# Patient Record
Sex: FEMALE | Race: WHITE
[De-identification: names, ages, dates, MRNs, and addresses within clinical notes are randomized per-mention and may not be internally consistent; named-entity substitution may affect disease eponyms.]

---

## 2019-06-25 NOTE — XMS REPORT
MU2 Ambulatory Summary

                             Created on: 2019



Elsi Casillas

External Reference #: [id number]

: 1940

Sex: Female



Demographics







                          Address                   [address] [place]  [address]

 

                          Home Phone                [phone]

 

                          Preferred Language        English

 

                          Marital Status            

 

                          Anabaptism Affiliation     Unknown

 

                          Race                      White

 

                          Ethnic Group              Not  or 





Author







                          Author                    DEON LEON

 

                          [facility]

 

                          Address                   [address]

[place], [place]  [address]



 

                          Phone                     [phone]







Care Team Providers







                    Care Team Member Name    Role                Phone

 

                    DEON LEON    PCP                 [phone]

 

                    DEON LEON    PreferredProvider    [phone]







Allergies and Adverse Reactions







                    Name                Reaction            Notes

 

                    SULFA (SULFONAMIDES)    nausea               







Plan of Treatment







             Planned Activity    Comments     Planned Date    Planned Time    Plan/Goal

 

             Lipid Profile                 2016    12:00 AM      

 

             Chest PA and Lateral - Main                 8/10/2017    12:00 AM      

 

             Lipid Profile                 2018    12:00 AM      







Medications







                                        Active 

 

             Name         Start Date    Estimated Completion Date    SIG          Comments

 

                Calcium 600 + D(3) 600 mg(1,500mg) -200 unit oral tablet                                    take 2 tablets by

 oral route daily                        

 

                pravastatin 20 mg oral tablet                                    take 1 tablet (20 mg) by oral route once daily

                                         

 

                Toprol XL 25 mg oral tablet extended release 24 hr                                    take 1 tablet (25 mg) 

by oral route once daily                 

 

                    albuterol sulfate 1.25 mg/3 mL inhalation solution for nebulization    2017           

                                        inhale 3 milliliters (1.25 mg) via nebulizer by inhalation route 4 times per day

  DX J44.9                               

 

                                        ipratropium-albuterol 0.5 mg-3 mg(2.5 mg base)/3 mL inhalation solution for nebulization

                    2018                                inhale 3 milliliters by nebulization route 4 times per day for COPD

                                         

 

                metoprolol succinate 25 mg oral tablet extended release 24 hr    2018                       TAKE

 1 TABLET DAILY                          

 

                Lasix 40 mg oral tablet    2018                       take 1 tablet (40 mg) by oral route once 

daily                                    

 

                fluticasone 50 mcg/actuation nasal spray,suspension    10/2/2018                       inhale 1 spray

 (50 mcg) in each nostril by intranasal route 2 times per day     

 

             hydrochlorothiazide 25 mg oral tablet    2018                 TAKE 1 TABLET DAILY     

 

                lisinopril 10 mg oral tablet    2018      3/11/2019       take 1 tablet (10 mg) by oral

 route once daily for 30 days            

 

                Sudogest 30 mg oral tablet    2018                      take 1-2 tablets by oral route every 

6 hours as needed                        

 

                amoxicillin 500 mg oral capsule    2019       take 1 capsule (500 mg) by

 oral route 3 times per day for 10 days     









                                         

 

             Name         Start Date    Expiration Date    SIG          Comments

 

                Singulair 10 mg oral tablet    9/3/2010        2011        take 1 tablet (10 mg) by oral route

 daily  for 60 days                      

 

                Zithromax Z-Christopher 250 mg oral tablet    2011       take 2 tablets (500 mg)

 by oral route once daily for 1 day then 1 tablet (250 mg) by oral route once 
daily for 4 days                         

 

                Medrol (Christopher) 4 mg oral tablets,dose pack    2011        take as directed

 for 6 days                              

 

                Keflex 500 mg oral capsule    3/1/2012        3/11/2012       take 1 capsule by oral route 3 

times a day for 10 days                  

 

                Cipro 500 mg oral tablet    2012        take 1 tablet (500 mg) by oral route

 every 12 hours for 15 days              

 

                benzonatate 100 mg oral capsule    2013       take 1 capsule (100 mg) by

 oral route 3 times per day for cough     

 

             Medrol (Christopher) 4 mg oral tablets,dose pack    2013     take as directed    

 

 

                Zyrtec 10 mg oral tablet    2013       take 1 tablet (10 mg) by oral route

 once daily for 30 days                  

 

                Flonase 50 mcg/actuation nasal spray,suspension    2013       inhale 1 spray

 in each nostril by intranasal route 2 times per day for 30 days     

 

                cephalexin 500 mg oral capsule    2013        take 1 capsule (500 mg) by oral

 route every 8 hours                     

 

                promethazine-codeine 6.25-10 mg/5 mL oral syrup    2013                       take 5 milliliters

 by oral route every 4 hours as needed, not to exceed 30 mL in 24 hours     

 

                Zithromax Z-Christopher 250 mg oral tablet    10/2/2013       10/7/2013       take 2 tablets (500 mg)

 by oral route once daily for 1 day then 1 tablet (250 mg) by oral route once 
daily for 4 days                         

 

                amoxicillin 500 mg oral capsule    2014       take 1 capsule by oral route

 3 times a day for 15 days               

 

                lovastatin 20 mg oral tablet    2014        take 1 tablet (20 mg) by oral 

route daily  for 30 days                 

 

                Zithromax Z-Christopher 250 mg oral tablet    2014       take 2 tablets (500 mg)

 by oral route once daily for 1 day then 1 tablet (250 mg) by oral route once 
daily for 4 days                         

 

             Medrol (Christopher) 4 mg oral tablets,dose pack    2014                 take as directed     

 

                amoxicillin 500 mg oral capsule    2014                       take 1 capsule (500 mg) by oral route

 3 times per day                         

 

                Levaquin 500 mg oral tablet    10/16/2014      10/26/2014      take 1 tablet (500 mg) by oral

 route once daily for 10 days            

 

                prednisone 20 mg oral tablet    10/16/2014      10/24/2014      4x2 days 3x2 days 2x2 days

 1x2 days                                

 

                Zithromax Z-Christopher 250 mg oral tablet    2014       take 2 tablets (500 mg)

 by oral route once daily for 1 day then 1 tablet (250 mg) by oral route once 
daily for 4 days                         

 

                Bactrim -160 mg oral tablet    1/15/2015       2015       take 1 tablet by oral route

 every 12 hours for 10 days              

 

                Zithromax Z-Christopher 250 mg oral tablet    2015      take 2 tablets (500 mg)

 by oral route once daily for 1 day then 1 tablet (250 mg) by oral route once 
daily for 4 days                         

 

                Keflex 500 mg oral capsule    2016       take 1 capsule by oral route 3

 times a day for 7 days                  

 

                amoxicillin 500 mg oral capsule    10/4/2016       10/14/2016      take 1 capsule by oral route

 3 times a day for 10 days               

 

                Tessalon Perles 100 mg oral capsule    10/4/2016                       take 1 capsule by oral route 

every 4 hours                            

 

                Zithromax Z-Christopher 250 mg oral tablet    11/10/2016      11/15/2016      take 2 tablets (500 

mg) by oral route once daily for 1 day then 1 tablet (250 mg) by oral route once
daily for 4 days                         

 

                amoxicillin 500 mg oral tablet    2016                       take 1 tablet (500 mg) by oral route

 3 times per day                         

 

                amoxicillin 500 mg oral capsule    2017       take 1 capsule (500 mg) by

 oral route 3 times per day for 10 days     

 

                Bactrim -160 mg oral tablet    2017       take 1 tablet by oral route

 2 times a day for 10 days               

 

                Levaquin 500 mg oral tablet    2017        take 1 tablet (500 mg) by oral

 route once daily for 10 days            

 

                amoxicillin 500 mg oral capsule    2017                       take 1 capsule (500 mg) by oral route

 every 12 hours for 7 days               

 

                Zithromax Z-Christopher 250 mg oral tablet    2017                      take 2 tablets (500 mg) by oral

 route once daily for 1 day then 1 tablet (250 mg) by oral route once daily for 
4 days                                   

 

                amoxicillin 500 mg oral tablet    2018                       take 1 tablet (500 mg) by oral route

 every 12 hours for 10 days              

 

                Cipro 500 mg oral tablet    2018        2/15/2018       take 1 tablet (500 mg) by oral route

 2 times per day for 10 days             

 

                Zithromax Z-Christopher 250 mg oral tablet    2018       take 2 tablets (500 mg)

 by oral route once daily for 1 day then 1 tablet (250 mg) by oral route once 
daily for 4 days                         

 

                Keflex 500 mg oral capsule    2018       take 1 capsule (500 mg) by oral

 route every 12 hours for 7 days         

 

                prednisone 20 mg oral tablet    2018       2X4 days 1X4 days 1/2X4 days 

                                         

 

                Levaquin 500 mg oral tablet    2018       take 1 tablet (500 mg) by oral

 route once daily for 7 days             









                                        Discontinued 

 

             Name         Start Date    Discontinued Date    SIG          Comments

 

                amoxicillin 500 mg oral capsule    11/10/2011      10/3/2012       take 1 capsule (500 mg) 

by oral route 3 times per day            

 

                Anusol-HC 25 mg rectal suppository    2011      10/3/2012       insert 1 suppository 

(25 mg) by rectal route 2 times per day     

 

                Proctofoam 1 % topical foam    2011       apply by external route daily

                                         

 

             Aerochamber    2014    Use with inhaler as directed     

 

                hydrochlorothiazide 25 mg oral tablet    1/15/2015       2015      TAKE 1 TABLET DAILY

                                         

 

                promethazine-codeine 6.25-10 mg/5 mL oral syrup    11/10/2016      10/25/2017      take 5 

milliliters by oral route every 6 hours as needed, not to exceed 30 mL in 24 
hours                                    

 

                prednisone 20 mg oral tablet    2017       10/25/2017      4 x 2 days, 3 x 2 days, 2 x

 2 days 1 x 2 days                       

 

                Augmentin 875-125 mg oral tablet    2017       take 1 tablet by oral route

 every 12 hours for 7 days               

 

                Keflex 500 mg oral capsule    2017       take 1 capsule (500 mg) by oral

 route every 12 hours                    

 

                Keflex 500 mg oral capsule    10/10/2017      2018       take 1 capsule (500 mg) by oral

 route every 12 hours for 10 days        

 

                    Symbicort 160-4.5 mcg/actuation inhalation HFA aerosol inhaler    10/25/2017          10/6/2018

                          inhale 2 puffs by inhalation route 2 times per day in the morning and evening    

 

 

                Levaquin 500 mg oral tablet    2018       take 1 tablet (500 mg) by oral

 route once daily for 10 days            

 

                Bactrim -160 mg oral tablet    2018       take 1 tablet by oral route

 every 12 hours for 10 days             nausea

 

                Tessalon Perles 100 mg oral capsule    2018        10/6/2018       take 1 capsule (100 mg)

 by oral route 3 times per day as needed for cough     

 

                Sudogest 30 mg oral tablet    2018        10/6/2018       take 1 tablets (60 mg) by oral 

route every 6 hours as needed            







Problem List







                    Description         Status              Onset

 

                    Chronic Obstructive Pulmonary Disease    Active               

 

                    Hyperlipidemia      Active               

 

                    Anxiety disorder    Active              10/29/2013

 

                    Pain in joint; Hip    Active              06/10/2014

 

                    Pulmonary nodule seen on imaging study    Active              10/29/2014

 

                    Exercise hypoxemia    Active              10/29/2014

 

                    Anxiety about health    Active              2016

 

                    Primary osteoarthritis involving multiple joints    Active              2017

 

                    Medication management    Active              2017

 

                    Cellulitis of left lower extremity    Active              2017

 

                    Dog bite of calf, left, sequela    Active              2017

 

                    Peripheral edema    Active              2018







Vital Signs







      Date    Time    BP-Sys(mm[Hg]    BP-Noni(mm[Hg])    HR(bpm)    RR(rpm)    Temp    WT    HT    HC    BMI

                    BSA                 BMI Percentile      O2 Sat(%)

 

        2019    3:30:00 PM    108 mmHg    72 mmHg    82 bpm    18 rpm    98.1 F    142 lbs    62 in 

                          25.9719 kg/m    1.6786 m                 89 %

 

        2018    9:30:00 AM    154 mmHg    100 mmHg    108 bpm    20 rpm    98.1 F    145 lbs    62 

in                        26.52 kg/m2    1.70 m2                   91 %

 

        10/4/2018    9:00:00 AM    114 mmHg    74 mmHg    76 bpm    18 rpm    98.4 F    145 lbs    61 in

                          27.3972 kg/m    1.6825 m                 97 %

 

        2018    10:07:00 AM    124 mmHg    82 mmHg    86 bpm    18 rpm    98.2 F    149 lbs    61 in

                          28.15 kg/m2    1.71 m2                   92 %

 

        2018    10:48:00 AM    118 mmHg    80 mmHg    82 bpm    18 rpm    98.3 F    144 lbs    61 in

                          27.2083 kg/m    1.6767 m                 93 %

 

        2018    2:35:00 PM    120 mmHg    82 mmHg    106 bpm    20 rpm    98.2 F    142 lbs    62 in

                          25.97 kg/m2    1.68 m2                   92 %

 

       2018    3:28:00 PM    122 mmHg    68 mmHg    114 bpm    18 rpm    98.2 F    142 lbs            

                                                                90 %

 

        2018    1:35:00 PM    112 mmHg    74 mmHg    114 bpm    18 rpm    99.6 F    139 lbs    63 in

                          24.6225 kg/m    1.6741 m                 98 %

 

       2018    3:57:00 PM    128 mmHg    80 mmHg    78 bpm    18 rpm    98.4 F    148 lbs             

                                                                90 %

 

        10/24/2017    1:51:00 PM    132 mmHg    80 mmHg    82 bpm    16 rpm    98.2 F    145 lbs    62 in

                          26.52 kg/m2    1.70 m2                   95 %

 

       2017    3:29:00 PM    128 mmHg    80 mmHg    68 bpm    16 rpm    98 F    144 lbs    63 in    

                25.5082 kg/m    1.7039 m                      93 %

 

        2017    11:37:00 AM    118 mmHg    72 mmHg    96 bpm    16 rpm    97.4 F    141 lbs    63 in

                          24.98 kg/m2    1.69 m2                   91 %

 

        2017    8:55:00 AM    105 mmHg    60 mmHg    96 bpm    18 rpm    95.8 F    142 lbs    63 in 

                          25.1539 kg/m    1.6921 m                 95 %

 

       2017    12:10:00 PM    122 mmHg    68 mmHg    76 bpm    18 rpm    98 F    143 lbs    63 in    

                25.33 kg/m2     1.70 m2                         98 %

 

        2017    4:03:00 PM    118 mmHg    64 mmHg    106 bpm    18 rpm    97.4 F    137 lbs    63 in

                          24.2682 kg/m    1.662 m                 98 %

 

        2016    12:11:00 PM    120 mmHg    84 mmHg    110 bpm    16 rpm    98.1 F    130 lbs    63

 in                       23.03 kg/m2    1.62 m2                   90 %

 

        11/10/2016    3:57:00 PM    148 mmHg    72 mmHg    88 bpm    16 rpm    98.2 F    132 lbs    63 in

                          23.3825 kg/m    1.6314 m                 98 %

 

        3/10/2016    2:12:00 PM    122 mmHg    60 mmHg    106 bpm    18 rpm    97 F    137 lbs    63 in 

                          24.27 kg/m2    1.66 m2                   93 %

 

        12/15/2015    2:33:00 PM    120 mmHg    75 mmHg    102 bpm    16 rpm    98.2 F    137 lbs    63 

in                        24.2682 kg/m    1.662 m                 94 %

 

        10/26/2015    2:46:00 PM    130 mmHg    80 mmHg    96 bpm    16 rpm    97.9 F    141 lbs    66 in

                          22.76 kg/m2    1.73 m2                   98 %

 

        10/6/2015    9:37:00 AM    140 mmHg    78 mmHg    110 bpm    16 rpm    97.9 F    141 lbs    63 in

                          24.9768 kg/m    1.6861 m                 95 %

 

        10/28/2014    2:25:00 PM    132 mmHg    66 mmHg    92 bpm    24 rpm    97.5 F    147 lbs    63 in

                          26.04 kg/m2    1.72 m2                   92 %

 

        10/16/2014    9:45:00 AM    132 mmHg    64 mmHg    74 bpm    24 rpm    97.7 F    146 lbs    63 in

                          25.8625 kg/m    1.7157 m                 92 %

 

        2014    2:31:00 PM    136 mmHg    68 mmHg    90 bpm    22 rpm    98.2 F    148 lbs    63 in 

                          26.22 kg/m2    1.73 m2                   95 %

 

        2014    3:19:00 PM    124 mmHg    70 mmHg    64 bpm    20 rpm    97.8 F    147 lbs    63 in

                          26.0396 kg/m    1.7216 m                 95 %

 

        10/28/2013    10:31:00 AM    140 mmHg    70 mmHg    92 bpm    24 rpm    97.8 F    143 lbs    63 

in                        25.33 kg/m2    1.70 m2                   95 %

 

      2013    2:51:00 PM    130 mmHg    78 mmHg    90 bpm          98.2 F    168 lbs    65 in          

27.9564 kg/m      1.8694 m                               

 

       2013    2:43:00 PM    110 mmHg    76 mmHg    103 bpm           96.6 F    137 lbs    63 in      

                24.27 kg/m2     1.66 m2                          

 

        2013    9:50:00 AM    150 mmHg    66 mmHg    108 bpm    20 rpm    97.8 F    138 lbs    63 in

                          24.4454 kg/m    1.6681 m                 94 %

 

        10/25/2012    9:08:00 AM    110 mmHg    60 mmHg    88 bpm    18 rpm    97.5 F    142 lbs    63 in

                          25.15 kg/m2    1.69 m2                   94 %

 

      10/3/2012    9:33:00 AM    130 mmHg    60 mmHg    98 bpm    18 rpm          146 lbs    63 in          

25.8625 kg/m      1.7157 m                              95 %

 

      2012    2:31:00 PM    116 mmHg    76 mmHg    88 bpm                140 lbs    63 in          24.80 

kg/m2               1.68 m2                                 96 %

 

     2011    10:02:00 AM    122 mmHg    80 mmHg    110 bpm              144 lbs                        

                                        94 %

 

      2011    2:58:00 PM    124 mmHg    84 mmHg    106 bpm                155 lbs    63 in          27.4567

 kg/m             1.7678 m                              96 %

 

     2011    9:55:00 AM    114 mmHg    78 mmHg    92 bpm              146 lbs                             95

 %

 

     6/3/2011    8:36:00 AM    116 mmHg    74 mmHg    90 bpm              146 lbs                             96

 %

 

     2010    3:01:00 PM    132 mmHg    84 mmHg    102 bpm              149 lbs                          

                                        94 %

 

     2010    11:46:00 AM    124 mmHg    78 mmHg    104 bpm              151 lbs                         

                                        94 %

 

     2010    8:47:00 AM    116 mmHg    78 mmHg    103 bpm              151 lbs                          

                                        95 %







Social History







                    Name                Description         Comments

 

                    Alcohol             Never                

 

                    Uses seatbelts                           

 

                    Tobacco             Never smoker         







History of Procedures







                    Date Ordered        Description         Order Status

 

                    2011 12:00 AM    THER/PROPH/DIAG INJ SC/IM    Reviewed

 

                    2011 12:00 AM    Decadron Inj.1mg-([facility]) N[facility] #[id number]    Reviewed

 

                    2011 12:00 AM    Depo-Medrol 80 Mg Im/[facility] 0009-128722    Reviewed

 

                    2011 12:00 AM    Rocephin, Per 250MG - 1 Gram Vial NDC 0979-330244-[facility]    Reviewed



 

                    3/16/2012 12:00 AM    ROUTINE VENIPUNCTURE    Reviewed

 

                    3/16/2012 12:00 AM    COMPLETE CBC W/AUTO DIFF WBC    Reviewed

 

                    3/16/2012 12:00 AM    COMPREHEN METABOLIC PANEL    Reviewed

 

                    3/16/2012 12:00 AM    ASSAY THYROID STIM HORMONE    Reviewed

 

                    11/10/2016 12:00 AM    Decadron, Per 1 Mg N[facility]# [address]-64    Reviewed

 

                    11/10/2016 12:00 AM    Depo-Medrol, Per 80 Mg NDC#1614-7108-25    Reviewed

 

                    11/10/2016 12:00 AM    Rocephin 1 gram NDC#3820-8517-17    Reviewed

 

                    2016 12:00 AM    THER/PROPH/DIAG INJ SC/IM    Reviewed

 

                    2016 12:00 AM    Decadron 8mg Injection, [facility]    Reviewed

 

                    2016 12:00 AM    Depo-Medrol 80mg Injection, [facility]    Reviewed

 

                    2016 12:00 AM    Rocephin 1 gram Injection, [facility]    Reviewed

 

                    2017 12:00 AM    COMPLETE CBC W/AUTO DIFF WBC    Reviewed

 

                    2017 12:00 AM    COMPREHEN METABOLIC PANEL    Reviewed

 

                    2017 12:00 AM    LIPID PANEL         Reviewed

 

                    2017 12:00 AM    THERAPEUTIC PROPHYLACTIC/DX INJECTION SUBQ/IM    Reviewed

 

                    2017 12:00 AM    Decadron 8mg Injection, [facility]    Reviewed

 

                    2017 12:00 AM    Depo-Medrol 80mg Injection, [facility]    Reviewed

 

                    2017 12:00 AM    LIPID PANEL         Returned

 

                    2017 12:00 AM    THERAPEUTIC PROPHYLACTIC/DX INJECTION SUBQ/IM    Reviewed

 

                    2017 12:00 AM    Decadron 8mg Injection, [facility]    Reviewed

 

                    2017 12:00 AM    Depo-Medrol 80mg Injection, [facility]    Reviewed

 

                    10/3/2012 12:00 AM    THER/PROPH/DIAG INJ SC/IM    Reviewed

 

                    10/3/2012 12:00 AM    Decadron, Per 1 Mg NDC# [address]-17    Reviewed

 

                    10/3/2012 12:00 AM    Depo-Medrol, Per 80 Mg NDC#6732-9430-36    Reviewed

 

                    10/3/2012 12:00 AM    Toradol 60 Mg NDC#4621-0774-48    Reviewed

 

                    10/24/2017 12:00 AM    THERAPEUTIC PROPHYLACTIC/DX INJECTION SUBQ/IM    Reviewed

 

                    10/24/2017 12:00 AM    Decadron 8mg Injection, [facility] Medicare    Reviewed

 

                    10/24/2017 12:00 AM    Depo-Medrol 80mg Injection, [facility] Medicare    Reviewed

 

                    2018 12:00 AM    THERAPEUTIC PROPHYLACTIC/DX INJECTION SUBQ/IM    Reviewed

 

                    2018 12:00 AM    Rocephin 1 gram Injection, [facility] Medicare    Reviewed

 

                    2018 12:00 AM    THERAPEUTIC PROPHYLACTIC/DX INJECTION SUBQ/IM    Reviewed

 

                    2018 12:00 AM    Decadron 8mg Injection, [facility] Medicare    Reviewed

 

                    2018 12:00 AM    Depo-Medrol 80mg Injection, [facility] Medicare    Reviewed

 

                    2018 12:00 AM    Rocephin 1 gram Injection, [facility]    Reviewed

 

                    2018 12:00 AM    COMPLETE CBC W/AUTO DIFF WBC    Returned

 

                    2018 12:00 AM    COMPREHEN METABOLIC PANEL    Returned

 

                    2018 12:00 AM    ELECTROCARDIOGRAM TRACING    Reviewed

 

                    2013 12:00 AM    THER/PROPH/DIAG INJ SC/IM    Reviewed

 

                    2013 12:00 AM    Decadron, Per 1 Mg NDC# [address]-11    Reviewed

 

                    2013 12:00 AM    Depo-Medrol, Per 80 Mg NDC#6850-2524-94    Reviewed

 

                    2013 12:00 AM    Toradol 60 Mg NDC#8113-8579-72    Reviewed

 

                    2019 12:00 AM    THERAPEUTIC PROPHYLACTIC/DX INJECTION SUBQ/IM    Reviewed

 

                    2019 12:00 AM    Decadron 8mg Injection, [facility] Medicare    Reviewed

 

                    2019 12:00 AM    Depo-Medrol 80mg Injection, [facility] Medicare    Reviewed

 

                    2010 12:00 AM    ROUTINE VENIPUNCTURE    Reviewed

 

                    2010 12:00 AM    COMPLETE CBC W/AUTO DIFF WBC    Reviewed

 

                    2010 12:00 AM    COMPREHEN METABOLIC PANEL    Reviewed

 

                    2010 12:00 AM    LIPID PANEL         Reviewed

 

                    10/28/2014 12:00 AM    CHEST X-RAY 4/> VIEWS    Reviewed

 

                    6/3/2011 12:00 AM    ROUTINE VENIPUNCTURE    Reviewed

 

                    6/3/2011 12:00 AM    COMPLETE CBC AUTOMATED    Reviewed

 

                    6/3/2011 12:00 AM    COMPREHEN METABOLIC PANEL    Reviewed

 

                    6/3/2011 12:00 AM    LIPID PANEL         Reviewed

 

                    2011 12:00 AM    THER/PROPH/DIAG INJ SC/IM    Reviewed

 

                    2011 12:00 AM    Decadron Inj.8mg-([place]) Ndc #[id number]    Reviewed

 

                    2011 12:00 AM    Depo-Medrol 80 Mg Im/[place] NDC 0009-116564    Reviewed







Results Summary







                          Date and Description      Results

 

                          6/3/2011 1:53 PM          TRIGLYCERIDES 155.0 mg/dLCHOLESTEROL 248.0 mg/dLHDL 51.0 mg/dLTOT

 CHOL/HDL 4.9 .0 mg/dLGLUCOSE 97.0 mg/dLSODIUM 139.0 mmol/LPOTASSIUM 3.70
 mmol/LCHLORIDE 101.0 mmol/LCO2 27.0 mmol/LBUN 19.0 mg/dLCREATININE 0.90 
mg/dLSGOT/AST 19.0 IU/LSGPT/ALT 11.0 IU/LALK PHOS 111.0 IU/LTOTAL PROTEIN 7.40 
g/dLALBUMIN 4.20 g/dLTOTAL BILI 0.50 mg/dLCALCIUM 9.50 mg/dLAGE 70 GFR NonAA 62 
GFR AA 75 eGFR >60 mL/min/1.73 m2eGFR AA* >60 







History Of Immunizations

Not available.



History of Past Illness







                    Name                Date of Onset       Comments

 

                    Chronic Obstructive Pulmonary Disease                         

 

                    Hyperlipidemia                           

 

                    Cough               2010  8:49AM     

 

                    General Medical Exam, Adult    2010  8:49AM     

 

                    Seasonal Allergies    2010  8:49AM     

 

                    Sinusitis, Chronic    2010  8:49AM     

 

                    Ganglion cyst of synovium, tendon, and bursa; other    2010 11:48AM     

 

                    Anxiety disorder    10/29/2013           

 

                    Pain in joint; Hip    06/10/2014           

 

                    Pulmonary nodule seen on imaging study    10/29/2014           

 

                    Exercise hypoxemia    10/29/2014           

 

                    Chronic Obstructive Pulmonary Disease    2010  3:02PM     

 

                    Edema               2010  3:02PM     

 

                    Sinusitis, Acute    2010  3:02PM     

 

                    Anxiety about health    2016           

 

                    Chronic Obstructive Pulmonary Disease    Trey  3 2011  8:38AM     

 

                    Palpitations        Trey  3 2011  8:38AM     

 

                    Cough               2011  9:54AM     

 

                    Sinusitis, Acute    2011  9:54AM     

 

                    Seasonal Allergies    2011  9:54AM     

 

                    Post-nasal drainage    2011  9:54AM     

 

                    Primary osteoarthritis involving multiple joints    2017           

 

                    Medication management    2017           

 

                    Cellulitis of left lower extremity    2017           

 

                    Dog bite of calf, left, sequela    2017           

 

                    Cough               Sep 22 2011  2:55PM     

 

                    Seasonal Allergies    Sep 22 2011  2:55PM     

 

                    Pharyngitis, Acute    Sep 22 2011  2:55PM     

 

                    Post-nasal drainage    Sep 22 2011  2:55PM     

 

                    Peripheral edema    2018           

 

                    Blood In Stool, Occult    2011  9:58AM     

 

                    Hemorrhoids         2011  9:58AM     

 

                    Chronic Obstructive Pulmonary Disease    2012  2:33PM     

 

                    Cardiac Dysrhythmia    2012  2:33PM     

 

                    Sinoatrial Node Dysfunction    Mar 16 2012  9:12AM     

 

                    Palpitations        Mar 16 2012  9:12AM     

 

                    Osteoarthrosis, generalized, multiple sites    Oct  3 2012  9:34AM     

 

                    Pain in joint; Hip    Oct  3 2012  9:34AM     

 

                    Osteoarthrosis      Oct 25 2012  9:09AM     

 

                    Bronchitis, Acute    Oct 25 2012  9:09AM     

 

                    Cough               Oct 25 2012  9:09AM     

 

                    Pain in joint; Left Hip    Oct 25 2012  9:09AM     

 

                    Pain in joint; Hip    2013  9:50AM     

 

                    Osteoarthrosis, generalized, multiple sites    2013  2:45PM     

 

                    Pain in joint; Hip bilateral    2013  2:45PM     

 

                    Anxiety Disorder    Oct 28 2013 10:32AM     

 

                    Chronic Obstructive Pulmonary Disease    Oct 28 2013 10:32AM     

 

                    Hyperlipidemia      Oct 28 2013 10:32AM     

 

                    Pain in joint; Left Hip    Oct 28 2013 10:32AM     

 

                    Sinusitis, Acute    Oct 28 2013 10:32AM     

 

                    Essential Hypertension    2014  3:19PM     

 

                    Osteoarthrosis, generalized, multiple sites    2014  3:19PM     

 

                    Chronic Obstructive Pulmonary Disease    2014  3:19PM     

 

                    Pain in joint; Hip    2014  3:19PM     

 

                    Chronic Obstructive Pulmonary Disease    2014  2:31PM     

 

                    Pain in joint; Hip    2014  2:31PM     

 

                    pre-operative examination, unspecified    2014  2:31PM     

 

                    Lung nodule seen on imaging study    Oct 28 2014 10:24AM     

 

                    Bronchitis, Acute    Oct 16 2014  9:45AM     

 

                    Respiratory System And Chest Symptoms    Oct 16 2014  9:45AM     

 

                    COPD (chronic obstructive pulmonary disease)    Oct 16 2014  9:45AM     

 

                    Chronic Obstructive Pulmonary Disease    Oct 28 2014  2:26PM     

 

                    Pulmonary nodule seen on imaging study    Oct 28 2014  2:26PM     

 

                    Shortness of breath    Oct 28 2014  2:26PM     

 

                    Exercise hypoxemia    Oct 28 2014  2:26PM     

 

                    Nail avulsion       Oct  6 2015  9:38AM     

 

                          Contusion of left index finger with damage to nail, initial encounter    Oct 26 2015

  2:53PM                                 

 

                    Forehead contusion, subsequent encounter    Dec 15 2015  2:39PM     

 

                    Generalized anxiety disorder    Dec 15 2015  2:39PM     

 

                    Chronic Obstructive Pulmonary Disease    Dec 15 2015  2:39PM     

 

                    Osteoarthrosis, generalized, multiple sites    Mar 10 2016  2:12PM     

 

                    Pain of right thigh    Mar 10 2016  2:12PM     

 

                    Mild Acute Hip pain, acute, right Improving    Mar 10 2016  2:12PM     

 

                    Anxiety about health    Mar 10 2016  2:12PM     

 

                    Hyperlipidemia      Aug 22 2016 10:58AM     

 

                    Sinoatrial Node Dysfunction    Aug 22 2016 10:58AM     

 

                    Palpitations        Aug 22 2016 10:58AM     

 

                    Chronic Obstructive Pulmonary Disease    Aug 22 2016 10:58AM     

 

                    Exercise hypoxemia    Aug 22 2016 10:58AM     

 

                    Pain in joint; Hip    Aug 22 2016 10:58AM     

 

                    Pulmonary nodule seen on imaging study    Aug 22 2016 10:58AM     

 

                    Eustachian tube dysfunction, bilateral    Nov 10 2016  3:57PM     

 

                    Moderate Acute Cough    Nov 10 2016  3:57PM     

 

                    Pharyngitis, Acute    Nov 10 2016  3:57PM     

 

                    Upper Respiratory Infection    Nov 10 2016  3:57PM     

 

                          COPD (chronic obstructive pulmonary disease) with acute bronchitis    Nov 10 2016 

 3:57PM                                  

 

                    Mild Chronic Cough Worsening    Dec 12 2016 12:12PM     

 

                          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

 12 2016 12:12PM                         

 

                    Moderate Shortness of breath on exertion    Dec 12 2016 12:12PM     

 

                    Hyperlipidemia      May  4 2017 10:27AM     

 

                    Sinoatrial Node Dysfunction    May  4 2017 10:27AM     

 

                    Palpitations        May  4 2017 10:27AM     

 

                    Chronic Obstructive Pulmonary Disease    May  4 2017 10:27AM     

 

                    Exercise hypoxemia    May  4 2017 10:27AM     

 

                    Pain in joint; Hip    May  4 2017 10:27AM     

 

                    Pulmonary nodule seen on imaging study    May  4 2017 10:27AM     

 

                    Chest congestion    May 16 2017  4:04PM     

 

                          COPD (chronic obstructive pulmonary disease) with acute bronchitis    May 16 2017 

 4:04PM                                  

 

                    Productive cough    May 16 2017  4:04PM     

 

                    Anxiety about health    May 16 2017  4:04PM     

 

                          Chronic obstructive pulmonary disease, unspecified COPD type    May 16 2017  4:04PM

                                         

 

                    Exercise hypoxemia    May 16 2017  4:04PM     

 

                    Mixed hyperlipidemia    May 16 2017  4:04PM     

 

                    Medication management    May 16 2017  4:04PM     

 

                    Primary osteoarthritis involving multiple joints    May 16 2017  4:04PM     

 

                    Cellulitis of left lower extremity    2017 12:10PM     

 

                    Bitten by dog, initial encounter    2017 12:10PM     

 

                    Cellulitis of left lower extremity    2017  8:56AM     

 

                    Open bite, left lower leg, sequela    2017  8:56AM     

 

                    Bitten by dog, sequela    2017  8:56AM     

 

                    Medication management    2017  8:56AM     

 

                    Cellulitis of left lower extremity    2017 11:38AM     

 

                    Open bite, left lower leg, subsequent encounter    2017 11:38AM     

 

                    Bitten by dog, subsequent encounter    2017 11:38AM     

 

                    Medication management    2017 11:38AM     

 

                    Cough               Aug 10 2017  4:09PM     

 

                    Abnormal chest xray    Aug 10 2017  4:09PM     

 

                    Hyperlipidemia      Aug 29 2017  9:02AM     

 

                    Sinoatrial Node Dysfunction    Aug 29 2017  9:02AM     

 

                    Palpitations        Aug 29 2017  9:02AM     

 

                    Chronic Obstructive Pulmonary Disease    Aug 29 2017  9:02AM     

 

                    Exercise hypoxemia    Aug 29 2017  9:02AM     

 

                    Pain in joint; Hip    Aug 29 2017  9:02AM     

 

                    Pulmonary nodule seen on imaging study    Aug 29 2017  9:02AM     

 

                    Eustachian tube dysfunction, bilateral    Aug 28 2017  3:30PM     

 

                    Purulent postnasal drainage    Aug 28 2017  3:30PM     

 

                    Chest congestion    Aug 28 2017  3:30PM     

 

                    Upper respiratory tract infection, unspecified type    Aug 28 2017  3:30PM     

 

                    Moderate Acute Sinus pressure    Aug 28 2017  3:30PM     

 

                          COPD (chronic obstructive pulmonary disease) with acute bronchitis    Aug 28 2017 

 3:30PM                                  

 

                    Moderate Chronic Purulent postnasal drainage    Oct 24 2017  1:52PM     

 

                    Mild Chronic Sinus pressure    Oct 24 2017  1:52PM     

 

                          Moderate Chronic Acute seasonal allergic rhinitis, unspecified trigger Stable    Oct

 24 2017  1:52PM                         

 

                    Mild Acute Labyrinthitis    Oct 24 2017  1:52PM     

 

                    Moderate Acute Vertigo    Oct 24 2017  1:52PM     

 

                    Purulent postnasal drainage    2018  3:58PM     

 

                    Upper respiratory tract infection, unspecified type    2018  3:58PM     

 

                    Mild Acute Left Enlarged lymph node in neck    2018  3:58PM     

 

                    Cough               2018  1:36PM     

 

                    Moderate Acute Recurrent Chest congestion    Feb   2018  1:36PM     

 

                    COPD exacerbation    2018  1:36PM     

 

                          Chronic obstructive pulmonary disease with acute lower respiratory infection    2018  1:36PM                         

 

                    Acute bronchitis, unspecified    2018  1:36PM     

 

                    Cough               2018  3:29PM     

 

                    Acute pharyngitis, unspecified etiology    2018  3:29PM     

 

                    Chest congestion    2018  3:29PM     

 

                    COPD (chronic obstructive pulmonary disease)    2018  3:29PM     

 

                    Cellulitis of left lower extremity    May 22 2018  2:35PM     

 

                    Cellulitis of left lower extremity    2018 10:48AM     

 

                    Peripheral edema    2018 10:48AM     

 

                    Hyperlipidemia      Aug 22 2018 10:03AM     

 

                    Sinoatrial Node Dysfunction    Aug 22 2018 10:03AM     

 

                    Palpitations        Aug 22 2018 10:03AM     

 

                    Chronic Obstructive Pulmonary Disease    Aug 22 2018 10:03AM     

 

                    Exercise hypoxemia    Aug 22 2018 10:03AM     

 

                    Pain in joint; Hip    Aug 22 2018 10:03AM     

 

                    Pulmonary nodule seen on imaging study    Aug 22 2018 10:03AM     

 

                    Exercise Counseling    Aug 21 2018 10:08AM     

 

                    Moderate Acute Bilateral Peripheral edema    Aug 21 2018 10:08AM     

 

                          Chronic obstructive pulmonary disease, unspecified COPD type    Aug 21 2018 10:08AM

                                         

 

                    Medication management    Aug 21 2018 10:08AM     

 

                    Acute nasopharyngitis (common cold)    Oct  4 2018  9:03AM     

 

                    Purulent postnasal drainage    Oct  4 2018  9:03AM     

 

                    Ear pain, right     Oct  4 2018  9:03AM     

 

                    Essential hypertension    2018  9:31AM     

 

                    Ear pain, bilateral    2018  9:31AM     

 

                    Purulent postnasal drainage    2019  3:35PM     

 

                    Chest congestion    2019  3:35PM     

 

                    Upper respiratory tract infection, unspecified type    2019  3:35PM     

 

                    Sinus pressure      2019  3:35PM     

 

                          Chronic obstructive pulmonary disease with acute lower respiratory infection    2019  3:35PM                         

 

                    Acute bronchitis, unspecified    2019  3:35PM     







Payers







           Insurance Name    Company Name    Plan Name    Plan Number    Policy Number    Policy Group

 Number                                 Start Date

 

                    Medicare [employer]    Medicare [employer]              [id number]              N/A

 

                          [place] [employer]    [place] [employer]                 [id number]

                                                    N/A

 

                    [employer] Medicare A    Test Medicare A              [id number]              N/A

 

                                [facility] [place] [facility]      

                    [id number]                             N/A

 

                    Medicare Part A    Medicare - Lab/Xray              [id number]              N/A

 

                          [place] [employer]    [facility] - C                 [id number]

                                                    N/A

 

                    Medicare Part A    Medicare Part A              [id number]              N/A

 

                    Medicare Part B    [employer] [place]              [id number]              N/A







History of Encounters







                    Visit Date          Visit Type          Provider

 

                    2019            Office visit        DEON LEON PA

 

                    2018           Office visit        DEON LEON PA

 

                    10/2/2018           Office visit        DEON LEON PA

 

                    2018           Office visit        DEON LEON PA

 

                    2018           Office visit        DEON LEON PA

 

                    2018           Office visit        DEON LEON PA

 

                    2018           Office visit        DEON LEON PA

 

                    2018            Office visit        DEON LEON PA

 

                    2018           Office visit        DEON LEON PA

 

                    10/24/2017          Office visit        DEON LEON PA

 

                    2017           Voided              DEON LEON PA

 

                    2017           Office visit        DEON LEON PA

 

                    2017           Office visit        DEON LEON PA

 

                    2017            Office visit        DEON LEON PA

 

                    2017            Office visit        DEON LEON PA

 

                    2017           Office visit        DEON LEON PA

 

                    2016          Office visit        DEON LEON PA

 

                    11/10/2016          Office visit        DEON LEON PA

 

                    3/10/2016           Office visit        DEON LEON PA

 

                    12/15/2015          Office visit        DEON LEON PA

 

                    10/26/2015          Office visit        DEON LEON PA

 

                    10/6/2015           Office visit        DEON LEON PA

 

                    10/28/2014          Office visit        DEON LEON PA

 

                    10/16/2014          Office visit        DEON LEON PA

 

                    2014            Office visit        DEON LEON PA

 

                    2014           [facility]            DEWEY Garcia MD

 

                    2014           Office visit        DEON LEON PA

 

                    10/28/2013          Office visit        DEON LEON PA

 

                    10/14/2013          [facility]            DEWEY Garcia MD

 

                    2013           Office visit        EDON LEON PA

 

                    2013            Office visit        DEON LEON PA

 

                    10/25/2012          Office visit        DEON LEON PA

 

                    10/3/2012           Office visit        DEON LEON PA

 

                    3/16/2012           Office visit        DEON ESCALANTE

 

                    2012            Office visit        DEON ESCALANTE

 

                    2012            [facility]            DEWEY Garcia MD

 

                    2011          Office visit        DEON ESCALANTE

 

                    2011           Office visit        Deon ESCALANTE-C

 

                    2011            Office visit        Deon ESCALANTE-C

 

                    6/3/2011            Office visit        Deon ESCALANTE-C

 

                    2010           Office visit        Deon CASTROC

 

                    2010           Office visit        Deon ESCALANTE-C

 

                    2010           Office visit        Deon CASTROC

## 2019-06-25 NOTE — XMS REPORT
MU2 Ambulatory Summary

                             Created on: 2018



Elsi Casillas

External Reference #: [id number]

: 1940

Sex: Female



Demographics







                          Address                   [address] [place]  [address]

 

                          Home Phone                [phone]

 

                          Preferred Language        English

 

                          Marital Status            

 

                          [employer] Affiliation     Unknown

 

                          Race                      White

 

                          Ethnic Group              Not  or 





Author







                          Author                    DEON LEON

 

                          [facility]

 

                          Address                   [address]

[place], [place]  [id number]



 

                          Phone                     [phone]







Care Team Providers







                    Care Team Member Name    Role                Phone

 

                    DEON LEON    PCP                 [phone]

 

                    DEON LEON    PreferredProvider    [phone]







Allergies and Adverse Reactions







                    Name                Reaction            Notes

 

                    NO KNOWN DRUG ALLERGIES                         







Plan of Treatment







             Planned Activity    Comments     Planned Date    Planned Time    Plan/Goal

 

             Lipid Profile                 2016    12:00 AM      

 

             Chest PA and Lateral - Main                 8/10/2017    12:00 AM      

 

             Injection,Subcutaneous/Intramuscul, [facility]                 2018    12:00 AM      







Medications







                                        Active 

 

             Name         Start Date    Estimated Completion Date    SIG          Comments

 

                Calcium 600 + D(3) 600 mg(1,500mg) -200 unit oral tablet                                    take 2 tablets by

 oral route daily                        

 

                pravastatin 20 mg oral tablet                                    take 1 tablet (20 mg) by oral route once daily

                                         

 

                Toprol XL 25 mg oral tablet extended release 24 hr                                    take 1 tablet (25 mg) 

by oral route once daily                 

 

                                        ipratropium-albuterol 0.5 mg-3 mg(2.5 mg base)/3 mL inhalation solution for nebulization

                    2016                              inhale 3 milliliters by nebulization route 4 times per day for COPD

                                         

 

                    albuterol sulfate 1.25 mg/3 mL inhalation solution for nebulization    2017           

                                        inhale 3 milliliters (1.25 mg) via nebulizer by inhalation route 4 times per day

  DX J44.9                               

 

                Symbicort 160-4.5 mcg/actuation inhalation HFA aerosol inhaler    10/25/2017                      inhale

 2 puffs by inhalation route 2 times per day in the morning and evening     

 

             hydrochlorothiazide 25 mg oral tablet    2017                 TAKE 1 TABLET DAILY     

 

                metoprolol succinate 25 mg oral tablet extended release 24 hr    2017                       TAKE

 1 TABLET DAILY                          

 

                Sudogest 30 mg oral tablet    2017                      take 1 tablets (60 mg) by oral route 

every 6 hours as needed                  

 

                amoxicillin 500 mg oral tablet    2018                       take 1 tablet (500 mg) by oral route

 every 12 hours for 10 days              









                                         

 

             Name         Start Date    Expiration Date    SIG          Comments

 

                Singulair 10 mg oral tablet    9/3/2010        2011        take 1 tablet (10 mg) by oral route

 daily  for 60 days                      

 

                Zithromax Z-Christopher 250 mg oral tablet    2011       take 2 tablets (500 mg)

 by oral route once daily for 1 day then 1 tablet (250 mg) by oral route once 
daily for 4 days                         

 

                Medrol (Christopher) 4 mg oral tablets,dose pack    2011        take as directed

 for 6 days                              

 

                Keflex 500 mg oral capsule    3/1/2012        3/11/2012       take 1 capsule by oral route 3 

times a day for 10 days                  

 

                Cipro 500 mg oral tablet    2012        take 1 tablet (500 mg) by oral route

 every 12 hours for 15 days              

 

                benzonatate 100 mg oral capsule    2013       take 1 capsule (100 mg) by

 oral route 3 times per day for cough     

 

             Medrol (Christopher) 4 mg oral tablets,dose pack    2013     take as directed    

 

 

                Zyrtec 10 mg oral tablet    2013       take 1 tablet (10 mg) by oral route

 once daily for 30 days                  

 

                Flonase 50 mcg/actuation nasal spray,suspension    2013       inhale 1 spray

 in each nostril by intranasal route 2 times per day for 30 days     

 

                cephalexin 500 mg oral capsule    2013        take 1 capsule (500 mg) by oral

 route every 8 hours                     

 

                promethazine-codeine 6.25-10 mg/5 mL oral syrup    2013                       take 5 milliliters

 by oral route every 4 hours as needed, not to exceed 30 mL in 24 hours     

 

                Zithromax Z-Christopher 250 mg oral tablet    10/2/2013       10/7/2013       take 2 tablets (500 mg)

 by oral route once daily for 1 day then 1 tablet (250 mg) by oral route once 
daily for 4 days                         

 

                amoxicillin 500 mg oral capsule    2014       take 1 capsule by oral route

 3 times a day for 15 days               

 

                lovastatin 20 mg oral tablet    2014        take 1 tablet (20 mg) by oral 

route daily  for 30 days                 

 

                Zithromax Z-Christopher 250 mg oral tablet    2014       take 2 tablets (500 mg)

 by oral route once daily for 1 day then 1 tablet (250 mg) by oral route once 
daily for 4 days                         

 

             Medrol (Christopher) 4 mg oral tablets,dose pack    2014                 take as directed     

 

                amoxicillin 500 mg oral capsule    2014                       take 1 capsule (500 mg) by oral route

 3 times per day                         

 

                Levaquin 500 mg oral tablet    10/16/2014      10/26/2014      take 1 tablet (500 mg) by oral

 route once daily for 10 days            

 

                prednisone 20 mg oral tablet    10/16/2014      10/24/2014      4x2 days 3x2 days 2x2 days

 1x2 days                                

 

                Zithromax Z-Christopher 250 mg oral tablet    2014       take 2 tablets (500 mg)

 by oral route once daily for 1 day then 1 tablet (250 mg) by oral route once 
daily for 4 days                         

 

                Bactrim -160 mg oral tablet    1/15/2015       2015       take 1 tablet by oral route

 every 12 hours for 10 days              

 

                Zithromax Z-Christopher 250 mg oral tablet    2015      take 2 tablets (500 mg)

 by oral route once daily for 1 day then 1 tablet (250 mg) by oral route once 
daily for 4 days                         

 

                Keflex 500 mg oral capsule    2016       take 1 capsule by oral route 3

 times a day for 7 days                  

 

                amoxicillin 500 mg oral capsule    10/4/2016       10/14/2016      take 1 capsule by oral route

 3 times a day for 10 days               

 

                Tessalon Perles 100 mg oral capsule    10/4/2016                       take 1 capsule by oral route 

every 4 hours                            

 

                Zithromax Z-Christopher 250 mg oral tablet    11/10/2016      11/15/2016      take 2 tablets (500 

mg) by oral route once daily for 1 day then 1 tablet (250 mg) by oral route once
daily for 4 days                         

 

                amoxicillin 500 mg oral tablet    2016                       take 1 tablet (500 mg) by oral route

 3 times per day                         

 

                amoxicillin 500 mg oral capsule    2017       take 1 capsule (500 mg) by

 oral route 3 times per day for 10 days     

 

                Bactrim -160 mg oral tablet    2017       take 1 tablet by oral route

 2 times a day for 10 days               

 

                Levaquin 500 mg oral tablet    2017        take 1 tablet (500 mg) by oral

 route once daily for 10 days            

 

                amoxicillin 500 mg oral capsule    2017                       take 1 capsule (500 mg) by oral route

 every 12 hours for 7 days               

 

                Zithromax Z-Christopher 250 mg oral tablet    2017                      take 2 tablets (500 mg) by oral

 route once daily for 1 day then 1 tablet (250 mg) by oral route once daily for 
4 days                                   









                                        Discontinued 

 

             Name         Start Date    Discontinued Date    SIG          Comments

 

                amoxicillin 500 mg oral capsule    11/10/2011      10/3/2012       take 1 capsule (500 mg) 

by oral route 3 times per day            

 

                Anusol-HC 25 mg rectal suppository    2011      10/3/2012       insert 1 suppository 

(25 mg) by rectal route 2 times per day     

 

                Proctofoam 1 % topical foam    2011       apply by external route daily

                                         

 

             Aerochamber    2014    Use with inhaler as directed     

 

                hydrochlorothiazide 25 mg oral tablet    1/15/2015       2015      TAKE 1 TABLET DAILY

                                         

 

                promethazine-codeine 6.25-10 mg/5 mL oral syrup    11/10/2016      10/25/2017      take 5 

milliliters by oral route every 6 hours as needed, not to exceed 30 mL in 24 
hours                                    

 

                prednisone 20 mg oral tablet    2017       10/25/2017      4 x 2 days, 3 x 2 days, 2 x

 2 days 1 x 2 days                       

 

                Augmentin 875-125 mg oral tablet    2017       take 1 tablet by oral route

 every 12 hours for 7 days               

 

                Keflex 500 mg oral capsule    2017       take 1 capsule (500 mg) by oral

 route every 12 hours                    

 

                Keflex 500 mg oral capsule    10/10/2017      2018       take 1 capsule (500 mg) by oral

 route every 12 hours for 10 days        

 

                Levaquin 500 mg oral tablet    2018       take 1 tablet (500 mg) by oral

 route once daily for 10 days            







Problem List







                    Description         Status              Onset

 

                    Chronic Obstructive Pulmonary Disease    Active               

 

                    Hyperlipidemia      Active               

 

                    Anxiety disorder    Active              10/29/2013

 

                    Pain in joint; Hip    Active              06/10/2014

 

                    Pulmonary nodule seen on imaging study    Active              10/29/2014

 

                    Exercise hypoxemia    Active              10/29/2014

 

                    Anxiety about health    Active              2016

 

                    Primary osteoarthritis involving multiple joints    Active              2017

 

                    Medication management    Active              2017

 

                    Cellulitis of left lower extremity    Active              2017

 

                    Dog bite of calf, left, sequela    Active              2017







Vital Signs







      Date    Time    BP-Sys(mm[Hg]    BP-Noni(mm[Hg])    HR(bpm)    RR(rpm)    Temp    WT    HT    HC    BMI

                    BSA                 BMI Percentile      O2 Sat(%)

 

       2018    3:57:00 PM    128 mmHg    80 mmHg    78 bpm    18 rpm    98.4 F    148 lbs             

                                                                90 %

 

        10/24/2017    1:51:00 PM    132 mmHg    80 mmHg    82 bpm    16 rpm    98.2 F    145 lbs    62 in

                          26.5206 kg/m    1.6962 m                 95 %

 

       2017    3:29:00 PM    128 mmHg    80 mmHg    68 bpm    16 rpm    98 F    144 lbs    63 in    

                25.51 kg/m2     1.70 m2                         93 %

 

        2017    11:37:00 AM    118 mmHg    72 mmHg    96 bpm    16 rpm    97.4 F    141 lbs    63 in

                          24.9768 kg/m    1.6861 m                 91 %

 

        2017    8:55:00 AM    105 mmHg    60 mmHg    96 bpm    18 rpm    95.8 F    142 lbs    63 in 

                          25.15 kg/m2    1.69 m2                   95 %

 

       2017    12:10:00 PM    122 mmHg    68 mmHg    76 bpm    18 rpm    98 F    143 lbs    63 in    

                25.3311 kg/m    1.698 m                       98 %

 

        2017    4:03:00 PM    118 mmHg    64 mmHg    106 bpm    18 rpm    97.4 F    137 lbs    63 in

                          24.27 kg/m2    1.66 m2                   98 %

 

        2016    12:11:00 PM    120 mmHg    84 mmHg    110 bpm    16 rpm    98.1 F    130 lbs    63

 in                       23.0282 kg/m    1.619 m                 90 %

 

        11/10/2016    3:57:00 PM    148 mmHg    72 mmHg    88 bpm    16 rpm    98.2 F    132 lbs    63 in

                          23.38 kg/m2    1.63 m2                   98 %

 

        3/10/2016    2:12:00 PM    122 mmHg    60 mmHg    106 bpm    18 rpm    97 F    137 lbs    63 in 

                          24.2682 kg/m    1.662 m                 93 %

 

        12/15/2015    2:33:00 PM    120 mmHg    75 mmHg    102 bpm    16 rpm    98.2 F    137 lbs    63 

in                        24.27 kg/m2    1.66 m2                   94 %

 

        10/26/2015    2:46:00 PM    130 mmHg    80 mmHg    96 bpm    16 rpm    97.9 F    141 lbs    66 in

                          22.7578 kg/m    1.7258 m                 98 %

 

        10/6/2015    9:37:00 AM    140 mmHg    78 mmHg    110 bpm    16 rpm    97.9 F    141 lbs    63 in

                          24.98 kg/m2    1.69 m2                   95 %

 

        10/28/2014    2:25:00 PM    132 mmHg    66 mmHg    92 bpm    24 rpm    97.5 F    147 lbs    63 in

                          26.0396 kg/m    1.7216 m                 92 %

 

        10/16/2014    9:45:00 AM    132 mmHg    64 mmHg    74 bpm    24 rpm    97.7 F    146 lbs    63 in

                          25.86 kg/m2    1.72 m2                   92 %

 

        2014    2:31:00 PM    136 mmHg    68 mmHg    90 bpm    22 rpm    98.2 F    148 lbs    63 in 

                          26.2168 kg/m    1.7274 m                 95 %

 

        2014    3:19:00 PM    124 mmHg    70 mmHg    64 bpm    20 rpm    97.8 F    147 lbs    63 in

                          26.04 kg/m2    1.72 m2                   95 %

 

        10/28/2013    10:31:00 AM    140 mmHg    70 mmHg    92 bpm    24 rpm    97.8 F    143 lbs    63 

in                        25.3311 kg/m    1.698 m                 95 %

 

      2013    2:51:00 PM    130 mmHg    78 mmHg    90 bpm          98.2 F    168 lbs    65 in          

27.96 kg/m2         1.87 m2                                  

 

       2013    2:43:00 PM    110 mmHg    76 mmHg    103 bpm           96.6 F    137 lbs    63 in      

                24.2682 kg/m    1.662 m                        

 

        2013    9:50:00 AM    150 mmHg    66 mmHg    108 bpm    20 rpm    97.8 F    138 lbs    63 in

                          24.45 kg/m2    1.67 m2                   94 %

 

        10/25/2012    9:08:00 AM    110 mmHg    60 mmHg    88 bpm    18 rpm    97.5 F    142 lbs    63 in

                          25.1539 kg/m    1.6921 m                 94 %

 

      10/3/2012    9:33:00 AM    130 mmHg    60 mmHg    98 bpm    18 rpm          146 lbs    63 in          

25.86 kg/m2         1.72 m2                                 95 %

 

      2012    2:31:00 PM    116 mmHg    76 mmHg    88 bpm                140 lbs    63 in          24.7996

 kg/m             1.6801 m                              96 %

 

     2011    10:02:00 AM    122 mmHg    80 mmHg    110 bpm              144 lbs                        

                                        94 %

 

      2011    2:58:00 PM    124 mmHg    84 mmHg    106 bpm                155 lbs    63 in          27.4567

 kg/m             1.7678 m                              96 %

 

     2011    9:55:00 AM    114 mmHg    78 mmHg    92 bpm              146 lbs                             95

 %

 

     6/3/2011    8:36:00 AM    116 mmHg    74 mmHg    90 bpm              146 lbs                             96

 %

 

     2010    3:01:00 PM    132 mmHg    84 mmHg    102 bpm              149 lbs                          

                                        94 %

 

     2010    11:46:00 AM    124 mmHg    78 mmHg    104 bpm              151 lbs                         

                                        94 %

 

     2010    8:47:00 AM    116 mmHg    78 mmHg    103 bpm              151 lbs                          

                                        95 %







Social History







                    Name                Description         Comments

 

                    Tobacco             Never smoker         

 

                    denies alcohol use                         







History of Procedures







                    Date Ordered        Description         Order Status

 

                    2011 12:00 AM    THER/PROPH/DIAG INJ SC/IM    Reviewed

 

                    2011 12:00 AM    Decadron Inj.1mg-([facility]) Ndc #[id number]    Reviewed

 

                    2011 12:00 AM    Depo-Medrol 80 Mg Im/[facility]DC 0009-439167    Reviewed

 

                    2011 12:00 AM    Rocephin, Per 250MG - 1 Gram Vial NDC 9958-141826-[facility]    Reviewed



 

                    3/16/2012 12:00 AM    ROUTINE VENIPUNCTURE    Reviewed

 

                    3/16/2012 12:00 AM    COMPLETE CBC W/AUTO DIFF WBC    Reviewed

 

                    3/16/2012 12:00 AM    COMPREHEN METABOLIC PANEL    Reviewed

 

                    3/16/2012 12:00 AM    ASSAY THYROID STIM HORMONE    Reviewed

 

                    11/10/2016 12:00 AM    Decadron, Per 1 Mg NDC# [address]-10    Reviewed

 

                    11/10/2016 12:00 AM    Depo-Medrol, Per 80 Mg NDC#2605-0668-77    Reviewed

 

                    11/10/2016 12:00 AM    Rocephin 1 gram NDC#8708-0981-51    Reviewed

 

                    2016 12:00 AM    THER/PROPH/DIAG INJ SC/IM    Reviewed

 

                    2016 12:00 AM    Decadron 8mg Injection, [facility]    Reviewed

 

                    2016 12:00 AM    Depo-Medrol 80mg Injection, [facility]    Reviewed

 

                    2016 12:00 AM    Rocephin 1 gram Injection, [facility]    Reviewed

 

                    2017 12:00 AM    COMPLETE CBC W/AUTO DIFF WBC    Reviewed

 

                    2017 12:00 AM    COMPREHEN METABOLIC PANEL    Reviewed

 

                    2017 12:00 AM    LIPID PANEL         Reviewed

 

                    2017 12:00 AM    THERAPEUTIC PROPHYLACTIC/DX INJECTION SUBQ/IM    Reviewed

 

                    2017 12:00 AM    Decadron 8mg Injection, [facility]    Reviewed

 

                    2017 12:00 AM    Depo-Medrol 80mg Injection, [facility]    Reviewed

 

                    2017 12:00 AM    LIPID PANEL         Returned

 

                    2017 12:00 AM    THERAPEUTIC PROPHYLACTIC/DX INJECTION SUBQ/IM    Reviewed

 

                    2017 12:00 AM    Decadron 8mg Injection, [facility]    Reviewed

 

                    2017 12:00 AM    Depo-Medrol 80mg Injection, [facility]    Reviewed

 

                    10/3/2012 12:00 AM    THER/PROPH/DIAG INJ SC/IM    Reviewed

 

                    10/3/2012 12:00 AM    Decadron, Per 1 Mg NDC# [address]-96    Reviewed

 

                    10/3/2012 12:00 AM    Depo-Medrol, Per 80 Mg NDC#1492-5233-63    Reviewed

 

                    10/3/2012 12:00 AM    Toradol 60 Mg NDC#9849-3733-51    Reviewed

 

                    10/24/2017 12:00 AM    THERAPEUTIC PROPHYLACTIC/DX INJECTION SUBQ/IM    Reviewed

 

                    10/24/2017 12:00 AM    Decadron 8mg Injection, [facility]    Reviewed

 

                    10/24/2017 12:00 AM    Depo-Medrol 80mg Injection, [facility]    Reviewed

 

                    2013 12:00 AM    THER/PROPH/DIAG INJ SC/IM    Reviewed

 

                    2013 12:00 AM    Decadron, Per 1 Mg NDC# [address]-77    Reviewed

 

                    2013 12:00 AM    Depo-Medrol, Per 80 Mg NDC#4258-2649-03    Reviewed

 

                    2013 12:00 AM    Toradol 60 Mg NDC#0379-1698-81    Reviewed

 

                    2010 12:00 AM    ROUTINE VENIPUNCTURE    Reviewed

 

                    2010 12:00 AM    COMPLETE CBC W/AUTO DIFF WBC    Reviewed

 

                    2010 12:00 AM    COMPREHEN METABOLIC PANEL    Reviewed

 

                    2010 12:00 AM    LIPID PANEL         Reviewed

 

                    10/28/2014 12:00 AM    CHEST X-RAY 4/> VIEWS    Reviewed

 

                    6/3/2011 12:00 AM    ROUTINE VENIPUNCTURE    Reviewed

 

                    6/3/2011 12:00 AM    COMPLETE CBC AUTOMATED    Reviewed

 

                    6/3/2011 12:00 AM    COMPREHEN METABOLIC PANEL    Reviewed

 

                    6/3/2011 12:00 AM    LIPID PANEL         Reviewed

 

                    2011 12:00 AM    THER/PROPH/DIAG INJ SC/IM    Reviewed

 

                    2011 12:00 AM    Decadron Inj.8mg-([facility]) Ndc #[id number]    Reviewed

 

                    2011 12:00 AM    Depo-Medrol 80 Mg Im/[facility] NDC 0009-703102    Reviewed







Results Summary







                          Date and Description      Results

 

                          6/3/2011 1:53 PM          TRIGLYCERIDES 155.0 mg/dLCHOLESTEROL 248.0 mg/dLHDL 51.0 mg/dLTOT

 CHOL/HDL 4.9 .0 mg/dLGLUCOSE 97.0 mg/dLSODIUM 139.0 mmol/LPOTASSIUM 3.70
 mmol/LCHLORIDE 101.0 mmol/LCO2 27.0 mmol/LBUN 19.0 mg/dLCREATININE 0.90 
mg/dLSGOT/AST 19.0 IU/LSGPT/ALT 11.0 IU/LALK PHOS 111.0 IU/LTOTAL PROTEIN 7.40 
g/dLALBUMIN 4.20 g/dLTOTAL BILI 0.50 mg/dLCALCIUM 9.50 mg/dLAGE 70 GFR NonAA 62 
GFR AA 75 eGFR >60 mL/min/1.73 m2eGFR AA* >60 







History Of Immunizations

Not available.



History of Past Illness







                    Name                Date of Onset       Comments

 

                    Chronic Obstructive Pulmonary Disease                         

 

                    Hyperlipidemia                           

 

                    Cough               2010  8:49AM     

 

                    General Medical Exam, Adult    2010  8:49AM     

 

                    Seasonal Allergies    2010  8:49AM     

 

                    Sinusitis, Chronic    2010  8:49AM     

 

                    Ganglion cyst of synovium, tendon, and bursa; other    2010 11:48AM     

 

                    Anxiety disorder    10/29/2013           

 

                    Pain in joint; Hip    06/10/2014           

 

                    Pulmonary nodule seen on imaging study    10/29/2014           

 

                    Exercise hypoxemia    10/29/2014           

 

                    Chronic Obstructive Pulmonary Disease    2010  3:02PM     

 

                    Edema               2010  3:02PM     

 

                    Sinusitis, Acute    2010  3:02PM     

 

                    Anxiety about health    2016           

 

                    Chronic Obstructive Pulmonary Disease    Trey  3 2011  8:38AM     

 

                    Palpitations        Trey  3 2011  8:38AM     

 

                    Cough               2011  9:54AM     

 

                    Sinusitis, Acute    2011  9:54AM     

 

                    Seasonal Allergies    2011  9:54AM     

 

                    Post-nasal drainage    2011  9:54AM     

 

                    Primary osteoarthritis involving multiple joints    2017           

 

                    Medication management    2017           

 

                    Cellulitis of left lower extremity    2017           

 

                    Dog bite of calf, left, sequela    2017           

 

                    Cough               Sep 22 2011  2:55PM     

 

                    Seasonal Allergies    Sep 22 2011  2:55PM     

 

                    Pharyngitis, Acute    Sep 22 2011  2:55PM     

 

                    Post-nasal drainage    Sep 22 2011  2:55PM     

 

                    Blood In Stool, Occult    2011  9:58AM     

 

                    Hemorrhoids         2011  9:58AM     

 

                    Chronic Obstructive Pulmonary Disease    2012  2:33PM     

 

                    Cardiac Dysrhythmia    2012  2:33PM     

 

                    Sinoatrial Node Dysfunction    Mar 16 2012  9:12AM     

 

                    Palpitations        Mar 16 2012  9:12AM     

 

                    Osteoarthrosis, generalized, multiple sites    Oct  3 2012  9:34AM     

 

                    Pain in joint; Hip    Oct  3 2012  9:34AM     

 

                    Osteoarthrosis      Oct 25 2012  9:09AM     

 

                    Bronchitis, Acute    Oct 25 2012  9:09AM     

 

                    Cough               Oct 25 2012  9:09AM     

 

                    Pain in joint; Left Hip    Oct 25 2012  9:09AM     

 

                    Pain in joint; Hip    2013  9:50AM     

 

                    Osteoarthrosis, generalized, multiple sites    2013  2:45PM     

 

                    Pain in joint; Hip bilateral    2013  2:45PM     

 

                    Anxiety Disorder    Oct 28 2013 10:32AM     

 

                    Chronic Obstructive Pulmonary Disease    Oct 28 2013 10:32AM     

 

                    Hyperlipidemia      Oct 28 2013 10:32AM     

 

                    Pain in joint; Left Hip    Oct 28 2013 10:32AM     

 

                    Sinusitis, Acute    Oct 28 2013 10:32AM     

 

                    Essential Hypertension    2014  3:19PM     

 

                    Osteoarthrosis, generalized, multiple sites    2014  3:19PM     

 

                    Chronic Obstructive Pulmonary Disease    2014  3:19PM     

 

                    Pain in joint; Hip    2014  3:19PM     

 

                    Chronic Obstructive Pulmonary Disease    2014  2:31PM     

 

                    Pain in joint; Hip    2014  2:31PM     

 

                    pre-operative examination, unspecified    2014  2:31PM     

 

                    Lung nodule seen on imaging study    Oct 28 2014 10:24AM     

 

                    Bronchitis, Acute    Oct 16 2014  9:45AM     

 

                    Respiratory System And Chest Symptoms    Oct 16 2014  9:45AM     

 

                    COPD (chronic obstructive pulmonary disease)    Oct 16 2014  9:45AM     

 

                    Chronic Obstructive Pulmonary Disease    Oct 28 2014  2:26PM     

 

                    Pulmonary nodule seen on imaging study    Oct 28 2014  2:26PM     

 

                    Shortness of breath    Oct 28 2014  2:26PM     

 

                    Exercise hypoxemia    Oct 28 2014  2:26PM     

 

                    Nail avulsion       Oct  6 2015  9:38AM     

 

                          Contusion of left index finger with damage to nail, initial encounter    Oct 26 2015

  2:53PM                                 

 

                    Forehead contusion, subsequent encounter    Dec 15 2015  2:39PM     

 

                    Generalized anxiety disorder    Dec 15 2015  2:39PM     

 

                    Chronic Obstructive Pulmonary Disease    Dec 15 2015  2:39PM     

 

                    Osteoarthrosis, generalized, multiple sites    Mar 10 2016  2:12PM     

 

                    Pain of right thigh    Mar 10 2016  2:12PM     

 

                    Mild Acute Hip pain, acute, right Improving    Mar 10 2016  2:12PM     

 

                    Anxiety about health    Mar 10 2016  2:12PM     

 

                    Hyperlipidemia      Aug 22 2016 10:58AM     

 

                    Sinoatrial Node Dysfunction    Aug 22 2016 10:58AM     

 

                    Palpitations        Aug 22 2016 10:58AM     

 

                    Chronic Obstructive Pulmonary Disease    Aug 22 2016 10:58AM     

 

                    Exercise hypoxemia    Aug 22 2016 10:58AM     

 

                    Pain in joint; Hip    Aug 22 2016 10:58AM     

 

                    Pulmonary nodule seen on imaging study    Aug 22 2016 10:58AM     

 

                    Eustachian tube dysfunction, bilateral    Nov 10 2016  3:57PM     

 

                    Moderate Acute Cough    Nov 10 2016  3:57PM     

 

                    Pharyngitis, Acute    Nov 10 2016  3:57PM     

 

                    Upper Respiratory Infection    Nov 10 2016  3:57PM     

 

                          COPD (chronic obstructive pulmonary disease) with acute bronchitis    Nov 10 2016 

 3:57PM                                  

 

                    Mild Chronic Cough Worsening    Dec 12 2016 12:12PM     

 

                          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

 12 2016 12:12PM                         

 

                    Moderate Shortness of breath on exertion    Dec 12 2016 12:12PM     

 

                    Hyperlipidemia      May  4 2017 10:27AM     

 

                    Sinoatrial Node Dysfunction    May  4 2017 10:27AM     

 

                    Palpitations        May  4 2017 10:27AM     

 

                    Chronic Obstructive Pulmonary Disease    May  4 2017 10:27AM     

 

                    Exercise hypoxemia    May  4 2017 10:27AM     

 

                    Pain in joint; Hip    May  4 2017 10:27AM     

 

                    Pulmonary nodule seen on imaging study    May  4 2017 10:27AM     

 

                    Chest congestion    May 16 2017  4:04PM     

 

                          COPD (chronic obstructive pulmonary disease) with acute bronchitis    May 16 2017 

 4:04PM                                  

 

                    Productive cough    May 16 2017  4:04PM     

 

                    Anxiety about health    May 16 2017  4:04PM     

 

                          Chronic obstructive pulmonary disease, unspecified COPD type    May 16 2017  4:04PM

                                         

 

                    Exercise hypoxemia    May 16 2017  4:04PM     

 

                    Mixed hyperlipidemia    May 16 2017  4:04PM     

 

                    Medication management    May 16 2017  4:04PM     

 

                    Primary osteoarthritis involving multiple joints    May 16 2017  4:04PM     

 

                    Cellulitis of left lower extremity    2017 12:10PM     

 

                    Bitten by dog, initial encounter    2017 12:10PM     

 

                    Cellulitis of left lower extremity    2017  8:56AM     

 

                    Open bite, left lower leg, sequela    2017  8:56AM     

 

                    Bitten by dog, sequela    2017  8:56AM     

 

                    Medication management    2017  8:56AM     

 

                    Cellulitis of left lower extremity    2017 11:38AM     

 

                    Open bite, left lower leg, subsequent encounter    2017 11:38AM     

 

                    Bitten by dog, subsequent encounter    2017 11:38AM     

 

                    Medication management    2017 11:38AM     

 

                    Cough               Aug 10 2017  4:09PM     

 

                    Abnormal chest xray    Aug 10 2017  4:09PM     

 

                    Hyperlipidemia      Aug 29 2017  9:02AM     

 

                    Sinoatrial Node Dysfunction    Aug 29 2017  9:02AM     

 

                    Palpitations        Aug 29 2017  9:02AM     

 

                    Chronic Obstructive Pulmonary Disease    Aug 29 2017  9:02AM     

 

                    Exercise hypoxemia    Aug 29 2017  9:02AM     

 

                    Pain in joint; Hip    Aug 29 2017  9:02AM     

 

                    Pulmonary nodule seen on imaging study    Aug 29 2017  9:02AM     

 

                    Eustachian tube dysfunction, bilateral    Aug 28 2017  3:30PM     

 

                    Purulent postnasal drainage    Aug 28 2017  3:30PM     

 

                    Chest congestion    Aug 28 2017  3:30PM     

 

                    Upper respiratory tract infection, unspecified type    Aug 28 2017  3:30PM     

 

                    Moderate Acute Sinus pressure    Aug 28 2017  3:30PM     

 

                          COPD (chronic obstructive pulmonary disease) with acute bronchitis    Aug 28 2017 

 3:30PM                                  

 

                    Moderate Chronic Purulent postnasal drainage    Oct 24 2017  1:52PM     

 

                    Mild Chronic Sinus pressure    Oct 24 2017  1:52PM     

 

                          Moderate Chronic Acute seasonal allergic rhinitis, unspecified trigger Stable    Oct

 24 2017  1:52PM                         

 

                    Mild Acute Labyrinthitis    Oct 24 2017  1:52PM     

 

                    Moderate Acute Vertigo    Oct 24 2017  1:52PM     

 

                    Purulent postnasal drainage    2018  3:58PM     

 

                    Upper respiratory tract infection, unspecified type    2018  3:58PM     

 

                    Mild Acute Left Enlarged lymph node in neck    2018  3:58PM     







Payers







           Insurance Name    Company Name    Plan Name    Plan Number    Policy Number    Policy Group

 Number                                 Start Date

 

                    Medicare [employer]    Medicare [employer]              [id number]              N/A

 

                          [place] [employer]    [place] [employer]                 [id number]

                                                    N/A

 

                    [employer] Medicare A    Test Medicare A              [id number]              N/A

 

                                [facility] - [facility] - [facility] Comm      

                    [id number]                             N/A

 

                    Medicare Part A    Medicare - Lab/Xray              [id number]              N/A

 

                          [employer]    [employer]                 [id number]

                                                    N/A

 

                    Medicare Part A    Medicare Part A              [id number]              N/A

 

                    Medicare Part B    [employer] [place]              [id number]              N/A







History of Encounters







                    Visit Date          Visit Type          Provider

 

                    2018           Office visit        DEON ESCALANTE

 

                    10/24/2017          Office visit        DEON ESCALANTE

 

                    2017           Voided              DEON ESCALANTE

 

                    2017           Office visit        DEON ESCALANTE

 

                    2017           Office visit        DEON ESCALANTE

 

                    2017            Office visit        DEON ESCALANTE

 

                    2017            Office visit        DEON ESCALANTE

 

                    2017           Office visit        DEON ESCALANTE

 

                    2016          Office visit        DEON ESCALANTE

 

                    11/10/2016          Office visit        DEON ESCALANTE

 

                    3/10/2016           Office visit        DEON ESCALANTE

 

                    12/15/2015          Office visit        DEON ESCALANTE

 

                    10/26/2015          Office visit        DEON ESCALANTE

 

                    10/6/2015           Office visit        DEON ESCALANTE

 

                    10/28/2014          Office visit        DEON ESCALANTE

 

                    10/16/2014          Office visit        DEON ESCALANTE

 

                    2014            Office visit        DEON ESCALANTE

 

                    2014           Hospital            DEWEY Garcia MD

 

                    2014           Office visit        DEON ESCALANTE

 

                    10/28/2013          Office visit        DEON ESCALANTE

 

                    10/14/2013          [facility]            DEWEY Garcia MD

 

                    2013           Office visit        DEON ESCALANTE

 

                    2013            Office visit        DEON LEON PA

 

                    10/25/2012          Office visit        DEON LEON PA

 

                    10/3/2012           Office visit        DEON LEON PA

 

                    3/16/2012           Office visit        DEON LEON PA

 

                    2012            Office visit        DEON LEON PA

 

                    2012            [facility]            DEWEY Garcia MD

 

                    2011          Office visit        DEON LEON PA

 

                    2011           Office visit        Deon Leon PA-C

 

                    2011            Office visit        Deon Leon PA-C

 

                    6/3/2011            Office visit        Deon Leon PA-C

 

                    2010           Office visit        Deon Leon PA-C

 

                    2010           Office visit        Deon Leon PA-C

 

                    2010           Office visit        Deon CASTROC

## 2019-06-25 NOTE — XMS REPORT
MU2 Ambulatory Summary

                             Created on: 2016



Elsi Casillas

External Reference #: [id number]

: 1940

Sex: Female



Demographics







                          Address                   [address] [place]  [address]

 

                          Home Phone                [phone]

 

                          Preferred Language        English

 

                          Marital Status            

 

                          Oriental orthodox Affiliation     Unknown

 

                          Race                      White

 

                          Ethnic Group              Not  or 





Author







                          Author                    DEON LEON

 

                          [facility]

 

                          Address                   [address]

[place], [place]  [id number]



 

                          Phone                     [phone]







Care Team Providers







                    Care Team Member Name    Role                Phone

 

                    DEON LEON    PCP                 Unavailable







Allergies and Adverse Reactions







                    Name                Reaction            Notes

 

                    NO KNOWN DRUG ALLERGIES                         







Plan of Treatment







             Planned Activity    Comments     Planned Date    Planned Time    Plan/Goal

 

             LIPID PANEL                 2016    12:00 AM      







Medications







                                        Active 

 

             Name         Start Date    Estimated Completion Date    SIG          Comments

 

             Proctofoam 1 % topical foam    2011                 apply by external route daily     

 

                Calcium 600 + D(3) 600 mg(1,500mg) -200 unit oral tablet                                    take 2 tablets by

 oral route daily                        

 

             Aerochamber    2014                 Use with inhaler as directed     

 

                Symbicort 160-4.5 mcg/actuation inhalation HFA aerosol inhaler    2014                       inhale

 2 puffs by inhalation route 2 times per day in the morning and evening     

 

                pravastatin 20 mg oral tablet                                    take 1 tablet (20 mg) by oral route once daily

                                         

 

                Toprol XL 25 mg oral tablet extended release 24 hr                                    take 1 tablet (25 mg) 

by oral route once daily                 

 

                                        ipratropium-albuterol 0.5 mg-3 mg(2.5 mg base)/3 mL inhalation solution for nebulization

                    10/29/2014                              inhale 3 milliliters by nebulization route 4 times per day for COPD

                                         

 

                hydrochlorothiazide 25 mg oral tablet    1/15/2015                       take 1 tablet (25 mg) by oral

 route once daily for 30 days            

 

                metoprolol succinate 25 mg oral tablet extended release 24 hr    2015                      take

 1 tablet (25 mg) by oral route once daily     

 

                metoprolol succinate 25 mg oral tablet extended release 24 hr    2016                       TAKE

 1 TABLET DAILY                          

 

             hydrochlorothiazide 25 mg oral tablet    2016                 TAKE 1 TABLET DAILY     









                                         

 

             Name         Start Date    Expiration Date    SIG          Comments

 

                Singulair 10 mg oral tablet    9/3/2010        2011        take 1 tablet (10 mg) by oral route

 daily  for 60 days                      

 

                Zithromax Z-Christopher 250 mg oral tablet    2011       take 2 tablets (500 mg)

 by oral route once daily for 1 day then 1 tablet (250 mg) by oral route once 
daily for 4 days                         

 

                Medrol (Christopher) 4 mg oral tablets,dose pack    2011        take as directed

 for 6 days                              

 

                Keflex 500 mg oral capsule    3/1/2012        3/11/2012       take 1 capsule by oral route 3 

times a day for 10 days                  

 

                Cipro 500 mg oral tablet    2012        take 1 tablet (500 mg) by oral route

 every 12 hours for 15 days              

 

                benzonatate 100 mg oral capsule    2013       take 1 capsule (100 mg) by

 oral route 3 times per day for cough     

 

             Medrol (Christopher) 4 mg oral tablets,dose pack    2013     take as directed    

 

 

                Zyrtec 10 mg oral tablet    2013       take 1 tablet (10 mg) by oral route

 once daily for 30 days                  

 

                Flonase 50 mcg/actuation nasal spray,suspension    2013       inhale 1 spray

 in each nostril by intranasal route 2 times per day for 30 days     

 

                cephalexin 500 mg oral capsule    2013        take 1 capsule (500 mg) by oral

 route every 8 hours                     

 

                promethazine-codeine 6.25-10 mg/5 mL oral syrup    2013                       take 5 milliliters

 by oral route every 4 hours as needed, not to exceed 30 mL in 24 hours     

 

                Zithromax Z-Christopher 250 mg oral tablet    10/2/2013       10/7/2013       take 2 tablets (500 mg)

 by oral route once daily for 1 day then 1 tablet (250 mg) by oral route once 
daily for 4 days                         

 

                amoxicillin 500 mg oral capsule    2014       take 1 capsule by oral route

 3 times a day for 15 days               

 

                lovastatin 20 mg oral tablet    2014        take 1 tablet (20 mg) by oral 

route daily  for 30 days                 

 

                Zithromax Z-Christopher 250 mg oral tablet    2014       take 2 tablets (500 mg)

 by oral route once daily for 1 day then 1 tablet (250 mg) by oral route once 
daily for 4 days                         

 

             Medrol (Christopher) 4 mg oral tablets,dose pack    2014                 take as directed     

 

                amoxicillin 500 mg oral capsule    2014                       take 1 capsule (500 mg) by oral route

 3 times per day                         

 

                Levaquin 500 mg oral tablet    10/16/2014      10/26/2014      take 1 tablet (500 mg) by oral

 route once daily for 10 days            

 

                prednisone 20 mg oral tablet    10/16/2014      10/24/2014      4x2 days 3x2 days 2x2 days

 1x2 days                                

 

                Zithromax Z-Christopher 250 mg oral tablet    2014       take 2 tablets (500 mg)

 by oral route once daily for 1 day then 1 tablet (250 mg) by oral route once 
daily for 4 days                         

 

                Bactrim -160 mg oral tablet    1/15/2015       2015       take 1 tablet by oral route

 every 12 hours for 10 days              

 

                Zithromax Z-Christopher 250 mg oral tablet    2015      take 2 tablets (500 mg)

 by oral route once daily for 1 day then 1 tablet (250 mg) by oral route once 
daily for 4 days                         

 

                Zithromax Z-Christopher 250 mg oral tablet    2016       take 2 tablets (500 mg)

 by oral route once daily for 1 day then 1 tablet (250 mg) by oral route once 
daily for 4 days                         









                                        Discontinued 

 

             Name         Start Date    Discontinued Date    SIG          Comments

 

                amoxicillin 500 mg oral capsule    11/10/2011      10/3/2012       take 1 capsule (500 mg) 

by oral route 3 times per day            

 

                Anusol-HC 25 mg rectal suppository    2011      10/3/2012       insert 1 suppository 

(25 mg) by rectal route 2 times per day     

 

                hydrochlorothiazide 25 mg oral tablet    1/15/2015       2015      TAKE 1 TABLET DAILY

                                         







Problem List







                    Description         Status              Onset

 

                    Chronic Obstructive Pulmonary Disease    Active               

 

                    Hyperlipidemia      Active               

 

                    Anxiety Disorder    Active              10/29/2013

 

                    Pain in joint; Hip    Active              06/10/2014

 

                    Pulmonary nodule seen on imaging study    Active              10/29/2014

 

                    Exercise hypoxemia    Active              10/29/2014

 

                    Anxiety about health    Active              2016







Vital Signs







      Date    Time    BP-Sys(mm[Hg]    BP-Noni(mm[Hg])    HR(bpm)    RR(rpm)    Temp    WT    HT    HC    BMI

                    BSA                 BMI Percentile      O2 Sat(%)

 

        3/10/2016    2:12:00 PM    122 mmHg    60 mmHg    106 bpm    18 rpm    97 F    137 lbs    63 in 

                          24.27 kg/m2    1.66 m2                   93 %

 

        12/15/2015    2:33:00 PM    120 mmHg    75 mmHg    102 bpm    16 rpm    98.2 F    137 lbs    63 

in                        24.2682 kg/m    1.662 m                 94 %

 

        10/26/2015    2:46:00 PM    130 mmHg    80 mmHg    96 bpm    16 rpm    97.9 F    141 lbs    66 in

                          22.76 kg/m2    1.73 m2                   98 %

 

        10/6/2015    9:37:00 AM    140 mmHg    78 mmHg    110 bpm    16 rpm    97.9 F    141 lbs    63 in

                          24.9768 kg/m    1.6861 m                 95 %

 

        10/28/2014    2:25:00 PM    132 mmHg    66 mmHg    92 bpm    24 rpm    97.5 F    147 lbs    63 in

                          26.04 kg/m2    1.72 m2                   92 %

 

        10/16/2014    9:45:00 AM    132 mmHg    64 mmHg    74 bpm    24 rpm    97.7 F    146 lbs    63 in

                          25.8625 kg/m    1.7157 m                 92 %

 

        2014    2:31:00 PM    136 mmHg    68 mmHg    90 bpm    22 rpm    98.2 F    148 lbs    63 in 

                          26.22 kg/m2    1.73 m2                   95 %

 

        2014    3:19:00 PM    124 mmHg    70 mmHg    64 bpm    20 rpm    97.8 F    147 lbs    63 in

                          26.0396 kg/m    1.7216 m                 95 %

 

        10/28/2013    10:31:00 AM    140 mmHg    70 mmHg    92 bpm    24 rpm    97.8 F    143 lbs    63 

in                        25.33 kg/m2    1.70 m2                   95 %

 

      2013    2:51:00 PM    130 mmHg    78 mmHg    90 bpm          98.2 F    168 lbs    65 in          

27.9564 kg/m      1.8694 m                               

 

       2013    2:43:00 PM    110 mmHg    76 mmHg    103 bpm           96.6 F    137 lbs    63 in      

                24.27 kg/m2     1.66 m2                          

 

        2013    9:50:00 AM    150 mmHg    66 mmHg    108 bpm    20 rpm    97.8 F    138 lbs    63 in

                          24.4454 kg/m    1.6681 m                 94 %

 

        10/25/2012    9:08:00 AM    110 mmHg    60 mmHg    88 bpm    18 rpm    97.5 F    142 lbs    63 in

                          25.15 kg/m2    1.69 m2                   94 %

 

      10/3/2012    9:33:00 AM    130 mmHg    60 mmHg    98 bpm    18 rpm          146 lbs    63 in          

25.8625 kg/m      1.7157 m                              95 %

 

      2012    2:31:00 PM    116 mmHg    76 mmHg    88 bpm                140 lbs    63 in          24.80 

kg/m2               1.68 m2                                 96 %

 

     2011    10:02:00 AM    122 mmHg    80 mmHg    110 bpm              144 lbs                        

                                        94 %

 

      2011    2:58:00 PM    124 mmHg    84 mmHg    106 bpm                155 lbs    63 in          27.46

 kg/m2              1.77 m2                                 96 %

 

     2011    9:55:00 AM    114 mmHg    78 mmHg    92 bpm              146 lbs                             95

 %

 

     6/3/2011    8:36:00 AM    116 mmHg    74 mmHg    90 bpm              146 lbs                             96

 %

 

     2010    3:01:00 PM    132 mmHg    84 mmHg    102 bpm              149 lbs                          

                                        94 %

 

     2010    11:46:00 AM    124 mmHg    78 mmHg    104 bpm              151 lbs                         

                                        94 %

 

     2010    8:47:00 AM    116 mmHg    78 mmHg    103 bpm              151 lbs                          

                                        95 %







Social History







                    Name                Description         Comments

 

                    Tobacco             Never smoker         

 

                    denies alcohol use                         







History of Procedures







                    Date Ordered        Description         Order Status

 

                    2011 12:00 AM    THER/PROPH/DIAG INJ SC/IM    Reviewed

 

                    2011 12:00 AM    Decadron Inj.1mg-([facility]) Ndc #[id number]    Reviewed

 

                    2011 12:00 AM    Depo-Medrol 80 Mg Im/[facility] NDC 0009-562588    Reviewed

 

                    2011 12:00 AM    Rocephin, Per 250MG - 1 Gram Vial NDC 6773-356174-[facility]    Reviewed



 

                    3/16/2012 12:00 AM    ROUTINE VENIPUNCTURE    Reviewed

 

                    3/16/2012 12:00 AM    COMPLETE CBC W/AUTO DIFF WBC    Reviewed

 

                    3/16/2012 12:00 AM    COMPREHEN METABOLIC PANEL    Reviewed

 

                    3/16/2012 12:00 AM    ASSAY THYROID STIM HORMONE    Reviewed

 

                    10/3/2012 12:00 AM    THER/PROPH/DIAG INJ SC/IM    Reviewed

 

                    10/3/2012 12:00 AM    Decadron, Per 1 Mg NDC# [address]-57    Reviewed

 

                    10/3/2012 12:00 AM    Depo-Medrol, Per 80 Mg NDC#9051-0652-46    Reviewed

 

                    10/3/2012 12:00 AM    Toradol 60 Mg NDC#8044-2952-79    Reviewed

 

                    2013 12:00 AM    THER/PROPH/DIAG INJ SC/IM    Reviewed

 

                    2013 12:00 AM    Decadron, Per 1 Mg NDC# [address]-82    Reviewed

 

                    2013 12:00 AM    Depo-Medrol, Per 80 Mg NDC#6657-3661-67    Reviewed

 

                    2013 12:00 AM    Toradol 60 Mg NDC#9849-7297-54    Reviewed

 

                    2010 12:00 AM    ROUTINE VENIPUNCTURE    Reviewed

 

                    2010 12:00 AM    COMPLETE CBC W/AUTO DIFF WBC    Reviewed

 

                    2010 12:00 AM    COMPREHEN METABOLIC PANEL    Reviewed

 

                    2010 12:00 AM    LIPID PANEL         Reviewed

 

                    10/28/2014 12:00 AM    CHEST X-RAY 4/> VIEWS    Returned

 

                    6/3/2011 12:00 AM    ROUTINE VENIPUNCTURE    Reviewed

 

                    6/3/2011 12:00 AM    COMPLETE CBC AUTOMATED    Reviewed

 

                    6/3/2011 12:00 AM    COMPREHEN METABOLIC PANEL    Reviewed

 

                    6/3/2011 12:00 AM    LIPID PANEL         Reviewed

 

                    2011 12:00 AM    THER/PROPH/DIAG INJ SC/IM    Reviewed

 

                    2011 12:00 AM    Decadron Inj.8mg-([facility]) Ndc #[id number]    Reviewed

 

                    2011 12:00 AM    Depo-Medrol 80 Mg Im/[facility] NDC 0009-764573    Reviewed







Results Summary







                          Data and Description      Results

 

                          6/3/2011 1:52 PM          WBC 8.6 RBC 4.66 HGB 13.20 g/dLHCT 42.10 %MCV 90.0 fLMCH 28.30

 pgMCHC 31.40 g/dLRDW CV 13.60 %MPV 10.90 fLPLT 383 %NEUT 68.0 %%LYMP 20.30 
%%MONO 9.30 %%EOS 2.20 %%BASO 0.20 %#NEUT 5.81 #LYMP 1.74 #MONO 0.80 #EOS 0.19 
#BASO 0.02 

 

                          6/3/2011 1:53 PM          TRIGLYCERIDES 155.0 mg/dLCHOLESTEROL 248.0 mg/dLHDL 51.0 mg/dLLDL

 168.0 mg/dLGLUCOSE 97.0 mg/dLSODIUM 139.0 mmol/LPOTASSIUM 3.70 mmol/LCHLORIDE 
101.0 mmol/LCO2 27.0 mmol/LBUN 19.0 mg/dLCREATININE 0.90 mg/dLSGOT/AST 19.0 
IU/LSGPT/ALT 11.0 IU/LALK PHOS 111.0 IU/LTOTAL PROTEIN 7.40 g/dLALBUMIN 4.20 
g/dLTOTAL BILI 0.50 mg/dLCALCIUM 9.50 mg/dLeGFR >60 mL/min/1.73 m2







History Of Immunizations

Not available.



History of Past Illness







                    Name                Date of Onset       Comments

 

                    Chronic Obstructive Pulmonary Disease                         

 

                    Hyperlipidemia                           

 

                    Cough               2010  8:49AM     

 

                    General Medical Exam, Adult    2010  8:49AM     

 

                    Seasonal Allergies    2010  8:49AM     

 

                    Sinusitis, Chronic    2010  8:49AM     

 

                    Ganglion cyst of synovium, tendon, and bursa; other    2010 11:48AM     

 

                    Anxiety Disorder    10/29/2013           

 

                    Pain in joint; Hip    06/10/2014           

 

                    Pulmonary nodule seen on imaging study    10/29/2014           

 

                    Exercise hypoxemia    10/29/2014           

 

                    Chronic Obstructive Pulmonary Disease    2010  3:02PM     

 

                    Edema               2010  3:02PM     

 

                    Sinusitis, Acute    2010  3:02PM     

 

                    Anxiety about health    2016           

 

                    Chronic Obstructive Pulmonary Disease    Trey  3 2011  8:38AM     

 

                    Palpitations        Trey  3 2011  8:38AM     

 

                    Cough               2011  9:54AM     

 

                    Sinusitis, Acute    2011  9:54AM     

 

                    Seasonal Allergies    2011  9:54AM     

 

                    Post-nasal drainage    2011  9:54AM     

 

                    Cough               Sep 22 2011  2:55PM     

 

                    Seasonal Allergies    Sep 22 2011  2:55PM     

 

                    Pharyngitis, Acute    Sep 22 2011  2:55PM     

 

                    Post-nasal drainage    Sep 22 2011  2:55PM     

 

                    Blood In Stool, Occult    2011  9:58AM     

 

                    Hemorrhoids         2011  9:58AM     

 

                    Chronic Obstructive Pulmonary Disease    2012  2:33PM     

 

                    Cardiac Dysrhythmia    2012  2:33PM     

 

                    Sinoatrial Node Dysfunction    Mar 16 2012  9:12AM     

 

                    Palpitations        Mar 16 2012  9:12AM     

 

                    Osteoarthrosis, generalized, multiple sites    Oct  3 2012  9:34AM     

 

                    Pain in joint; Hip    Oct  3 2012  9:34AM     

 

                    Osteoarthrosis      Oct 25 2012  9:09AM     

 

                    Bronchitis, Acute    Oct 25 2012  9:09AM     

 

                    Cough               Oct 25 2012  9:09AM     

 

                    Pain in joint; Left Hip    Oct 25 2012  9:09AM     

 

                    Pain in joint; Hip    2013  9:50AM     

 

                    Osteoarthrosis, generalized, multiple sites    2013  2:45PM     

 

                    Pain in joint; Hip bilateral    2013  2:45PM     

 

                    Anxiety Disorder    Oct 28 2013 10:32AM     

 

                    Chronic Obstructive Pulmonary Disease    Oct 28 2013 10:32AM     

 

                    Hyperlipidemia      Oct 28 2013 10:32AM     

 

                    Pain in joint; Left Hip    Oct 28 2013 10:32AM     

 

                    Sinusitis, Acute    Oct 28 2013 10:32AM     

 

                    Essential Hypertension    2014  3:19PM     

 

                    Osteoarthrosis, generalized, multiple sites    2014  3:19PM     

 

                    Chronic Obstructive Pulmonary Disease    2014  3:19PM     

 

                    Pain in joint; Hip    2014  3:19PM     

 

                    Chronic Obstructive Pulmonary Disease    2014  2:31PM     

 

                    Pain in joint; Hip    2014  2:31PM     

 

                    pre-operative examination, unspecified    2014  2:31PM     

 

                    Lung nodule seen on imaging study    Oct 28 2014 10:24AM     

 

                    Bronchitis, Acute    Oct 16 2014  9:45AM     

 

                    Respiratory System And Chest Symptoms    Oct 16 2014  9:45AM     

 

                    COPD (chronic obstructive pulmonary disease)    Oct 16 2014  9:45AM     

 

                    Chronic Obstructive Pulmonary Disease    Oct 28 2014  2:26PM     

 

                    Pulmonary nodule seen on imaging study    Oct 28 2014  2:26PM     

 

                    Shortness of breath    Oct 28 2014  2:26PM     

 

                    Exercise hypoxemia    Oct 28 2014  2:26PM     

 

                    Nail avulsion       Oct  6 2015  9:38AM     

 

                          Contusion of left index finger with damage to nail, initial encounter    Oct 26 2015

  2:53PM                                 

 

                    Forehead contusion, subsequent encounter    Dec 15 2015  2:39PM     

 

                    Generalized anxiety disorder    Dec 15 2015  2:39PM     

 

                    Chronic Obstructive Pulmonary Disease    Dec 15 2015  2:39PM     

 

                    Osteoarthrosis, generalized, multiple sites    Mar 10 2016  2:12PM     

 

                    Pain of right thigh    Mar 10 2016  2:12PM     

 

                    Mild Acute Hip pain, acute, right Improving    Mar 10 2016  2:12PM     

 

                    Anxiety about health    Mar 10 2016  2:12PM     

 

                    Hyperlipidemia      Aug 22 2016 10:58AM     

 

                    Sinoatrial Node Dysfunction    Aug 22 2016 10:58AM     

 

                    Palpitations        Aug 22 2016 10:58AM     

 

                    Chronic Obstructive Pulmonary Disease    Aug 22 2016 10:58AM     

 

                    Exercise hypoxemia    Aug 22 2016 10:58AM     

 

                    Pain in joint; Hip    Aug 22 2016 10:58AM     

 

                    Pulmonary nodule seen on imaging study    Aug 22 2016 10:58AM     







Payers







           Insurance Name    Company Name    Plan Name    Plan Number    Policy Number    Policy Group

 Number                                 Start Date

 

                    Medicare Part A    Medicare Part A              [id number]              N/A

 

                          [place] [employer] [facility]    [place] [employer]                 [id number]

                                                    N/A

 

                    zzzTest Medicare A    Test Medicare A              [id number]              N/A

 

                                [facility]    [facility]      

                    [id number]                             N/A

 

                    Medicare Part A    Medicare - Lab/Xray              [id number]              N/A

 

                          [employer] [facility]    [employer] [facility]                 [id number]

                                                    N/A

 

                    Medicare Part B    [employer] [place]              [id number]              N/A







History of Encounters







                    Visit Date          Visit Type          Provider

 

                    3/10/2016           Office visit        DEON ESCALANTE

 

                    12/15/2015          Office visit        DEON ESCALANTE

 

                    10/26/2015          Office visit        DEON ESCALANTE

 

                    10/6/2015           Office visit        DEON ESCALANTE

 

                    10/28/2014          Office visit        DEON ESCALANTE

 

                    10/16/2014          Office visit        DEON ESCALANTE

 

                    2014            Office visit        DEON ESCALANTE

 

                    2014           [facility]            DEWEY Garcia MD

 

                    2014           Office visit        DEON ESCALANTE

 

                    10/28/2013          Office visit        DEON ESCALANTE

 

                    10/14/2013          [facility]            DEWEY Garcia MD

 

                    2013           Office visit        DEON ESCALANTE

 

                    2013            Office visit        DEON ESCALANTE

 

                    10/25/2012          Office visit        DEON ESCALANTE

 

                    10/3/2012           Office visit        DEON ESCALANTE

 

                    3/16/2012           Office visit        DEON ESCALANTE

 

                    2012            Office visit        DEON ESCALANTE

 

                    2012            [facility]            DEWEY Garcia MD

 

                    2011          Office visit        DEON ESCALANTE

 

                    2011           Office visit        Deon Leon PA-C

 

                    2011            Office visit        Deon Leon PA-C

 

                    6/3/2011            Office visit        Deon Leon PA-C

 

                    2010           Office visit        Deon Leon PA-C

 

                    2010           Office visit        Deon Leon PA-C

 

                    2010           Office visit        Deon ESCALANTE-C

## 2019-06-25 NOTE — XMS REPORT
MU2 Ambulatory Summary

                             Created on: 10/06/2018



Elsi Casillas

External Reference #: [id number]

: 1940

Sex: Female



Demographics







                          Address                   [address] [place]  [address]

 

                          Home Phone                [phone]

 

                          Preferred Language        English

 

                          Marital Status            

 

                          Nondenominational Affiliation     Unknown

 

                          Race                      White

 

                          Ethnic Group              Not  or 





Author







                          Author                    DEON LEON

 

                          [facility]

 

                          Address                   [address]

[place], [place]  [id number]



 

                          Phone                     [phone]







Care Team Providers







                    Care Team Member Name    Role                Phone

 

                    DEON LEON    PCP                 [phone]

 

                    DEON LEON    PreferredProvider    [phone]







Allergies and Adverse Reactions







                    Name                Reaction            Notes

 

                    SULFA (SULFONAMIDES)    nausea               







Plan of Treatment







             Planned Activity    Comments     Planned Date    Planned Time    Plan/Goal

 

             Lipid Profile                 2016    12:00 AM      

 

             Chest PA and Lateral - Main                 8/10/2017    12:00 AM      

 

             Lipid Profile                 2018    12:00 AM      







Medications







                                        Active 

 

             Name         Start Date    Estimated Completion Date    SIG          Comments

 

                Calcium 600 + D(3) 600 mg(1,500mg) -200 unit oral tablet                                    take 2 tablets by

 oral route daily                        

 

                pravastatin 20 mg oral tablet                                    take 1 tablet (20 mg) by oral route once daily

                                         

 

                Toprol XL 25 mg oral tablet extended release 24 hr                                    take 1 tablet (25 mg) 

by oral route once daily                 

 

                    albuterol sulfate 1.25 mg/3 mL inhalation solution for nebulization    2017           

                                        inhale 3 milliliters (1.25 mg) via nebulizer by inhalation route 4 times per day

  DX J44.9                               

 

                                        ipratropium-albuterol 0.5 mg-3 mg(2.5 mg base)/3 mL inhalation solution for nebulization

                    2018                                inhale 3 milliliters by nebulization route 4 times per day for COPD

                                         

 

                metoprolol succinate 25 mg oral tablet extended release 24 hr    2018                       TAKE

 1 TABLET DAILY                          

 

                Lasix 40 mg oral tablet    2018                       take 1 tablet (40 mg) by oral route once 

daily                                    

 

             hydrochlorothiazide 25 mg oral tablet    2018                 TAKE 1 TABLET DAILY     

 

                amoxicillin 500 mg oral capsule    10/2/2018       10/12/2018      take 1 capsule (500 mg) 

by oral route 3 times per day for 10 days     

 

                fluticasone 50 mcg/actuation nasal spray,suspension    10/2/2018                       inhale 1 spray

 (50 mcg) in each nostril by intranasal route 2 times per day     









                                         

 

             Name         Start Date    Expiration Date    SIG          Comments

 

                Singulair 10 mg oral tablet    9/3/2010        2011        take 1 tablet (10 mg) by oral route

 daily  for 60 days                      

 

                Zithromax Z-Christopher 250 mg oral tablet    2011       take 2 tablets (500 mg)

 by oral route once daily for 1 day then 1 tablet (250 mg) by oral route once 
daily for 4 days                         

 

                Medrol (Christopher) 4 mg oral tablets,dose pack    2011        take as directed

 for 6 days                              

 

                Keflex 500 mg oral capsule    3/1/2012        3/11/2012       take 1 capsule by oral route 3 

times a day for 10 days                  

 

                Cipro 500 mg oral tablet    2012        take 1 tablet (500 mg) by oral route

 every 12 hours for 15 days              

 

                benzonatate 100 mg oral capsule    2013       take 1 capsule (100 mg) by

 oral route 3 times per day for cough     

 

             Medrol (Christopher) 4 mg oral tablets,dose pack    2013     take as directed    

 

 

                Zyrtec 10 mg oral tablet    2013       take 1 tablet (10 mg) by oral route

 once daily for 30 days                  

 

                Flonase 50 mcg/actuation nasal spray,suspension    2013       inhale 1 spray

 in each nostril by intranasal route 2 times per day for 30 days     

 

                cephalexin 500 mg oral capsule    2013        take 1 capsule (500 mg) by oral

 route every 8 hours                     

 

                promethazine-codeine 6.25-10 mg/5 mL oral syrup    2013                       take 5 milliliters

 by oral route every 4 hours as needed, not to exceed 30 mL in 24 hours     

 

                Zithromax Z-Christopher 250 mg oral tablet    10/2/2013       10/7/2013       take 2 tablets (500 mg)

 by oral route once daily for 1 day then 1 tablet (250 mg) by oral route once 
daily for 4 days                         

 

                amoxicillin 500 mg oral capsule    2014       take 1 capsule by oral route

 3 times a day for 15 days               

 

                lovastatin 20 mg oral tablet    2014        take 1 tablet (20 mg) by oral 

route daily  for 30 days                 

 

                Zithromax Z-Christopher 250 mg oral tablet    2014       take 2 tablets (500 mg)

 by oral route once daily for 1 day then 1 tablet (250 mg) by oral route once 
daily for 4 days                         

 

             Medrol (Christopher) 4 mg oral tablets,dose pack    2014                 take as directed     

 

                amoxicillin 500 mg oral capsule    2014                       take 1 capsule (500 mg) by oral route

 3 times per day                         

 

                Levaquin 500 mg oral tablet    10/16/2014      10/26/2014      take 1 tablet (500 mg) by oral

 route once daily for 10 days            

 

                prednisone 20 mg oral tablet    10/16/2014      10/24/2014      4x2 days 3x2 days 2x2 days

 1x2 days                                

 

                Zithromax Z-Christopher 250 mg oral tablet    2014       take 2 tablets (500 mg)

 by oral route once daily for 1 day then 1 tablet (250 mg) by oral route once 
daily for 4 days                         

 

                Bactrim -160 mg oral tablet    1/15/2015       2015       take 1 tablet by oral route

 every 12 hours for 10 days              

 

                Zithromax Z-Christopher 250 mg oral tablet    2015      take 2 tablets (500 mg)

 by oral route once daily for 1 day then 1 tablet (250 mg) by oral route once 
daily for 4 days                         

 

                Keflex 500 mg oral capsule    2016       take 1 capsule by oral route 3

 times a day for 7 days                  

 

                amoxicillin 500 mg oral capsule    10/4/2016       10/14/2016      take 1 capsule by oral route

 3 times a day for 10 days               

 

                Tessalon Perles 100 mg oral capsule    10/4/2016                       take 1 capsule by oral route 

every 4 hours                            

 

                Zithromax Z-Christopher 250 mg oral tablet    11/10/2016      11/15/2016      take 2 tablets (500 

mg) by oral route once daily for 1 day then 1 tablet (250 mg) by oral route once
daily for 4 days                         

 

                amoxicillin 500 mg oral tablet    2016                       take 1 tablet (500 mg) by oral route

 3 times per day                         

 

                amoxicillin 500 mg oral capsule    2017       take 1 capsule (500 mg) by

 oral route 3 times per day for 10 days     

 

                Bactrim -160 mg oral tablet    2017       take 1 tablet by oral route

 2 times a day for 10 days               

 

                Levaquin 500 mg oral tablet    2017        take 1 tablet (500 mg) by oral

 route once daily for 10 days            

 

                amoxicillin 500 mg oral capsule    2017                       take 1 capsule (500 mg) by oral route

 every 12 hours for 7 days               

 

                Zithromax Z-Christopher 250 mg oral tablet    2017                      take 2 tablets (500 mg) by oral

 route once daily for 1 day then 1 tablet (250 mg) by oral route once daily for 
4 days                                   

 

                amoxicillin 500 mg oral tablet    2018                       take 1 tablet (500 mg) by oral route

 every 12 hours for 10 days              

 

                Cipro 500 mg oral tablet    2018        2/15/2018       take 1 tablet (500 mg) by oral route

 2 times per day for 10 days             

 

                Zithromax Z-Christopher 250 mg oral tablet    2018       take 2 tablets (500 mg)

 by oral route once daily for 1 day then 1 tablet (250 mg) by oral route once 
daily for 4 days                         

 

                Keflex 500 mg oral capsule    2018       take 1 capsule (500 mg) by oral

 route every 12 hours for 7 days         

 

                prednisone 20 mg oral tablet    2018       2X4 days 1X4 days 1/2X4 days 

                                         









                                        Discontinued 

 

             Name         Start Date    Discontinued Date    SIG          Comments

 

                amoxicillin 500 mg oral capsule    11/10/2011      10/3/2012       take 1 capsule (500 mg) 

by oral route 3 times per day            

 

                Anusol-HC 25 mg rectal suppository    2011      10/3/2012       insert 1 suppository 

(25 mg) by rectal route 2 times per day     

 

                Proctofoam 1 % topical foam    2011       apply by external route daily

                                         

 

             Aerochamber    2014    Use with inhaler as directed     

 

                hydrochlorothiazide 25 mg oral tablet    1/15/2015       2015      TAKE 1 TABLET DAILY

                                         

 

                promethazine-codeine 6.25-10 mg/5 mL oral syrup    11/10/2016      10/25/2017      take 5 

milliliters by oral route every 6 hours as needed, not to exceed 30 mL in 24 
hours                                    

 

                prednisone 20 mg oral tablet    2017       10/25/2017      4 x 2 days, 3 x 2 days, 2 x

 2 days 1 x 2 days                       

 

                Augmentin 875-125 mg oral tablet    2017       take 1 tablet by oral route

 every 12 hours for 7 days               

 

                Keflex 500 mg oral capsule    2017       take 1 capsule (500 mg) by oral

 route every 12 hours                    

 

                Keflex 500 mg oral capsule    10/10/2017      2018       take 1 capsule (500 mg) by oral

 route every 12 hours for 10 days        

 

                    Symbicort 160-4.5 mcg/actuation inhalation HFA aerosol inhaler    10/25/2017          10/6/2018

                          inhale 2 puffs by inhalation route 2 times per day in the morning and evening    

 

 

                Levaquin 500 mg oral tablet    2018       take 1 tablet (500 mg) by oral

 route once daily for 10 days            

 

                Bactrim -160 mg oral tablet    2018       take 1 tablet by oral route

 every 12 hours for 10 days             nausea

 

                Tessalon Perles 100 mg oral capsule    2018        10/6/2018       take 1 capsule (100 mg)

 by oral route 3 times per day as needed for cough     

 

                Sudogest 30 mg oral tablet    2018        10/6/2018       take 1 tablets (60 mg) by oral 

route every 6 hours as needed            







Problem List







                    Description         Status              Onset

 

                    Chronic Obstructive Pulmonary Disease    Active               

 

                    Hyperlipidemia      Active               

 

                    Anxiety Disorder    Active              10/29/2013

 

                    Pain in joint; Hip    Active              06/10/2014

 

                    Pulmonary nodule seen on imaging study    Active              10/29/2014

 

                    Exercise hypoxemia    Active              10/29/2014

 

                    Anxiety about health    Active              2016

 

                    Primary osteoarthritis involving multiple joints    Active              2017

 

                    Medication management    Active              2017

 

                    Cellulitis of left lower extremity    Active              2017

 

                    Dog bite of calf, left, sequela    Active              2017

 

                    Peripheral edema    Active              2018







Vital Signs







      Date    Time    BP-Sys(mm[Hg]    BP-Noni(mm[Hg])    HR(bpm)    RR(rpm)    Temp    WT    HT    HC    BMI

                    BSA                 BMI Percentile      O2 Sat(%)

 

        10/4/2018    9:00:00 AM    114 mmHg    74 mmHg    76 bpm    18 rpm    98.4 F    145 lbs    61 in

                          27.3972 kg/m    1.6825 m                 97 %

 

        2018    10:07:00 AM    124 mmHg    82 mmHg    86 bpm    18 rpm    98.2 F    149 lbs    61 in

                          28.15 kg/m2    1.71 m2                   92 %

 

        2018    10:48:00 AM    118 mmHg    80 mmHg    82 bpm    18 rpm    98.3 F    144 lbs    61 in

                          27.2083 kg/m    1.6767 m                 93 %

 

        2018    2:35:00 PM    120 mmHg    82 mmHg    106 bpm    20 rpm    98.2 F    142 lbs    62 in

                          25.97 kg/m2    1.68 m2                   92 %

 

       2018    3:28:00 PM    122 mmHg    68 mmHg    114 bpm    18 rpm    98.2 F    142 lbs            

                                                                90 %

 

        2018    1:35:00 PM    112 mmHg    74 mmHg    114 bpm    18 rpm    99.6 F    139 lbs    63 in

                          24.6225 kg/m    1.6741 m                 98 %

 

       2018    3:57:00 PM    128 mmHg    80 mmHg    78 bpm    18 rpm    98.4 F    148 lbs             

                                                                90 %

 

        10/24/2017    1:51:00 PM    132 mmHg    80 mmHg    82 bpm    16 rpm    98.2 F    145 lbs    62 in

                          26.52 kg/m2    1.70 m2                   95 %

 

       2017    3:29:00 PM    128 mmHg    80 mmHg    68 bpm    16 rpm    98 F    144 lbs    63 in    

                25.5082 kg/m    1.7039 m                      93 %

 

        2017    11:37:00 AM    118 mmHg    72 mmHg    96 bpm    16 rpm    97.4 F    141 lbs    63 in

                          24.98 kg/m2    1.69 m2                   91 %

 

        2017    8:55:00 AM    105 mmHg    60 mmHg    96 bpm    18 rpm    95.8 F    142 lbs    63 in 

                          25.1539 kg/m    1.6921 m                 95 %

 

       2017    12:10:00 PM    122 mmHg    68 mmHg    76 bpm    18 rpm    98 F    143 lbs    63 in    

                25.33 kg/m2     1.70 m2                         98 %

 

        2017    4:03:00 PM    118 mmHg    64 mmHg    106 bpm    18 rpm    97.4 F    137 lbs    63 in

                          24.2682 kg/m    1.662 m                 98 %

 

        2016    12:11:00 PM    120 mmHg    84 mmHg    110 bpm    16 rpm    98.1 F    130 lbs    63

 in                       23.03 kg/m2    1.62 m2                   90 %

 

        11/10/2016    3:57:00 PM    148 mmHg    72 mmHg    88 bpm    16 rpm    98.2 F    132 lbs    63 in

                          23.3825 kg/m    1.6314 m                 98 %

 

        3/10/2016    2:12:00 PM    122 mmHg    60 mmHg    106 bpm    18 rpm    97 F    137 lbs    63 in 

                          24.27 kg/m2    1.66 m2                   93 %

 

        12/15/2015    2:33:00 PM    120 mmHg    75 mmHg    102 bpm    16 rpm    98.2 F    137 lbs    63 

in                        24.2682 kg/m    1.662 m                 94 %

 

        10/26/2015    2:46:00 PM    130 mmHg    80 mmHg    96 bpm    16 rpm    97.9 F    141 lbs    66 in

                          22.76 kg/m2    1.73 m2                   98 %

 

        10/6/2015    9:37:00 AM    140 mmHg    78 mmHg    110 bpm    16 rpm    97.9 F    141 lbs    63 in

                          24.9768 kg/m    1.6861 m                 95 %

 

        10/28/2014    2:25:00 PM    132 mmHg    66 mmHg    92 bpm    24 rpm    97.5 F    147 lbs    63 in

                          26.04 kg/m2    1.72 m2                   92 %

 

        10/16/2014    9:45:00 AM    132 mmHg    64 mmHg    74 bpm    24 rpm    97.7 F    146 lbs    63 in

                          25.8625 kg/m    1.7157 m                 92 %

 

        2014    2:31:00 PM    136 mmHg    68 mmHg    90 bpm    22 rpm    98.2 F    148 lbs    63 in 

                          26.22 kg/m2    1.73 m2                   95 %

 

        2014    3:19:00 PM    124 mmHg    70 mmHg    64 bpm    20 rpm    97.8 F    147 lbs    63 in

                          26.0396 kg/m    1.7216 m                 95 %

 

        10/28/2013    10:31:00 AM    140 mmHg    70 mmHg    92 bpm    24 rpm    97.8 F    143 lbs    63 

in                        25.33 kg/m2    1.70 m2                   95 %

 

      2013    2:51:00 PM    130 mmHg    78 mmHg    90 bpm          98.2 F    168 lbs    65 in          

27.9564 kg/m      1.8694 m                               

 

       2013    2:43:00 PM    110 mmHg    76 mmHg    103 bpm           96.6 F    137 lbs    63 in      

                24.27 kg/m2     1.66 m2                          

 

        2013    9:50:00 AM    150 mmHg    66 mmHg    108 bpm    20 rpm    97.8 F    138 lbs    63 in

                          24.4454 kg/m    1.6681 m                 94 %

 

        10/25/2012    9:08:00 AM    110 mmHg    60 mmHg    88 bpm    18 rpm    97.5 F    142 lbs    63 in

                          25.15 kg/m2    1.69 m2                   94 %

 

      10/3/2012    9:33:00 AM    130 mmHg    60 mmHg    98 bpm    18 rpm          146 lbs    63 in          

25.8625 kg/m      1.7157 m                              95 %

 

      2012    2:31:00 PM    116 mmHg    76 mmHg    88 bpm                140 lbs    63 in          24.80 

kg/m2               1.68 m2                                 96 %

 

     2011    10:02:00 AM    122 mmHg    80 mmHg    110 bpm              144 lbs                        

                                        94 %

 

      2011    2:58:00 PM    124 mmHg    84 mmHg    106 bpm                155 lbs    63 in          27.4567

 kg/m             1.7678 m                              96 %

 

     2011    9:55:00 AM    114 mmHg    78 mmHg    92 bpm              146 lbs                             95

 %

 

     6/3/2011    8:36:00 AM    116 mmHg    74 mmHg    90 bpm              146 lbs                             96

 %

 

     2010    3:01:00 PM    132 mmHg    84 mmHg    102 bpm              149 lbs                          

                                        94 %

 

     2010    11:46:00 AM    124 mmHg    78 mmHg    104 bpm              151 lbs                         

                                        94 %

 

     2010    8:47:00 AM    116 mmHg    78 mmHg    103 bpm              151 lbs                          

                                        95 %







Social History







                    Name                Description         Comments

 

                    Alcohol             Never                

 

                    Uses seatbelts                           

 

                    Tobacco             Never smoker         







History of Procedures







                    Date Ordered        Description         Order Status

 

                    2011 12:00 AM    THER/PROPH/DIAG INJ SC/IM    Reviewed

 

                    2011 12:00 AM    Decadron Inj.1mg-([facility]) Ndc #0812485061    Reviewed

 

                    2011 12:00 AM    Depo-Medrol 80 Mg Im/[facility]DC 0009-040822    Reviewed

 

                    2011 12:00 AM    Rocephin, Per 250MG - 1 Gram Vial NDC 9847-712517-[facility]    Reviewed



 

                    3/16/2012 12:00 AM    ROUTINE VENIPUNCTURE    Reviewed

 

                    3/16/2012 12:00 AM    COMPLETE CBC W/AUTO DIFF WBC    Reviewed

 

                    3/16/2012 12:00 AM    COMPREHEN METABOLIC PANEL    Reviewed

 

                    3/16/2012 12:00 AM    ASSAY THYROID STIM HORMONE    Reviewed

 

                    11/10/2016 12:00 AM    Decadron, Per 1 Mg NDC# [address]-51    Reviewed

 

                    11/10/2016 12:00 AM    Depo-Medrol, Per 80 Mg NDC#9015-5236-59    Reviewed

 

                    11/10/2016 12:00 AM    Rocephin 1 gram NDC#5687-1374-24    Reviewed

 

                    2016 12:00 AM    THER/PROPH/DIAG INJ SC/IM    Reviewed

 

                    2016 12:00 AM    Decadron 8mg Injection, [facility]    Reviewed

 

                    2016 12:00 AM    Depo-Medrol 80mg Injection, [facility]    Reviewed

 

                    2016 12:00 AM    Rocephin 1 gram Injection, [facility]    Reviewed

 

                    2017 12:00 AM    COMPLETE CBC W/AUTO DIFF WBC    Reviewed

 

                    2017 12:00 AM    COMPREHEN METABOLIC PANEL    Reviewed

 

                    2017 12:00 AM    LIPID PANEL         Reviewed

 

                    2017 12:00 AM    THERAPEUTIC PROPHYLACTIC/DX INJECTION SUBQ/IM    Reviewed

 

                    2017 12:00 AM    Decadron 8mg Injection, [facility]    Reviewed

 

                    2017 12:00 AM    Depo-Medrol 80mg Injection, [facility]    Reviewed

 

                    2017 12:00 AM    LIPID PANEL         Returned

 

                    2017 12:00 AM    THERAPEUTIC PROPHYLACTIC/DX INJECTION SUBQ/IM    Reviewed

 

                    2017 12:00 AM    Decadron 8mg Injection, [facility]    Reviewed

 

                    2017 12:00 AM    Depo-Medrol 80mg Injection, [facility]    Reviewed

 

                    10/3/2012 12:00 AM    THER/PROPH/DIAG INJ SC/IM    Reviewed

 

                    10/3/2012 12:00 AM    Decadron, Per 1 Mg NDC# [address]-84    Reviewed

 

                    10/3/2012 12:00 AM    Depo-Medrol, Per 80 Mg NDC#9161-3911-56    Reviewed

 

                    10/3/2012 12:00 AM    Toradol 60 Mg NDC#0733-2026-93    Reviewed

 

                    10/24/2017 12:00 AM    THERAPEUTIC PROPHYLACTIC/DX INJECTION SUBQ/IM    Reviewed

 

                    10/24/2017 12:00 AM    Decadron 8mg Injection, [facility]    Reviewed

 

                    10/24/2017 12:00 AM    Depo-Medrol 80mg Injection, [facility]    Reviewed

 

                    2018 12:00 AM    THERAPEUTIC PROPHYLACTIC/DX INJECTION SUBQ/IM    Reviewed

 

                    2018 12:00 AM    Rocephin 1 gram Injection, [facility]    Reviewed

 

                    2018 12:00 AM    THERAPEUTIC PROPHYLACTIC/DX INJECTION SUBQ/IM    Reviewed

 

                    2018 12:00 AM    Decadron 8mg Injection, [facility]    Reviewed

 

                    2018 12:00 AM    Depo-Medrol 80mg Injection, [facility]    Reviewed

 

                    2018 12:00 AM    Rocephin 1 gram Injection, [facility]    Reviewed

 

                    2018 12:00 AM    COMPLETE CBC W/AUTO DIFF WBC    Returned

 

                    2018 12:00 AM    COMPREHEN METABOLIC PANEL    Returned

 

                    2018 12:00 AM    ELECTROCARDIOGRAM TRACING    Reviewed

 

                    2013 12:00 AM    THER/PROPH/DIAG INJ SC/IM    Reviewed

 

                    2013 12:00 AM    Decadron, Per 1 Mg NDC# [address]-73    Reviewed

 

                    2013 12:00 AM    Depo-Medrol, Per 80 Mg NDC#1934-1183-90    Reviewed

 

                    2013 12:00 AM    Toradol 60 Mg NDC#0951-6816-64    Reviewed

 

                    2010 12:00 AM    ROUTINE VENIPUNCTURE    Reviewed

 

                    2010 12:00 AM    COMPLETE CBC W/AUTO DIFF WBC    Reviewed

 

                    2010 12:00 AM    COMPREHEN METABOLIC PANEL    Reviewed

 

                    2010 12:00 AM    LIPID PANEL         Reviewed

 

                    10/28/2014 12:00 AM    CHEST X-RAY 4/> VIEWS    Reviewed

 

                    6/3/2011 12:00 AM    ROUTINE VENIPUNCTURE    Reviewed

 

                    6/3/2011 12:00 AM    COMPLETE CBC AUTOMATED    Reviewed

 

                    6/3/2011 12:00 AM    COMPREHEN METABOLIC PANEL    Reviewed

 

                    6/3/2011 12:00 AM    LIPID PANEL         Reviewed

 

                    2011 12:00 AM    THER/PROPH/DIAG INJ SC/IM    Reviewed

 

                    2011 12:00 AM    Decadron Inj.8mg-([place]) Ndc #[id number]    Reviewed

 

                    2011 12:00 AM    Depo-Medrol 80 Mg Im/[place] NDC [id number]    Reviewed







Results Summary







                          Date and Description      Results

 

                          6/3/2011 1:53 PM          TRIGLYCERIDES 155.0 mg/dLCHOLESTEROL 248.0 mg/dLHDL 51.0 mg/dLTOT

 CHOL/HDL 4.9 .0 mg/dLGLUCOSE 97.0 mg/dLSODIUM 139.0 mmol/LPOTASSIUM 3.70
 mmol/LCHLORIDE 101.0 mmol/LCO2 27.0 mmol/LBUN 19.0 mg/dLCREATININE 0.90 
mg/dLSGOT/AST 19.0 IU/LSGPT/ALT 11.0 IU/LALK PHOS 111.0 IU/LTOTAL PROTEIN 7.40 
g/dLALBUMIN 4.20 g/dLTOTAL BILI 0.50 mg/dLCALCIUM 9.50 mg/dLAGE 70 GFR NonAA 62 
GFR AA 75 eGFR >60 mL/min/1.73 m2eGFR AA* >60 







History Of Immunizations

Not available.



History of Past Illness







                    Name                Date of Onset       Comments

 

                    Chronic Obstructive Pulmonary Disease                         

 

                    Hyperlipidemia                           

 

                    Cough               2010  8:49AM     

 

                    General Medical Exam, Adult    2010  8:49AM     

 

                    Seasonal Allergies    2010  8:49AM     

 

                    Sinusitis, Chronic    2010  8:49AM     

 

                    Ganglion cyst of synovium, tendon, and bursa; other    2010 11:48AM     

 

                    Anxiety Disorder    10/29/2013           

 

                    Pain in joint; Hip    06/10/2014           

 

                    Pulmonary nodule seen on imaging study    10/29/2014           

 

                    Exercise hypoxemia    10/29/2014           

 

                    Chronic Obstructive Pulmonary Disease    2010  3:02PM     

 

                    Edema               2010  3:02PM     

 

                    Sinusitis, Acute    2010  3:02PM     

 

                    Anxiety about health    2016           

 

                    Chronic Obstructive Pulmonary Disease    Trey  3 2011  8:38AM     

 

                    Palpitations        Trey  3 2011  8:38AM     

 

                    Cough               2011  9:54AM     

 

                    Sinusitis, Acute    2011  9:54AM     

 

                    Seasonal Allergies    2011  9:54AM     

 

                    Post-nasal drainage    2011  9:54AM     

 

                    Primary osteoarthritis involving multiple joints    2017           

 

                    Medication management    2017           

 

                    Cellulitis of left lower extremity    2017           

 

                    Dog bite of calf, left, sequela    2017           

 

                    Cough               Sep 22 2011  2:55PM     

 

                    Seasonal Allergies    Sep 22 2011  2:55PM     

 

                    Pharyngitis, Acute    Sep 22 2011  2:55PM     

 

                    Post-nasal drainage    Sep 22 2011  2:55PM     

 

                    Peripheral edema    2018           

 

                    Blood In Stool, Occult    2011  9:58AM     

 

                    Hemorrhoids         2011  9:58AM     

 

                    Chronic Obstructive Pulmonary Disease    2012  2:33PM     

 

                    Cardiac Dysrhythmia    2012  2:33PM     

 

                    Sinoatrial Node Dysfunction    Mar 16 2012  9:12AM     

 

                    Palpitations        Mar 16 2012  9:12AM     

 

                    Osteoarthrosis, generalized, multiple sites    Oct  3 2012  9:34AM     

 

                    Pain in joint; Hip    Oct  3 2012  9:34AM     

 

                    Osteoarthrosis      Oct 25 2012  9:09AM     

 

                    Bronchitis, Acute    Oct 25 2012  9:09AM     

 

                    Cough               Oct 25 2012  9:09AM     

 

                    Pain in joint; Left Hip    Oct 25 2012  9:09AM     

 

                    Pain in joint; Hip    2013  9:50AM     

 

                    Osteoarthrosis, generalized, multiple sites    2013  2:45PM     

 

                    Pain in joint; Hip bilateral    2013  2:45PM     

 

                    Anxiety Disorder    Oct 28 2013 10:32AM     

 

                    Chronic Obstructive Pulmonary Disease    Oct 28 2013 10:32AM     

 

                    Hyperlipidemia      Oct 28 2013 10:32AM     

 

                    Pain in joint; Left Hip    Oct 28 2013 10:32AM     

 

                    Sinusitis, Acute    Oct 28 2013 10:32AM     

 

                    Essential Hypertension    2014  3:19PM     

 

                    Osteoarthrosis, generalized, multiple sites    2014  3:19PM     

 

                    Chronic Obstructive Pulmonary Disease    2014  3:19PM     

 

                    Pain in joint; Hip    2014  3:19PM     

 

                    Chronic Obstructive Pulmonary Disease    2014  2:31PM     

 

                    Pain in joint; Hip    2014  2:31PM     

 

                    pre-operative examination, unspecified    2014  2:31PM     

 

                    Lung nodule seen on imaging study    Oct 28 2014 10:24AM     

 

                    Bronchitis, Acute    Oct 16 2014  9:45AM     

 

                    Respiratory System And Chest Symptoms    Oct 16 2014  9:45AM     

 

                    COPD (chronic obstructive pulmonary disease)    Oct 16 2014  9:45AM     

 

                    Chronic Obstructive Pulmonary Disease    Oct 28 2014  2:26PM     

 

                    Pulmonary nodule seen on imaging study    Oct 28 2014  2:26PM     

 

                    Shortness of breath    Oct 28 2014  2:26PM     

 

                    Exercise hypoxemia    Oct 28 2014  2:26PM     

 

                    Nail avulsion       Oct  6 2015  9:38AM     

 

                          Contusion of left index finger with damage to nail, initial encounter    Oct 26 2015

  2:53PM                                 

 

                    Forehead contusion, subsequent encounter    Dec 15 2015  2:39PM     

 

                    Generalized anxiety disorder    Dec 15 2015  2:39PM     

 

                    Chronic Obstructive Pulmonary Disease    Dec 15 2015  2:39PM     

 

                    Osteoarthrosis, generalized, multiple sites    Mar 10 2016  2:12PM     

 

                    Pain of right thigh    Mar 10 2016  2:12PM     

 

                    Mild Acute Hip pain, acute, right Improving    Mar 10 2016  2:12PM     

 

                    Anxiety about health    Mar 10 2016  2:12PM     

 

                    Hyperlipidemia      Aug 22 2016 10:58AM     

 

                    Sinoatrial Node Dysfunction    Aug 22 2016 10:58AM     

 

                    Palpitations        Aug 22 2016 10:58AM     

 

                    Chronic Obstructive Pulmonary Disease    Aug 22 2016 10:58AM     

 

                    Exercise hypoxemia    Aug 22 2016 10:58AM     

 

                    Pain in joint; Hip    Aug 22 2016 10:58AM     

 

                    Pulmonary nodule seen on imaging study    Aug 22 2016 10:58AM     

 

                    Eustachian tube dysfunction, bilateral    Nov 10 2016  3:57PM     

 

                    Moderate Acute Cough    Nov 10 2016  3:57PM     

 

                    Pharyngitis, Acute    Nov 10 2016  3:57PM     

 

                    Upper Respiratory Infection    Nov 10 2016  3:57PM     

 

                          COPD (chronic obstructive pulmonary disease) with acute bronchitis    Nov 10 2016 

 3:57PM                                  

 

                    Mild Chronic Cough Worsening    Dec 12 2016 12:12PM     

 

                          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

 12 2016 12:12PM                         

 

                    Moderate Shortness of breath on exertion    Dec 12 2016 12:12PM     

 

                    Hyperlipidemia      May  4 2017 10:27AM     

 

                    Sinoatrial Node Dysfunction    May  4 2017 10:27AM     

 

                    Palpitations        May  4 2017 10:27AM     

 

                    Chronic Obstructive Pulmonary Disease    May  4 2017 10:27AM     

 

                    Exercise hypoxemia    May  4 2017 10:27AM     

 

                    Pain in joint; Hip    May  4 2017 10:27AM     

 

                    Pulmonary nodule seen on imaging study    May  4 2017 10:27AM     

 

                    Chest congestion    May 16 2017  4:04PM     

 

                          COPD (chronic obstructive pulmonary disease) with acute bronchitis    May 16 2017 

 4:04PM                                  

 

                    Productive cough    May 16 2017  4:04PM     

 

                    Anxiety about health    May 16 2017  4:04PM     

 

                          Chronic obstructive pulmonary disease, unspecified COPD type    May 16 2017  4:04PM

                                         

 

                    Exercise hypoxemia    May 16 2017  4:04PM     

 

                    Mixed hyperlipidemia    May 16 2017  4:04PM     

 

                    Medication management    May 16 2017  4:04PM     

 

                    Primary osteoarthritis involving multiple joints    May 16 2017  4:04PM     

 

                    Cellulitis of left lower extremity    2017 12:10PM     

 

                    Bitten by dog, initial encounter    2017 12:10PM     

 

                    Cellulitis of left lower extremity    2017  8:56AM     

 

                    Open bite, left lower leg, sequela    2017  8:56AM     

 

                    Bitten by dog, sequela    2017  8:56AM     

 

                    Medication management    2017  8:56AM     

 

                    Cellulitis of left lower extremity    2017 11:38AM     

 

                    Open bite, left lower leg, subsequent encounter    2017 11:38AM     

 

                    Bitten by dog, subsequent encounter    2017 11:38AM     

 

                    Medication management    2017 11:38AM     

 

                    Cough               Aug 10 2017  4:09PM     

 

                    Abnormal chest xray    Aug 10 2017  4:09PM     

 

                    Hyperlipidemia      Aug 29 2017  9:02AM     

 

                    Sinoatrial Node Dysfunction    Aug 29 2017  9:02AM     

 

                    Palpitations        Aug 29 2017  9:02AM     

 

                    Chronic Obstructive Pulmonary Disease    Aug 29 2017  9:02AM     

 

                    Exercise hypoxemia    Aug 29 2017  9:02AM     

 

                    Pain in joint; Hip    Aug 29 2017  9:02AM     

 

                    Pulmonary nodule seen on imaging study    Aug 29 2017  9:02AM     

 

                    Eustachian tube dysfunction, bilateral    Aug 28 2017  3:30PM     

 

                    Purulent postnasal drainage    Aug 28 2017  3:30PM     

 

                    Chest congestion    Aug 28 2017  3:30PM     

 

                    Upper respiratory tract infection, unspecified type    Aug 28 2017  3:30PM     

 

                    Moderate Acute Sinus pressure    Aug 28 2017  3:30PM     

 

                          COPD (chronic obstructive pulmonary disease) with acute bronchitis    Aug 28 2017 

 3:30PM                                  

 

                    Moderate Chronic Purulent postnasal drainage    Oct 24 2017  1:52PM     

 

                    Mild Chronic Sinus pressure    Oct 24 2017  1:52PM     

 

                          Moderate Chronic Acute seasonal allergic rhinitis, unspecified trigger Stable    Oct

 24 2017  1:52PM                         

 

                    Mild Acute Labyrinthitis    Oct 24 2017  1:52PM     

 

                    Moderate Acute Vertigo    Oct 24 2017  1:52PM     

 

                    Purulent postnasal drainage    2018  3:58PM     

 

                    Upper respiratory tract infection, unspecified type    2018  3:58PM     

 

                    Mild Acute Left Enlarged lymph node in neck    2018  3:58PM     

 

                    Cough               2018  1:36PM     

 

                    Moderate Acute Recurrent Chest congestion    2018  1:36PM     

 

                    COPD exacerbation    2018  1:36PM     

 

                          Chronic obstructive pulmonary disease with acute lower respiratory infection    2018  1:36PM                         

 

                    Acute bronchitis, unspecified    b  2018  1:36PM     

 

                    Cough               2018  3:29PM     

 

                    Acute pharyngitis, unspecified etiology    2018  3:29PM     

 

                    Chest congestion    2018  3:29PM     

 

                    COPD (chronic obstructive pulmonary disease)    2018  3:29PM     

 

                    Cellulitis of left lower extremity    May 22 2018  2:35PM     

 

                    Cellulitis of left lower extremity    2018 10:48AM     

 

                    Peripheral edema    2018 10:48AM     

 

                    Hyperlipidemia      Aug 22 2018 10:03AM     

 

                    Sinoatrial Node Dysfunction    Aug 22 2018 10:03AM     

 

                    Palpitations        Aug 22 2018 10:03AM     

 

                    Chronic Obstructive Pulmonary Disease    Aug 22 2018 10:03AM     

 

                    Exercise hypoxemia    Aug 22 2018 10:03AM     

 

                    Pain in joint; Hip    Aug 22 2018 10:03AM     

 

                    Pulmonary nodule seen on imaging study    Aug 22 2018 10:03AM     

 

                    Exercise Counseling    Aug 21 2018 10:08AM     

 

                    Moderate Acute Bilateral Peripheral edema    Aug 21 2018 10:08AM     

 

                          Chronic obstructive pulmonary disease, unspecified COPD type    Aug 21 2018 10:08AM

                                         

 

                    Medication management    Aug 21 2018 10:08AM     

 

                    Acute nasopharyngitis (common cold)    Oct  4 2018  9:03AM     

 

                    Purulent postnasal drainage    Oct  4 2018  9:03AM     

 

                    Ear pain, right     Oct  4 2018  9:03AM     







Payers







           Insurance Name    Company Name    Plan Name    Plan Number    Policy Number    Policy Group

 Number                                 Start Date

 

                    Medicare [employer]    Medicare [employer]              [id number]              N/A

 

                          [place] [employer]    [place] [employer]                 [id number]

                                                    N/A

 

                    zzzTest Medicare A    Test Medicare A              [id number]              N/A

 

                                [facility] - RHC - [facility] RHC Comm      

                    [id number]                             N/A

 

                    Medicare Part A    Medicare - Lab/Xray              [id number]              N/A

 

                          [employer]    [employer]                 [id number]

                                                    N/A

 

                    Medicare Part A    Medicare Part A              [id number]              N/A

 

                    Medicare Part B    [employer] [place]              [id number]              N/A







History of Encounters







                    Visit Date          Visit Type          Provider

 

                    10/2/2018           Office visit        DEON ESCALANTE

 

                    2018           Office visit        DEON ESCALANTE

 

                    2018           Office visit        DEON ESCALANTE

 

                    2018           Office visit        DEON ESCALANTE

 

                    2018           Office visit        DEON ESCALANTE

 

                    2018            Office visit        DEON ESCALANTE

 

                    2018           Office visit        DEON ESCALANTE

 

                    10/24/2017          Office visit        DEON ESCALANTE

 

                    2017           Voided              DEON ESCALANTE

 

                    2017           Office visit        DEON ESCALANTE

 

                    2017           Office visit        DEON ESCALANTE

 

                    2017            Office visit        DEON ESCALANTE

 

                    2017            Office visit        DEON ESCALANTE

 

                    2017           Office visit        DEON ESCALANTE

 

                    2016          Office visit        DEON ESCALANTE

 

                    11/10/2016          Office visit        DEON ESCALANTE

 

                    3/10/2016           Office visit        DEON ESCALANTE

 

                    12/15/2015          Office visit        DEON ESCALANTE

 

                    10/26/2015          Office visit        DEON ESCALANTE

 

                    10/6/2015           Office visit        DEON ESCALANTE

 

                    10/28/2014          Office visit        DEON LEON PA

 

                    10/16/2014          Office visit        DEON LEON PA

 

                    2014            Office visit        DEON LEON PA

 

                    2014           [facility]            DEWEY Garcia MD

 

                    2014           Office visit        DEON LEON PA

 

                    10/28/2013          Office visit        DEON LEON PA

 

                    10/14/2013          [facility]            DEWEY Garcia MD

 

                    2013           Office visit        DEON LEON PA

 

                    2013            Office visit        DEON LEON PA

 

                    10/25/2012          Office visit        DEON LEON PA

 

                    10/3/2012           Office visit        DEON LEON PA

 

                    3/16/2012           Office visit        DEON LEON PA

 

                    2012            Office visit        DEON LEON PA

 

                    2012            [facility]            DEWEY Garcia MD

 

                    2011          Office visit        DEON LEON PA

 

                    2011           Office visit        Deon Leon PA-C

 

                    2011            Office visit        Deon Leon PA-C

 

                    6/3/2011            Office visit        Deon Leon PA-C

 

                    2010           Office visit        Deon Leon PA-C

 

                    2010           Office visit        Deon Leon PA-C

 

                    2010           Office visit        Deon Leon PA-C

## 2019-06-25 NOTE — XMS REPORT
MU2 Ambulatory Summary

                             Created on: 2016



Elsi Casillas

External Reference #: [id number]

: 1940

Sex: Female



Demographics







                          Address                   [address] [place]  [address]

 

                          Home Phone                [phone]

 

                          Preferred Language        English

 

                          Marital Status            

 

                          Ad[employer]ist Affiliation     Unknown

 

                          Race                      White

 

                          Ethnic Group              Not  or 





Author







                          Author                    DEON LEON

 

                          [facility]

 

                          Address                   [address]

[place], [place]  [id number]



 

                          Phone                     [phone]







Care Team Providers







                    Care Team Member Name    Role                Phone

 

                    DEON LEON    PCP                 Unavailable







Allergies and Adverse Reactions







                    Name                Reaction            Notes

 

                    NO KNOWN DRUG ALLERGIES                         







Plan of Treatment

Not available.



Medications







                                        Active 

 

             Name         Start Date    Estimated Completion Date    SIG          Comments

 

             Proctofoam 1 % topical foam    2011                 apply by external route daily     

 

                Calcium 600 + D(3) 600 mg(1,500mg) -200 unit oral tablet                                    take 2 tablets by

 oral route daily                        

 

             Aerochamber    2014                 Use with inhaler as directed     

 

                Symbicort 160-4.5 mcg/actuation inhalation HFA aerosol inhaler    2014                       inhale

 2 puffs by inhalation route 2 times per day in the morning and evening     

 

                pravastatin 20 mg oral tablet                                    take 1 tablet (20 mg) by oral route once daily

                                         

 

                Toprol XL 25 mg oral tablet extended release 24 hr                                    take 1 tablet (25 mg) 

by oral route once daily                 

 

                                        ipratropium-albuterol 0.5 mg-3 mg(2.5 mg base)/3 mL inhalation solution for nebulization

                    10/29/2014                              inhale 3 milliliters by nebulization route 4 times per day for COPD

                                         

 

                hydrochlorothiazide 25 mg oral tablet    1/15/2015                       take 1 tablet (25 mg) by oral

 route once daily for 30 days            

 

                metoprolol succinate 25 mg oral tablet extended release 24 hr    2015                      take

 1 tablet (25 mg) by oral route once daily     









                                         

 

             Name         Start Date    Expiration Date    SIG          Comments

 

                Singulair 10 mg oral tablet    9/3/2010        2011        take 1 tablet (10 mg) by oral route

 daily  for 60 days                      

 

                Zithromax Z-Christopher 250 mg oral tablet    2011       take 2 tablets (500 mg)

 by oral route once daily for 1 day then 1 tablet (250 mg) by oral route once 
daily for 4 days                         

 

                Medrol (Christopher) 4 mg oral tablets,dose pack    2011        take as directed

 for 6 days                              

 

                Keflex 500 mg oral capsule    3/1/2012        3/11/2012       take 1 capsule by oral route 3 

times a day for 10 days                  

 

                Cipro 500 mg oral tablet    2012        take 1 tablet (500 mg) by oral route

 every 12 hours for 15 days              

 

                benzonatate 100 mg oral capsule    2013       take 1 capsule (100 mg) by

 oral route 3 times per day for cough     

 

             Medrol (Christopher) 4 mg oral tablets,dose pack    2013     take as directed    

 

 

                Zyrtec 10 mg oral tablet    2013       take 1 tablet (10 mg) by oral route

 once daily for 30 days                  

 

                Flonase 50 mcg/actuation nasal spray,suspension    2013       inhale 1 spray

 in each nostril by intranasal route 2 times per day for 30 days     

 

                cephalexin 500 mg oral capsule    2013        take 1 capsule (500 mg) by oral

 route every 8 hours                     

 

                promethazine-codeine 6.25-10 mg/5 mL oral syrup    2013                       take 5 milliliters

 by oral route every 4 hours as needed, not to exceed 30 mL in 24 hours     

 

                Zithromax Z-Christopher 250 mg oral tablet    10/2/2013       10/7/2013       take 2 tablets (500 mg)

 by oral route once daily for 1 day then 1 tablet (250 mg) by oral route once 
daily for 4 days                         

 

                amoxicillin 500 mg oral capsule    2014       take 1 capsule by oral route

 3 times a day for 15 days               

 

                lovastatin 20 mg oral tablet    2014        take 1 tablet (20 mg) by oral 

route daily  for 30 days                 

 

                Zithromax Z-Christopher 250 mg oral tablet    2014       take 2 tablets (500 mg)

 by oral route once daily for 1 day then 1 tablet (250 mg) by oral route once 
daily for 4 days                         

 

             Medrol (Christopher) 4 mg oral tablets,dose pack    2014                 take as directed     

 

                amoxicillin 500 mg oral capsule    2014                       take 1 capsule (500 mg) by oral route

 3 times per day                         

 

                Levaquin 500 mg oral tablet    10/16/2014      10/26/2014      take 1 tablet (500 mg) by oral

 route once daily for 10 days            

 

                prednisone 20 mg oral tablet    10/16/2014      10/24/2014      4x2 days 3x2 days 2x2 days

 1x2 days                                

 

                Zithromax Z-Christopher 250 mg oral tablet    2014       take 2 tablets (500 mg)

 by oral route once daily for 1 day then 1 tablet (250 mg) by oral route once 
daily for 4 days                         

 

                Bactrim -160 mg oral tablet    1/15/2015       2015       take 1 tablet by oral route

 every 12 hours for 10 days              

 

                Zithromax Z-Christopher 250 mg oral tablet    2015      take 2 tablets (500 mg)

 by oral route once daily for 1 day then 1 tablet (250 mg) by oral route once 
daily for 4 days                         









                                        Discontinued 

 

             Name         Start Date    Discontinued Date    SIG          Comments

 

                amoxicillin 500 mg oral capsule    11/10/2011      10/3/2012       take 1 capsule (500 mg) 

by oral route 3 times per day            

 

                Anusol-HC 25 mg rectal suppository    2011      10/3/2012       insert 1 suppository 

(25 mg) by rectal route 2 times per day     

 

                hydrochlorothiazide 25 mg oral tablet    1/15/2015       2015      TAKE 1 TABLET DAILY

                                         







Problem List







                    Description         Status              Onset

 

                    Chronic Obstructive Pulmonary Disease    Active               

 

                    Hyperlipidemia      Active               

 

                    Anxiety Disorder    Active              10/29/2013

 

                    Pain in joint; Hip    Active              06/10/2014

 

                    Pulmonary nodule seen on imaging study    Active              10/29/2014

 

                    Exercise hypoxemia    Active              10/29/2014







Vital Signs







      Date    Time    BP-Sys(mm[Hg]    BP-Noni(mm[Hg])    HR(bpm)    RR(rpm)    Temp    WT    HT    HC    BMI

                    BSA                 BMI Percentile      O2 Sat(%)

 

        12/15/2015    2:33:00 PM    120 mmHg    75 mmHg    102 bpm    16 rpm    98.2 F    137 lbs    63 

in                        24.27 kg/m2    1.66 m2                   94 %

 

        10/26/2015    2:46:00 PM    130 mmHg    80 mmHg    96 bpm    16 rpm    97.9 F    141 lbs    66 in

                          22.7578 kg/m    1.7258 m                 98 %

 

        10/6/2015    9:37:00 AM    140 mmHg    78 mmHg    110 bpm    16 rpm    97.9 F    141 lbs    63 in

                          24.98 kg/m2    1.69 m2                   95 %

 

        10/28/2014    2:25:00 PM    132 mmHg    66 mmHg    92 bpm    24 rpm    97.5 F    147 lbs    63 in

                          26.0396 kg/m    1.7216 m                 92 %

 

        10/16/2014    9:45:00 AM    132 mmHg    64 mmHg    74 bpm    24 rpm    97.7 F    146 lbs    63 in

                          25.86 kg/m2    1.72 m2                   92 %

 

        2014    2:31:00 PM    136 mmHg    68 mmHg    90 bpm    22 rpm    98.2 F    148 lbs    63 in 

                          26.2168 kg/m    1.7274 m                 95 %

 

        2014    3:19:00 PM    124 mmHg    70 mmHg    64 bpm    20 rpm    97.8 F    147 lbs    63 in

                          26.04 kg/m2    1.72 m2                   95 %

 

        10/28/2013    10:31:00 AM    140 mmHg    70 mmHg    92 bpm    24 rpm    97.8 F    143 lbs    63 

in                        25.3311 kg/m    1.698 m                 95 %

 

      2013    2:51:00 PM    130 mmHg    78 mmHg    90 bpm          98.2 F    168 lbs    65 in          

27.96 kg/m2         1.87 m2                                  

 

       2013    2:43:00 PM    110 mmHg    76 mmHg    103 bpm           96.6 F    137 lbs    63 in      

                24.2682 kg/m    1.662 m                        

 

        2013    9:50:00 AM    150 mmHg    66 mmHg    108 bpm    20 rpm    97.8 F    138 lbs    63 in

                          24.45 kg/m2    1.67 m2                   94 %

 

        10/25/2012    9:08:00 AM    110 mmHg    60 mmHg    88 bpm    18 rpm    97.5 F    142 lbs    63 in

                          25.1539 kg/m    1.6921 m                 94 %

 

      10/3/2012    9:33:00 AM    130 mmHg    60 mmHg    98 bpm    18 rpm          146 lbs    63 in          

25.86 kg/m2         1.72 m2                                 95 %

 

      2012    2:31:00 PM    116 mmHg    76 mmHg    88 bpm                140 lbs    63 in          24.7996

 kg/m             1.6801 m                              96 %

 

     2011    10:02:00 AM    122 mmHg    80 mmHg    110 bpm              144 lbs                        

                                        94 %

 

      2011    2:58:00 PM    124 mmHg    84 mmHg    106 bpm                155 lbs    63 in          27.4567

 kg/m             1.7678 m                              96 %

 

     2011    9:55:00 AM    114 mmHg    78 mmHg    92 bpm              146 lbs                             95

 %

 

     6/3/2011    8:36:00 AM    116 mmHg    74 mmHg    90 bpm              146 lbs                             96

 %

 

     2010    3:01:00 PM    132 mmHg    84 mmHg    102 bpm              149 lbs                          

                                        94 %

 

     2010    11:46:00 AM    124 mmHg    78 mmHg    104 bpm              151 lbs                         

                                        94 %

 

     2010    8:47:00 AM    116 mmHg    78 mmHg    103 bpm              151 lbs                          

                                        95 %







Social History







                    Name                Description         Comments

 

                    Tobacco             Never smoker         

 

                    denies alcohol use                         







History of Procedures







                    Date Ordered        Description         Order Status

 

                    2011 12:00 AM    THER/PROPH/DIAG INJ SC/IM    Reviewed

 

                    2011 12:00 AM    Decadron Inj.1mg-([facility]) Ndc #[id number]    Reviewed

 

                    2011 12:00 AM    Depo-Medrol 80 Mg Im/[facility] NDC 0009-352532    Reviewed

 

                    2011 12:00 AM    Rocephin, Per 250MG - 1 Gram Vial NDC 3149-673632-[facility] [place]    Reviewed



 

                    3/16/2012 12:00 AM    ROUTINE VENIPUNCTURE    Reviewed

 

                    3/16/2012 12:00 AM    COMPLETE CBC W/AUTO DIFF WBC    Reviewed

 

                    3/16/2012 12:00 AM    COMPREHEN METABOLIC PANEL    Reviewed

 

                    3/16/2012 12:00 AM    ASSAY THYROID STIM HORMONE    Reviewed

 

                    10/3/2012 12:00 AM    THER/PROPH/DIAG INJ SC/IM    Reviewed

 

                    10/3/2012 12:00 AM    Decadron, Per 1 Mg NDC# [address]-11    Reviewed

 

                    10/3/2012 12:00 AM    Depo-Medrol, Per 80 Mg NDC#2240-0243-92    Reviewed

 

                    10/3/2012 12:00 AM    Toradol 60 Mg NDC#2334-1358-97    Reviewed

 

                    2013 12:00 AM    THER/PROPH/DIAG INJ SC/IM    Reviewed

 

                    2013 12:00 AM    Decadron, Per 1 Mg NDC# [address]-59    Reviewed

 

                    2013 12:00 AM    Depo-Medrol, Per 80 Mg NDC#2906-8997-63    Reviewed

 

                    2013 12:00 AM    Toradol 60 Mg NDC#2557-0103-92    Reviewed

 

                    2010 12:00 AM    ROUTINE VENIPUNCTURE    Reviewed

 

                    2010 12:00 AM    COMPLETE CBC W/AUTO DIFF WBC    Reviewed

 

                    2010 12:00 AM    COMPREHEN METABOLIC PANEL    Reviewed

 

                    2010 12:00 AM    LIPID PANEL         Reviewed

 

                    10/28/2014 12:00 AM    CHEST X-RAY 4/> VIEWS    Returned

 

                    6/3/2011 12:00 AM    ROUTINE VENIPUNCTURE    Reviewed

 

                    6/3/2011 12:00 AM    COMPLETE CBC AUTOMATED    Reviewed

 

                    6/3/2011 12:00 AM    COMPREHEN METABOLIC PANEL    Reviewed

 

                    6/3/2011 12:00 AM    LIPID PANEL         Reviewed

 

                    2011 12:00 AM    THER/PROPH/DIAG INJ SC/IM    Reviewed

 

                    2011 12:00 AM    Decadron Inj.8mg-([facility]) Ndc #[id number]    Reviewed

 

                    2011 12:00 AM    Depo-Medrol 80 Mg Im/[facility] NDC 0009-453396    Reviewed







Results Summary







                          Data and Description      Results

 

                          6/3/2011 1:52 PM          WBC 8.6 RBC 4.66 HGB 13.20 g/dLHCT 42.10 %MCV 90.0 fLMCH 28.30

 pgMCHC 31.40 g/dLRDW CV 13.60 %MPV 10.90 fLPLT 383 %NEUT 68.0 %%LYMP 20.30 
%%MONO 9.30 %%EOS 2.20 %%BASO 0.20 %#NEUT 5.81 #LYMP 1.74 #MONO 0.80 #EOS 0.19 
#BASO 0.02 

 

                          6/3/2011 1:53 PM          TRIGLYCERIDES 155.0 mg/dLCHOLESTEROL 248.0 mg/dLHDL 51.0 mg/dLLDL

 168.0 mg/dLGLUCOSE 97.0 mg/dLSODIUM 139.0 mmol/LPOTASSIUM 3.70 mmol/LCHLORIDE 
101.0 mmol/LCO2 27.0 mmol/LBUN 19.0 mg/dLCREATININE 0.90 mg/dLSGOT/AST 19.0 
IU/LSGPT/ALT 11.0 IU/LALK PHOS 111.0 IU/LTOTAL PROTEIN 7.40 g/dLALBUMIN 4.20 
g/dLTOTAL BILI 0.50 mg/dLCALCIUM 9.50 mg/dLeGFR >60 mL/min/1.73 m2







History Of Immunizations

Not available.



History of Past Illness







                    Name                Date of Onset       Comments

 

                    Chronic Obstructive Pulmonary Disease                         

 

                    Hyperlipidemia                           

 

                    Cough               2010  8:49AM     

 

                    General Medical Exam, Adult    2010  8:49AM     

 

                    Seasonal Allergies    2010  8:49AM     

 

                    Sinusitis, Chronic    2010  8:49AM     

 

                    Ganglion cyst of synovium, tendon, and bursa; other    2010 11:48AM     

 

                    Anxiety Disorder    10/29/2013           

 

                    Pain in joint; Hip    06/10/2014           

 

                    Pulmonary nodule seen on imaging study    10/29/2014           

 

                    Exercise hypoxemia    10/29/2014           

 

                    Chronic Obstructive Pulmonary Disease    2010  3:02PM     

 

                    Edema               2010  3:02PM     

 

                    Sinusitis, Acute    2010  3:02PM     

 

                    Chronic Obstructive Pulmonary Disease    Trey  3 2011  8:38AM     

 

                    Palpitations        Trey  3 2011  8:38AM     

 

                    Cough               2011  9:54AM     

 

                    Sinusitis, Acute    2011  9:54AM     

 

                    Seasonal Allergies    2011  9:54AM     

 

                    Post-nasal drainage    2011  9:54AM     

 

                    Cough               Sep 22 2011  2:55PM     

 

                    Seasonal Allergies    Sep 22 2011  2:55PM     

 

                    Pharyngitis, Acute    Sep 22 2011  2:55PM     

 

                    Post-nasal drainage    Sep 22 2011  2:55PM     

 

                    Blood In Stool, Occult    2011  9:58AM     

 

                    Hemorrhoids         2011  9:58AM     

 

                    Chronic Obstructive Pulmonary Disease    2012  2:33PM     

 

                    Cardiac Dysrhythmia    2012  2:33PM     

 

                    Sinoatrial Node Dysfunction    Mar 16 2012  9:12AM     

 

                    Palpitations        Mar 16 2012  9:12AM     

 

                    Osteoarthrosis, generalized, multiple sites    Oct  3 2012  9:34AM     

 

                    Pain in joint; Hip    Oct  3 2012  9:34AM     

 

                    Osteoarthrosis      Oct 25 2012  9:09AM     

 

                    Bronchitis, Acute    Oct 25 2012  9:09AM     

 

                    Cough               Oct 25 2012  9:09AM     

 

                    Pain in joint; Left Hip    Oct 25 2012  9:09AM     

 

                    Pain in joint; Hip    2013  9:50AM     

 

                    Osteoarthrosis, generalized, multiple sites    2013  2:45PM     

 

                    Pain in joint; Hip bilateral    2013  2:45PM     

 

                    Anxiety Disorder    Oct 28 2013 10:32AM     

 

                    Chronic Obstructive Pulmonary Disease    Oct 28 2013 10:32AM     

 

                    Hyperlipidemia      Oct 28 2013 10:32AM     

 

                    Pain in joint; Left Hip    Oct 28 2013 10:32AM     

 

                    Sinusitis, Acute    Oct 28 2013 10:32AM     

 

                    Essential Hypertension    2014  3:19PM     

 

                    Osteoarthrosis, generalized, multiple sites    2014  3:19PM     

 

                    Chronic Obstructive Pulmonary Disease    2014  3:19PM     

 

                    Pain in joint; Hip    2014  3:19PM     

 

                    Chronic Obstructive Pulmonary Disease    2014  2:31PM     

 

                    Pain in joint; Hip    2014  2:31PM     

 

                    pre-operative examination, unspecified    2014  2:31PM     

 

                    Lung nodule seen on imaging study    Oct 28 2014 10:24AM     

 

                    Bronchitis, Acute    Oct 16 2014  9:45AM     

 

                    Respiratory System And Chest Symptoms    Oct 16 2014  9:45AM     

 

                    COPD (chronic obstructive pulmonary disease)    Oct 16 2014  9:45AM     

 

                    Chronic Obstructive Pulmonary Disease    Oct 28 2014  2:26PM     

 

                    Pulmonary nodule seen on imaging study    Oct 28 2014  2:26PM     

 

                    Shortness of breath    Oct 28 2014  2:26PM     

 

                    Exercise hypoxemia    Oct 28 2014  2:26PM     

 

                    Nail avulsion       Oct  6 2015  9:38AM     

 

                          Contusion of left index finger with damage to nail, initial encounter    Oct 26 2015

  2:53PM                                 

 

                    Forehead contusion, subsequent encounter    Dec 15 2015  2:39PM     

 

                    Generalized anxiety disorder    Dec 15 2015  2:39PM     

 

                    Chronic Obstructive Pulmonary Disease    Dec 15 2015  2:39PM     







Payers







           Insurance Name    Company Name    Plan Name    Plan Number    Policy Number    Policy Group

 Number                                 Start Date

 

                    Medicare Part A    Medicare Part A              [id number]              N/A

 

                                [facility]      

                    [id number]                             N/A

 

                    Medicare Part B    [employer] [place]              [id number]              N/A

 

                          [place] [employer] [facility]                 [id number]

                                                    N/A

 

                    [facility]              [id number]              N/A









History of Encounters







                    Visit Date          Visit Type          Provider

 

                    12/15/2015          Office visit        DEON ESCALANTE

 

                    10/26/2015          Office visit        DEON ESCALANTE

 

                    10/6/2015           Office visit        DEON ESCALANTE

 

                    10/28/2014          Office visit        DEON ESCALANTE

 

                    10/16/2014          Office visit        DEON ESCALANTE

 

                    2014            Office visit        DEON ESCALANTE

 

                    2014           Hospital            DEWEY Garcia MD

 

                    2014           Office visit        DEON ESCALANTE

 

                    10/28/2013          Office visit        DEON ESCALANTE

 

                    10/14/2013          Hospital            DEWEY Garcia MD

 

                    2013           Office visit        DEON ESCALANTE

 

                    2013            Office visit        DEON ESCALANTE

 

                    10/25/2012          Office visit        DEON LEON PA

 

                    10/3/2012           Office visit        DEON LEON PA

 

                    3/16/2012           Office visit        DEON LEON PA

 

                    2012            Office visit        DEON LEON PA

 

                    2012            [facility]            DEWEY Garcia MD

 

                    2011          Office visit        DEON LEON PA

 

                    2011           Office visit        Deon Leon PA-C

 

                    2011            Office visit        Deon Leon PA-C

 

                    6/3/2011            Office visit        Deon Leon PA-C

 

                    2010           Office visit        Deon Leon PA-C

 

                    2010           Office visit        Deon Leon PA-C

 

                    2010           Office visit        Deon Leon PA-C

## 2019-06-25 NOTE — XMS REPORT
Continuity of Care Document

                             Created on: 2019



Elsi Casillas

External Reference #: [id number]

: 1940

Sex: Female



Demographics







                          Address                   [address] [place]  [address]

 

                          Home Phone                [phone] x

 

                          Preferred Language        Unknown

 

                          Marital Status            Unknown

 

                          Yazidism Affiliation     Unknown

 

                          Race                      Unknown

 

                          Ethnic Group              Unknown





Author







                          Organization              Unknown

 

                          Address                   Unknown

 

                          Phone                     [phone]



              



Allergies

      





             Active              Description              Code              Type              Severity

                Reaction              Onset              Reported/Identified              Relationship

 to Patient                             Clinical Status        

 

                Yes              No Known Drug Allergies              [id number]              Drug Allergy

              Unknown              N/A                             2015              

                                                 



                  



Medications

      



There is no data.                  



Problems

      





             Date Dx Coded              Attending              Type              Code              Diagnosis

                                        Diagnosed By        

 

                2015              NEGIN VÁZQUEZ MD              Ot              S01.112A  

                                                             

 

                2015              NEGIN VÁZQUEZ MD D              Ot              S01.21XA  

                                                             

 

                2015              NEGIN VÁZQUEZ MD D              Ot              W01.0XXA  

                                                             

 

                2015              NEGIN VÁZQUEZ MD D              Ot              Y92.481   

                                                             

 

                2015              NEGIN VÁZQUEZ MD D              Ot              Y99.8     

                                                             

 

                2015              GURPREET JAMES, NEGIN D              Ot              Z23       

                                                             

 

                2015              GURPREET JAMES, NEGIN D              Ot              Z79.82    

                                                             

 

                2015              YISEL MCKEON              Ot              S01.112D   

                                                             



                                



Procedures

      



There is no data.                  



Results

      



There is no data.              



Encounters

      





                ACCT No.              Visit Date/Time              Discharge              Status      

                Pt. Type              Provider              Facility              Loc./Unit      

                                        Complaint        

 

                [id number]              2019 12:23:00              2019 23:59:59              

[facility]              Outpatient              JOSE LEON                                

                                                 

 

                [id number]              2019 11:05:39              2019 23:59:59              

[facility]              Outpatient              JOSE LEON                                

                                                 

 

                [id number]              2019 11:15:33              2019 23:59:59              

[facility]              Outpatient              JOSE LE[facility]                                

                                                 

 

                [id number]              2019 15:09:59              2019 23:59:59              

[facility]              Outpatient              JOSE LEON                                

                                                 

 

                [id number]              2019 15:32:24              2019 23:59:59              

[facility]              Outpatient              JOSE LEON                                

                                                 

 

                [id number]              2018 14:11:47              2018 23:59:59              

[facility]              Outpatient              JOSE LEON                                

                                                 

 

                [id number]              2018 10:14:35              2018 23:59:59              

[facility]              Outpatient              CAROLYN, JOSE PALOMO                                

                                                 

 

                [id number]              10/02/2018 11:27:40              10/02/2018 23:59:59              

[facility]              Outpatient              CAROLYN, JOSE PALOMO                                

                                                 

 

                [id number]              2018 14:52:35              2018 23:59:59              

[facility]              Outpatient              CAROLYN, JOSE PALOMO                                

                                                 

 

                [id number]              2018 10:32:37              2018 23:59:59              

[facility]              Outpatient              CAROLYN, JOSE PALOMO                                

                                                 

 

                [id number]              2018 14:56:10              2018 23:59:59              

[facility]              Outpatient              CAROLYN, JOSE PALOMO                                

                                                 

 

                [id number]              2018 10:53:12              2018 23:59:59              

[facility]              Outpatient              CAROLYN, JOSE PALOMO                                

                                                 

 

                [id number]              2018 11:11:44              2018 23:59:59              

[facility]              Outpatient              CAROLYN, JOSE PALOMO                                

                                                 

 

                [id number]              2018 07:14:13              2018 23:59:59              

[facility]              Outpatient              CAROLYN, JOSE PALOMO                                

                                                 

 

                [id number]              10/24/2017 11:36:49              10/24/2017 23:59:59              

[facility]              Outpatient              CAROLYN, JOSE PALOMO                                

                                                 

 

                [id number]              2017 09:16:17              2017 23:59:59              

[facility]              Outpatient              CAROLYN, JOSE PALOMO                                

                                                 

 

                [id number]              2017 15:13:21              2017 23:59:59              

[facility]              Outpatient              CAROLYNJOSE DEWEY                                

                                                 

 

                [id number]              2017 10:02:59              2017 23:59:59              

[facility]              Outpatient              CAROLYN, JOSE DEWEY                                

                                                 

 

                [id number]              2017 09:23:15              2017 23:59:59              

[facility]              Outpatient              CAROLYN, JOSE DEWEY                                

                                                 

 

                [id number]              2017 10:08:47              2017 23:59:59              

[facility]              Outpatient              CAROLYN, JOSE DEWEY                                

                                                 

 

                [id number]              2017 15:19:18              2017 23:59:59              

[facility]              Outpatient              CAROLYN, JOSE DEWEY                                

                                                 

 

                [id number]              2016 11:56:35              2016 23:59:59              

[facility]              Outpatient              CAROLYN, JOSE DEWEY                                

                                                 

 

                [id number]              11/10/2016 17:24:47              11/10/2016 23:59:59              

[facility]              Outpatient              CAROLYN, JOSE DEWEY                                

                                                 

 

                [id number]              03/10/2016 15:06:06              03/10/2016 23:59:59              

[facility]              Outpatient              [facility]                                

                                                 

 

                [id number]              12/15/2015 15:01:17              12/15/2015 23:59:59              

[facility]              Outpatient              [facility]                                

                                                 

 

                [id number]              10/26/2015 15:20:04              10/26/2015 23:59:59              

[facility]              Outpatient              [facility] LE[facility]                                

                                                 

 

                [id number]              10/06/2015 10:16:59              10/06/2015 23:59:59              

[facility]              Outpatient              [facility] LE[facility]                                

                                                 

 

                [id number]              10/28/2014 15:01:19              10/28/2014 23:59:59              

[facility]              Outpatient              [facility]                                

                                                 

 

                [id number]              10/16/2014 09:53:31              10/16/2014 23:59:59              

[facility]              Outpatient              [facility] LE[facility]                                

                                                 

 

                [id number]              2014 15:03:12              2014 23:59:59              

[facility]              Outpatient              [facility]                                

                                                 

 

                [id number]              2014 03:11:56              2014 23:59:59              

[facility]              Outpatient              DEWEY Garcia Abhijit                                 

                                                 

 

                [id number]              2014 15:48:44              2014 23:59:59              

[facility]              Outpatient              [facility]                                

                                                 

 

                    [id number]              2015 16:12:00              2015 16:47:00      

                [facility]              Emergency              YISEL MCKEON              [facility]              ER                                 

 

                    [id number]              2015 13:35:00              2015 15:25:00      

                [facility]              Emergency              NEGIN VÁZQUEZ MD              [facility]              ER

## 2019-06-25 NOTE — XMS REPORT
MU2 Ambulatory Summary

                             Created on: 2015



Elsi Casillas

External Reference #: [id number]

: 1940

Sex: Female



Demographics







                          Address                   [address] [place]  [address]

 

                          Home Phone                [phone]

 

                          Preferred Language        English

 

                          Marital Status            

 

                          Christianity Affiliation     Unknown

 

                          Race                      White

 

                          Ethnic Group              Not  or 





Author







                          Author                    DEON LEON

 

                          [facility]

 

                          Address                   [address]

[place], [place]  [address]



 

                          Phone                     [phone]







Care Team Providers







                    Care Team Member Name    Role                Phone

 

                    DEON LEON    PCP                 Unavailable







Allergies and Adverse Reactions







                    Name                Reaction            Notes

 

                    NO KNOWN DRUG ALLERGIES                         







Plan of Treatment

Not available.



Medications







                                        Active 

 

             Name         Start Date    Estimated Completion Date    SIG          Comments

 

             Proctofoam 1 % topical foam    2011                 apply by external route daily     

 

                Calcium 600 + D(3) 600 mg(1,500mg) -200 unit oral tablet                                    take 2 tablets by

 oral route daily                        

 

             Aerochamber    2014                 Use with inhaler as directed     

 

                Symbicort 160-4.5 mcg/actuation inhalation HFA aerosol inhaler    2014                       inhale

 2 puffs by inhalation route 2 times per day in the morning and evening     

 

                pravastatin 20 mg oral tablet                                    take 1 tablet (20 mg) by oral route once daily

                                         

 

                Toprol XL 25 mg oral tablet extended release 24 hr                                    take 1 tablet (25 mg) 

by oral route once daily                 

 

                                        ipratropium-albuterol 0.5 mg-3 mg(2.5 mg base)/3 mL inhalation solution for nebulization

                    10/29/2014                              inhale 3 milliliters by nebulization route 4 times per day for COPD

                                         

 

                hydrochlorothiazide 25 mg oral tablet    1/15/2015                       take 1 tablet (25 mg) by oral

 route once daily for 30 days            

 

                metoprolol succinate 25 mg oral tablet extended release 24 hr    2015                      take

 1 tablet (25 mg) by oral route once daily     









                                         

 

             Name         Start Date    Expiration Date    SIG          Comments

 

                Singulair 10 mg oral tablet    9/3/2010        2011        take 1 tablet (10 mg) by oral route

 daily  for 60 days                      

 

                Zithromax Z-Christopher 250 mg oral tablet    2011       take 2 tablets (500 mg)

 by oral route once daily for 1 day then 1 tablet (250 mg) by oral route once 
daily for 4 days                         

 

                Medrol (Christopher) 4 mg oral tablets,dose pack    2011        take as directed

 for 6 days                              

 

                Keflex 500 mg oral capsule    3/1/2012        3/11/2012       take 1 capsule by oral route 3 

times a day for 10 days                  

 

                Cipro 500 mg oral tablet    2012        take 1 tablet (500 mg) by oral route

 every 12 hours for 15 days              

 

                benzonatate 100 mg oral capsule    2013       take 1 capsule (100 mg) by

 oral route 3 times per day for cough     

 

             Medrol (Christopher) 4 mg oral tablets,dose pack    2013     take as directed    

 

 

                Zyrtec 10 mg oral tablet    2013       take 1 tablet (10 mg) by oral route

 once daily for 30 days                  

 

                Flonase 50 mcg/actuation nasal spray,suspension    2013       inhale 1 spray

 in each nostril by intranasal route 2 times per day for 30 days     

 

                cephalexin 500 mg oral capsule    2013        take 1 capsule (500 mg) by oral

 route every 8 hours                     

 

                promethazine-codeine 6.25-10 mg/5 mL oral syrup    2013                       take 5 milliliters

 by oral route every 4 hours as needed, not to exceed 30 mL in 24 hours     

 

                Zithromax Z-Christopher 250 mg oral tablet    10/2/2013       10/7/2013       take 2 tablets (500 mg)

 by oral route once daily for 1 day then 1 tablet (250 mg) by oral route once 
daily for 4 days                         

 

                amoxicillin 500 mg oral capsule    2014       take 1 capsule by oral route

 3 times a day for 15 days               

 

                lovastatin 20 mg oral tablet    2014        take 1 tablet (20 mg) by oral 

route daily  for 30 days                 

 

                Zithromax Z-Christopher 250 mg oral tablet    2014       take 2 tablets (500 mg)

 by oral route once daily for 1 day then 1 tablet (250 mg) by oral route once 
daily for 4 days                         

 

             Medrol (Christopher) 4 mg oral tablets,dose pack    2014                 take as directed     

 

                amoxicillin 500 mg oral capsule    2014                       take 1 capsule (500 mg) by oral route

 3 times per day                         

 

                Levaquin 500 mg oral tablet    10/16/2014      10/26/2014      take 1 tablet (500 mg) by oral

 route once daily for 10 days            

 

                prednisone 20 mg oral tablet    10/16/2014      10/24/2014      4x2 days 3x2 days 2x2 days

 1x2 days                                

 

                Zithromax Z-Christopher 250 mg oral tablet    2014       take 2 tablets (500 mg)

 by oral route once daily for 1 day then 1 tablet (250 mg) by oral route once 
daily for 4 days                         

 

                Bactrim -160 mg oral tablet    1/15/2015       2015       take 1 tablet by oral route

 every 12 hours for 10 days              

 

                Zithromax Z-Christopher 250 mg oral tablet    2015      take 2 tablets (500 mg)

 by oral route once daily for 1 day then 1 tablet (250 mg) by oral route once 
daily for 4 days                         









                                        Discontinued 

 

             Name         Start Date    Discontinued Date    SIG          Comments

 

                amoxicillin 500 mg oral capsule    11/10/2011      10/3/2012       take 1 capsule (500 mg) 

by oral route 3 times per day            

 

                Anusol-HC 25 mg rectal suppository    2011      10/3/2012       insert 1 suppository 

(25 mg) by rectal route 2 times per day     

 

                hydrochlorothiazide 25 mg oral tablet    1/15/2015       2015      TAKE 1 TABLET DAILY

                                         







Problem List







                    Description         Status              Onset

 

                    Chronic Obstructive Pulmonary Disease    Active               

 

                    Hyperlipidemia      Active               

 

                    Anxiety Disorder    Active              10/29/2013

 

                    Pain in joint; Hip    Active              06/10/2014

 

                    Pulmonary nodule seen on imaging study    Active              10/29/2014

 

                    Exercise hypoxemia    Active              10/29/2014







Vital Signs







      Date    Time    BP-Sys(mm[Hg]    BP-Noni(mm[Hg])    HR(bpm)    RR(rpm)    Temp    WT    HT    HC    BMI

                    BSA                 BMI Percentile      O2 Sat(%)

 

        12/15/2015    2:33:00 PM    120 mmHg    75 mmHg    102 bpm    16 rpm    98.2 F    137 lbs    63 

in                        24.27 kg/m2    1.66 m2                   94 %

 

        10/26/2015    2:46:00 PM    130 mmHg    80 mmHg    96 bpm    16 rpm    97.9 F    141 lbs    66 in

                          22.7578 kg/m    1.7258 m                 98 %

 

        10/6/2015    9:37:00 AM    140 mmHg    78 mmHg    110 bpm    16 rpm    97.9 F    141 lbs    63 in

                          24.98 kg/m2    1.69 m2                   95 %

 

        10/28/2014    2:25:00 PM    132 mmHg    66 mmHg    92 bpm    24 rpm    97.5 F    147 lbs    63 in

                          26.0396 kg/m    1.7216 m                 92 %

 

        10/16/2014    9:45:00 AM    132 mmHg    64 mmHg    74 bpm    24 rpm    97.7 F    146 lbs    63 in

                          25.86 kg/m2    1.72 m2                   92 %

 

        2014    2:31:00 PM    136 mmHg    68 mmHg    90 bpm    22 rpm    98.2 F    148 lbs    63 in 

                          26.2168 kg/m    1.7274 m                 95 %

 

        2014    3:19:00 PM    124 mmHg    70 mmHg    64 bpm    20 rpm    97.8 F    147 lbs    63 in

                          26.04 kg/m2    1.72 m2                   95 %

 

        10/28/2013    10:31:00 AM    140 mmHg    70 mmHg    92 bpm    24 rpm    97.8 F    143 lbs    63 

in                        25.3311 kg/m    1.698 m                 95 %

 

      2013    2:51:00 PM    130 mmHg    78 mmHg    90 bpm          98.2 F    168 lbs    65 in          

27.96 kg/m2         1.87 m2                                  

 

       2013    2:43:00 PM    110 mmHg    76 mmHg    103 bpm           96.6 F    137 lbs    63 in      

                24.2682 kg/m    1.662 m                        

 

        2013    9:50:00 AM    150 mmHg    66 mmHg    108 bpm    20 rpm    97.8 F    138 lbs    63 in

                          24.45 kg/m2    1.67 m2                   94 %

 

        10/25/2012    9:08:00 AM    110 mmHg    60 mmHg    88 bpm    18 rpm    97.5 F    142 lbs    63 in

                          25.1539 kg/m    1.6921 m                 94 %

 

      10/3/2012    9:33:00 AM    130 mmHg    60 mmHg    98 bpm    18 rpm          146 lbs    63 in          

25.86 kg/m2         1.72 m2                                 95 %

 

      2012    2:31:00 PM    116 mmHg    76 mmHg    88 bpm                140 lbs    63 in          24.7996

 kg/m             1.6801 m                              96 %

 

     2011    10:02:00 AM    122 mmHg    80 mmHg    110 bpm              144 lbs                        

                                        94 %

 

      2011    2:58:00 PM    124 mmHg    84 mmHg    106 bpm                155 lbs    63 in          27.4567

 kg/m             1.7678 m                              96 %

 

     2011    9:55:00 AM    114 mmHg    78 mmHg    92 bpm              146 lbs                             95

 %

 

     6/3/2011    8:36:00 AM    116 mmHg    74 mmHg    90 bpm              146 lbs                             96

 %

 

     2010    3:01:00 PM    132 mmHg    84 mmHg    102 bpm              149 lbs                          

                                        94 %

 

     2010    11:46:00 AM    124 mmHg    78 mmHg    104 bpm              151 lbs                         

                                        94 %

 

     2010    8:47:00 AM    116 mmHg    78 mmHg    103 bpm              151 lbs                          

                                        95 %







Social History







                    Name                Description         Comments

 

                    Tobacco             Never smoker         

 

                    denies alcohol use                         







History of Procedures







                    Date Ordered        Description         Order Status

 

                    2011 12:00 AM    THER/PROPH/DIAG INJ SC/IM    Reviewed

 

                    2011 12:00 AM    Decadron Inj.1mg-([facility]) Ndc #[id number]    Reviewed

 

                    2011 12:00 AM    Depo-Medrol 80 Mg Im/[facility] NDC 0009-297780    Reviewed

 

                    2011 12:00 AM    Rocephin, Per 250MG - 1 Gram Vial NDC 6709-829784-[facility]    Reviewed



 

                    3/16/2012 12:00 AM    ROUTINE VENIPUNCTURE    Reviewed

 

                    3/16/2012 12:00 AM    COMPLETE CBC W/AUTO DIFF WBC    Reviewed

 

                    3/16/2012 12:00 AM    COMPREHEN METABOLIC PANEL    Reviewed

 

                    3/16/2012 12:00 AM    ASSAY THYROID STIM HORMONE    Reviewed

 

                    10/3/2012 12:00 AM    THER/PROPH/DIAG INJ SC/IM    Reviewed

 

                    10/3/2012 12:00 AM    Decadron, Per 1 Mg NDC# [address]-34    Reviewed

 

                    10/3/2012 12:00 AM    Depo-Medrol, Per 80 Mg NDC#1810-1164-60    Reviewed

 

                    10/3/2012 12:00 AM    Toradol 60 Mg NDC#8439-4852-57    Reviewed

 

                    2013 12:00 AM    THER/PROPH/DIAG INJ SC/IM    Reviewed

 

                    2013 12:00 AM    Decadron, Per 1 Mg NDC# [address]-43    Reviewed

 

                    2013 12:00 AM    Depo-Medrol, Per 80 Mg NDC#4238-0708-71    Reviewed

 

                    2013 12:00 AM    Toradol 60 Mg NDC#3249-0201-46    Reviewed

 

                    2010 12:00 AM    ROUTINE VENIPUNCTURE    Reviewed

 

                    2010 12:00 AM    COMPLETE CBC W/AUTO DIFF WBC    Reviewed

 

                    2010 12:00 AM    COMPREHEN METABOLIC PANEL    Reviewed

 

                    2010 12:00 AM    LIPID PANEL         Reviewed

 

                    10/28/2014 12:00 AM    CHEST X-RAY 4/> VIEWS    Returned

 

                    6/3/2011 12:00 AM    ROUTINE VENIPUNCTURE    Reviewed

 

                    6/3/2011 12:00 AM    COMPLETE CBC AUTOMATED    Reviewed

 

                    6/3/2011 12:00 AM    COMPREHEN METABOLIC PANEL    Reviewed

 

                    6/3/2011 12:00 AM    LIPID PANEL         Reviewed

 

                    2011 12:00 AM    THER/PROPH/DIAG INJ SC/IM    Reviewed

 

                    2011 12:00 AM    Decadron Inj.8mg-([facility]) Ndc #[id number]    Reviewed

 

                    2011 12:00 AM    Depo-Medrol 80 Mg Im/[facility] NDC [id number]    Reviewed







Results Summary







                          Data and Description      Results

 

                          6/3/2011 1:52 PM          WBC 8.6 RBC 4.66 HGB 13.20 g/dLHCT 42.10 %MCV 90.0 fLMCH 28.30

 pgMCHC 31.40 g/dLRDW CV 13.60 %MPV 10.90 fLPLT 383 %NEUT 68.0 %%LYMP 20.30 
%%MONO 9.30 %%EOS 2.20 %%BASO 0.20 %#NEUT 5.81 #LYMP 1.74 #MONO 0.80 #EOS 0.19 
#BASO 0.02 

 

                          6/3/2011 1:53 PM          TRIGLYCERIDES 155.0 mg/dLCHOLESTEROL 248.0 mg/dLHDL 51.0 mg/dLLDL

 168.0 mg/dLGLUCOSE 97.0 mg/dLSODIUM 139.0 mmol/LPOTASSIUM 3.70 mmol/LCHLORIDE 
101.0 mmol/LCO2 27.0 mmol/LBUN 19.0 mg/dLCREATININE 0.90 mg/dLSGOT/AST 19.0 
IU/LSGPT/ALT 11.0 IU/LALK PHOS 111.0 IU/LTOTAL PROTEIN 7.40 g/dLALBUMIN 4.20 
g/dLTOTAL BILI 0.50 mg/dLCALCIUM 9.50 mg/dLeGFR >60 mL/min/1.73 m2







History Of Immunizations

Not available.



History of Past Illness







                    Name                Date of Onset       Comments

 

                    Chronic Obstructive Pulmonary Disease                         

 

                    Hyperlipidemia                           

 

                    Cough               2010  8:49AM     

 

                    General Medical Exam, Adult    2010  8:49AM     

 

                    Seasonal Allergies    2010  8:49AM     

 

                    Sinusitis, Chronic    2010  8:49AM     

 

                    Ganglion cyst of synovium, tendon, and bursa; other    2010 11:48AM     

 

                    Anxiety Disorder    10/29/2013           

 

                    Pain in joint; Hip    06/10/2014           

 

                    Pulmonary nodule seen on imaging study    10/29/2014           

 

                    Exercise hypoxemia    10/29/2014           

 

                    Chronic Obstructive Pulmonary Disease    2010  3:02PM     

 

                    Edema               2010  3:02PM     

 

                    Sinusitis, Acute    2010  3:02PM     

 

                    Chronic Obstructive Pulmonary Disease    Trey  3 2011  8:38AM     

 

                    Palpitations        Trey  3 2011  8:38AM     

 

                    Cough               2011  9:54AM     

 

                    Sinusitis, Acute    2011  9:54AM     

 

                    Seasonal Allergies    2011  9:54AM     

 

                    Post-nasal drainage    2011  9:54AM     

 

                    Cough               Sep 22 2011  2:55PM     

 

                    Seasonal Allergies    Sep 22 2011  2:55PM     

 

                    Pharyngitis, Acute    Sep 22 2011  2:55PM     

 

                    Post-nasal drainage    Sep 22 2011  2:55PM     

 

                    Blood In Stool, Occult    2011  9:58AM     

 

                    Hemorrhoids         2011  9:58AM     

 

                    Chronic Obstructive Pulmonary Disease    2012  2:33PM     

 

                    Cardiac Dysrhythmia    2012  2:33PM     

 

                    Sinoatrial Node Dysfunction    Mar 16 2012  9:12AM     

 

                    Palpitations        Mar 16 2012  9:12AM     

 

                    Osteoarthrosis, generalized, multiple sites    Oct  3 2012  9:34AM     

 

                    Pain in joint; Hip    Oct  3 2012  9:34AM     

 

                    Osteoarthrosis      Oct 25 2012  9:09AM     

 

                    Bronchitis, Acute    Oct 25 2012  9:09AM     

 

                    Cough               Oct 25 2012  9:09AM     

 

                    Pain in joint; Left Hip    Oct 25 2012  9:09AM     

 

                    Pain in joint; Hip    2013  9:50AM     

 

                    Osteoarthrosis, generalized, multiple sites    2013  2:45PM     

 

                    Pain in joint; Hip bilateral    2013  2:45PM     

 

                    Anxiety Disorder    Oct 28 2013 10:32AM     

 

                    Chronic Obstructive Pulmonary Disease    Oct 28 2013 10:32AM     

 

                    Hyperlipidemia      Oct 28 2013 10:32AM     

 

                    Pain in joint; Left Hip    Oct 28 2013 10:32AM     

 

                    Sinusitis, Acute    Oct 28 2013 10:32AM     

 

                    Essential Hypertension    2014  3:19PM     

 

                    Osteoarthrosis, generalized, multiple sites    2014  3:19PM     

 

                    Chronic Obstructive Pulmonary Disease    2014  3:19PM     

 

                    Pain in joint; Hip    2014  3:19PM     

 

                    Chronic Obstructive Pulmonary Disease    2014  2:31PM     

 

                    Pain in joint; Hip    2014  2:31PM     

 

                    pre-operative examination, unspecified    2014  2:31PM     

 

                    Lung nodule seen on imaging study    Oct 28 2014 10:24AM     

 

                    Bronchitis, Acute    Oct 16 2014  9:45AM     

 

                    Respiratory System And Chest Symptoms    Oct 16 2014  9:45AM     

 

                    COPD (chronic obstructive pulmonary disease)    Oct 16 2014  9:45AM     

 

                    Chronic Obstructive Pulmonary Disease    Oct 28 2014  2:26PM     

 

                    Pulmonary nodule seen on imaging study    Oct 28 2014  2:26PM     

 

                    Shortness of breath    Oct 28 2014  2:26PM     

 

                    Exercise hypoxemia    Oct 28 2014  2:26PM     

 

                    Nail avulsion       Oct  6 2015  9:38AM     

 

                          Contusion of left index finger with damage to nail, initial encounter    Oct 26 2015

  2:53PM                                 

 

                    Forehead contusion, subsequent encounter    Dec 15 2015  2:39PM     

 

                    Generalized anxiety disorder    Dec 15 2015  2:39PM     

 

                    Chronic Obstructive Pulmonary Disease    Dec 15 2015  2:39PM     







Payers







           Insurance Name    Company Name    Plan Name    Plan Number    Policy Number    Policy Group

 Number                                 Start Date

 

                    Medicare Part A    Medicare Part A              [id number]              N/A

 

                                [place] [facility]      

                    [id number]                             N/A

 

                    Medicare Part B    [employer] [place]              [id number]              N/A

 

                          [place] [facility]                 [id number]

                                                    N/A

 

                    [facility]              [id number]              N/A









History of Encounters







                    Visit Date          Visit Type          Provider

 

                    12/15/2015          Office visit        DEON ESCALANTE

 

                    10/26/2015          Office visit        DEON ESCALANTE

 

                    10/6/2015           Office visit        DEON ESCALANTE

 

                    10/28/2014          Office visit        DEON ESCALANTE

 

                    10/16/2014          Office visit        DEON ESCALANTE

 

                    2014            Office visit        DEON ESCALANTE

 

                    2014           Hospital            DEWEY Garcia MD

 

                    2014           Office visit        DEON ESCALANTE

 

                    10/28/2013          Office visit        DEON ESCALANTE

 

                    10/14/2013          Hospital            DEWEY Garcia MD

 

                    2013           Office visit        DEON ESCALANTE

 

                    2013            Office visit        DEON ESCALANTE

 

                    10/25/2012          Office visit        DEON LEON PA

 

                    10/3/2012           Office visit        DEON LEON PA

 

                    3/16/2012           Office visit        DEON LEON PA

 

                    2012            Office visit        DEON LEON PA

 

                    2012            [facility]            DEWEY Garcia MD

 

                    2011          Office visit        DEON LEON PA

 

                    2011           Office visit        Deon Leon PA-C

 

                    2011            Office visit        Deon Leon PA-C

 

                    6/3/2011            Office visit        eDon Leon PA-C

 

                    2010           Office visit        Deon Leon PA-C

 

                    2010           Office visit        Deon Leon PA-C

 

                    2010           Office visit        Deon Leon PA-C

## 2019-06-25 NOTE — XMS REPORT
MU2 Ambulatory Summary

                             Created on: 2018



Elsi Casillas

External Reference #: [id number]

: 1940

Sex: Female



Demographics







                          Address                   [address] [place]  [address]

 

                          Home Phone                [phone]

 

                          Preferred Language        English

 

                          Marital Status            

 

                          Worship Affiliation     Unknown

 

                          Race                      White

 

                          Ethnic Group              Not  or 





Author







                          Author                    DEON LEON

 

                          [facility]

 

                          Address                   [address]

[place], [place]  [id number]



 

                          Phone                     [phone]







Care Team Providers







                    Care Team Member Name    Role                Phone

 

                    DEON LEON    PCP                 [phone]

 

                    DEON LEON    PreferredProvider    [phone]







Allergies and Adverse Reactions







                    Name                Reaction            Notes

 

                    SULFA (SULFONAMIDES)    nausea               







Plan of Treatment







             Planned Activity    Comments     Planned Date    Planned Time    Plan/Goal

 

             Lipid Profile                 2016    12:00 AM      

 

             Chest PA and Lateral - Main                 8/10/2017    12:00 AM      

 

             CBC with Auto                 2018    12:00 AM      

 

             Comprehensive metabolic panel                 2018    12:00 AM      

 

             Lipid Profile                 2018    12:00 AM      

 

             EKG.                      2018    12:00 AM      







Medications







                                        Active 

 

             Name         Start Date    Estimated Completion Date    SIG          Comments

 

                Calcium 600 + D(3) 600 mg(1,500mg) -200 unit oral tablet                                    take 2 tablets by

 oral route daily                        

 

                pravastatin 20 mg oral tablet                                    take 1 tablet (20 mg) by oral route once daily

                                         

 

                Toprol XL 25 mg oral tablet extended release 24 hr                                    take 1 tablet (25 mg) 

by oral route once daily                 

 

                    albuterol sulfate 1.25 mg/3 mL inhalation solution for nebulization    2017           

                                        inhale 3 milliliters (1.25 mg) via nebulizer by inhalation route 4 times per day

  DX J44.9                               

 

                Symbicort 160-4.5 mcg/actuation inhalation HFA aerosol inhaler    10/25/2017                      inhale

 2 puffs by inhalation route 2 times per day in the morning and evening     

 

                                        ipratropium-albuterol 0.5 mg-3 mg(2.5 mg base)/3 mL inhalation solution for nebulization

                    2018                                inhale 3 milliliters by nebulization route 4 times per day for COPD

                                         

 

                metoprolol succinate 25 mg oral tablet extended release 24 hr    2018                       TAKE

 1 TABLET DAILY                          

 

                Tessalon Perles 100 mg oral capsule    2018                        take 1 capsule (100 mg) by oral

 route 3 times per day as needed for cough     

 

                Sudogest 30 mg oral tablet    2018                        take 1 tablets (60 mg) by oral route every

 6 hours as needed                       

 

                amoxicillin 500 mg oral capsule    7/10/2018                       take 1 capsule (500 mg) by oral route

 3 times per day                         

 

             hydrochlorothiazide 25 mg oral tablet    2018                 TAKE 1 TABLET DAILY     









                                         

 

             Name         Start Date    Expiration Date    SIG          Comments

 

                Singulair 10 mg oral tablet    9/3/2010        2011        take 1 tablet (10 mg) by oral route

 daily  for 60 days                      

 

                Zithromax Z-Christopher 250 mg oral tablet    2011       take 2 tablets (500 mg)

 by oral route once daily for 1 day then 1 tablet (250 mg) by oral route once 
daily for 4 days                         

 

                Medrol (Christopher) 4 mg oral tablets,dose pack    2011        take as directed

 for 6 days                              

 

                Keflex 500 mg oral capsule    3/1/2012        3/11/2012       take 1 capsule by oral route 3 

times a day for 10 days                  

 

                Cipro 500 mg oral tablet    2012        take 1 tablet (500 mg) by oral route

 every 12 hours for 15 days              

 

                benzonatate 100 mg oral capsule    2013       take 1 capsule (100 mg) by

 oral route 3 times per day for cough     

 

             Medrol (Christopher) 4 mg oral tablets,dose pack    2013     take as directed    

 

 

                Zyrtec 10 mg oral tablet    2013       take 1 tablet (10 mg) by oral route

 once daily for 30 days                  

 

                Flonase 50 mcg/actuation nasal spray,suspension    2013       inhale 1 spray

 in each nostril by intranasal route 2 times per day for 30 days     

 

                cephalexin 500 mg oral capsule    2013        take 1 capsule (500 mg) by oral

 route every 8 hours                     

 

                promethazine-codeine 6.25-10 mg/5 mL oral syrup    2013                       take 5 milliliters

 by oral route every 4 hours as needed, not to exceed 30 mL in 24 hours     

 

                Zithromax Z-Christopher 250 mg oral tablet    10/2/2013       10/7/2013       take 2 tablets (500 mg)

 by oral route once daily for 1 day then 1 tablet (250 mg) by oral route once 
daily for 4 days                         

 

                amoxicillin 500 mg oral capsule    2014       take 1 capsule by oral route

 3 times a day for 15 days               

 

                lovastatin 20 mg oral tablet    2014        take 1 tablet (20 mg) by oral 

route daily  for 30 days                 

 

                Zithromax Z-Christopher 250 mg oral tablet    2014       take 2 tablets (500 mg)

 by oral route once daily for 1 day then 1 tablet (250 mg) by oral route once 
daily for 4 days                         

 

             Medrol (Christopher) 4 mg oral tablets,dose pack    2014                 take as directed     

 

                amoxicillin 500 mg oral capsule    2014                       take 1 capsule (500 mg) by oral route

 3 times per day                         

 

                Levaquin 500 mg oral tablet    10/16/2014      10/26/2014      take 1 tablet (500 mg) by oral

 route once daily for 10 days            

 

                prednisone 20 mg oral tablet    10/16/2014      10/24/2014      4x2 days 3x2 days 2x2 days

 1x2 days                                

 

                Zithromax Z-Christopher 250 mg oral tablet    2014       take 2 tablets (500 mg)

 by oral route once daily for 1 day then 1 tablet (250 mg) by oral route once 
daily for 4 days                         

 

                Bactrim -160 mg oral tablet    1/15/2015       2015       take 1 tablet by oral route

 every 12 hours for 10 days              

 

                Zithromax Z-Christopher 250 mg oral tablet    2015      take 2 tablets (500 mg)

 by oral route once daily for 1 day then 1 tablet (250 mg) by oral route once 
daily for 4 days                         

 

                Keflex 500 mg oral capsule    2016       take 1 capsule by oral route 3

 times a day for 7 days                  

 

                amoxicillin 500 mg oral capsule    10/4/2016       10/14/2016      take 1 capsule by oral route

 3 times a day for 10 days               

 

                Tessalon Perles 100 mg oral capsule    10/4/2016                       take 1 capsule by oral route 

every 4 hours                            

 

                Zithromax Z-Christopher 250 mg oral tablet    11/10/2016      11/15/2016      take 2 tablets (500 

mg) by oral route once daily for 1 day then 1 tablet (250 mg) by oral route once
daily for 4 days                         

 

                amoxicillin 500 mg oral tablet    2016                       take 1 tablet (500 mg) by oral route

 3 times per day                         

 

                amoxicillin 500 mg oral capsule    2017       take 1 capsule (500 mg) by

 oral route 3 times per day for 10 days     

 

                Bactrim -160 mg oral tablet    2017       take 1 tablet by oral route

 2 times a day for 10 days               

 

                Levaquin 500 mg oral tablet    2017        take 1 tablet (500 mg) by oral

 route once daily for 10 days            

 

                amoxicillin 500 mg oral capsule    2017                       take 1 capsule (500 mg) by oral route

 every 12 hours for 7 days               

 

                Zithromax Z-Christopher 250 mg oral tablet    2017                      take 2 tablets (500 mg) by oral

 route once daily for 1 day then 1 tablet (250 mg) by oral route once daily for 
4 days                                   

 

                amoxicillin 500 mg oral tablet    2018                       take 1 tablet (500 mg) by oral route

 every 12 hours for 10 days              

 

                Cipro 500 mg oral tablet    2018        2/15/2018       take 1 tablet (500 mg) by oral route

 2 times per day for 10 days             

 

                Zithromax Z-Christopher 250 mg oral tablet    2018       take 2 tablets (500 mg)

 by oral route once daily for 1 day then 1 tablet (250 mg) by oral route once 
daily for 4 days                         

 

                Keflex 500 mg oral capsule    2018       take 1 capsule (500 mg) by oral

 route every 12 hours for 7 days         

 

                prednisone 20 mg oral tablet    2018       2X4 days 1X4 days 1/2X4 days 

                                         









                                        Discontinued 

 

             Name         Start Date    Discontinued Date    SIG          Comments

 

                amoxicillin 500 mg oral capsule    11/10/2011      10/3/2012       take 1 capsule (500 mg) 

by oral route 3 times per day            

 

                Anusol-HC 25 mg rectal suppository    2011      10/3/2012       insert 1 suppository 

(25 mg) by rectal route 2 times per day     

 

                Proctofoam 1 % topical foam    2011       apply by external route daily

                                         

 

             Aerochamber    2014    Use with inhaler as directed     

 

                hydrochlorothiazide 25 mg oral tablet    1/15/2015       2015      TAKE 1 TABLET DAILY

                                         

 

                promethazine-codeine 6.25-10 mg/5 mL oral syrup    11/10/2016      10/25/2017      take 5 

milliliters by oral route every 6 hours as needed, not to exceed 30 mL in 24 
hours                                    

 

                prednisone 20 mg oral tablet    2017       10/25/2017      4 x 2 days, 3 x 2 days, 2 x

 2 days 1 x 2 days                       

 

                Augmentin 875-125 mg oral tablet    2017       take 1 tablet by oral route

 every 12 hours for 7 days               

 

                Keflex 500 mg oral capsule    2017       take 1 capsule (500 mg) by oral

 route every 12 hours                    

 

                Keflex 500 mg oral capsule    10/10/2017      2018       take 1 capsule (500 mg) by oral

 route every 12 hours for 10 days        

 

                Levaquin 500 mg oral tablet    2018       take 1 tablet (500 mg) by oral

 route once daily for 10 days            

 

                Bactrim -160 mg oral tablet    2018       take 1 tablet by oral route

 every 12 hours for 10 days             nausea







Problem List







                    Description         Status              Onset

 

                    Chronic Obstructive Pulmonary Disease    Active               

 

                    Hyperlipidemia      Active               

 

                    Anxiety disorder    Active              10/29/2013

 

                    Pain in joint; Hip    Active              06/10/2014

 

                    Pulmonary nodule seen on imaging study    Active              10/29/2014

 

                    Exercise hypoxemia    Active              10/29/2014

 

                    Anxiety about health    Active              2016

 

                    Primary osteoarthritis involving multiple joints    Active              2017

 

                    Medication management    Active              2017

 

                    Cellulitis of left lower extremity    Active              2017

 

                    Dog bite of calf, left, sequela    Active              2017







Vital Signs







      Date    Time    BP-Sys(mm[Hg]    BP-Noni(mm[Hg])    HR(bpm)    RR(rpm)    Temp    WT    HT    HC    BMI

                    BSA                 BMI Percentile      O2 Sat(%)

 

        2018    10:07:00 AM    124 mmHg    82 mmHg    86 bpm    18 rpm    98.2 F    149 lbs    61 in

                          28.153 kg/m    1.7055 m                 92 %

 

        2018    10:48:00 AM    118 mmHg    80 mmHg    82 bpm    18 rpm    98.3 F    144 lbs    61 in

                          27.21 kg/m2    1.68 m2                   93 %

 

        2018    2:35:00 PM    120 mmHg    82 mmHg    106 bpm    20 rpm    98.2 F    142 lbs    62 in

                          25.9719 kg/m    1.6786 m                 92 %

 

       2018    3:28:00 PM    122 mmHg    68 mmHg    114 bpm    18 rpm    98.2 F    142 lbs            

                                                                90 %

 

        2018    1:35:00 PM    112 mmHg    74 mmHg    114 bpm    18 rpm    99.6 F    139 lbs    63 in

                          24.6225 kg/m    1.6741 m                 98 %

 

       2018    3:57:00 PM    128 mmHg    80 mmHg    78 bpm    18 rpm    98.4 F    148 lbs             

                                                                90 %

 

        10/24/2017    1:51:00 PM    132 mmHg    80 mmHg    82 bpm    16 rpm    98.2 F    145 lbs    62 in

                          26.52 kg/m2    1.70 m2                   95 %

 

       2017    3:29:00 PM    128 mmHg    80 mmHg    68 bpm    16 rpm    98 F    144 lbs    63 in    

                25.5082 kg/m    1.7039 m                      93 %

 

        2017    11:37:00 AM    118 mmHg    72 mmHg    96 bpm    16 rpm    97.4 F    141 lbs    63 in

                          24.98 kg/m2    1.69 m2                   91 %

 

        2017    8:55:00 AM    105 mmHg    60 mmHg    96 bpm    18 rpm    95.8 F    142 lbs    63 in 

                          25.1539 kg/m    1.6921 m                 95 %

 

       2017    12:10:00 PM    122 mmHg    68 mmHg    76 bpm    18 rpm    98 F    143 lbs    63 in    

                25.33 kg/m2     1.70 m2                         98 %

 

        2017    4:03:00 PM    118 mmHg    64 mmHg    106 bpm    18 rpm    97.4 F    137 lbs    63 in

                          24.2682 kg/m    1.662 m                 98 %

 

        2016    12:11:00 PM    120 mmHg    84 mmHg    110 bpm    16 rpm    98.1 F    130 lbs    63

 in                       23.03 kg/m2    1.62 m2                   90 %

 

        11/10/2016    3:57:00 PM    148 mmHg    72 mmHg    88 bpm    16 rpm    98.2 F    132 lbs    63 in

                          23.3825 kg/m    1.6314 m                 98 %

 

        3/10/2016    2:12:00 PM    122 mmHg    60 mmHg    106 bpm    18 rpm    97 F    137 lbs    63 in 

                          24.27 kg/m2    1.66 m2                   93 %

 

        12/15/2015    2:33:00 PM    120 mmHg    75 mmHg    102 bpm    16 rpm    98.2 F    137 lbs    63 

in                        24.2682 kg/m    1.662 m                 94 %

 

        10/26/2015    2:46:00 PM    130 mmHg    80 mmHg    96 bpm    16 rpm    97.9 F    141 lbs    66 in

                          22.76 kg/m2    1.73 m2                   98 %

 

        10/6/2015    9:37:00 AM    140 mmHg    78 mmHg    110 bpm    16 rpm    97.9 F    141 lbs    63 in

                          24.9768 kg/m    1.6861 m                 95 %

 

        10/28/2014    2:25:00 PM    132 mmHg    66 mmHg    92 bpm    24 rpm    97.5 F    147 lbs    63 in

                          26.04 kg/m2    1.72 m2                   92 %

 

        10/16/2014    9:45:00 AM    132 mmHg    64 mmHg    74 bpm    24 rpm    97.7 F    146 lbs    63 in

                          25.8625 kg/m    1.7157 m                 92 %

 

        2014    2:31:00 PM    136 mmHg    68 mmHg    90 bpm    22 rpm    98.2 F    148 lbs    63 in 

                          26.22 kg/m2    1.73 m2                   95 %

 

        2014    3:19:00 PM    124 mmHg    70 mmHg    64 bpm    20 rpm    97.8 F    147 lbs    63 in

                          26.0396 kg/m    1.7216 m                 95 %

 

        10/28/2013    10:31:00 AM    140 mmHg    70 mmHg    92 bpm    24 rpm    97.8 F    143 lbs    63 

in                        25.33 kg/m2    1.70 m2                   95 %

 

      2013    2:51:00 PM    130 mmHg    78 mmHg    90 bpm          98.2 F    168 lbs    65 in          

27.9564 kg/m      1.8694 m                               

 

       2013    2:43:00 PM    110 mmHg    76 mmHg    103 bpm           96.6 F    137 lbs    63 in      

                24.27 kg/m2     1.66 m2                          

 

        2013    9:50:00 AM    150 mmHg    66 mmHg    108 bpm    20 rpm    97.8 F    138 lbs    63 in

                          24.4454 kg/m    1.6681 m                 94 %

 

        10/25/2012    9:08:00 AM    110 mmHg    60 mmHg    88 bpm    18 rpm    97.5 F    142 lbs    63 in

                          25.15 kg/m2    1.69 m2                   94 %

 

      10/3/2012    9:33:00 AM    130 mmHg    60 mmHg    98 bpm    18 rpm          146 lbs    63 in          

25.8625 kg/m      1.7157 m                              95 %

 

      2012    2:31:00 PM    116 mmHg    76 mmHg    88 bpm                140 lbs    63 in          24.80 

kg/m2               1.68 m2                                 96 %

 

     2011    10:02:00 AM    122 mmHg    80 mmHg    110 bpm              144 lbs                        

                                        94 %

 

      2011    2:58:00 PM    124 mmHg    84 mmHg    106 bpm                155 lbs    63 in          27.4567

 kg/m             1.7678 m                              96 %

 

     2011    9:55:00 AM    114 mmHg    78 mmHg    92 bpm              146 lbs                             95

 %

 

     6/3/2011    8:36:00 AM    116 mmHg    74 mmHg    90 bpm              146 lbs                             96

 %

 

     2010    3:01:00 PM    132 mmHg    84 mmHg    102 bpm              149 lbs                          

                                        94 %

 

     2010    11:46:00 AM    124 mmHg    78 mmHg    104 bpm              151 lbs                         

                                        94 %

 

     2010    8:47:00 AM    116 mmHg    78 mmHg    103 bpm              151 lbs                          

                                        95 %







Social History







                    Name                Description         Comments

 

                    Alcohol             Never                

 

                    Uses seatbelts                           

 

                    Tobacco             Never smoker         







History of Procedures







                    Date Ordered        Description         Order Status

 

                    2011 12:00 AM    THER/PROPH/DIAG INJ SC/IM    Reviewed

 

                    2011 12:00 AM    Decadron Inj.1mg-([facility]) Ndc #[id number]    Reviewed

 

                    2011 12:00 AM    Depo-Medrol 80 Mg Im/[facility] [place] 0009-460441    Reviewed

 

                    2011 12:00 AM    Rocephin, Per 250MG - 1 Gram Vial NDC 4374-328637-[facility] [place]    Reviewed



 

                    3/16/2012 12:00 AM    ROUTINE VENIPUNCTURE    Reviewed

 

                    3/16/2012 12:00 AM    COMPLETE CBC W/AUTO DIFF WBC    Reviewed

 

                    3/16/2012 12:00 AM    COMPREHEN METABOLIC PANEL    Reviewed

 

                    3/16/2012 12:00 AM    ASSAY THYROID STIM HORMONE    Reviewed

 

                    11/10/2016 12:00 AM    Decadron, Per 1 Mg NDC# [address]-89    Reviewed

 

                    11/10/2016 12:00 AM    Depo-Medrol, Per 80 Mg NDC#7995-4763-63    Reviewed

 

                    11/10/2016 12:00 AM    Rocephin 1 gram NDC#1327-2022-48    Reviewed

 

                    2016 12:00 AM    THER/PROPH/DIAG INJ SC/IM    Reviewed

 

                    2016 12:00 AM    Decadron 8mg Injection, [facility]    Reviewed

 

                    2016 12:00 AM    Depo-Medrol 80mg Injection, [facility]    Reviewed

 

                    2016 12:00 AM    Rocephin 1 gram Injection, [facility]    Reviewed

 

                    2017 12:00 AM    COMPLETE CBC W/AUTO DIFF WBC    Reviewed

 

                    2017 12:00 AM    COMPREHEN METABOLIC PANEL    Reviewed

 

                    2017 12:00 AM    LIPID PANEL         Reviewed

 

                    2017 12:00 AM    THERAPEUTIC PROPHYLACTIC/DX INJECTION SUBQ/IM    Reviewed

 

                    2017 12:00 AM    Decadron 8mg Injection, [facility]    Reviewed

 

                    2017 12:00 AM    Depo-Medrol 80mg Injection, [facility]    Reviewed

 

                    2017 12:00 AM    LIPID PANEL         Returned

 

                    2017 12:00 AM    THERAPEUTIC PROPHYLACTIC/DX INJECTION SUBQ/IM    Reviewed

 

                    2017 12:00 AM    Decadron 8mg Injection, [facility]    Reviewed

 

                    2017 12:00 AM    Depo-Medrol 80mg Injection, [facility]    Reviewed

 

                    10/3/2012 12:00 AM    THER/PROPH/DIAG INJ SC/IM    Reviewed

 

                    10/3/2012 12:00 AM    Decadron, Per 1 Mg NDC# [address]-78    Reviewed

 

                    10/3/2012 12:00 AM    Depo-Medrol, Per 80 Mg NDC#5429-7941-91    Reviewed

 

                    10/3/2012 12:00 AM    Toradol 60 Mg NDC#1819-5853-30    Reviewed

 

                    10/24/2017 12:00 AM    THERAPEUTIC PROPHYLACTIC/DX INJECTION SUBQ/IM    Reviewed

 

                    10/24/2017 12:00 AM    Decadron 8mg Injection, [facility]    Reviewed

 

                    10/24/2017 12:00 AM    Depo-Medrol 80mg Injection, [facility]    Reviewed

 

                    2018 12:00 AM    THERAPEUTIC PROPHYLACTIC/DX INJECTION SUBQ/IM    Reviewed

 

                    2018 12:00 AM    Rocephin 1 gram Injection, [facility]    Reviewed

 

                    2018 12:00 AM    THERAPEUTIC PROPHYLACTIC/DX INJECTION SUBQ/IM    Reviewed

 

                    2018 12:00 AM    Decadron 8mg Injection, [facility]    Reviewed

 

                    2018 12:00 AM    Depo-Medrol 80mg Injection, [facility]    Reviewed

 

                    2018 12:00 AM    Rocephin 1 gram Injection, [facility]    Reviewed

 

                    2013 12:00 AM    THER/PROPH/DIAG INJ SC/IM    Reviewed

 

                    2013 12:00 AM    Decadron, Per 1 Mg NDC# [address]-87    Reviewed

 

                    2013 12:00 AM    Depo-Medrol, Per 80 Mg NDC#9233-7574-52    Reviewed

 

                    2013 12:00 AM    Toradol 60 Mg NDC#6551-5055-84    Reviewed

 

                    2010 12:00 AM    ROUTINE VENIPUNCTURE    Reviewed

 

                    2010 12:00 AM    COMPLETE CBC W/AUTO DIFF WBC    Reviewed

 

                    2010 12:00 AM    COMPREHEN METABOLIC PANEL    Reviewed

 

                    2010 12:00 AM    LIPID PANEL         Reviewed

 

                    10/28/2014 12:00 AM    CHEST X-RAY 4/> VIEWS    Reviewed

 

                    6/3/2011 12:00 AM    ROUTINE VENIPUNCTURE    Reviewed

 

                    6/3/2011 12:00 AM    COMPLETE CBC AUTOMATED    Reviewed

 

                    6/3/2011 12:00 AM    COMPREHEN METABOLIC PANEL    Reviewed

 

                    6/3/2011 12:00 AM    LIPID PANEL         Reviewed

 

                    2011 12:00 AM    THER/PROPH/DIAG INJ SC/IM    Reviewed

 

                    2011 12:00 AM    Decadron Inj.8mg-([facility]) Ndc #[id number]    Reviewed

 

                    2011 12:00 AM    Depo-Medrol 80 Mg Im/[facility] [place] [facility]DC [id number]    Reviewed







Results Summary







                          Date and Description      Results

 

                          6/3/2011 1:53 PM          TRIGLYCERIDES 155.0 mg/dLCHOLESTEROL 248.0 mg/dLHDL 51.0 mg/dLTOT

 CHOL/HDL 4.9 .0 mg/dLGLUCOSE 97.0 mg/dLSODIUM 139.0 mmol/LPOTASSIUM 3.70
 mmol/LCHLORIDE 101.0 mmol/LCO2 27.0 mmol/LBUN 19.0 mg/dLCREATININE 0.90 
mg/dLSGOT/AST 19.0 IU/LSGPT/ALT 11.0 IU/LALK PHOS 111.0 IU/LTOTAL PROTEIN 7.40 
g/dLALBUMIN 4.20 g/dLTOTAL BILI 0.50 mg/dLCALCIUM 9.50 mg/dLAGE 70 GFR NonAA 62 
GFR AA 75 eGFR >60 mL/min/1.73 m2eGFR AA* >60 







History Of Immunizations

Not available.



History of Past Illness







                    Name                Date of Onset       Comments

 

                    Chronic Obstructive Pulmonary Disease                         

 

                    Hyperlipidemia                           

 

                    Cough               2010  8:49AM     

 

                    General Medical Exam, Adult    2010  8:49AM     

 

                    Seasonal Allergies    2010  8:49AM     

 

                    Sinusitis, Chronic    2010  8:49AM     

 

                    Ganglion cyst of synovium, tendon, and bursa; other    2010 11:48AM     

 

                    Anxiety disorder    10/29/2013           

 

                    Pain in joint; Hip    06/10/2014           

 

                    Pulmonary nodule seen on imaging study    10/29/2014           

 

                    Exercise hypoxemia    10/29/2014           

 

                    Chronic Obstructive Pulmonary Disease    2010  3:02PM     

 

                    Edema               2010  3:02PM     

 

                    Sinusitis, Acute    2010  3:02PM     

 

                    Anxiety about health    2016           

 

                    Chronic Obstructive Pulmonary Disease    Trey  3 2011  8:38AM     

 

                    Palpitations        Trey  3 2011  8:38AM     

 

                    Cough               2011  9:54AM     

 

                    Sinusitis, Acute    2011  9:54AM     

 

                    Seasonal Allergies    2011  9:54AM     

 

                    Post-nasal drainage    2011  9:54AM     

 

                    Primary osteoarthritis involving multiple joints    2017           

 

                    Medication management    2017           

 

                    Cellulitis of left lower extremity    2017           

 

                    Dog bite of calf, left, sequela    2017           

 

                    Cough               Sep 22 2011  2:55PM     

 

                    Seasonal Allergies    Sep 22 2011  2:55PM     

 

                    Pharyngitis, Acute    Sep 22 2011  2:55PM     

 

                    Post-nasal drainage    Sep 22 2011  2:55PM     

 

                    Blood In Stool, Occult    2011  9:58AM     

 

                    Hemorrhoids         2011  9:58AM     

 

                    Chronic Obstructive Pulmonary Disease    2012  2:33PM     

 

                    Cardiac Dysrhythmia    2012  2:33PM     

 

                    Sinoatrial Node Dysfunction    Mar 16 2012  9:12AM     

 

                    Palpitations        Mar 16 2012  9:12AM     

 

                    Osteoarthrosis, generalized, multiple sites    Oct  3 2012  9:34AM     

 

                    Pain in joint; Hip    Oct  3 2012  9:34AM     

 

                    Osteoarthrosis      Oct 25 2012  9:09AM     

 

                    Bronchitis, Acute    Oct 25 2012  9:09AM     

 

                    Cough               Oct 25 2012  9:09AM     

 

                    Pain in joint; Left Hip    Oct 25 2012  9:09AM     

 

                    Pain in joint; Hip    2013  9:50AM     

 

                    Osteoarthrosis, generalized, multiple sites    2013  2:45PM     

 

                    Pain in joint; Hip bilateral    2013  2:45PM     

 

                    Anxiety Disorder    Oct 28 2013 10:32AM     

 

                    Chronic Obstructive Pulmonary Disease    Oct 28 2013 10:32AM     

 

                    Hyperlipidemia      Oct 28 2013 10:32AM     

 

                    Pain in joint; Left Hip    Oct 28 2013 10:32AM     

 

                    Sinusitis, Acute    Oct 28 2013 10:32AM     

 

                    Essential Hypertension    2014  3:19PM     

 

                    Osteoarthrosis, generalized, multiple sites    2014  3:19PM     

 

                    Chronic Obstructive Pulmonary Disease    2014  3:19PM     

 

                    Pain in joint; Hip    2014  3:19PM     

 

                    Chronic Obstructive Pulmonary Disease    2014  2:31PM     

 

                    Pain in joint; Hip    2014  2:31PM     

 

                    pre-operative examination, unspecified    2014  2:31PM     

 

                    Lung nodule seen on imaging study    Oct 28 2014 10:24AM     

 

                    Bronchitis, Acute    Oct 16 2014  9:45AM     

 

                    Respiratory System And Chest Symptoms    Oct 16 2014  9:45AM     

 

                    COPD (chronic obstructive pulmonary disease)    Oct 16 2014  9:45AM     

 

                    Chronic Obstructive Pulmonary Disease    Oct 28 2014  2:26PM     

 

                    Pulmonary nodule seen on imaging study    Oct 28 2014  2:26PM     

 

                    Shortness of breath    Oct 28 2014  2:26PM     

 

                    Exercise hypoxemia    Oct 28 2014  2:26PM     

 

                    Nail avulsion       Oct  6 2015  9:38AM     

 

                          Contusion of left index finger with damage to nail, initial encounter    Oct 26 2015

  2:53PM                                 

 

                    Forehead contusion, subsequent encounter    Dec 15 2015  2:39PM     

 

                    Generalized anxiety disorder    Dec 15 2015  2:39PM     

 

                    Chronic Obstructive Pulmonary Disease    Dec 15 2015  2:39PM     

 

                    Osteoarthrosis, generalized, multiple sites    Mar 10 2016  2:12PM     

 

                    Pain of right thigh    Mar 10 2016  2:12PM     

 

                    Mild Acute Hip pain, acute, right Improving    Mar 10 2016  2:12PM     

 

                    Anxiety about health    Mar 10 2016  2:12PM     

 

                    Hyperlipidemia      Aug 22 2016 10:58AM     

 

                    Sinoatrial Node Dysfunction    Aug 22 2016 10:58AM     

 

                    Palpitations        Aug 22 2016 10:58AM     

 

                    Chronic Obstructive Pulmonary Disease    Aug 22 2016 10:58AM     

 

                    Exercise hypoxemia    Aug 22 2016 10:58AM     

 

                    Pain in joint; Hip    Aug 22 2016 10:58AM     

 

                    Pulmonary nodule seen on imaging study    Aug 22 2016 10:58AM     

 

                    Eustachian tube dysfunction, bilateral    Nov 10 2016  3:57PM     

 

                    Moderate Acute Cough    Nov 10 2016  3:57PM     

 

                    Pharyngitis, Acute    Nov 10 2016  3:57PM     

 

                    Upper Respiratory Infection    Nov 10 2016  3:57PM     

 

                          COPD (chronic obstructive pulmonary disease) with acute bronchitis    Nov 10 2016 

 3:57PM                                  

 

                    Mild Chronic Cough Worsening    Dec 12 2016 12:12PM     

 

                          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

 12 2016 12:12PM                         

 

                    Moderate Shortness of breath on exertion    Dec 12 2016 12:12PM     

 

                    Hyperlipidemia      May  4 2017 10:27AM     

 

                    Sinoatrial Node Dysfunction    May  4 2017 10:27AM     

 

                    Palpitations        May  4 2017 10:27AM     

 

                    Chronic Obstructive Pulmonary Disease    May  4 2017 10:27AM     

 

                    Exercise hypoxemia    May  4 2017 10:27AM     

 

                    Pain in joint; Hip    May  4 2017 10:27AM     

 

                    Pulmonary nodule seen on imaging study    May  4 2017 10:27AM     

 

                    Chest congestion    May 16 2017  4:04PM     

 

                          COPD (chronic obstructive pulmonary disease) with acute bronchitis    May 16 2017 

 4:04PM                                  

 

                    Productive cough    May 16 2017  4:04PM     

 

                    Anxiety about health    May 16 2017  4:04PM     

 

                          Chronic obstructive pulmonary disease, unspecified COPD type    May 16 2017  4:04PM

                                         

 

                    Exercise hypoxemia    May 16 2017  4:04PM     

 

                    Mixed hyperlipidemia    May 16 2017  4:04PM     

 

                    Medication management    May 16 2017  4:04PM     

 

                    Primary osteoarthritis involving multiple joints    May 16 2017  4:04PM     

 

                    Cellulitis of left lower extremity    2017 12:10PM     

 

                    Bitten by dog, initial encounter    2017 12:10PM     

 

                    Cellulitis of left lower extremity    2017  8:56AM     

 

                    Open bite, left lower leg, sequela    2017  8:56AM     

 

                    Bitten by dog, sequela    2017  8:56AM     

 

                    Medication management    2017  8:56AM     

 

                    Cellulitis of left lower extremity    2017 11:38AM     

 

                    Open bite, left lower leg, subsequent encounter    2017 11:38AM     

 

                    Bitten by dog, subsequent encounter    2017 11:38AM     

 

                    Medication management    2017 11:38AM     

 

                    Cough               Aug 10 2017  4:09PM     

 

                    Abnormal chest xray    Aug 10 2017  4:09PM     

 

                    Hyperlipidemia      Aug 29 2017  9:02AM     

 

                    Sinoatrial Node Dysfunction    Aug 29 2017  9:02AM     

 

                    Palpitations        Aug 29 2017  9:02AM     

 

                    Chronic Obstructive Pulmonary Disease    Aug 29 2017  9:02AM     

 

                    Exercise hypoxemia    Aug 29 2017  9:02AM     

 

                    Pain in joint; Hip    Aug 29 2017  9:02AM     

 

                    Pulmonary nodule seen on imaging study    Aug 29 2017  9:02AM     

 

                    Eustachian tube dysfunction, bilateral    Aug 28 2017  3:30PM     

 

                    Purulent postnasal drainage    Aug 28 2017  3:30PM     

 

                    Chest congestion    Aug 28 2017  3:30PM     

 

                    Upper respiratory tract infection, unspecified type    Aug 28 2017  3:30PM     

 

                    Moderate Acute Sinus pressure    Aug 28 2017  3:30PM     

 

                          COPD (chronic obstructive pulmonary disease) with acute bronchitis    Aug 28 2017 

 3:30PM                                  

 

                    Moderate Chronic Purulent postnasal drainage    Oct 24 2017  1:52PM     

 

                    Mild Chronic Sinus pressure    Oct 24 2017  1:52PM     

 

                          Moderate Chronic Acute seasonal allergic rhinitis, unspecified trigger Stable    Oct

 24 2017  1:52PM                         

 

                    Mild Acute Labyrinthitis    Oct 24 2017  1:52PM     

 

                    Moderate Acute Vertigo    Oct 24 2017  1:52PM     

 

                    Purulent postnasal drainage    2018  3:58PM     

 

                    Upper respiratory tract infection, unspecified type    2018  3:58PM     

 

                    Mild Acute Left Enlarged lymph node in neck    2018  3:58PM     

 

                    Cough               2018  1:36PM     

 

                    Moderate Acute Recurrent Chest congestion    2018  1:36PM     

 

                    COPD exacerbation    2018  1:36PM     

 

                          Chronic obstructive pulmonary disease with acute lower respiratory infection    2018  1:36PM                         

 

                    Acute bronchitis, unspecified    2018  1:36PM     

 

                    Cough               2018  3:29PM     

 

                    Acute pharyngitis, unspecified etiology    2018  3:29PM     

 

                    Chest congestion    2018  3:29PM     

 

                    COPD (chronic obstructive pulmonary disease)    2018  3:29PM     

 

                    Cellulitis of left lower extremity    May 22 2018  2:35PM     

 

                    Cellulitis of left lower extremity    2018 10:48AM     

 

                    Peripheral edema    2018 10:48AM     

 

                    Hyperlipidemia      Aug 22 2018 10:03AM     

 

                    Sinoatrial Node Dysfunction    Aug 22 2018 10:03AM     

 

                    Palpitations        Aug 22 2018 10:03AM     

 

                    Chronic Obstructive Pulmonary Disease    Aug 22 2018 10:03AM     

 

                    Exercise hypoxemia    Aug 22 2018 10:03AM     

 

                    Pain in joint; Hip    Aug 22 2018 10:03AM     

 

                    Pulmonary nodule seen on imaging study    Aug 22 2018 10:03AM     







Payers







           Insurance Name    Company Name    Plan Name    Plan Number    Policy Number    Policy Group

 Number                                 Start Date

 

                    Medicare RHC Medicare RHC              [id number]              N/A

 

                          [place] [employer]    [place] [employer]                 [id number]

                                                    N/A

 

                    zzzTest Medicare A    Test Medicare A              [id number]              N/A

 

                                [facility]      

                    [id number]                             N/A

 

                    Medicare Part A    Medicare - Lab/Xray              [id number]              N/A

 

                          [employer] [employer]    [employer] [facility]                 [id number]

                                                    N/A

 

                    Medicare Part A    Medicare Part A              [id number]              N/A

 

                    Medicare Part B    [employer] [place]              [id number]              N/A







History of Encounters







                    Visit Date          Visit Type          Provider

 

                    2018           Office visit        DEON LEON PA

 

                    2018           Office visit        DEON LEON PA

 

                    2018           Office visit        DEON LEON PA

 

                    2018           Office visit        DEON LEON PA

 

                    2018            Office visit        DEON LEON PA

 

                    2018           Office visit        DEON LEON PA

 

                    10/24/2017          Office visit        DEON LEON PA

 

                    2017           Voided              DEON LEON PA

 

                    2017           Office visit        DEON LEON PA

 

                    2017           Office visit        DEON LEON PA

 

                    2017            Office visit        DEON LEON PA

 

                    2017            Office visit        DEON LEON PA

 

                    2017           Office visit        DEON LEON PA

 

                    2016          Office visit        DEON LEON PA

 

                    11/10/2016          Office visit        DEON LEON PA

 

                    3/10/2016           Office visit        DEON LEON PA

 

                    12/15/2015          Office visit        DEON LEON PA

 

                    10/26/2015          Office visit        DEON LEON PA

 

                    10/6/2015           Office visit        DEON LEON PA

 

                    10/28/2014          Office visit        DEON LEON PA

 

                    10/16/2014          Office visit        DEON LEON PA

 

                    2014            Office visit        DEON ESCALANTE

 

                    2014           [facility]            DEWEY Garcia MD

 

                    2014           Office visit        DEON LEON PA

 

                    10/28/2013          Office visit        DEON LEON PA

 

                    10/14/2013          Nabeel Garcia MD

 

                    2013           Office visit        DEON LEON PA

 

                    2013            Office visit        DEON LEON PA

 

                    10/25/2012          Office visit        DEON LEON PA

 

                    10/3/2012           Office visit        DEON LEON PA

 

                    3/16/2012           Office visit        DEON LEON PA

 

                    2012            Office visit        DEON LEON PA

 

                    2012            [facility]            DEWEY Garcia MD

 

                    2011          Office visit        DEON LEON PA

 

                    2011           Office visit        Deon Leon PA-C

 

                    2011            Office visit        Deon Leon PA-C

 

                    6/3/2011            Office visit        Deon Leon PA-C

 

                    2010           Office visit        Deon Leon PA-C

 

                    2010           Office visit        Deon Leon PA-C

 

                    2010           Office visit        Deon Loen PA-C

## 2019-06-25 NOTE — XMS REPORT
MU2 Ambulatory Summary

                             Created on: 2017



Elsi Casillas

External Reference #: [id number]

: 1940

Sex: Female



Demographics







                          Address                   [address] [place]  [address]

 

                          Home Phone                [phone]

 

                          Preferred Language        English

 

                          Marital Status            

 

                          Sabianism Affiliation     Unknown

 

                          Race                      White

 

                          Ethnic Group              Not  or 





Author







                          DEON Ferguson

 

                          [facility]

 

                          Address                   [address]

[place], [place]  [address]



 

                          Phone                     [phone]







Care Team Providers







                    Care Team Member Name    Role                Phone

 

                    DEON LEON    PCP                 Unavailable

 

                    DEON LEON    PreferredProvider    Unavailable







Allergies and Adverse Reactions







                    Name                Reaction            Notes

 

                    NO KNOWN DRUG ALLERGIES                         







Plan of Treatment







             Planned Activity    Comments     Planned Date    Planned Time    Plan/Goal

 

             Lipid Profile                 2016    12:00 AM      

 

             Chest PA and Lateral - Main                 8/10/2017    12:00 AM      







Medications







                                        Active 

 

             Name         Start Date    Estimated Completion Date    SIG          Comments

 

                Calcium 600 + D(3) 600 mg(1,500mg) -200 unit oral tablet                                    take 2 tablets by

 oral route daily                        

 

                pravastatin 20 mg oral tablet                                    take 1 tablet (20 mg) by oral route once daily

                                         

 

                Toprol XL 25 mg oral tablet extended release 24 hr                                    take 1 tablet (25 mg) 

by oral route once daily                 

 

                hydrochlorothiazide 25 mg oral tablet    1/15/2015                       take 1 tablet (25 mg) by oral

 route once daily for 30 days            

 

                metoprolol succinate 25 mg oral tablet extended release 24 hr    2015                      take

 1 tablet (25 mg) by oral route once daily     

 

                promethazine-codeine 6.25-10 mg/5 mL oral syrup    11/10/2016                      take 5 milliliters

 by oral route every 6 hours as needed, not to exceed 30 mL in 24 hours     

 

                                        ipratropium-albuterol 0.5 mg-3 mg(2.5 mg base)/3 mL inhalation solution for nebulization

                    2016                              inhale 3 milliliters by nebulization route 4 times per day for COPD

                                         

 

                Symbicort 160-4.5 mcg/actuation inhalation HFA aerosol inhaler    11/10/2016                      inhale

 2 puffs by inhalation route 2 times per day in the morning and evening     

 

                prednisone 20 mg oral tablet    2017                       4 x 2 days, 3 x 2 days, 2 x 2 days 1

 x 2 days                                

 

             hydrochlorothiazide 25 mg oral tablet    2017                 TAKE 1 TABLET DAILY     

 

                metoprolol succinate 25 mg oral tablet extended release 24 hr    2017                       TAKE

 1 TABLET DAILY                          

 

                    albuterol sulfate 1.25 mg/3 mL inhalation solution for nebulization    2017           

                                        inhale 3 milliliters (1.25 mg) via nebulizer by inhalation route 4 times per day

  DX J44.9                               

 

                Sudogest 30 mg oral tablet    2017                       take 1 tablets (60 mg) by oral route every

 6 hours as needed                       

 

                Levaquin 500 mg oral tablet    2017        take 1 tablet (500 mg) by oral

 route once daily for 10 days            









                                         

 

             Name         Start Date    Expiration Date    SIG          Comments

 

                Singulair 10 mg oral tablet    9/3/2010        2011        take 1 tablet (10 mg) by oral route

 daily  for 60 days                      

 

                Zithromax Z-Christopher 250 mg oral tablet    2011       take 2 tablets (500 mg)

 by oral route once daily for 1 day then 1 tablet (250 mg) by oral route once 
daily for 4 days                         

 

                Medrol (Christopher) 4 mg oral tablets,dose pack    2011        take as directed

 for 6 days                              

 

                Keflex 500 mg oral capsule    3/1/2012        3/11/2012       take 1 capsule by oral route 3 

times a day for 10 days                  

 

                Cipro 500 mg oral tablet    2012        take 1 tablet (500 mg) by oral route

 every 12 hours for 15 days              

 

                benzonatate 100 mg oral capsule    2013       take 1 capsule (100 mg) by

 oral route 3 times per day for cough     

 

             Medrol (Christopher) 4 mg oral tablets,dose pack    2013     take as directed    

 

 

                Zyrtec 10 mg oral tablet    2013       take 1 tablet (10 mg) by oral route

 once daily for 30 days                  

 

                Flonase 50 mcg/actuation nasal spray,suspension    2013       inhale 1 spray

 in each nostril by intranasal route 2 times per day for 30 days     

 

                cephalexin 500 mg oral capsule    2013        take 1 capsule (500 mg) by oral

 route every 8 hours                     

 

                promethazine-codeine 6.25-10 mg/5 mL oral syrup    2013                       take 5 milliliters

 by oral route every 4 hours as needed, not to exceed 30 mL in 24 hours     

 

                Zithromax Z-Christopher 250 mg oral tablet    10/2/2013       10/7/2013       take 2 tablets (500 mg)

 by oral route once daily for 1 day then 1 tablet (250 mg) by oral route once 
daily for 4 days                         

 

                amoxicillin 500 mg oral capsule    2014       take 1 capsule by oral route

 3 times a day for 15 days               

 

                lovastatin 20 mg oral tablet    2014        take 1 tablet (20 mg) by oral 

route daily  for 30 days                 

 

                Zithromax Z-Christopher 250 mg oral tablet    2014       take 2 tablets (500 mg)

 by oral route once daily for 1 day then 1 tablet (250 mg) by oral route once 
daily for 4 days                         

 

             Medrol (Christopher) 4 mg oral tablets,dose pack    2014                 take as directed     

 

                amoxicillin 500 mg oral capsule    2014                       take 1 capsule (500 mg) by oral route

 3 times per day                         

 

                Levaquin 500 mg oral tablet    10/16/2014      10/26/2014      take 1 tablet (500 mg) by oral

 route once daily for 10 days            

 

                prednisone 20 mg oral tablet    10/16/2014      10/24/2014      4x2 days 3x2 days 2x2 days

 1x2 days                                

 

                Zithromax Z-Christopher 250 mg oral tablet    2014       take 2 tablets (500 mg)

 by oral route once daily for 1 day then 1 tablet (250 mg) by oral route once 
daily for 4 days                         

 

                Bactrim -160 mg oral tablet    1/15/2015       2015       take 1 tablet by oral route

 every 12 hours for 10 days              

 

                Zithromax Z-Christopher 250 mg oral tablet    2015      take 2 tablets (500 mg)

 by oral route once daily for 1 day then 1 tablet (250 mg) by oral route once 
daily for 4 days                         

 

                Keflex 500 mg oral capsule    2016       take 1 capsule by oral route 3

 times a day for 7 days                  

 

                amoxicillin 500 mg oral capsule    10/4/2016       10/14/2016      take 1 capsule by oral route

 3 times a day for 10 days               

 

                Tessalon Perles 100 mg oral capsule    10/4/2016                       take 1 capsule by oral route 

every 4 hours                            

 

                Zithromax Z-Christopher 250 mg oral tablet    11/10/2016      11/15/2016      take 2 tablets (500 

mg) by oral route once daily for 1 day then 1 tablet (250 mg) by oral route once
daily for 4 days                         

 

                amoxicillin 500 mg oral tablet    2016                       take 1 tablet (500 mg) by oral route

 3 times per day                         

 

                amoxicillin 500 mg oral capsule    2017       take 1 capsule (500 mg) by

 oral route 3 times per day for 10 days     

 

                Bactrim -160 mg oral tablet    2017       take 1 tablet by oral route

 2 times a day for 10 days               









                                        Discontinued 

 

             Name         Start Date    Discontinued Date    SIG          Comments

 

                amoxicillin 500 mg oral capsule    11/10/2011      10/3/2012       take 1 capsule (500 mg) 

by oral route 3 times per day            

 

                Anusol-HC 25 mg rectal suppository    2011      10/3/2012       insert 1 suppository 

(25 mg) by rectal route 2 times per day     

 

                Proctofoam 1 % topical foam    2011       apply by external route daily

                                         

 

             Aerochamber    2014    Use with inhaler as directed     

 

                hydrochlorothiazide 25 mg oral tablet    1/15/2015       2015      TAKE 1 TABLET DAILY

                                         

 

                Augmentin 875-125 mg oral tablet    2017       take 1 tablet by oral route

 every 12 hours for 7 days               

 

                Keflex 500 mg oral capsule    2017       take 1 capsule (500 mg) by oral

 route every 12 hours                    







Problem List







                    Description         Status              Onset

 

                    Chronic Obstructive Pulmonary Disease    Active               

 

                    Hyperlipidemia      Active               

 

                    Anxiety Disorder    Active              10/29/2013

 

                    Pain in joint; Hip    Active              06/10/2014

 

                    Pulmonary nodule seen on imaging study    Active              10/29/2014

 

                    Exercise hypoxemia    Active              10/29/2014

 

                    Anxiety about health    Active              2016

 

                    Primary osteoarthritis involving multiple joints    Active              2017

 

                    Medication management    Active              2017

 

                    Cellulitis of left lower extremity    Active              2017

 

                    Dog bite of calf, left, sequela    Active              2017







Vital Signs







      Date    Time    BP-Sys(mm[Hg]    BP-Noni(mm[Hg])    HR(bpm)    RR(rpm)    Temp    WT    HT    HC    BMI

                    BSA                 BMI Percentile      O2 Sat(%)

 

       2017    3:29:00 PM    128 mmHg    80 mmHg    68 bpm    16 rpm    98 F    144 lbs    63 in    

                25.51 kg/m2     1.70 m2                         93 %

 

        2017    11:37:00 AM    118 mmHg    72 mmHg    96 bpm    16 rpm    97.4 F    141 lbs    63 in

                          24.9768 kg/m    1.6861 m                 91 %

 

        2017    8:55:00 AM    105 mmHg    60 mmHg    96 bpm    18 rpm    95.8 F    142 lbs    63 in 

                          25.15 kg/m2    1.69 m2                   95 %

 

       2017    12:10:00 PM    122 mmHg    68 mmHg    76 bpm    18 rpm    98 F    143 lbs    63 in    

                25.3311 kg/m    1.698 m                       98 %

 

        2017    4:03:00 PM    118 mmHg    64 mmHg    106 bpm    18 rpm    97.4 F    137 lbs    63 in

                          24.27 kg/m2    1.66 m2                   98 %

 

        2016    12:11:00 PM    120 mmHg    84 mmHg    110 bpm    16 rpm    98.1 F    130 lbs    63

 in                       23.0282 kg/m    1.619 m                 90 %

 

        11/10/2016    3:57:00 PM    148 mmHg    72 mmHg    88 bpm    16 rpm    98.2 F    132 lbs    63 in

                          23.38 kg/m2    1.63 m2                   98 %

 

        3/10/2016    2:12:00 PM    122 mmHg    60 mmHg    106 bpm    18 rpm    97 F    137 lbs    63 in 

                          24.2682 kg/m    1.662 m                 93 %

 

        12/15/2015    2:33:00 PM    120 mmHg    75 mmHg    102 bpm    16 rpm    98.2 F    137 lbs    63 

in                        24.27 kg/m2    1.66 m2                   94 %

 

        10/26/2015    2:46:00 PM    130 mmHg    80 mmHg    96 bpm    16 rpm    97.9 F    141 lbs    66 in

                          22.7578 kg/m    1.7258 m                 98 %

 

        10/6/2015    9:37:00 AM    140 mmHg    78 mmHg    110 bpm    16 rpm    97.9 F    141 lbs    63 in

                          24.98 kg/m2    1.69 m2                   95 %

 

        10/28/2014    2:25:00 PM    132 mmHg    66 mmHg    92 bpm    24 rpm    97.5 F    147 lbs    63 in

                          26.0396 kg/m    1.7216 m                 92 %

 

        10/16/2014    9:45:00 AM    132 mmHg    64 mmHg    74 bpm    24 rpm    97.7 F    146 lbs    63 in

                          25.86 kg/m2    1.72 m2                   92 %

 

        2014    2:31:00 PM    136 mmHg    68 mmHg    90 bpm    22 rpm    98.2 F    148 lbs    63 in 

                          26.2168 kg/m    1.7274 m                 95 %

 

        2014    3:19:00 PM    124 mmHg    70 mmHg    64 bpm    20 rpm    97.8 F    147 lbs    63 in

                          26.04 kg/m2    1.72 m2                   95 %

 

        10/28/2013    10:31:00 AM    140 mmHg    70 mmHg    92 bpm    24 rpm    97.8 F    143 lbs    63 

in                        25.3311 kg/m    1.698 m                 95 %

 

      2013    2:51:00 PM    130 mmHg    78 mmHg    90 bpm          98.2 F    168 lbs    65 in          

27.96 kg/m2         1.87 m2                                  

 

       2013    2:43:00 PM    110 mmHg    76 mmHg    103 bpm           96.6 F    137 lbs    63 in      

                24.2682 kg/m    1.662 m                        

 

        2013    9:50:00 AM    150 mmHg    66 mmHg    108 bpm    20 rpm    97.8 F    138 lbs    63 in

                          24.45 kg/m2    1.67 m2                   94 %

 

        10/25/2012    9:08:00 AM    110 mmHg    60 mmHg    88 bpm    18 rpm    97.5 F    142 lbs    63 in

                          25.1539 kg/m    1.6921 m                 94 %

 

      10/3/2012    9:33:00 AM    130 mmHg    60 mmHg    98 bpm    18 rpm          146 lbs    63 in          

25.86 kg/m2         1.72 m2                                 95 %

 

      2012    2:31:00 PM    116 mmHg    76 mmHg    88 bpm                140 lbs    63 in          24.7996

 kg/m             1.6801 m                              96 %

 

     2011    10:02:00 AM    122 mmHg    80 mmHg    110 bpm              144 lbs                        

                                        94 %

 

      2011    2:58:00 PM    124 mmHg    84 mmHg    106 bpm                155 lbs    63 in          27.4567

 kg/m             1.7678 m                              96 %

 

     2011    9:55:00 AM    114 mmHg    78 mmHg    92 bpm              146 lbs                             95

 %

 

     6/3/2011    8:36:00 AM    116 mmHg    74 mmHg    90 bpm              146 lbs                             96

 %

 

     2010    3:01:00 PM    132 mmHg    84 mmHg    102 bpm              149 lbs                          

                                        94 %

 

     2010    11:46:00 AM    124 mmHg    78 mmHg    104 bpm              151 lbs                         

                                        94 %

 

     2010    8:47:00 AM    116 mmHg    78 mmHg    103 bpm              151 lbs                          

                                        95 %







Social History







                    Name                Description         Comments

 

                    Tobacco             Never smoker         

 

                    denies alcohol use                         







History of Procedures







                    Date Ordered        Description         Order Status

 

                    2011 12:00 AM    THER/PROPH/DIAG INJ SC/IM    Reviewed

 

                    2011 12:00 AM    Decadron Inj.1mg-([facility]) Ndc #[id number]    Reviewed

 

                    2011 12:00 AM    Depo-Medrol 80 Mg Im/[facility] NDC 0009-540364    Reviewed

 

                    2011 12:00 AM    Rocephin, Per 250MG - 1 Gram Vial NDC 2713-576042-[facility]    Reviewed



 

                    3/16/2012 12:00 AM    ROUTINE VENIPUNCTURE    Reviewed

 

                    3/16/2012 12:00 AM    COMPLETE CBC W/AUTO DIFF WBC    Reviewed

 

                    3/16/2012 12:00 AM    COMPREHEN METABOLIC PANEL    Reviewed

 

                    3/16/2012 12:00 AM    ASSAY THYROID STIM HORMONE    Reviewed

 

                    11/10/2016 12:00 AM    Decadron, Per 1 Mg NDC# [address]-65    Reviewed

 

                    11/10/2016 12:00 AM    Depo-Medrol, Per 80 Mg NDC#4105-5558-83    Reviewed

 

                    11/10/2016 12:00 AM    Rocephin 1 gram NDC#7397-4441-92    Reviewed

 

                    2016 12:00 AM    THER/PROPH/DIAG INJ SC/IM    Reviewed

 

                    2016 12:00 AM    Decadron 8mg Injection, [facility]    Reviewed

 

                    2016 12:00 AM    Depo-Medrol 80mg Injection, [facility]    Reviewed

 

                    2016 12:00 AM    Rocephin 1 gram Injection, [facility]    Reviewed

 

                    2017 12:00 AM    COMPLETE CBC W/AUTO DIFF WBC    Reviewed

 

                    2017 12:00 AM    COMPREHEN METABOLIC PANEL    Reviewed

 

                    2017 12:00 AM    LIPID PANEL         Reviewed

 

                    2017 12:00 AM    THERAPEUTIC PROPHYLACTIC/DX INJECTION SUBQ/IM    Reviewed

 

                    2017 12:00 AM    Decadron 8mg Injection, [facility]    Reviewed

 

                    2017 12:00 AM    Depo-Medrol 80mg Injection, [facility]    Reviewed

 

                    2017 12:00 AM    LIPID PANEL         Returned

 

                    2017 12:00 AM    THERAPEUTIC PROPHYLACTIC/DX INJECTION SUBQ/IM    Reviewed

 

                    2017 12:00 AM    Decadron 8mg Injection, [facility]    Reviewed

 

                    2017 12:00 AM    Depo-Medrol 80mg Injection, [facility]    Reviewed

 

                    10/3/2012 12:00 AM    THER/PROPH/DIAG INJ SC/IM    Reviewed

 

                    10/3/2012 12:00 AM    Decadron, Per 1 Mg NDC# [address]-89    Reviewed

 

                    10/3/2012 12:00 AM    Depo-Medrol, Per 80 Mg NDC#6801-8168-20    Reviewed

 

                    10/3/2012 12:00 AM    Toradol 60 Mg NDC#4413-2921-15    Reviewed

 

                    2013 12:00 AM    THER/PROPH/DIAG INJ SC/IM    Reviewed

 

                    2013 12:00 AM    Decadron, Per 1 Mg NDC# [address]-80    Reviewed

 

                    2013 12:00 AM    Depo-Medrol, Per 80 Mg NDC#0515-5614-76    Reviewed

 

                    2013 12:00 AM    Toradol 60 Mg NDC#3354-3895-19    Reviewed

 

                    2010 12:00 AM    ROUTINE VENIPUNCTURE    Reviewed

 

                    2010 12:00 AM    COMPLETE CBC W/AUTO DIFF WBC    Reviewed

 

                    2010 12:00 AM    COMPREHEN METABOLIC PANEL    Reviewed

 

                    2010 12:00 AM    LIPID PANEL         Reviewed

 

                    10/28/2014 12:00 AM    CHEST X-RAY 4/> VIEWS    Reviewed

 

                    6/3/2011 12:00 AM    ROUTINE VENIPUNCTURE    Reviewed

 

                    6/3/2011 12:00 AM    COMPLETE CBC AUTOMATED    Reviewed

 

                    6/3/2011 12:00 AM    COMPREHEN METABOLIC PANEL    Reviewed

 

                    6/3/2011 12:00 AM    LIPID PANEL         Reviewed

 

                    2011 12:00 AM    THER/PROPH/DIAG INJ SC/IM    Reviewed

 

                    2011 12:00 AM    Decadron Inj.8mg-([facility]) Ndc #[id number]    Reviewed

 

                    2011 12:00 AM    Depo-Medrol 80 Mg Im/[facility] NDC [id number]    Reviewed







Results Summary







                          Date and Description      Results

 

                          6/3/2011 1:53 PM          TRIGLYCERIDES 155.0 mg/dLCHOLESTEROL 248.0 mg/dLHDL 51.0 mg/dLTOT

 CHOL/HDL 4.9 .0 mg/dLGLUCOSE 97.0 mg/dLSODIUM 139.0 mmol/LPOTASSIUM 3.70
 mmol/LCHLORIDE 101.0 mmol/LCO2 27.0 mmol/LBUN 19.0 mg/dLCREATININE 0.90 
mg/dLSGOT/AST 19.0 IU/LSGPT/ALT 11.0 IU/LALK PHOS 111.0 IU/LTOTAL PROTEIN 7.40 
g/dLALBUMIN 4.20 g/dLTOTAL BILI 0.50 mg/dLCALCIUM 9.50 mg/dLAGE 70 GFR NonAA 62 
GFR AA 75 eGFR >60 mL/min/1.73 m2eGFR AA* >60 







History Of Immunizations

Not available.



History of Past Illness







                    Name                Date of Onset       Comments

 

                    Chronic Obstructive Pulmonary Disease                         

 

                    Hyperlipidemia                           

 

                    Cough               2010  8:49AM     

 

                    General Medical Exam, Adult    2010  8:49AM     

 

                    Seasonal Allergies    2010  8:49AM     

 

                    Sinusitis, Chronic    2010  8:49AM     

 

                    Ganglion cyst of synovium, tendon, and bursa; other    2010 11:48AM     

 

                    Anxiety Disorder    10/29/2013           

 

                    Pain in joint; Hip    06/10/2014           

 

                    Pulmonary nodule seen on imaging study    10/29/2014           

 

                    Exercise hypoxemia    10/29/2014           

 

                    Chronic Obstructive Pulmonary Disease    2010  3:02PM     

 

                    Edema               2010  3:02PM     

 

                    Sinusitis, Acute    2010  3:02PM     

 

                    Anxiety about health    2016           

 

                    Chronic Obstructive Pulmonary Disease    Trey  3 2011  8:38AM     

 

                    Palpitations        Trey  3 2011  8:38AM     

 

                    Cough               2011  9:54AM     

 

                    Sinusitis, Acute    2011  9:54AM     

 

                    Seasonal Allergies    2011  9:54AM     

 

                    Post-nasal drainage    2011  9:54AM     

 

                    Primary osteoarthritis involving multiple joints    2017           

 

                    Medication management    2017           

 

                    Cellulitis of left lower extremity    2017           

 

                    Dog bite of calf, left, sequela    2017           

 

                    Cough               Sep 22 2011  2:55PM     

 

                    Seasonal Allergies    Sep 22 2011  2:55PM     

 

                    Pharyngitis, Acute    Sep 22 2011  2:55PM     

 

                    Post-nasal drainage    Sep 22 2011  2:55PM     

 

                    Blood In Stool, Occult    2011  9:58AM     

 

                    Hemorrhoids         2011  9:58AM     

 

                    Chronic Obstructive Pulmonary Disease    2012  2:33PM     

 

                    Cardiac Dysrhythmia    2012  2:33PM     

 

                    Sinoatrial Node Dysfunction    Mar 16 2012  9:12AM     

 

                    Palpitations        Mar 16 2012  9:12AM     

 

                    Osteoarthrosis, generalized, multiple sites    Oct  3 2012  9:34AM     

 

                    Pain in joint; Hip    Oct  3 2012  9:34AM     

 

                    Osteoarthrosis      Oct 25 2012  9:09AM     

 

                    Bronchitis, Acute    Oct 25 2012  9:09AM     

 

                    Cough               Oct 25 2012  9:09AM     

 

                    Pain in joint; Left Hip    Oct 25 2012  9:09AM     

 

                    Pain in joint; Hip    2013  9:50AM     

 

                    Osteoarthrosis, generalized, multiple sites    2013  2:45PM     

 

                    Pain in joint; Hip bilateral    2013  2:45PM     

 

                    Anxiety Disorder    Oct 28 2013 10:32AM     

 

                    Chronic Obstructive Pulmonary Disease    Oct 28 2013 10:32AM     

 

                    Hyperlipidemia      Oct 28 2013 10:32AM     

 

                    Pain in joint; Left Hip    Oct 28 2013 10:32AM     

 

                    Sinusitis, Acute    Oct 28 2013 10:32AM     

 

                    Essential Hypertension    2014  3:19PM     

 

                    Osteoarthrosis, generalized, multiple sites    2014  3:19PM     

 

                    Chronic Obstructive Pulmonary Disease    2014  3:19PM     

 

                    Pain in joint; Hip    2014  3:19PM     

 

                    Chronic Obstructive Pulmonary Disease    2014  2:31PM     

 

                    Pain in joint; Hip    2014  2:31PM     

 

                    pre-operative examination, unspecified    2014  2:31PM     

 

                    Lung nodule seen on imaging study    Oct 28 2014 10:24AM     

 

                    Bronchitis, Acute    Oct 16 2014  9:45AM     

 

                    Respiratory System And Chest Symptoms    Oct 16 2014  9:45AM     

 

                    COPD (chronic obstructive pulmonary disease)    Oct 16 2014  9:45AM     

 

                    Chronic Obstructive Pulmonary Disease    Oct 28 2014  2:26PM     

 

                    Pulmonary nodule seen on imaging study    Oct 28 2014  2:26PM     

 

                    Shortness of breath    Oct 28 2014  2:26PM     

 

                    Exercise hypoxemia    Oct 28 2014  2:26PM     

 

                    Nail avulsion       Oct  6 2015  9:38AM     

 

                          Contusion of left index finger with damage to nail, initial encounter    Oct 26 2015

  2:53PM                                 

 

                    Forehead contusion, subsequent encounter    Dec 15 2015  2:39PM     

 

                    Generalized anxiety disorder    Dec 15 2015  2:39PM     

 

                    Chronic Obstructive Pulmonary Disease    Dec 15 2015  2:39PM     

 

                    Osteoarthrosis, generalized, multiple sites    Mar 10 2016  2:12PM     

 

                    Pain of right thigh    Mar 10 2016  2:12PM     

 

                    Mild Acute Hip pain, acute, right Improving    Mar 10 2016  2:12PM     

 

                    Anxiety about health    Mar 10 2016  2:12PM     

 

                    Hyperlipidemia      Aug 22 2016 10:58AM     

 

                    Sinoatrial Node Dysfunction    Aug 22 2016 10:58AM     

 

                    Palpitations        Aug 22 2016 10:58AM     

 

                    Chronic Obstructive Pulmonary Disease    Aug 22 2016 10:58AM     

 

                    Exercise hypoxemia    Aug 22 2016 10:58AM     

 

                    Pain in joint; Hip    Aug 22 2016 10:58AM     

 

                    Pulmonary nodule seen on imaging study    Aug 22 2016 10:58AM     

 

                    Eustachian tube dysfunction, bilateral    Nov 10 2016  3:57PM     

 

                    Moderate Acute Cough    Nov 10 2016  3:57PM     

 

                    Pharyngitis, Acute    Nov 10 2016  3:57PM     

 

                    Upper Respiratory Infection    Nov 10 2016  3:57PM     

 

                          COPD (chronic obstructive pulmonary disease) with acute bronchitis    Nov 10 2016 

 3:57PM                                  

 

                    Mild Chronic Cough Worsening    Dec 12 2016 12:12PM     

 

                          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

 12 2016 12:12PM                         

 

                    Moderate Shortness of breath on exertion    Dec 12 2016 12:12PM     

 

                    Hyperlipidemia      May  4 2017 10:27AM     

 

                    Sinoatrial Node Dysfunction    May  4 2017 10:27AM     

 

                    Palpitations        May  4 2017 10:27AM     

 

                    Chronic Obstructive Pulmonary Disease    May  4 2017 10:27AM     

 

                    Exercise hypoxemia    May  4 2017 10:27AM     

 

                    Pain in joint; Hip    May  4 2017 10:27AM     

 

                    Pulmonary nodule seen on imaging study    May  4 2017 10:27AM     

 

                    Chest congestion    May 16 2017  4:04PM     

 

                          COPD (chronic obstructive pulmonary disease) with acute bronchitis    May 16 2017 

 4:04PM                                  

 

                    Productive cough    May 16 2017  4:04PM     

 

                    Anxiety about health    May 16 2017  4:04PM     

 

                          Chronic obstructive pulmonary disease, unspecified COPD type    May 16 2017  4:04PM

                                         

 

                    Exercise hypoxemia    May 16 2017  4:04PM     

 

                    Mixed hyperlipidemia    May 16 2017  4:04PM     

 

                    Medication management    May 16 2017  4:04PM     

 

                    Primary osteoarthritis involving multiple joints    May 16 2017  4:04PM     

 

                    Cellulitis of left lower extremity    2017 12:10PM     

 

                    Bitten by dog, initial encounter    2017 12:10PM     

 

                    Cellulitis of left lower extremity    2017  8:56AM     

 

                    Open bite, left lower leg, sequela    2017  8:56AM     

 

                    Bitten by dog, sequela    2017  8:56AM     

 

                    Medication management    2017  8:56AM     

 

                    Cellulitis of left lower extremity    2017 11:38AM     

 

                    Open bite, left lower leg, subsequent encounter    2017 11:38AM     

 

                    Bitten by dog, subsequent encounter    2017 11:38AM     

 

                    Medication management    2017 11:38AM     

 

                    Cough               Aug 10 2017  4:09PM     

 

                    Abnormal chest xray    Aug 10 2017  4:09PM     

 

                    Hyperlipidemia      Aug 29 2017  9:02AM     

 

                    Sinoatrial Node Dysfunction    Aug 29 2017  9:02AM     

 

                    Palpitations        Aug 29 2017  9:02AM     

 

                    Chronic Obstructive Pulmonary Disease    Aug 29 2017  9:02AM     

 

                    Exercise hypoxemia    Aug 29 2017  9:02AM     

 

                    Pain in joint; Hip    Aug 29 2017  9:02AM     

 

                    Pulmonary nodule seen on imaging study    Aug 29 2017  9:02AM     

 

                    Eustachian tube dysfunction, bilateral    Aug 28 2017  3:30PM     

 

                    Purulent postnasal drainage    Aug 28 2017  3:30PM     

 

                    Chest congestion    Aug 28 2017  3:30PM     

 

                    Upper respiratory tract infection, unspecified type    Aug 28 2017  3:30PM     

 

                    Moderate Acute Sinus pressure    Aug 28 2017  3:30PM     

 

                          COPD (chronic obstructive pulmonary disease) with acute bronchitis    Aug 28 2017 

 3:30PM                                  







Payers







           Insurance Name    Company Name    Plan Name    Plan Number    Policy Number    Policy Group

 Number                                 Start Date

 

                    Medicare [employer]    Medicare [employer]              [id number]              N/A

 

                          [place] [employer]    [place] [employer]                 [id number]

                                                    N/A

 

                    [employer] Medicare A    Test Medicare A              [id number]              N/A

 

                                [facility] - [facility] Comm      

                    [id number]                             N/A

 

                    Medicare Part A    Medicare - Lab/Xray              [id number]              N/A

 

                          [employer]    [employer]                 [id number]

                                                    N/A

 

                    Medicare Part A    Medicare Part A              [id number]              N/A

 

                    Medicare Part B    [employer] [place]              [id number]              N/A







History of Encounters







                    Visit Date          Visit Type          Provider

 

                    2017           Laboratory          DEON ESCALANTE

 

                    2017           Office visit        DEON ESCALANTE

 

                    2017           Office visit        DEON ESCALANTE

 

                    2017            Office visit        DEON ESCALANTE

 

                    2017            Office visit        DEON ESCALANTE

 

                    2017           Office visit        DEON ESCALANTE

 

                    2016          Office visit        DEON ESCALANTE

 

                    11/10/2016          Office visit        DEON ESCALANTE

 

                    3/10/2016           Office visit        DEON ESCALANTE

 

                    12/15/2015          Office visit        DEON ESCALANTE

 

                    10/26/2015          Office visit        DEON ESCALANTE

 

                    10/6/2015           Office visit        DEON ESCALANTE

 

                    10/28/2014          Office visit        DEON ESCALANTE

 

                    10/16/2014          Office visit        DEON ESCALANTE

 

                    2014            Office visit        DEON LEON PA

 

                    2014           [facility]            DEWEY Garcia MD

 

                    2014           Office visit        DEON LEON PA

 

                    10/28/2013          Office visit        DEON LEON PA

 

                    10/14/2013          [facility]            DEWEY Garcia MD

 

                    2013           Office visit        DEON LEON PA

 

                    2013            Office visit        DEON LEON PA

 

                    10/25/2012          Office visit        DEON ESCALANTE

 

                    10/3/2012           Office visit        DEON LEON PA

 

                    3/16/2012           Office visit        DEON LEON PA

 

                    2012            Office visit        DEON LEON PA

 

                    2012            [facility]            DEWEY Garcia MD

 

                    2011          Office visit        DEON LEON PA

 

                    2011           Office visit        Deon Leon PA-C

 

                    2011            Office visit        Deon Leon PA-C

 

                    6/3/2011            Office visit        Deon Leon PA-C

 

                    2010           Office visit        Deon Leon PA-C

 

                    2010           Office visit        Deon Leon PA-C

 

                    2010           Office visit        Deon Leon PA-C

## 2019-06-25 NOTE — DIAGNOSTIC IMAGING REPORT
PROCEDURE: CT head wo r/o stroke.



TECHNIQUE: Multiple contiguous axial images were obtained through

the brain without the use of intravenous contrast. Auto Exposure

Controls were utilized during the CT exam to meet ALARA standards

for radiation dose reduction. 



INDICATION: Stroke symptoms, vomiting, headache, hypertension. 



CORRELATION STUDY: 

CT head 12/07/2015



FINDINGS:  

There is streak attenuation and motion artifact as limited

assessment. The ventricles and sulci appearing age-appropriate.

Scattered areas of decreased attenuation favoring likely changes

of small vessel ischemic disease and/or senescent changes.

Definitive evidence for edema is not otherwise suggested. No

intracranial hemorrhage. No hyperdense MCA sign. Basilar cisterns

are maintained.



Bony calvarium intact. Paranasal sinuses and mastoid air cells

are clear.



IMPRESSION:

1. No CT evidence for acute intracranial abnormality. Generalized

atrophic changes with likely findings of small vessel ischemic

disease. If symptoms persist and/or clinically warranted, MRI

would be recommended.



Dictated by: 



  Dictated on workstation # [id number]

## 2019-06-25 NOTE — XMS REPORT
MU2 Ambulatory Summary

                             Created on: 2018



Elsi Casillas

External Reference #: [id number]

: 1940

Sex: Female



Demographics







                          Address                   [address] [place]  [address]

 

                          Home Phone                [phone]

 

                          Preferred Language        English

 

                          Marital Status            

 

                          Catholic Affiliation     Unknown

 

                          Race                      White

 

                          Ethnic Group              Not  or 





Author







                          Author                    DEON LEON

 

                          [facility]

 

                          Address                   [address]

[place], [place]  [address]



 

                          Phone                     [phone]







Care Team Providers







                    Care Team Member Name    Role                Phone

 

                    DEON LEON    PCP                 [phone]

 

                    DEON LEON    PreferredProvider    [phone]







Allergies and Adverse Reactions







                    Name                Reaction            Notes

 

                    NO KNOWN DRUG ALLERGIES                         







Plan of Treatment







             Planned Activity    Comments     Planned Date    Planned Time    Plan/Goal

 

             Lipid Profile                 2016    12:00 AM      

 

             Chest PA and Lateral - Main                 8/10/2017    12:00 AM      







Medications







                                        Active 

 

             Name         Start Date    Estimated Completion Date    SIG          Comments

 

                Calcium 600 + D(3) 600 mg(1,500mg) -200 unit oral tablet                                    take 2 tablets by

 oral route daily                        

 

                pravastatin 20 mg oral tablet                                    take 1 tablet (20 mg) by oral route once daily

                                         

 

                Toprol XL 25 mg oral tablet extended release 24 hr                                    take 1 tablet (25 mg) 

by oral route once daily                 

 

                    albuterol sulfate 1.25 mg/3 mL inhalation solution for nebulization    2017           

                                        inhale 3 milliliters (1.25 mg) via nebulizer by inhalation route 4 times per day

  DX J44.9                               

 

                Symbicort 160-4.5 mcg/actuation inhalation HFA aerosol inhaler    10/25/2017                      inhale

 2 puffs by inhalation route 2 times per day in the morning and evening     

 

                metoprolol succinate 25 mg oral tablet extended release 24 hr    2017                       TAKE

 1 TABLET DAILY                          

 

                                        ipratropium-albuterol 0.5 mg-3 mg(2.5 mg base)/3 mL inhalation solution for nebulization

                    2018                                inhale 3 milliliters by nebulization route 4 times per day for COPD

                                         

 

                Sudogest 30 mg oral tablet    3/9/2018                        take 1 tablets (60 mg) by oral route every

 6 hours as needed                       

 

                Bactrim -160 mg oral tablet    2018       take 1 tablet by oral route

 every 12 hours for 10 days              

 

             hydrochlorothiazide 25 mg oral tablet    2018                 TAKE 1 TABLET DAILY     









                                         

 

             Name         Start Date    Expiration Date    SIG          Comments

 

                Singulair 10 mg oral tablet    9/3/2010        2011        take 1 tablet (10 mg) by oral route

 daily  for 60 days                      

 

                Zithromax Z-Christopher 250 mg oral tablet    2011       take 2 tablets (500 mg)

 by oral route once daily for 1 day then 1 tablet (250 mg) by oral route once 
daily for 4 days                         

 

                Medrol (Christopher) 4 mg oral tablets,dose pack    2011        take as directed

 for 6 days                              

 

                Keflex 500 mg oral capsule    3/1/2012        3/11/2012       take 1 capsule by oral route 3 

times a day for 10 days                  

 

                Cipro 500 mg oral tablet    2012        take 1 tablet (500 mg) by oral route

 every 12 hours for 15 days              

 

                benzonatate 100 mg oral capsule    2013       take 1 capsule (100 mg) by

 oral route 3 times per day for cough     

 

             Medrol (Christopher) 4 mg oral tablets,dose pack    2013     take as directed    

 

 

                Zyrtec 10 mg oral tablet    2013       take 1 tablet (10 mg) by oral route

 once daily for 30 days                  

 

                Flonase 50 mcg/actuation nasal spray,suspension    2013       inhale 1 spray

 in each nostril by intranasal route 2 times per day for 30 days     

 

                cephalexin 500 mg oral capsule    2013        take 1 capsule (500 mg) by oral

 route every 8 hours                     

 

                promethazine-codeine 6.25-10 mg/5 mL oral syrup    2013                       take 5 milliliters

 by oral route every 4 hours as needed, not to exceed 30 mL in 24 hours     

 

                Zithromax Z-Christopher 250 mg oral tablet    10/2/2013       10/7/2013       take 2 tablets (500 mg)

 by oral route once daily for 1 day then 1 tablet (250 mg) by oral route once 
daily for 4 days                         

 

                amoxicillin 500 mg oral capsule    2014       take 1 capsule by oral route

 3 times a day for 15 days               

 

                lovastatin 20 mg oral tablet    2014        take 1 tablet (20 mg) by oral 

route daily  for 30 days                 

 

                Zithromax Z-Christopher 250 mg oral tablet    2014       take 2 tablets (500 mg)

 by oral route once daily for 1 day then 1 tablet (250 mg) by oral route once 
daily for 4 days                         

 

             Medrol (Christopher) 4 mg oral tablets,dose pack    2014                 take as directed     

 

                amoxicillin 500 mg oral capsule    2014                       take 1 capsule (500 mg) by oral route

 3 times per day                         

 

                Levaquin 500 mg oral tablet    10/16/2014      10/26/2014      take 1 tablet (500 mg) by oral

 route once daily for 10 days            

 

                prednisone 20 mg oral tablet    10/16/2014      10/24/2014      4x2 days 3x2 days 2x2 days

 1x2 days                                

 

                Zithromax Z-Christopher 250 mg oral tablet    2014       take 2 tablets (500 mg)

 by oral route once daily for 1 day then 1 tablet (250 mg) by oral route once 
daily for 4 days                         

 

                Bactrim -160 mg oral tablet    1/15/2015       2015       take 1 tablet by oral route

 every 12 hours for 10 days              

 

                Zithromax Z-Christopher 250 mg oral tablet    2015      take 2 tablets (500 mg)

 by oral route once daily for 1 day then 1 tablet (250 mg) by oral route once 
daily for 4 days                         

 

                Keflex 500 mg oral capsule    2016       take 1 capsule by oral route 3

 times a day for 7 days                  

 

                amoxicillin 500 mg oral capsule    10/4/2016       10/14/2016      take 1 capsule by oral route

 3 times a day for 10 days               

 

                Tessalon Perles 100 mg oral capsule    10/4/2016                       take 1 capsule by oral route 

every 4 hours                            

 

                Zithromax Z-Christopher 250 mg oral tablet    11/10/2016      11/15/2016      take 2 tablets (500 

mg) by oral route once daily for 1 day then 1 tablet (250 mg) by oral route once
daily for 4 days                         

 

                amoxicillin 500 mg oral tablet    2016                       take 1 tablet (500 mg) by oral route

 3 times per day                         

 

                amoxicillin 500 mg oral capsule    2017       take 1 capsule (500 mg) by

 oral route 3 times per day for 10 days     

 

                Bactrim -160 mg oral tablet    2017       take 1 tablet by oral route

 2 times a day for 10 days               

 

                Levaquin 500 mg oral tablet    2017        take 1 tablet (500 mg) by oral

 route once daily for 10 days            

 

                amoxicillin 500 mg oral capsule    2017                       take 1 capsule (500 mg) by oral route

 every 12 hours for 7 days               

 

                Zithromax Z-Christopher 250 mg oral tablet    2017                      take 2 tablets (500 mg) by oral

 route once daily for 1 day then 1 tablet (250 mg) by oral route once daily for 
4 days                                   

 

                amoxicillin 500 mg oral tablet    2018                       take 1 tablet (500 mg) by oral route

 every 12 hours for 10 days              

 

                Cipro 500 mg oral tablet    2018        2/15/2018       take 1 tablet (500 mg) by oral route

 2 times per day for 10 days             

 

                prednisone 20 mg oral tablet    2018       2X4 days 1X4 days 1/2X4 days 

                                         

 

                Zithromax Z-Christopher 250 mg oral tablet    2018       take 2 tablets (500 mg)

 by oral route once daily for 1 day then 1 tablet (250 mg) by oral route once 
daily for 4 days                         

 

                Keflex 500 mg oral capsule    2018       take 1 capsule (500 mg) by oral

 route every 12 hours for 7 days         









                                        Discontinued 

 

             Name         Start Date    Discontinued Date    SIG          Comments

 

                amoxicillin 500 mg oral capsule    11/10/2011      10/3/2012       take 1 capsule (500 mg) 

by oral route 3 times per day            

 

                Anusol-HC 25 mg rectal suppository    2011      10/3/2012       insert 1 suppository 

(25 mg) by rectal route 2 times per day     

 

                Proctofoam 1 % topical foam    2011       apply by external route daily

                                         

 

             Aerochamber    2014    Use with inhaler as directed     

 

                hydrochlorothiazide 25 mg oral tablet    1/15/2015       2015      TAKE 1 TABLET DAILY

                                         

 

                promethazine-codeine 6.25-10 mg/5 mL oral syrup    11/10/2016      10/25/2017      take 5 

milliliters by oral route every 6 hours as needed, not to exceed 30 mL in 24 
hours                                    

 

                prednisone 20 mg oral tablet    2017       10/25/2017      4 x 2 days, 3 x 2 days, 2 x

 2 days 1 x 2 days                       

 

                Augmentin 875-125 mg oral tablet    2017       take 1 tablet by oral route

 every 12 hours for 7 days               

 

                Keflex 500 mg oral capsule    2017       take 1 capsule (500 mg) by oral

 route every 12 hours                    

 

                Keflex 500 mg oral capsule    10/10/2017      2018       take 1 capsule (500 mg) by oral

 route every 12 hours for 10 days        

 

                Levaquin 500 mg oral tablet    2018       take 1 tablet (500 mg) by oral

 route once daily for 10 days            







Problem List







                    Description         Status              Onset

 

                    Chronic Obstructive Pulmonary Disease    Active               

 

                    Hyperlipidemia      Active               

 

                    Anxiety disorder    Active              10/29/2013

 

                    Pain in joint; Hip    Active              06/10/2014

 

                    Pulmonary nodule seen on imaging study    Active              10/29/2014

 

                    Exercise hypoxemia    Active              10/29/2014

 

                    Anxiety about health    Active              2016

 

                    Primary osteoarthritis involving multiple joints    Active              2017

 

                    Medication management    Active              2017

 

                    Cellulitis of left lower extremity    Active              2017

 

                    Dog bite of calf, left, sequela    Active              2017







Vital Signs







      Date    Time    BP-Sys(mm[Hg]    BP-Noni(mm[Hg])    HR(bpm)    RR(rpm)    Temp    WT    HT    HC    BMI

                    BSA                 BMI Percentile      O2 Sat(%)

 

        2018    10:48:00 AM    118 mmHg    80 mmHg    82 bpm    18 rpm    98.3 F    144 lbs    61 in

                          27.2083 kg/m    1.6767 m                 93 %

 

        2018    2:35:00 PM    120 mmHg    82 mmHg    106 bpm    20 rpm    98.2 F    142 lbs    62 in

                          25.97 kg/m2    1.68 m2                   92 %

 

       2018    3:28:00 PM    122 mmHg    68 mmHg    114 bpm    18 rpm    98.2 F    142 lbs            

                                                                90 %

 

        2018    1:35:00 PM    112 mmHg    74 mmHg    114 bpm    18 rpm    99.6 F    139 lbs    63 in

                          24.6225 kg/m    1.6741 m                 98 %

 

       2018    3:57:00 PM    128 mmHg    80 mmHg    78 bpm    18 rpm    98.4 F    148 lbs             

                                                                90 %

 

        10/24/2017    1:51:00 PM    132 mmHg    80 mmHg    82 bpm    16 rpm    98.2 F    145 lbs    62 in

                          26.5206 kg/m    1.6962 m                 95 %

 

       2017    3:29:00 PM    128 mmHg    80 mmHg    68 bpm    16 rpm    98 F    144 lbs    63 in    

                25.51 kg/m2     1.70 m2                         93 %

 

        2017    11:37:00 AM    118 mmHg    72 mmHg    96 bpm    16 rpm    97.4 F    141 lbs    63 in

                          24.9768 kg/m    1.6861 m                 91 %

 

        2017    8:55:00 AM    105 mmHg    60 mmHg    96 bpm    18 rpm    95.8 F    142 lbs    63 in 

                          25.15 kg/m2    1.69 m2                   95 %

 

       2017    12:10:00 PM    122 mmHg    68 mmHg    76 bpm    18 rpm    98 F    143 lbs    63 in    

                25.3311 kg/m    1.698 m                       98 %

 

        2017    4:03:00 PM    118 mmHg    64 mmHg    106 bpm    18 rpm    97.4 F    137 lbs    63 in

                          24.27 kg/m2    1.66 m2                   98 %

 

        2016    12:11:00 PM    120 mmHg    84 mmHg    110 bpm    16 rpm    98.1 F    130 lbs    63

 in                       23.0282 kg/m    1.619 m                 90 %

 

        11/10/2016    3:57:00 PM    148 mmHg    72 mmHg    88 bpm    16 rpm    98.2 F    132 lbs    63 in

                          23.38 kg/m2    1.63 m2                   98 %

 

        3/10/2016    2:12:00 PM    122 mmHg    60 mmHg    106 bpm    18 rpm    97 F    137 lbs    63 in 

                          24.2682 kg/m    1.662 m                 93 %

 

        12/15/2015    2:33:00 PM    120 mmHg    75 mmHg    102 bpm    16 rpm    98.2 F    137 lbs    63 

in                        24.27 kg/m2    1.66 m2                   94 %

 

        10/26/2015    2:46:00 PM    130 mmHg    80 mmHg    96 bpm    16 rpm    97.9 F    141 lbs    66 in

                          22.7578 kg/m    1.7258 m                 98 %

 

        10/6/2015    9:37:00 AM    140 mmHg    78 mmHg    110 bpm    16 rpm    97.9 F    141 lbs    63 in

                          24.98 kg/m2    1.69 m2                   95 %

 

        10/28/2014    2:25:00 PM    132 mmHg    66 mmHg    92 bpm    24 rpm    97.5 F    147 lbs    63 in

                          26.0396 kg/m    1.7216 m                 92 %

 

        10/16/2014    9:45:00 AM    132 mmHg    64 mmHg    74 bpm    24 rpm    97.7 F    146 lbs    63 in

                          25.86 kg/m2    1.72 m2                   92 %

 

        2014    2:31:00 PM    136 mmHg    68 mmHg    90 bpm    22 rpm    98.2 F    148 lbs    63 in 

                          26.2168 kg/m    1.7274 m                 95 %

 

        2014    3:19:00 PM    124 mmHg    70 mmHg    64 bpm    20 rpm    97.8 F    147 lbs    63 in

                          26.04 kg/m2    1.72 m2                   95 %

 

        10/28/2013    10:31:00 AM    140 mmHg    70 mmHg    92 bpm    24 rpm    97.8 F    143 lbs    63 

in                        25.3311 kg/m    1.698 m                 95 %

 

      2013    2:51:00 PM    130 mmHg    78 mmHg    90 bpm          98.2 F    168 lbs    65 in          

27.96 kg/m2         1.87 m2                                  

 

       2013    2:43:00 PM    110 mmHg    76 mmHg    103 bpm           96.6 F    137 lbs    63 in      

                24.2682 kg/m    1.662 m                        

 

        2013    9:50:00 AM    150 mmHg    66 mmHg    108 bpm    20 rpm    97.8 F    138 lbs    63 in

                          24.4454 kg/m    1.67 m2                   94 %

 

        10/25/2012    9:08:00 AM    110 mmHg    60 mmHg    88 bpm    18 rpm    97.5 F    142 lbs    63 in

                          25.15 kg/m2    1.6921 m                 94 %

 

      10/3/2012    9:33:00 AM    130 mmHg    60 mmHg    98 bpm    18 rpm          146 lbs    63 in          

25.8625 kg/m      1.72 m2                                 95 %

 

      2012    2:31:00 PM    116 mmHg    76 mmHg    88 bpm                140 lbs    63 in          24.80 

kg/m2               1.6801 m                              96 %

 

     2011    10:02:00 AM    122 mmHg    80 mmHg    110 bpm              144 lbs                        

                                        94 %

 

      2011    2:58:00 PM    124 mmHg    84 mmHg    106 bpm                155 lbs    63 in          27.4567

 kg/m             1.7678 m                              96 %

 

     2011    9:55:00 AM    114 mmHg    78 mmHg    92 bpm              146 lbs                             95

 %

 

     6/3/2011    8:36:00 AM    116 mmHg    74 mmHg    90 bpm              146 lbs                             96

 %

 

     2010    3:01:00 PM    132 mmHg    84 mmHg    102 bpm              149 lbs                          

                                        94 %

 

     2010    11:46:00 AM    124 mmHg    78 mmHg    104 bpm              151 lbs                         

                                        94 %

 

     2010    8:47:00 AM    116 mmHg    78 mmHg    103 bpm              151 lbs                          

                                        95 %







Social History







                    Name                Description         Comments

 

                    Alcohol             Never                

 

                    Uses seatbelts                           

 

                    Tobacco             Never smoker         







History of Procedures







                    Date Ordered        Description         Order Status

 

                    2011 12:00 AM    THER/PROPH/DIAG INJ SC/IM    Reviewed

 

                    2011 12:00 AM    Decadron Inj.1mg-([facility]) Ndc #[id number]    Reviewed

 

                    2011 12:00 AM    Depo-Medrol 80 Mg Im/[facility] NDC 0009-195399    Reviewed

 

                    2011 12:00 AM    Rocephin, Per 250MG - 1 Gram Vial NDC 1733-244540-[facility]    Reviewed



 

                    3/16/2012 12:00 AM    ROUTINE VENIPUNCTURE    Reviewed

 

                    3/16/2012 12:00 AM    COMPLETE CBC W/AUTO DIFF WBC    Reviewed

 

                    3/16/2012 12:00 AM    COMPREHEN METABOLIC PANEL    Reviewed

 

                    3/16/2012 12:00 AM    ASSAY THYROID STIM HORMONE    Reviewed

 

                    11/10/2016 12:00 AM    Decadron, Per 1 Mg NDC# [address]-94    Reviewed

 

                    11/10/2016 12:00 AM    Depo-Medrol, Per 80 Mg NDC#9893-1516-27    Reviewed

 

                    11/10/2016 12:00 AM    Rocephin 1 gram NDC#4808-1085-20    Reviewed

 

                    2016 12:00 AM    THER/PROPH/DIAG INJ SC/IM    Reviewed

 

                    2016 12:00 AM    Decadron 8mg Injection, [facility]    Reviewed

 

                    2016 12:00 AM    Depo-Medrol 80mg Injection, [facility]    Reviewed

 

                    2016 12:00 AM    Rocephin 1 gram Injection, [facility]    Reviewed

 

                    2017 12:00 AM    COMPLETE CBC W/AUTO DIFF WBC    Reviewed

 

                    2017 12:00 AM    COMPREHEN METABOLIC PANEL    Reviewed

 

                    2017 12:00 AM    LIPID PANEL         Reviewed

 

                    2017 12:00 AM    THERAPEUTIC PROPHYLACTIC/DX INJECTION SUBQ/IM    Reviewed

 

                    2017 12:00 AM    Decadron 8mg Injection, [facility]    Reviewed

 

                    2017 12:00 AM    Depo-Medrol 80mg Injection, [facility]    Reviewed

 

                    2017 12:00 AM    LIPID PANEL         Returned

 

                    2017 12:00 AM    THERAPEUTIC PROPHYLACTIC/DX INJECTION SUBQ/IM    Reviewed

 

                    2017 12:00 AM    Decadron 8mg Injection, [facility]    Reviewed

 

                    2017 12:00 AM    Depo-Medrol 80mg Injection, [facility]    Reviewed

 

                    10/3/2012 12:00 AM    THER/PROPH/DIAG INJ SC/IM    Reviewed

 

                    10/3/2012 12:00 AM    Decadron, Per 1 Mg NDC# [address]-48    Reviewed

 

                    10/3/2012 12:00 AM    Depo-Medrol, Per 80 Mg NDC#4014-7856-34    Reviewed

 

                    10/3/2012 12:00 AM    Toradol 60 Mg NDC#7665-0120-49    Reviewed

 

                    10/24/2017 12:00 AM    THERAPEUTIC PROPHYLACTIC/DX INJECTION SUBQ/IM    Reviewed

 

                    10/24/2017 12:00 AM    Decadron 8mg Injection, [facility]    Reviewed

 

                    10/24/2017 12:00 AM    Depo-Medrol 80mg Injection, [facility]    Reviewed

 

                    2018 12:00 AM    THERAPEUTIC PROPHYLACTIC/DX INJECTION SUBQ/IM    Reviewed

 

                    2018 12:00 AM    Rocephin 1 gram Injection, [facility]    Reviewed

 

                    2018 12:00 AM    THERAPEUTIC PROPHYLACTIC/DX INJECTION SUBQ/IM    Reviewed

 

                    2018 12:00 AM    Decadron 8mg Injection, [facility]    Reviewed

 

                    2018 12:00 AM    Depo-Medrol 80mg Injection, [facility]    Reviewed

 

                    2018 12:00 AM    Rocephin 1 gram Injection, [facility]    Reviewed

 

                    2013 12:00 AM    THER/PROPH/DIAG INJ SC/IM    Reviewed

 

                    2013 12:00 AM    Decadron, Per 1 Mg NDC# [address]-47    Reviewed

 

                    2013 12:00 AM    Depo-Medrol, Per 80 Mg NDC#5516-7878-03    Reviewed

 

                    2013 12:00 AM    Toradol 60 Mg NDC#1968-1627-47    Reviewed

 

                    2010 12:00 AM    ROUTINE VENIPUNCTURE    Reviewed

 

                    2010 12:00 AM    COMPLETE CBC W/AUTO DIFF WBC    Reviewed

 

                    2010 12:00 AM    COMPREHEN METABOLIC PANEL    Reviewed

 

                    2010 12:00 AM    LIPID PANEL         Reviewed

 

                    10/28/2014 12:00 AM    CHEST X-RAY 4/> VIEWS    Reviewed

 

                    6/3/2011 12:00 AM    ROUTINE VENIPUNCTURE    Reviewed

 

                    6/3/2011 12:00 AM    COMPLETE CBC AUTOMATED    Reviewed

 

                    6/3/2011 12:00 AM    COMPREHEN METABOLIC PANEL    Reviewed

 

                    6/3/2011 12:00 AM    LIPID PANEL         Reviewed

 

                    2011 12:00 AM    THER/PROPH/DIAG INJ SC/IM    Reviewed

 

                    2011 12:00 AM    Dec[facility] Inj.8[facility] Ndc #[id number]    Reviewed

 

                    2011 12:00 AM    Depo-Medrol 80 Mg Im/[facility] [place] NDC [id number]    Reviewed







Results Summary







                          Date and Description      Results

 

                          6/3/2011 1:53 PM          TRIGLYCERIDES 155.0 mg/dLCHOLESTEROL 248.0 mg/dLHDL 51.0 mg/dLTOT

 CHOL/HDL 4.9 .0 mg/dLGLUCOSE 97.0 mg/dLSODIUM 139.0 mmol/LPOTASSIUM 3.70
 mmol/LCHLORIDE 101.0 mmol/LCO2 27.0 mmol/LBUN 19.0 mg/dLCREATININE 0.90 
mg/dLSGOT/AST 19.0 IU/LSGPT/ALT 11.0 IU/LALK PHOS 111.0 IU/LTOTAL PROTEIN 7.40 
g/dLALBUMIN 4.20 g/dLTOTAL BILI 0.50 mg/dLCALCIUM 9.50 mg/dLAGE 70 GFR NonAA 62 
GFR AA 75 eGFR >60 mL/min/1.73 m2eGFR AA* >60 







History Of Immunizations

Not available.



History of Past Illness







                    Name                Date of Onset       Comments

 

                    Chronic Obstructive Pulmonary Disease                         

 

                    Hyperlipidemia                           

 

                    Cough               2010  8:49AM     

 

                    General Medical Exam, Adult    2010  8:49AM     

 

                    Seasonal Allergies    2010  8:49AM     

 

                    Sinusitis, Chronic    2010  8:49AM     

 

                    Ganglion cyst of synovium, tendon, and bursa; other    2010 11:48AM     

 

                    Anxiety disorder    10/29/2013           

 

                    Pain in joint; Hip    06/10/2014           

 

                    Pulmonary nodule seen on imaging study    10/29/2014           

 

                    Exercise hypoxemia    10/29/2014           

 

                    Chronic Obstructive Pulmonary Disease    2010  3:02PM     

 

                    Edema               2010  3:02PM     

 

                    Sinusitis, Acute    2010  3:02PM     

 

                    Anxiety about health    2016           

 

                    Chronic Obstructive Pulmonary Disease    Trey  3 2011  8:38AM     

 

                    Palpitations        Trey  3 2011  8:38AM     

 

                    Cough               2011  9:54AM     

 

                    Sinusitis, Acute    2011  9:54AM     

 

                    Seasonal Allergies    2011  9:54AM     

 

                    Post-nasal drainage    2011  9:54AM     

 

                    Primary osteoarthritis involving multiple joints    2017           

 

                    Medication management    2017           

 

                    Cellulitis of left lower extremity    2017           

 

                    Dog bite of calf, left, sequela    2017           

 

                    Cough               Sep 22 2011  2:55PM     

 

                    Seasonal Allergies    Sep 22 2011  2:55PM     

 

                    Pharyngitis, Acute    Sep 22 2011  2:55PM     

 

                    Post-nasal drainage    Sep 22 2011  2:55PM     

 

                    Blood In Stool, Occult    2011  9:58AM     

 

                    Hemorrhoids         2011  9:58AM     

 

                    Chronic Obstructive Pulmonary Disease    2012  2:33PM     

 

                    Cardiac Dysrhythmia    2012  2:33PM     

 

                    Sinoatrial Node Dysfunction    Mar 16 2012  9:12AM     

 

                    Palpitations        Mar 16 2012  9:12AM     

 

                    Osteoarthrosis, generalized, multiple sites    Oct  3 2012  9:34AM     

 

                    Pain in joint; Hip    Oct  3 2012  9:34AM     

 

                    Osteoarthrosis      Oct 25 2012  9:09AM     

 

                    Bronchitis, Acute    Oct 25 2012  9:09AM     

 

                    Cough               Oct 25 2012  9:09AM     

 

                    Pain in joint; Left Hip    Oct 25 2012  9:09AM     

 

                    Pain in joint; Hip    2013  9:50AM     

 

                    Osteoarthrosis, generalized, multiple sites    2013  2:45PM     

 

                    Pain in joint; Hip bilateral    2013  2:45PM     

 

                    Anxiety Disorder    Oct 28 2013 10:32AM     

 

                    Chronic Obstructive Pulmonary Disease    Oct 28 2013 10:32AM     

 

                    Hyperlipidemia      Oct 28 2013 10:32AM     

 

                    Pain in joint; Left Hip    Oct 28 2013 10:32AM     

 

                    Sinusitis, Acute    Oct 28 2013 10:32AM     

 

                    Essential Hypertension    2014  3:19PM     

 

                    Osteoarthrosis, generalized, multiple sites    2014  3:19PM     

 

                    Chronic Obstructive Pulmonary Disease    2014  3:19PM     

 

                    Pain in joint; Hip    2014  3:19PM     

 

                    Chronic Obstructive Pulmonary Disease    2014  2:31PM     

 

                    Pain in joint; Hip    2014  2:31PM     

 

                    pre-operative examination, unspecified    2014  2:31PM     

 

                    Lung nodule seen on imaging study    Oct 28 2014 10:24AM     

 

                    Bronchitis, Acute    Oct 16 2014  9:45AM     

 

                    Respiratory System And Chest Symptoms    Oct 16 2014  9:45AM     

 

                    COPD (chronic obstructive pulmonary disease)    Oct 16 2014  9:45AM     

 

                    Chronic Obstructive Pulmonary Disease    Oct 28 2014  2:26PM     

 

                    Pulmonary nodule seen on imaging study    Oct 28 2014  2:26PM     

 

                    Shortness of breath    Oct 28 2014  2:26PM     

 

                    Exercise hypoxemia    Oct 28 2014  2:26PM     

 

                    Nail avulsion       Oct  6 2015  9:38AM     

 

                          Contusion of left index finger with damage to nail, initial encounter    Oct 26 2015

  2:53PM                                 

 

                    Forehead contusion, subsequent encounter    Dec 15 2015  2:39PM     

 

                    Generalized anxiety disorder    Dec 15 2015  2:39PM     

 

                    Chronic Obstructive Pulmonary Disease    Dec 15 2015  2:39PM     

 

                    Osteoarthrosis, generalized, multiple sites    Mar 10 2016  2:12PM     

 

                    Pain of right thigh    Mar 10 2016  2:12PM     

 

                    Mild Acute Hip pain, acute, right Improving    Mar 10 2016  2:12PM     

 

                    Anxiety about health    Mar 10 2016  2:12PM     

 

                    Hyperlipidemia      Aug 22 2016 10:58AM     

 

                    Sinoatrial Node Dysfunction    Aug 22 2016 10:58AM     

 

                    Palpitations        Aug 22 2016 10:58AM     

 

                    Chronic Obstructive Pulmonary Disease    Aug 22 2016 10:58AM     

 

                    Exercise hypoxemia    Aug 22 2016 10:58AM     

 

                    Pain in joint; Hip    Aug 22 2016 10:58AM     

 

                    Pulmonary nodule seen on imaging study    Aug 22 2016 10:58AM     

 

                    Eustachian tube dysfunction, bilateral    Nov 10 2016  3:57PM     

 

                    Moderate Acute Cough    Nov 10 2016  3:57PM     

 

                    Pharyngitis, Acute    Nov 10 2016  3:57PM     

 

                    Upper Respiratory Infection    Nov 10 2016  3:57PM     

 

                          COPD (chronic obstructive pulmonary disease) with acute bronchitis    Nov 10 2016 

 3:57PM                                  

 

                    Mild Chronic Cough Worsening    Dec 12 2016 12:12PM     

 

                          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

 12 2016 12:12PM                         

 

                    Moderate Shortness of breath on exertion    Dec 12 2016 12:12PM     

 

                    Hyperlipidemia      May  4 2017 10:27AM     

 

                    Sinoatrial Node Dysfunction    May  4 2017 10:27AM     

 

                    Palpitations        May  4 2017 10:27AM     

 

                    Chronic Obstructive Pulmonary Disease    May  4 2017 10:27AM     

 

                    Exercise hypoxemia    May  4 2017 10:27AM     

 

                    Pain in joint; Hip    May  4 2017 10:27AM     

 

                    Pulmonary nodule seen on imaging study    May  4 2017 10:27AM     

 

                    Chest congestion    May 16 2017  4:04PM     

 

                          COPD (chronic obstructive pulmonary disease) with acute bronchitis    May 16 2017 

 4:04PM                                  

 

                    Productive cough    May 16 2017  4:04PM     

 

                    Anxiety about health    May 16 2017  4:04PM     

 

                          Chronic obstructive pulmonary disease, unspecified COPD type    May 16 2017  4:04PM

                                         

 

                    Exercise hypoxemia    May 16 2017  4:04PM     

 

                    Mixed hyperlipidemia    May 16 2017  4:04PM     

 

                    Medication management    May 16 2017  4:04PM     

 

                    Primary osteoarthritis involving multiple joints    May 16 2017  4:04PM     

 

                    Cellulitis of left lower extremity    2017 12:10PM     

 

                    Bitten by dog, initial encounter    2017 12:10PM     

 

                    Cellulitis of left lower extremity    2017  8:56AM     

 

                    Open bite, left lower leg, sequela    2017  8:56AM     

 

                    Bitten by dog, sequela    2017  8:56AM     

 

                    Medication management    2017  8:56AM     

 

                    Cellulitis of left lower extremity    2017 11:38AM     

 

                    Open bite, left lower leg, subsequent encounter    2017 11:38AM     

 

                    Bitten by dog, subsequent encounter    2017 11:38AM     

 

                    Medication management    2017 11:38AM     

 

                    Cough               Aug 10 2017  4:09PM     

 

                    Abnormal chest xray    Aug 10 2017  4:09PM     

 

                    Hyperlipidemia      Aug 29 2017  9:02AM     

 

                    Sinoatrial Node Dysfunction    Aug 29 2017  9:02AM     

 

                    Palpitations        Aug 29 2017  9:02AM     

 

                    Chronic Obstructive Pulmonary Disease    Aug 29 2017  9:02AM     

 

                    Exercise hypoxemia    Aug 29 2017  9:02AM     

 

                    Pain in joint; Hip    Aug 29 2017  9:02AM     

 

                    Pulmonary nodule seen on imaging study    Aug 29 2017  9:02AM     

 

                    Eustachian tube dysfunction, bilateral    Aug 28 2017  3:30PM     

 

                    Purulent postnasal drainage    Aug 28 2017  3:30PM     

 

                    Chest congestion    Aug 28 2017  3:30PM     

 

                    Upper respiratory tract infection, unspecified type    Aug 28 2017  3:30PM     

 

                    Moderate Acute Sinus pressure    Aug 28 2017  3:30PM     

 

                          COPD (chronic obstructive pulmonary disease) with acute bronchitis    Aug 28 2017 

 3:30PM                                  

 

                    Moderate Chronic Purulent postnasal drainage    Oct 24 2017  1:52PM     

 

                    Mild Chronic Sinus pressure    Oct 24 2017  1:52PM     

 

                          Moderate Chronic Acute seasonal allergic rhinitis, unspecified trigger Stable    Oct

 24 2017  1:52PM                         

 

                    Mild Acute Labyrinthitis    Oct 24 2017  1:52PM     

 

                    Moderate Acute Vertigo    Oct 24 2017  1:52PM     

 

                    Purulent postnasal drainage    2018  3:58PM     

 

                    Upper respiratory tract infection, unspecified type    2018  3:58PM     

 

                    Mild Acute Left Enlarged lymph node in neck    2018  3:58PM     

 

                    Cough               2018  1:36PM     

 

                    Moderate Acute Recurrent Chest congestion    2018  1:36PM     

 

                    COPD exacerbation    2018  1:36PM     

 

                          Chronic obstructive pulmonary disease with acute lower respiratory infection    2018  1:36PM                         

 

                    Acute bronchitis, unspecified    2018  1:36PM     

 

                    Cough               2018  3:29PM     

 

                    Acute pharyngitis, unspecified etiology    2018  3:29PM     

 

                    Chest congestion    2018  3:29PM     

 

                    COPD (chronic obstructive pulmonary disease)    2018  3:29PM     

 

                    Cellulitis of left lower extremity    May 22 2018  2:35PM     

 

                    Cellulitis of left lower extremity    2018 10:48AM     

 

                    Peripheral edema    2018 10:48AM     







Payers







           Insurance Name    Company Name    Plan Name    Plan Number    Policy Number    Policy Group

 Number                                 Start Date

 

                    Medicare [employer]    Medicare R[employer]              [id number]              N/A

 

                          [place] [employer]    [place] [employer]                 [id number]

                                                    N/A

 

                    [employer]zTest Medicare A    Test Medicare A              [id number]              N/A

 

                                [facility] - RHC - [facility] RHC Comm      

                    [id number]                             N/A

 

                    Medicare Part A    Medicare - Lab/Xray              [id number]              N/A

 

                          [employer]    [employer]                 [id number]

                                                    N/A

 

                    Medicare Part A    Medicare Part A              [id number]              N/A

 

                    Medicare Part B    [employer] [place]              [id number]              N/A







History of Encounters







                    Visit Date          Visit Type          Provider

 

                    2018           Office visit        DEON ESCALANTE

 

                    2018           Office visit        DEON ESCALANTE

 

                    2018           Office visit        DEON ESCALANTE

 

                    2018            Office visit        DEON ESCALANTE

 

                    2018           Office visit        DEON ESCALANTE

 

                    10/24/2017          Office visit        DEON ESCALANTE

 

                    2017           Voided              DEON ESCALANTE

 

                    2017           Office visit        DEON ESCALANTE

 

                    2017           Office visit        DEON ESCALANTE

 

                    2017            Office visit        DEON ESCALANTE

 

                    2017            Office visit        DEON ESCALANTE

 

                    2017           Office visit        DEON ESCALANTE

 

                    2016          Office visit        DEON ESCALANTE

 

                    11/10/2016          Office visit        DEON ESCALANTE

 

                    3/10/2016           Office visit        DEON ESCALANTE

 

                    12/15/2015          Office visit        DEON ESCALANTE

 

                    10/26/2015          Office visit        DEON ESCALANTE

 

                    10/6/2015           Office visit        DEON LEON PA

 

                    10/28/2014          Office visit        DEON LEON PA

 

                    10/16/2014          Office visit        DEON LEON PA

 

                    2014            Office visit        DEON LEON PA

 

                    2014           [facility]            DEWEY Garcia MD

 

                    2014           Office visit        DEON LEON PA

 

                    10/28/2013          Office visit        DEON LEON PA

 

                    10/14/2013          [facility]            DEWEY Garcia MD

 

                    2013           Office visit        DEON LEON PA

 

                    2013            Office visit        DEON LEON PA

 

                    10/25/2012          Office visit        DEON LEON PA

 

                    10/3/2012           Office visit        DEON LEON PA

 

                    3/16/2012           Office visit        DEON LEON PA

 

                    2012            Office visit        DEON LEON PA

 

                    2012            [facility]            DEWEY Garcia MD

 

                    2011          Office visit        DEON LEON PA

 

                    2011           Office visit        Deon Leon PA-C

 

                    2011            Office visit        Deon Leon PA-C

 

                    6/3/2011            Office visit        Deon Leon PA-C

 

                    2010           Office visit        Deon Leon PA-C

 

                    2010           Office visit        Deon Leon PA-C

 

                    2010           Office visit        Deon Leon PA-C

## 2019-06-25 NOTE — XMS REPORT
MU2 Ambulatory Summary

                             Created on: 2017



Elsi Casillas

External Reference #: [id number]

: 1940

Sex: Female



Demographics







                          Address                   [address] [place]  [address]

 

                          Home Phone                [phone]

 

                          Preferred Language        English

 

                          Marital Status            

 

                          Zoroastrianism Affiliation     Unknown

 

                          Race                      White

 

                          Ethnic Group              Not  or 





Author







                          DEON Ferguson

 

                          [facility]

 

                          Address                   [address]

[place], [place]  [id number]



 

                          Phone                     [phone]







Care Team Providers







                    Care Team Member Name    Role                Phone

 

                    DEON LEON    PCP                 Unavailable







Allergies and Adverse Reactions







                    Name                Reaction            Notes

 

                    NO KNOWN DRUG ALLERGIES                         







Plan of Treatment







             Planned Activity    Comments     Planned Date    Planned Time    Plan/Goal

 

             Lipid Profile                 2016    12:00 AM      







Medications







                                        Active 

 

             Name         Start Date    Estimated Completion Date    SIG          Comments

 

             Proctofoam 1 % topical foam    2011                 apply by external route daily     

 

                Calcium 600 + D(3) 600 mg(1,500mg) -200 unit oral tablet                                    take 2 tablets by

 oral route daily                        

 

             Aerochamber    2014                 Use with inhaler as directed     

 

                pravastatin 20 mg oral tablet                                    take 1 tablet (20 mg) by oral route once daily

                                         

 

                Toprol XL 25 mg oral tablet extended release 24 hr                                    take 1 tablet (25 mg) 

by oral route once daily                 

 

                hydrochlorothiazide 25 mg oral tablet    1/15/2015                       take 1 tablet (25 mg) by oral

 route once daily for 30 days            

 

                metoprolol succinate 25 mg oral tablet extended release 24 hr    2015                      take

 1 tablet (25 mg) by oral route once daily     

 

                promethazine-codeine 6.25-10 mg/5 mL oral syrup    11/10/2016                      take 5 milliliters

 by oral route every 6 hours as needed, not to exceed 30 mL in 24 hours     

 

                                        ipratropium-albuterol 0.5 mg-3 mg(2.5 mg base)/3 mL inhalation solution for nebulization

                    2016                              inhale 3 milliliters by nebulization route 4 times per day for COPD

                                         

 

                Symbicort 160-4.5 mcg/actuation inhalation HFA aerosol inhaler    11/10/2016                      inhale

 2 puffs by inhalation route 2 times per day in the morning and evening     

 

                metoprolol succinate 25 mg oral tablet extended release 24 hr    3/16/2017                       TAKE

 1 TABLET DAILY                          

 

             hydrochlorothiazide 25 mg oral tablet    2017                  TAKE 1 TABLET DAILY     









                                         

 

             Name         Start Date    Expiration Date    SIG          Comments

 

                Singulair 10 mg oral tablet    9/3/2010        2011        take 1 tablet (10 mg) by oral route

 daily  for 60 days                      

 

                Zithromax Z-Christopher 250 mg oral tablet    2011       take 2 tablets (500 mg)

 by oral route once daily for 1 day then 1 tablet (250 mg) by oral route once 
daily for 4 days                         

 

                Medrol (Christopher) 4 mg oral tablets,dose pack    2011        take as directed

 for 6 days                              

 

                Keflex 500 mg oral capsule    3/1/2012        3/11/2012       take 1 capsule by oral route 3 

times a day for 10 days                  

 

                Cipro 500 mg oral tablet    2012        take 1 tablet (500 mg) by oral route

 every 12 hours for 15 days              

 

                benzonatate 100 mg oral capsule    2013       take 1 capsule (100 mg) by

 oral route 3 times per day for cough     

 

             Medrol (Christopher) 4 mg oral tablets,dose pack    2013     take as directed    

 

 

                Zyrtec 10 mg oral tablet    2013       take 1 tablet (10 mg) by oral route

 once daily for 30 days                  

 

                Flonase 50 mcg/actuation nasal spray,suspension    2013       inhale 1 spray

 in each nostril by intranasal route 2 times per day for 30 days     

 

                cephalexin 500 mg oral capsule    2013        take 1 capsule (500 mg) by oral

 route every 8 hours                     

 

                promethazine-codeine 6.25-10 mg/5 mL oral syrup    2013                       take 5 milliliters

 by oral route every 4 hours as needed, not to exceed 30 mL in 24 hours     

 

                Zithromax Z-Christopher 250 mg oral tablet    10/2/2013       10/7/2013       take 2 tablets (500 mg)

 by oral route once daily for 1 day then 1 tablet (250 mg) by oral route once 
daily for 4 days                         

 

                amoxicillin 500 mg oral capsule    2014       take 1 capsule by oral route

 3 times a day for 15 days               

 

                lovastatin 20 mg oral tablet    2014        take 1 tablet (20 mg) by oral 

route daily  for 30 days                 

 

                Zithromax Z-Christopher 250 mg oral tablet    2014       take 2 tablets (500 mg)

 by oral route once daily for 1 day then 1 tablet (250 mg) by oral route once 
daily for 4 days                         

 

             Medrol (Christopher) 4 mg oral tablets,dose pack    2014                 take as directed     

 

                amoxicillin 500 mg oral capsule    2014                       take 1 capsule (500 mg) by oral route

 3 times per day                         

 

                Levaquin 500 mg oral tablet    10/16/2014      10/26/2014      take 1 tablet (500 mg) by oral

 route once daily for 10 days            

 

                prednisone 20 mg oral tablet    10/16/2014      10/24/2014      4x2 days 3x2 days 2x2 days

 1x2 days                                

 

                Zithromax Z-Christopher 250 mg oral tablet    2014       take 2 tablets (500 mg)

 by oral route once daily for 1 day then 1 tablet (250 mg) by oral route once 
daily for 4 days                         

 

                Bactrim -160 mg oral tablet    1/15/2015       2015       take 1 tablet by oral route

 every 12 hours for 10 days              

 

                Zithromax Z-Christopher 250 mg oral tablet    2015      take 2 tablets (500 mg)

 by oral route once daily for 1 day then 1 tablet (250 mg) by oral route once 
daily for 4 days                         

 

                Keflex 500 mg oral capsule    2016       take 1 capsule by oral route 3

 times a day for 7 days                  

 

                amoxicillin 500 mg oral capsule    10/4/2016       10/14/2016      take 1 capsule by oral route

 3 times a day for 10 days               

 

                Tessalon Perles 100 mg oral capsule    10/4/2016                       take 1 capsule by oral route 

every 4 hours                            

 

                Zithromax Z-Christopher 250 mg oral tablet    11/10/2016      11/15/2016      take 2 tablets (500 

mg) by oral route once daily for 1 day then 1 tablet (250 mg) by oral route once
daily for 4 days                         

 

                amoxicillin 500 mg oral tablet    2016                       take 1 tablet (500 mg) by oral route

 3 times per day                         

 

                Levaquin 500 mg oral tablet    2017       take 1 tablet (500 mg) by oral

 route once daily for 10 days            

 

                amoxicillin 500 mg oral capsule    2017       take 1 capsule (500 mg) by

 oral route 3 times per day for 10 days     









                                        Discontinued 

 

             Name         Start Date    Discontinued Date    SIG          Comments

 

                amoxicillin 500 mg oral capsule    11/10/2011      10/3/2012       take 1 capsule (500 mg) 

by oral route 3 times per day            

 

                Anusol-HC 25 mg rectal suppository    2011      10/3/2012       insert 1 suppository 

(25 mg) by rectal route 2 times per day     

 

                hydrochlorothiazide 25 mg oral tablet    1/15/2015       2015      TAKE 1 TABLET DAILY

                                         







Problem List







                    Description         Status              Onset

 

                    Chronic Obstructive Pulmonary Disease    Active               

 

                    Hyperlipidemia      Active               

 

                    Anxiety Disorder    Active              10/29/2013

 

                    Pain in joint; Hip    Active              06/10/2014

 

                    Pulmonary nodule seen on imaging study    Active              10/29/2014

 

                    Exercise hypoxemia    Active              10/29/2014

 

                    Anxiety about health    Active              2016







Vital Signs







      Date    Time    BP-Sys(mm[Hg]    BP-Noni(mm[Hg])    HR(bpm)    RR(rpm)    Temp    WT    HT    HC    BMI

                    BSA                 BMI Percentile      O2 Sat(%)

 

        2016    12:11:00 PM    120 mmHg    84 mmHg    110 bpm    16 rpm    98.1 F    130 lbs    63

 in                       23.03 kg/m2    1.62 m2                   90 %

 

        11/10/2016    3:57:00 PM    148 mmHg    72 mmHg    88 bpm    16 rpm    98.2 F    132 lbs    63 in

                          23.3825 kg/m    1.6314 m                 98 %

 

        3/10/2016    2:12:00 PM    122 mmHg    60 mmHg    106 bpm    18 rpm    97 F    137 lbs    63 in 

                          24.27 kg/m2    1.66 m2                   93 %

 

        12/15/2015    2:33:00 PM    120 mmHg    75 mmHg    102 bpm    16 rpm    98.2 F    137 lbs    63 

in                        24.2682 kg/m    1.662 m                 94 %

 

        10/26/2015    2:46:00 PM    130 mmHg    80 mmHg    96 bpm    16 rpm    97.9 F    141 lbs    66 in

                          22.76 kg/m2    1.73 m2                   98 %

 

        10/6/2015    9:37:00 AM    140 mmHg    78 mmHg    110 bpm    16 rpm    97.9 F    141 lbs    63 in

                          24.9768 kg/m    1.6861 m                 95 %

 

        10/28/2014    2:25:00 PM    132 mmHg    66 mmHg    92 bpm    24 rpm    97.5 F    147 lbs    63 in

                          26.04 kg/m2    1.72 m2                   92 %

 

        10/16/2014    9:45:00 AM    132 mmHg    64 mmHg    74 bpm    24 rpm    97.7 F    146 lbs    63 in

                          25.8625 kg/m    1.7157 m                 92 %

 

        2014    2:31:00 PM    136 mmHg    68 mmHg    90 bpm    22 rpm    98.2 F    148 lbs    63 in 

                          26.22 kg/m2    1.73 m2                   95 %

 

        2014    3:19:00 PM    124 mmHg    70 mmHg    64 bpm    20 rpm    97.8 F    147 lbs    63 in

                          26.0396 kg/m    1.7216 m                 95 %

 

        10/28/2013    10:31:00 AM    140 mmHg    70 mmHg    92 bpm    24 rpm    97.8 F    143 lbs    63 

in                        25.33 kg/m2    1.70 m2                   95 %

 

      2013    2:51:00 PM    130 mmHg    78 mmHg    90 bpm          98.2 F    168 lbs    65 in          

27.9564 kg/m      1.8694 m                               

 

       2013    2:43:00 PM    110 mmHg    76 mmHg    103 bpm           96.6 F    137 lbs    63 in      

                24.27 kg/m2     1.66 m2                          

 

        2013    9:50:00 AM    150 mmHg    66 mmHg    108 bpm    20 rpm    97.8 F    138 lbs    63 in

                          24.4454 kg/m    1.6681 m                 94 %

 

        10/25/2012    9:08:00 AM    110 mmHg    60 mmHg    88 bpm    18 rpm    97.5 F    142 lbs    63 in

                          25.15 kg/m2    1.69 m2                   94 %

 

      10/3/2012    9:33:00 AM    130 mmHg    60 mmHg    98 bpm    18 rpm          146 lbs    63 in          

25.8625 kg/m      1.7157 m                              95 %

 

      2012    2:31:00 PM    116 mmHg    76 mmHg    88 bpm                140 lbs    63 in          24.80 

kg/m2               1.68 m2                                 96 %

 

     2011    10:02:00 AM    122 mmHg    80 mmHg    110 bpm              144 lbs                        

                                        94 %

 

      2011    2:58:00 PM    124 mmHg    84 mmHg    106 bpm                155 lbs    63 in          27.46

 kg/m2              1.77 m2                                 96 %

 

     2011    9:55:00 AM    114 mmHg    78 mmHg    92 bpm              146 lbs                             95

 %

 

     6/3/2011    8:36:00 AM    116 mmHg    74 mmHg    90 bpm              146 lbs                             96

 %

 

     2010    3:01:00 PM    132 mmHg    84 mmHg    102 bpm              149 lbs                          

                                        94 %

 

     2010    11:46:00 AM    124 mmHg    78 mmHg    104 bpm              151 lbs                         

                                        94 %

 

     2010    8:47:00 AM    116 mmHg    78 mmHg    103 bpm              151 lbs                          

                                        95 %







Social History







                    Name                Description         Comments

 

                    Tobacco             Never smoker         

 

                    denies alcohol use                         







History of Procedures







                    Date Ordered        Description         Order Status

 

                    2011 12:00 AM    THER/PROPH/DIAG INJ SC/IM    Reviewed

 

                    2011 12:00 AM    Decadron Inj.1mg-([facility]) Ndc #[id number]    Reviewed

 

                    2011 12:00 AM    Depo-Medrol 80 Mg Im/[facility] NDC 0009-737385    Reviewed

 

                    2011 12:00 AM    Rocephin, Per 250MG - 1 Gram Vial NDC 8476-466526-[facility]    Reviewed



 

                    3/16/2012 12:00 AM    ROUTINE VENIPUNCTURE    Reviewed

 

                    3/16/2012 12:00 AM    COMPLETE CBC W/AUTO DIFF WBC    Reviewed

 

                    3/16/2012 12:00 AM    COMPREHEN METABOLIC PANEL    Reviewed

 

                    3/16/2012 12:00 AM    ASSAY THYROID STIM HORMONE    Reviewed

 

                    11/10/2016 12:00 AM    Decadron, Per 1 Mg NDC# [address]-26    Reviewed

 

                    11/10/2016 12:00 AM    Depo-Medrol, Per 80 Mg NDC#1874-8418-76    Reviewed

 

                    11/10/2016 12:00 AM    Rocephin 1 gram NDC#3360-3203-02    Reviewed

 

                    2016 12:00 AM    THER/PROPH/DIAG INJ SC/IM    Reviewed

 

                    2016 12:00 AM    Decadron 8mg Injection, [facility]    Reviewed

 

                    2016 12:00 AM    Depo-Medrol 80mg Injection, [facility]    Reviewed

 

                    2016 12:00 AM    Rocephin 1 gram Injection, [facility]    Reviewed

 

                    2017 12:00 AM    COMPLETE CBC W/AUTO DIFF WBC    Reviewed

 

                    2017 12:00 AM    COMPREHEN METABOLIC PANEL    Reviewed

 

                    2017 12:00 AM    LIPID PANEL         Reviewed

 

                    10/3/2012 12:00 AM    THER/PROPH/DIAG INJ SC/IM    Reviewed

 

                    10/3/2012 12:00 AM    Decadron, Per 1 Mg NDC# [address]-27    Reviewed

 

                    10/3/2012 12:00 AM    Depo-Medrol, Per 80 Mg NDC#5627-6734-97    Reviewed

 

                    10/3/2012 12:00 AM    Toradol 60 Mg NDC#7004-2484-35    Reviewed

 

                    2013 12:00 AM    THER/PROPH/DIAG INJ SC/IM    Reviewed

 

                    2013 12:00 AM    Decadron, Per 1 Mg NDC# [address]-40    Reviewed

 

                    2013 12:00 AM    Depo-Medrol, Per 80 Mg NDC#9386-4406-90    Reviewed

 

                    2013 12:00 AM    Toradol 60 Mg NDC#0361-1339-06    Reviewed

 

                    2010 12:00 AM    ROUTINE VENIPUNCTURE    Reviewed

 

                    2010 12:00 AM    COMPLETE CBC W/AUTO DIFF WBC    Reviewed

 

                    2010 12:00 AM    COMPREHEN METABOLIC PANEL    Reviewed

 

                    2010 12:00 AM    LIPID PANEL         Reviewed

 

                    10/28/2014 12:00 AM    CHEST X-RAY 4/> VIEWS    Reviewed

 

                    6/3/2011 12:00 AM    ROUTINE VENIPUNCTURE    Reviewed

 

                    6/3/2011 12:00 AM    COMPLETE CBC AUTOMATED    Reviewed

 

                    6/3/2011 12:00 AM    COMPREHEN METABOLIC PANEL    Reviewed

 

                    6/3/2011 12:00 AM    LIPID PANEL         Reviewed

 

                    2011 12:00 AM    THER/PROPH/DIAG INJ SC/IM    Reviewed

 

                    2011 12:00 AM    Decadron Inj.8mg-([facility]) Ndc #[id number]    Reviewed

 

                    2011 12:00 AM    Depo-Medrol 80 Mg Im/[facility] NDC [id number]    Reviewed







Results Summary







                          Date and Description      Results

 

                          6/3/2011 1:52 PM          WBC 8.6 RBC 4.66 HGB 13.20 g/dLHCT 42.10 %MCV 90.0 fLMCH 28.30

 pgMCHC 31.40 g/dLRDW SD 44 RDW CV 13.60 %MPV 10.90 fLPLT 383 NRBC# 0.00 NRBC% 
0.0 %NEUT 68.0 %%LYMP 20.30 %%MONO 9.30 %%EOS 2.20 %%BASO 0.20 %#NEUT 5.81 #LYMP
 1.74 #MONO 0.80 #EOS 0.19 #BASO 0.02 MANUAL DIFF NOT IND 

 

                          6/3/2011 1:53 PM          TRIGLYCERIDES 155.0 mg/dLCHOLESTEROL 248.0 mg/dLHDL 51.0 mg/dLTOT

 CHOL/HDL 4.9 .0 mg/dLGLUCOSE 97.0 mg/dLSODIUM 139.0 mmol/LPOTASSIUM 3.70
 mmol/LCHLORIDE 101.0 mmol/LCO2 27.0 mmol/LBUN 19.0 mg/dLCREATININE 0.90 
mg/dLSGOT/AST 19.0 IU/LSGPT/ALT 11.0 IU/LALK PHOS 111.0 IU/LTOTAL PROTEIN 7.40 
g/dLALBUMIN 4.20 g/dLTOTAL BILI 0.50 mg/dLCALCIUM 9.50 mg/dLAGE 70 GFR NonAA 62 
GFR AA 75 eGFR >60 mL/min/1.73 m2eGFR AA* >60 







History Of Immunizations

Not available.



History of Past Illness







                    Name                Date of Onset       Comments

 

                    Chronic Obstructive Pulmonary Disease                         

 

                    Hyperlipidemia                           

 

                    Cough               2010  8:49AM     

 

                    General Medical Exam, Adult    2010  8:49AM     

 

                    Seasonal Allergies    2010  8:49AM     

 

                    Sinusitis, Chronic    2010  8:49AM     

 

                    Ganglion cyst of synovium, tendon, and bursa; other    2010 11:48AM     

 

                    Anxiety Disorder    10/29/2013           

 

                    Pain in joint; Hip    06/10/2014           

 

                    Pulmonary nodule seen on imaging study    10/29/2014           

 

                    Exercise hypoxemia    10/29/2014           

 

                    Chronic Obstructive Pulmonary Disease    2010  3:02PM     

 

                    Edema               2010  3:02PM     

 

                    Sinusitis, Acute    2010  3:02PM     

 

                    Anxiety about health    2016           

 

                    Chronic Obstructive Pulmonary Disease    Trey  3 2011  8:38AM     

 

                    Palpitations        Trey  3 2011  8:38AM     

 

                    Cough               2011  9:54AM     

 

                    Sinusitis, Acute    2011  9:54AM     

 

                    Seasonal Allergies    2011  9:54AM     

 

                    Post-nasal drainage    2011  9:54AM     

 

                    Cough               Sep 22 2011  2:55PM     

 

                    Seasonal Allergies    Sep 22 2011  2:55PM     

 

                    Pharyngitis, Acute    Sep 22 2011  2:55PM     

 

                    Post-nasal drainage    Sep 22 2011  2:55PM     

 

                    Blood In Stool, Occult    2011  9:58AM     

 

                    Hemorrhoids         2011  9:58AM     

 

                    Chronic Obstructive Pulmonary Disease    2012  2:33PM     

 

                    Cardiac Dysrhythmia    2012  2:33PM     

 

                    Sinoatrial Node Dysfunction    Mar 16 2012  9:12AM     

 

                    Palpitations        Mar 16 2012  9:12AM     

 

                    Osteoarthrosis, generalized, multiple sites    Oct  3 2012  9:34AM     

 

                    Pain in joint; Hip    Oct  3 2012  9:34AM     

 

                    Osteoarthrosis      Oct 25 2012  9:09AM     

 

                    Bronchitis, Acute    Oct 25 2012  9:09AM     

 

                    Cough               Oct 25 2012  9:09AM     

 

                    Pain in joint; Left Hip    Oct 25 2012  9:09AM     

 

                    Pain in joint; Hip    2013  9:50AM     

 

                    Osteoarthrosis, generalized, multiple sites    2013  2:45PM     

 

                    Pain in joint; Hip bilateral    2013  2:45PM     

 

                    Anxiety Disorder    Oct 28 2013 10:32AM     

 

                    Chronic Obstructive Pulmonary Disease    Oct 28 2013 10:32AM     

 

                    Hyperlipidemia      Oct 28 2013 10:32AM     

 

                    Pain in joint; Left Hip    Oct 28 2013 10:32AM     

 

                    Sinusitis, Acute    Oct 28 2013 10:32AM     

 

                    Essential Hypertension    2014  3:19PM     

 

                    Osteoarthrosis, generalized, multiple sites    2014  3:19PM     

 

                    Chronic Obstructive Pulmonary Disease    2014  3:19PM     

 

                    Pain in joint; Hip    2014  3:19PM     

 

                    Chronic Obstructive Pulmonary Disease    2014  2:31PM     

 

                    Pain in joint; Hip    2014  2:31PM     

 

                    pre-operative examination, unspecified    2014  2:31PM     

 

                    Lung nodule seen on imaging study    Oct 28 2014 10:24AM     

 

                    Bronchitis, Acute    Oct 16 2014  9:45AM     

 

                    Respiratory System And Chest Symptoms    Oct 16 2014  9:45AM     

 

                    COPD (chronic obstructive pulmonary disease)    Oct 16 2014  9:45AM     

 

                    Chronic Obstructive Pulmonary Disease    Oct 28 2014  2:26PM     

 

                    Pulmonary nodule seen on imaging study    Oct 28 2014  2:26PM     

 

                    Shortness of breath    Oct 28 2014  2:26PM     

 

                    Exercise hypoxemia    Oct 28 2014  2:26PM     

 

                    Nail avulsion       Oct  6 2015  9:38AM     

 

                          Contusion of left index finger with damage to nail, initial encounter    Oct 26 2015

  2:53PM                                 

 

                    Forehead contusion, subsequent encounter    Dec 15 2015  2:39PM     

 

                    Generalized anxiety disorder    Dec 15 2015  2:39PM     

 

                    Chronic Obstructive Pulmonary Disease    Dec 15 2015  2:39PM     

 

                    Osteoarthrosis, generalized, multiple sites    Mar 10 2016  2:12PM     

 

                    Pain of right thigh    Mar 10 2016  2:12PM     

 

                    Mild Acute Hip pain, acute, right Improving    Mar 10 2016  2:12PM     

 

                    Anxiety about health    Mar 10 2016  2:12PM     

 

                    Hyperlipidemia      Aug 22 2016 10:58AM     

 

                    Sinoatrial Node Dysfunction    Aug 22 2016 10:58AM     

 

                    Palpitations        Aug 22 2016 10:58AM     

 

                    Chronic Obstructive Pulmonary Disease    Aug 22 2016 10:58AM     

 

                    Exercise hypoxemia    Aug 22 2016 10:58AM     

 

                    Pain in joint; Hip    Aug 22 2016 10:58AM     

 

                    Pulmonary nodule seen on imaging study    Aug 22 2016 10:58AM     

 

                    Eustachian tube dysfunction, bilateral    Nov 10 2016  3:57PM     

 

                    Moderate Acute Cough    Nov 10 2016  3:57PM     

 

                    Pharyngitis, Acute    Nov 10 2016  3:57PM     

 

                    Upper Respiratory Infection    Nov 10 2016  3:57PM     

 

                          COPD (chronic obstructive pulmonary disease) with acute bronchitis    Nov 10 2016 

 3:57PM                                  

 

                    Mild Chronic Cough Worsening    Dec 12 2016 12:12PM     

 

                          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

 12 2016 12:12PM                         

 

                    Moderate Shortness of breath on exertion    Dec 12 2016 12:12PM     

 

                    Hyperlipidemia      May  4 2017 10:27AM     

 

                    Sinoatrial Node Dysfunction    May  4 2017 10:27AM     

 

                    Palpitations        May  4 2017 10:27AM     

 

                    Chronic Obstructive Pulmonary Disease    May  4 2017 10:27AM     

 

                    Exercise hypoxemia    May  4 2017 10:27AM     

 

                    Pain in joint; Hip    May  4 2017 10:27AM     

 

                    Pulmonary nodule seen on imaging study    May  4 2017 10:27AM     







Payers







           Insurance Name    Company Name    Plan Name    Plan Number    Policy Number    Policy Group

 Number                                 Start Date

 

                    Medicare RHC    Medicare RHC              [id number]              N/A

 

                          [place] [employer]    [place] [employer]                 [id number]

                                                    N/A

 

                    zzzTest Medicare A    Test Medicare A              [id number]              N/A

 

                                [facility] [employer]      

                    [id number]                             N/A

 

                    Medicare Part A    Medicare - Lab/Xray              [id number]              N/A

 

                          [employer] [employer]    [employer]                 [id number]

                                                    N/A

 

                    Medicare Part A    Medicare Part A              [id number]              N/A

 

                    Medicare Part B    [employer] [place]              [id number]              N/A







History of Encounters







                    Visit Date          Visit Type          Provider

 

                    2016          Office visit        DEON ESCALANTE

 

                    11/10/2016          Office visit        DEON ESCALANTE

 

                    3/10/2016           Office visit        DEON ESCALANTE

 

                    12/15/2015          Office visit        DEON ESCALANTE

 

                    10/26/2015          Office visit        DEON ESCALANTE

 

                    10/6/2015           Office visit        DEON ESCALANTE

 

                    10/28/2014          Office visit        DEON ESCALANTE

 

                    10/16/2014          Office visit        DEON LEON PA

 

                    2014            Office visit        DEON LEON PA

 

                    2014           [facility]            DEWEY Garcia MD

 

                    2014           Office visit        DEON LEON PA

 

                    10/28/2013          Office visit        DEON LEON PA

 

                    10/14/2013          [facility]            DEWEY Garcia MD

 

                    2013           Office visit        DEON LEON PA

 

                    2013            Office visit        DEON LEON PA

 

                    10/25/2012          Office visit        DEON LEON PA

 

                    10/3/2012           Office visit        DEON LEON PA

 

                    3/16/2012           Office visit        DEON LEON PA

 

                    2012            Office visit        DEON LEON PA

 

                    2012            [facility]            DEWEY Garcia MD

 

                    2011          Office visit        DEON LEON PA

 

                    2011           Office visit        Deon Leon PA-C

 

                    2011            Office visit        Deon Leon PA-C

 

                    6/3/2011            Office visit        Deon Leon PA-C

 

                    2010           Office visit        Deon Leon PA-C

 

                    2010           Office visit        Deon Leon PA-C

 

                    2010           Office visit        Deon Leon PA-C

## 2019-06-25 NOTE — XMS REPORT
MU2 Ambulatory Summary

                             Created on: 2017



Elsi Casillas

External Reference #: [id number]

: 1940

Sex: Female



Demographics







                          Address                   [address] [place]  [address]

 

                          Home Phone                [phone]

 

                          Preferred Language        English

 

                          Marital Status            

 

                          Church Affiliation     Unknown

 

                          Race                      White

 

                          Ethnic Group              Not  or 





Author







                          Author                    DEON LEON

 

                          [facility]

 

                          Address                   [address]

[place], [place]  [id number]



 

                          Phone                     [phone]







Care Team Providers







                    Care Team Member Name    Role                Phone

 

                    DEON LEON    PCP                 Unavailable







Allergies and Adverse Reactions







                    Name                Reaction            Notes

 

                    NO KNOWN DRUG ALLERGIES                         







Plan of Treatment







             Planned Activity    Comments     Planned Date    Planned Time    Plan/Goal

 

             Lipid Profile                 2016    12:00 AM      

 

             CBC with Auto                 2017     12:00 AM      

 

             Comprehensive metabolic panel                 2017     12:00 AM      

 

             Lipid Profile                 2017     12:00 AM      







Medications







                                        Active 

 

             Name         Start Date    Estimated Completion Date    SIG          Comments

 

             Proctofoam 1 % topical foam    2011                 apply by external route daily     

 

                Calcium 600 + D(3) 600 mg(1,500mg) -200 unit oral tablet                                    take 2 tablets by

 oral route daily                        

 

             Aerochamber    2014                 Use with inhaler as directed     

 

                pravastatin 20 mg oral tablet                                    take 1 tablet (20 mg) by oral route once daily

                                         

 

                Toprol XL 25 mg oral tablet extended release 24 hr                                    take 1 tablet (25 mg) 

by oral route once daily                 

 

                hydrochlorothiazide 25 mg oral tablet    1/15/2015                       take 1 tablet (25 mg) by oral

 route once daily for 30 days            

 

                metoprolol succinate 25 mg oral tablet extended release 24 hr    2015                      take

 1 tablet (25 mg) by oral route once daily     

 

                promethazine-codeine 6.25-10 mg/5 mL oral syrup    11/10/2016                      take 5 milliliters

 by oral route every 6 hours as needed, not to exceed 30 mL in 24 hours     

 

                                        ipratropium-albuterol 0.5 mg-3 mg(2.5 mg base)/3 mL inhalation solution for nebulization

                    2016                              inhale 3 milliliters by nebulization route 4 times per day for COPD

                                         

 

                Symbicort 160-4.5 mcg/actuation inhalation HFA aerosol inhaler    11/10/2016                      inhale

 2 puffs by inhalation route 2 times per day in the morning and evening     

 

                metoprolol succinate 25 mg oral tablet extended release 24 hr    3/16/2017                       TAKE

 1 TABLET DAILY                          

 

             hydrochlorothiazide 25 mg oral tablet    2017                  TAKE 1 TABLET DAILY     









                                         

 

             Name         Start Date    Expiration Date    SIG          Comments

 

                Singulair 10 mg oral tablet    9/3/2010        2011        take 1 tablet (10 mg) by oral route

 daily  for 60 days                      

 

                Zithromax Z-Christopher 250 mg oral tablet    2011       take 2 tablets (500 mg)

 by oral route once daily for 1 day then 1 tablet (250 mg) by oral route once 
daily for 4 days                         

 

                Medrol (Christopher) 4 mg oral tablets,dose pack    2011        take as directed

 for 6 days                              

 

                Keflex 500 mg oral capsule    3/1/2012        3/11/2012       take 1 capsule by oral route 3 

times a day for 10 days                  

 

                Cipro 500 mg oral tablet    2012        take 1 tablet (500 mg) by oral route

 every 12 hours for 15 days              

 

                benzonatate 100 mg oral capsule    2013       take 1 capsule (100 mg) by

 oral route 3 times per day for cough     

 

             Medrol (Christopher) 4 mg oral tablets,dose pack    2013     take as directed    

 

 

                Zyrtec 10 mg oral tablet    2013       take 1 tablet (10 mg) by oral route

 once daily for 30 days                  

 

                Flonase 50 mcg/actuation nasal spray,suspension    2013       inhale 1 spray

 in each nostril by intranasal route 2 times per day for 30 days     

 

                cephalexin 500 mg oral capsule    2013        take 1 capsule (500 mg) by oral

 route every 8 hours                     

 

                promethazine-codeine 6.25-10 mg/5 mL oral syrup    2013                       take 5 milliliters

 by oral route every 4 hours as needed, not to exceed 30 mL in 24 hours     

 

                Zithromax Z-Christopher 250 mg oral tablet    10/2/2013       10/7/2013       take 2 tablets (500 mg)

 by oral route once daily for 1 day then 1 tablet (250 mg) by oral route once 
daily for 4 days                         

 

                amoxicillin 500 mg oral capsule    2014       take 1 capsule by oral route

 3 times a day for 15 days               

 

                lovastatin 20 mg oral tablet    2014        take 1 tablet (20 mg) by oral 

route daily  for 30 days                 

 

                Zithromax Z-Christopher 250 mg oral tablet    2014       take 2 tablets (500 mg)

 by oral route once daily for 1 day then 1 tablet (250 mg) by oral route once 
daily for 4 days                         

 

             Medrol (Christopher) 4 mg oral tablets,dose pack    2014                 take as directed     

 

                amoxicillin 500 mg oral capsule    2014                       take 1 capsule (500 mg) by oral route

 3 times per day                         

 

                Levaquin 500 mg oral tablet    10/16/2014      10/26/2014      take 1 tablet (500 mg) by oral

 route once daily for 10 days            

 

                prednisone 20 mg oral tablet    10/16/2014      10/24/2014      4x2 days 3x2 days 2x2 days

 1x2 days                                

 

                Zithromax Z-Christopher 250 mg oral tablet    2014       take 2 tablets (500 mg)

 by oral route once daily for 1 day then 1 tablet (250 mg) by oral route once 
daily for 4 days                         

 

                Bactrim -160 mg oral tablet    1/15/2015       2015       take 1 tablet by oral route

 every 12 hours for 10 days              

 

                Zithromax Z-Christopher 250 mg oral tablet    2015      take 2 tablets (500 mg)

 by oral route once daily for 1 day then 1 tablet (250 mg) by oral route once 
daily for 4 days                         

 

                Keflex 500 mg oral capsule    2016       take 1 capsule by oral route 3

 times a day for 7 days                  

 

                amoxicillin 500 mg oral capsule    10/4/2016       10/14/2016      take 1 capsule by oral route

 3 times a day for 10 days               

 

                Tessalon Perles 100 mg oral capsule    10/4/2016                       take 1 capsule by oral route 

every 4 hours                            

 

                Zithromax Z-Christopher 250 mg oral tablet    11/10/2016      11/15/2016      take 2 tablets (500 

mg) by oral route once daily for 1 day then 1 tablet (250 mg) by oral route once
daily for 4 days                         

 

                amoxicillin 500 mg oral tablet    2016                       take 1 tablet (500 mg) by oral route

 3 times per day                         

 

                Levaquin 500 mg oral tablet    2017       take 1 tablet (500 mg) by oral

 route once daily for 10 days            









                                        Discontinued 

 

             Name         Start Date    Discontinued Date    SIG          Comments

 

                amoxicillin 500 mg oral capsule    11/10/2011      10/3/2012       take 1 capsule (500 mg) 

by oral route 3 times per day            

 

                Anusol-HC 25 mg rectal suppository    2011      10/3/2012       insert 1 suppository 

(25 mg) by rectal route 2 times per day     

 

                hydrochlorothiazide 25 mg oral tablet    1/15/2015       2015      TAKE 1 TABLET DAILY

                                         







Problem List







                    Description         Status              Onset

 

                    Chronic Obstructive Pulmonary Disease    Active               

 

                    Hyperlipidemia      Active               

 

                    Anxiety Disorder    Active              10/29/2013

 

                    Pain in joint; Hip    Active              06/10/2014

 

                    Pulmonary nodule seen on imaging study    Active              10/29/2014

 

                    Exercise hypoxemia    Active              10/29/2014

 

                    Anxiety about health    Active              2016







Vital Signs







      Date    Time    BP-Sys(mm[Hg]    BP-Noni(mm[Hg])    HR(bpm)    RR(rpm)    Temp    WT    HT    HC    BMI

                    BSA                 BMI Percentile      O2 Sat(%)

 

        2016    12:11:00 PM    120 mmHg    84 mmHg    110 bpm    16 rpm    98.1 F    130 lbs    63

 in                       23.03 kg/m2    1.62 m2                   90 %

 

        11/10/2016    3:57:00 PM    148 mmHg    72 mmHg    88 bpm    16 rpm    98.2 F    132 lbs    63 in

                          23.3825 kg/m    1.6314 m                 98 %

 

        3/10/2016    2:12:00 PM    122 mmHg    60 mmHg    106 bpm    18 rpm    97 F    137 lbs    63 in 

                          24.27 kg/m2    1.66 m2                   93 %

 

        12/15/2015    2:33:00 PM    120 mmHg    75 mmHg    102 bpm    16 rpm    98.2 F    137 lbs    63 

in                        24.2682 kg/m    1.662 m                 94 %

 

        10/26/2015    2:46:00 PM    130 mmHg    80 mmHg    96 bpm    16 rpm    97.9 F    141 lbs    66 in

                          22.76 kg/m2    1.73 m2                   98 %

 

        10/6/2015    9:37:00 AM    140 mmHg    78 mmHg    110 bpm    16 rpm    97.9 F    141 lbs    63 in

                          24.9768 kg/m    1.6861 m                 95 %

 

        10/28/2014    2:25:00 PM    132 mmHg    66 mmHg    92 bpm    24 rpm    97.5 F    147 lbs    63 in

                          26.04 kg/m2    1.72 m2                   92 %

 

        10/16/2014    9:45:00 AM    132 mmHg    64 mmHg    74 bpm    24 rpm    97.7 F    146 lbs    63 in

                          25.8625 kg/m    1.7157 m                 92 %

 

        2014    2:31:00 PM    136 mmHg    68 mmHg    90 bpm    22 rpm    98.2 F    148 lbs    63 in 

                          26.22 kg/m2    1.73 m2                   95 %

 

        2014    3:19:00 PM    124 mmHg    70 mmHg    64 bpm    20 rpm    97.8 F    147 lbs    63 in

                          26.0396 kg/m    1.7216 m                 95 %

 

        10/28/2013    10:31:00 AM    140 mmHg    70 mmHg    92 bpm    24 rpm    97.8 F    143 lbs    63 

in                        25.33 kg/m2    1.70 m2                   95 %

 

      2013    2:51:00 PM    130 mmHg    78 mmHg    90 bpm          98.2 F    168 lbs    65 in          

27.9564 kg/m      1.8694 m                               

 

       2013    2:43:00 PM    110 mmHg    76 mmHg    103 bpm           96.6 F    137 lbs    63 in      

                24.27 kg/m2     1.66 m2                          

 

        2013    9:50:00 AM    150 mmHg    66 mmHg    108 bpm    20 rpm    97.8 F    138 lbs    63 in

                          24.4454 kg/m    1.6681 m                 94 %

 

        10/25/2012    9:08:00 AM    110 mmHg    60 mmHg    88 bpm    18 rpm    97.5 F    142 lbs    63 in

                          25.15 kg/m2    1.69 m2                   94 %

 

      10/3/2012    9:33:00 AM    130 mmHg    60 mmHg    98 bpm    18 rpm          146 lbs    63 in          

25.8625 kg/m      1.7157 m                              95 %

 

      2012    2:31:00 PM    116 mmHg    76 mmHg    88 bpm                140 lbs    63 in          24.80 

kg/m2               1.68 m2                                 96 %

 

     2011    10:02:00 AM    122 mmHg    80 mmHg    110 bpm              144 lbs                        

                                        94 %

 

      2011    2:58:00 PM    124 mmHg    84 mmHg    106 bpm                155 lbs    63 in          27.46

 kg/m2              1.77 m2                                 96 %

 

     2011    9:55:00 AM    114 mmHg    78 mmHg    92 bpm              146 lbs                             95

 %

 

     6/3/2011    8:36:00 AM    116 mmHg    74 mmHg    90 bpm              146 lbs                             96

 %

 

     2010    3:01:00 PM    132 mmHg    84 mmHg    102 bpm              149 lbs                          

                                        94 %

 

     2010    11:46:00 AM    124 mmHg    78 mmHg    104 bpm              151 lbs                         

                                        94 %

 

     2010    8:47:00 AM    116 mmHg    78 mmHg    103 bpm              151 lbs                          

                                        95 %







Social History







                    Name                Description         Comments

 

                    Tobacco             Never smoker         

 

                    denies alcohol use                         







History of Procedures







                    Date Ordered        Description         Order Status

 

                    2011 12:00 AM    THER/PROPH/DIAG INJ SC/IM    Reviewed

 

                    2011 12:00 AM    Decadron Inj.1mg-([facility]) Ndc #[id number]    Reviewed

 

                    2011 12:00 AM    Depo-Medrol 80 Mg Im/[facility] NDC 0009-636175    Reviewed

 

                    2011 12:00 AM    Rocephin, Per 250MG - 1 Gram Vial NDC 6609-922528-[facility]    Reviewed



 

                    3/16/2012 12:00 AM    ROUTINE VENIPUNCTURE    Reviewed

 

                    3/16/2012 12:00 AM    COMPLETE CBC W/AUTO DIFF WBC    Reviewed

 

                    3/16/2012 12:00 AM    COMPREHEN METABOLIC PANEL    Reviewed

 

                    3/16/2012 12:00 AM    ASSAY THYROID STIM HORMONE    Reviewed

 

                    11/10/2016 12:00 AM    Decadron, Per 1 Mg NDC# [address]-15    Reviewed

 

                    11/10/2016 12:00 AM    Depo-Medrol, Per 80 Mg NDC#7133-0422-27    Reviewed

 

                    11/10/2016 12:00 AM    Rocephin 1 gram NDC#1758-0353-53    Reviewed

 

                    2016 12:00 AM    THER/PROPH/DIAG INJ SC/IM    Reviewed

 

                    2016 12:00 AM    Decadron 8mg Injection, [facility]    Reviewed

 

                    2016 12:00 AM    Depo-Medrol 80mg Injection, [facility]    Reviewed

 

                    2016 12:00 AM    Rocephin 1 gram Injection, [facility]    Reviewed

 

                    10/3/2012 12:00 AM    THER/PROPH/DIAG INJ SC/IM    Reviewed

 

                    10/3/2012 12:00 AM    Decadron, Per 1 Mg NDC# [address]-73    Reviewed

 

                    10/3/2012 12:00 AM    Depo-Medrol, Per 80 Mg NDC#3944-9220-88    Reviewed

 

                    10/3/2012 12:00 AM    Toradol 60 Mg NDC#3905-1141-46    Reviewed

 

                    2013 12:00 AM    THER/PROPH/DIAG INJ SC/IM    Reviewed

 

                    2013 12:00 AM    Decadron, Per 1 Mg NDC# [address]-09    Reviewed

 

                    2013 12:00 AM    Depo-Medrol, Per 80 Mg NDC#3048-1322-62    Reviewed

 

                    2013 12:00 AM    Toradol 60 Mg NDC#4258-5489-70    Reviewed

 

                    2010 12:00 AM    ROUTINE VENIPUNCTURE    Reviewed

 

                    2010 12:00 AM    COMPLETE CBC W/AUTO DIFF WBC    Reviewed

 

                    2010 12:00 AM    COMPREHEN METABOLIC PANEL    Reviewed

 

                    2010 12:00 AM    LIPID PANEL         Reviewed

 

                    10/28/2014 12:00 AM    CHEST X-RAY 4/> VIEWS    Reviewed

 

                    6/3/2011 12:00 AM    ROUTINE VENIPUNCTURE    Reviewed

 

                    6/3/2011 12:00 AM    COMPLETE CBC AUTOMATED    Reviewed

 

                    6/3/2011 12:00 AM    COMPREHEN METABOLIC PANEL    Reviewed

 

                    6/3/2011 12:00 AM    LIPID PANEL         Reviewed

 

                    2011 12:00 AM    THER/PROPH/DIAG INJ SC/IM    Reviewed

 

                    2011 12:00 AM    Decadron Inj.8mg-([facility]) Ndc #[id number]    Reviewed

 

                    2011 12:00 AM    Depo-Medrol 80 Mg Im/[facility] NDC 0009-075502    Reviewed







Results Summary







                          Data and Description      Results

 

                          6/3/2011 1:52 PM          WBC 8.6 RBC 4.66 HGB 13.20 g/dLHCT 42.10 %MCV 90.0 fLMCH 28.30

 pgMCHC 31.40 g/dLRDW SD 44 RDW CV 13.60 %MPV 10.90 fLPLT 383 NRBC# 0.00 NRBC% 
0.0 %NEUT 68.0 %%LYMP 20.30 %%MONO 9.30 %%EOS 2.20 %%BASO 0.20 %#NEUT 5.81 #LYMP
 1.74 #MONO 0.80 #EOS 0.19 #BASO 0.02 MANUAL DIFF NOT IND 

 

                          6/3/2011 1:53 PM          TRIGLYCERIDES 155.0 mg/dLCHOLESTEROL 248.0 mg/dLHDL 51.0 mg/dLTOT

 CHOL/HDL 4.9 .0 mg/dLGLUCOSE 97.0 mg/dLSODIUM 139.0 mmol/LPOTASSIUM 3.70
 mmol/LCHLORIDE 101.0 mmol/LCO2 27.0 mmol/LBUN 19.0 mg/dLCREATININE 0.90 
mg/dLSGOT/AST 19.0 IU/LSGPT/ALT 11.0 IU/LALK PHOS 111.0 IU/LTOTAL PROTEIN 7.40 
g/dLALBUMIN 4.20 g/dLTOTAL BILI 0.50 mg/dLCALCIUM 9.50 mg/dLAGE 70 GFR NonAA 62 
GFR AA 75 eGFR >60 mL/min/1.73 m2eGFR AA* >60 







History Of Immunizations

Not available.



History of Past Illness







                    Name                Date of Onset       Comments

 

                    Chronic Obstructive Pulmonary Disease                         

 

                    Hyperlipidemia                           

 

                    Cough               2010  8:49AM     

 

                    General Medical Exam, Adult    2010  8:49AM     

 

                    Seasonal Allergies    2010  8:49AM     

 

                    Sinusitis, Chronic    2010  8:49AM     

 

                    Ganglion cyst of synovium, tendon, and bursa; other    2010 11:48AM     

 

                    Anxiety Disorder    10/29/2013           

 

                    Pain in joint; Hip    06/10/2014           

 

                    Pulmonary nodule seen on imaging study    10/29/2014           

 

                    Exercise hypoxemia    10/29/2014           

 

                    Chronic Obstructive Pulmonary Disease    2010  3:02PM     

 

                    Edema               2010  3:02PM     

 

                    Sinusitis, Acute    2010  3:02PM     

 

                    Anxiety about health    2016           

 

                    Chronic Obstructive Pulmonary Disease    Trey  3 2011  8:38AM     

 

                    Palpitations        Trey  3 2011  8:38AM     

 

                    Cough               2011  9:54AM     

 

                    Sinusitis, Acute    2011  9:54AM     

 

                    Seasonal Allergies    2011  9:54AM     

 

                    Post-nasal drainage    2011  9:54AM     

 

                    Cough               Sep 22 2011  2:55PM     

 

                    Seasonal Allergies    Sep 22 2011  2:55PM     

 

                    Pharyngitis, Acute    Sep 22 2011  2:55PM     

 

                    Post-nasal drainage    Sep 22 2011  2:55PM     

 

                    Blood In Stool, Occult    2011  9:58AM     

 

                    Hemorrhoids         2011  9:58AM     

 

                    Chronic Obstructive Pulmonary Disease    2012  2:33PM     

 

                    Cardiac Dysrhythmia    2012  2:33PM     

 

                    Sinoatrial Node Dysfunction    Mar 16 2012  9:12AM     

 

                    Palpitations        Mar 16 2012  9:12AM     

 

                    Osteoarthrosis, generalized, multiple sites    Oct  3 2012  9:34AM     

 

                    Pain in joint; Hip    Oct  3 2012  9:34AM     

 

                    Osteoarthrosis      Oct 25 2012  9:09AM     

 

                    Bronchitis, Acute    Oct 25 2012  9:09AM     

 

                    Cough               Oct 25 2012  9:09AM     

 

                    Pain in joint; Left Hip    Oct 25 2012  9:09AM     

 

                    Pain in joint; Hip    2013  9:50AM     

 

                    Osteoarthrosis, generalized, multiple sites    2013  2:45PM     

 

                    Pain in joint; Hip bilateral    2013  2:45PM     

 

                    Anxiety Disorder    Oct 28 2013 10:32AM     

 

                    Chronic Obstructive Pulmonary Disease    Oct 28 2013 10:32AM     

 

                    Hyperlipidemia      Oct 28 2013 10:32AM     

 

                    Pain in joint; Left Hip    Oct 28 2013 10:32AM     

 

                    Sinusitis, Acute    Oct 28 2013 10:32AM     

 

                    Essential Hypertension    2014  3:19PM     

 

                    Osteoarthrosis, generalized, multiple sites    2014  3:19PM     

 

                    Chronic Obstructive Pulmonary Disease    2014  3:19PM     

 

                    Pain in joint; Hip    2014  3:19PM     

 

                    Chronic Obstructive Pulmonary Disease    2014  2:31PM     

 

                    Pain in joint; Hip    2014  2:31PM     

 

                    pre-operative examination, unspecified    2014  2:31PM     

 

                    Lung nodule seen on imaging study    Oct 28 2014 10:24AM     

 

                    Bronchitis, Acute    Oct 16 2014  9:45AM     

 

                    Respiratory System And Chest Symptoms    Oct 16 2014  9:45AM     

 

                    COPD (chronic obstructive pulmonary disease)    Oct 16 2014  9:45AM     

 

                    Chronic Obstructive Pulmonary Disease    Oct 28 2014  2:26PM     

 

                    Pulmonary nodule seen on imaging study    Oct 28 2014  2:26PM     

 

                    Shortness of breath    Oct 28 2014  2:26PM     

 

                    Exercise hypoxemia    Oct 28 2014  2:26PM     

 

                    Nail avulsion       Oct  6 2015  9:38AM     

 

                          Contusion of left index finger with damage to nail, initial encounter    Oct 26 2015

  2:53PM                                 

 

                    Forehead contusion, subsequent encounter    Dec 15 2015  2:39PM     

 

                    Generalized anxiety disorder    Dec 15 2015  2:39PM     

 

                    Chronic Obstructive Pulmonary Disease    Dec 15 2015  2:39PM     

 

                    Osteoarthrosis, generalized, multiple sites    Mar 10 2016  2:12PM     

 

                    Pain of right thigh    Mar 10 2016  2:12PM     

 

                    Mild Acute Hip pain, acute, right Improving    Mar 10 2016  2:12PM     

 

                    Anxiety about health    Mar 10 2016  2:12PM     

 

                    Hyperlipidemia      Aug 22 2016 10:58AM     

 

                    Sinoatrial Node Dysfunction    Aug 22 2016 10:58AM     

 

                    Palpitations        Aug 22 2016 10:58AM     

 

                    Chronic Obstructive Pulmonary Disease    Aug 22 2016 10:58AM     

 

                    Exercise hypoxemia    Aug 22 2016 10:58AM     

 

                    Pain in joint; Hip    Aug 22 2016 10:58AM     

 

                    Pulmonary nodule seen on imaging study    Aug 22 2016 10:58AM     

 

                    Eustachian tube dysfunction, bilateral    Nov 10 2016  3:57PM     

 

                    Moderate Acute Cough    Nov 10 2016  3:57PM     

 

                    Pharyngitis, Acute    Nov 10 2016  3:57PM     

 

                    Upper Respiratory Infection    Nov 10 2016  3:57PM     

 

                          COPD (chronic obstructive pulmonary disease) with acute bronchitis    Nov 10 2016 

 3:57PM                                  

 

                    Mild Chronic Cough Worsening    Dec 12 2016 12:12PM     

 

                          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

 12 2016 12:12PM                         

 

                    Moderate Shortness of breath on exertion    Dec 12 2016 12:12PM     

 

                    Hyperlipidemia      May  4 2017 10:27AM     

 

                    Sinoatrial Node Dysfunction    May  4 2017 10:27AM     

 

                    Palpitations        May  4 2017 10:27AM     

 

                    Chronic Obstructive Pulmonary Disease    May  4 2017 10:27AM     

 

                    Exercise hypoxemia    May  4 2017 10:27AM     

 

                    Pain in joint; Hip    May  4 2017 10:27AM     

 

                    Pulmonary nodule seen on imaging study    May  4 2017 10:27AM     







Payers







           Insurance Name    Company Name    Plan Name    Plan Number    Policy Number    Policy Group

 Number                                 Start Date

 

                    Medicare RHC    Medicare R[employer]              [id number]              N/A

 

                          [place] [employer]    [place] [employer]                 [id number]

                                                    N/A

 

                    zzzTest Medicare A    Test Medicare A              [id number]              N/A

 

                                [facility] - [facility] - [facility] R[facility] Comm      

                    [id number]                             N/A

 

                    Medicare Part A    Medicare - Lab/Xray              [id number]              N/A

 

                          [employer] [facility] [employer]    [employer]                 [id number]

                                                    N/A

 

                    Medicare Part A    Medicare Part A              [id number]              N/A

 

                    Medicare Part B    [employer] [place]              [id number]              N/A







History of Encounters







                    Visit Date          Visit Type          Provider

 

                    2016          Office visit        DEON ESCALANTE

 

                    11/10/2016          Office visit        DEON ESCALANTE

 

                    3/10/2016           Office visit        DEON ESCALANTE

 

                    12/15/2015          Office visit        DEON ESCALANTE

 

                    10/26/2015          Office visit        DEON ESCALANTE

 

                    10/6/2015           Office visit        DEON ESCALANTE

 

                    10/28/2014          Office visit        DEON ESCALANTE

 

                    10/16/2014          Office visit        DEON ESCALANTE

 

                    2014            Office visit        DEON ESCALANTE

 

                    2014           [facility]            DEWEY Garcia MD

 

                    2014           Office visit        DEON LEON PA

 

                    10/28/2013          Office visit        DEON LEON PA

 

                    10/14/2013          [facility]            DEWEY Garcia MD

 

                    2013           Office visit        DEON LEON PA

 

                    2013            Office visit        DEON LEON PA

 

                    10/25/2012          Office visit        DEON LEON PA

 

                    10/3/2012           Office visit        DEON LEON PA

 

                    3/16/2012           Office visit        DEON LEON PA

 

                    2012            Office visit        DEON LEON PA

 

                    2012            [facility]            DEWEY Garcia MD

 

                    2011          Office visit        DEON LEON PA

 

                    2011           Office visit        Deon Leon PA-C

 

                    2011            Office visit        Deon Leon PA-C

 

                    6/3/2011            Office visit        Deon Leon PA-C

 

                    2010           Office visit        Deon Leon PA-C

 

                    2010           Office visit        Deon Leon PA-C

 

                    2010           Office visit        Deon Leon PA-C

## 2019-06-25 NOTE — XMS REPORT
MU2 Ambulatory Summary

                             Created on: 2018



Elsi Casillas

External Reference #: [id number]

: 1940

Sex: Female



Demographics







                          Address                   [address] [place]  [address]

 

                          Home Phone                [phone]

 

                          Preferred Language        English

 

                          Marital Status            

 

                          Synagogue Affiliation     Unknown

 

                          Race                      White

 

                          Ethnic Group              Not  or 





Author







                          Author                    DEON LEON

 

                          [facility]

 

                          Address                   [address]

[place], [place]  [id number]



 

                          Phone                     [phone]







Care Team Providers







                    Care Team Member Name    Role                Phone

 

                    DEON LEON    PCP                 [phone]

 

                    DEON LEON    PreferredProvider    [phone]







Allergies and Adverse Reactions







                    Name                Reaction            Notes

 

                    NO KNOWN DRUG ALLERGIES                         







Plan of Treatment







             Planned Activity    Comments     Planned Date    Planned Time    Plan/Goal

 

             Lipid Profile                 2016    12:00 AM      

 

             Chest PA and Lateral - Main                 8/10/2017    12:00 AM      







Medications







                                        Active 

 

             Name         Start Date    Estimated Completion Date    SIG          Comments

 

                Calcium 600 + D(3) 600 mg(1,500mg) -200 unit oral tablet                                    take 2 tablets by

 oral route daily                        

 

                pravastatin 20 mg oral tablet                                    take 1 tablet (20 mg) by oral route once daily

                                         

 

                Toprol XL 25 mg oral tablet extended release 24 hr                                    take 1 tablet (25 mg) 

by oral route once daily                 

 

                    albuterol sulfate 1.25 mg/3 mL inhalation solution for nebulization    2017           

                                        inhale 3 milliliters (1.25 mg) via nebulizer by inhalation route 4 times per day

  DX J44.9                               

 

                Symbicort 160-4.5 mcg/actuation inhalation HFA aerosol inhaler    10/25/2017                      inhale

 2 puffs by inhalation route 2 times per day in the morning and evening     

 

             hydrochlorothiazide 25 mg oral tablet    2017                 TAKE 1 TABLET DAILY     

 

                metoprolol succinate 25 mg oral tablet extended release 24 hr    2017                       TAKE

 1 TABLET DAILY                          

 

                Sudogest 30 mg oral tablet    2017                      take 1 tablets (60 mg) by oral route 

every 6 hours as needed                  

 

                Cipro 500 mg oral tablet    2018        2/15/2018       take 1 tablet (500 mg) by oral route

 2 times per day for 10 days             

 

                                        ipratropium-albuterol 0.5 mg-3 mg(2.5 mg base)/3 mL inhalation solution for nebulization

                    2018                                inhale 3 milliliters by nebulization route 4 times per day for COPD

                                         

 

                prednisone 20 mg oral tablet    2018       2X4 days 1X4 days 1/2X4 days 

                                         









                                         

 

             Name         Start Date    Expiration Date    SIG          Comments

 

                Singulair 10 mg oral tablet    9/3/2010        2011        take 1 tablet (10 mg) by oral route

 daily  for 60 days                      

 

                Zithromax Z-Christopher 250 mg oral tablet    2011       take 2 tablets (500 mg)

 by oral route once daily for 1 day then 1 tablet (250 mg) by oral route once 
daily for 4 days                         

 

                Medrol (Christopher) 4 mg oral tablets,dose pack    2011        take as directed

 for 6 days                              

 

                Keflex 500 mg oral capsule    3/1/2012        3/11/2012       take 1 capsule by oral route 3 

times a day for 10 days                  

 

                Cipro 500 mg oral tablet    2012        take 1 tablet (500 mg) by oral route

 every 12 hours for 15 days              

 

                benzonatate 100 mg oral capsule    2013       take 1 capsule (100 mg) by

 oral route 3 times per day for cough     

 

             Medrol (Christopher) 4 mg oral tablets,dose pack    2013     take as directed    

 

 

                Zyrtec 10 mg oral tablet    2013       take 1 tablet (10 mg) by oral route

 once daily for 30 days                  

 

                Flonase 50 mcg/actuation nasal spray,suspension    2013       inhale 1 spray

 in each nostril by intranasal route 2 times per day for 30 days     

 

                cephalexin 500 mg oral capsule    2013        take 1 capsule (500 mg) by oral

 route every 8 hours                     

 

                promethazine-codeine 6.25-10 mg/5 mL oral syrup    2013                       take 5 milliliters

 by oral route every 4 hours as needed, not to exceed 30 mL in 24 hours     

 

                Zithromax Z-Christopher 250 mg oral tablet    10/2/2013       10/7/2013       take 2 tablets (500 mg)

 by oral route once daily for 1 day then 1 tablet (250 mg) by oral route once 
daily for 4 days                         

 

                amoxicillin 500 mg oral capsule    2014       take 1 capsule by oral route

 3 times a day for 15 days               

 

                lovastatin 20 mg oral tablet    2014        take 1 tablet (20 mg) by oral 

route daily  for 30 days                 

 

                Zithromax Z-Christopher 250 mg oral tablet    2014       take 2 tablets (500 mg)

 by oral route once daily for 1 day then 1 tablet (250 mg) by oral route once 
daily for 4 days                         

 

             Medrol (Christopher) 4 mg oral tablets,dose pack    2014                 take as directed     

 

                amoxicillin 500 mg oral capsule    2014                       take 1 capsule (500 mg) by oral route

 3 times per day                         

 

                Levaquin 500 mg oral tablet    10/16/2014      10/26/2014      take 1 tablet (500 mg) by oral

 route once daily for 10 days            

 

                prednisone 20 mg oral tablet    10/16/2014      10/24/2014      4x2 days 3x2 days 2x2 days

 1x2 days                                

 

                Zithromax Z-Christopher 250 mg oral tablet    2014       take 2 tablets (500 mg)

 by oral route once daily for 1 day then 1 tablet (250 mg) by oral route once 
daily for 4 days                         

 

                Bactrim -160 mg oral tablet    1/15/2015       2015       take 1 tablet by oral route

 every 12 hours for 10 days              

 

                Zithromax Z-Christopher 250 mg oral tablet    2015      take 2 tablets (500 mg)

 by oral route once daily for 1 day then 1 tablet (250 mg) by oral route once 
daily for 4 days                         

 

                Keflex 500 mg oral capsule    2016       take 1 capsule by oral route 3

 times a day for 7 days                  

 

                amoxicillin 500 mg oral capsule    10/4/2016       10/14/2016      take 1 capsule by oral route

 3 times a day for 10 days               

 

                Tessalon Perles 100 mg oral capsule    10/4/2016                       take 1 capsule by oral route 

every 4 hours                            

 

                Zithromax Z-Christopher 250 mg oral tablet    11/10/2016      11/15/2016      take 2 tablets (500 

mg) by oral route once daily for 1 day then 1 tablet (250 mg) by oral route once
daily for 4 days                         

 

                amoxicillin 500 mg oral tablet    2016                       take 1 tablet (500 mg) by oral route

 3 times per day                         

 

                amoxicillin 500 mg oral capsule    2017       take 1 capsule (500 mg) by

 oral route 3 times per day for 10 days     

 

                Bactrim -160 mg oral tablet    2017       take 1 tablet by oral route

 2 times a day for 10 days               

 

                Levaquin 500 mg oral tablet    2017        take 1 tablet (500 mg) by oral

 route once daily for 10 days            

 

                amoxicillin 500 mg oral capsule    2017                       take 1 capsule (500 mg) by oral route

 every 12 hours for 7 days               

 

                Zithromax Z-Christopher 250 mg oral tablet    2017                      take 2 tablets (500 mg) by oral

 route once daily for 1 day then 1 tablet (250 mg) by oral route once daily for 
4 days                                   

 

                amoxicillin 500 mg oral tablet    2018                       take 1 tablet (500 mg) by oral route

 every 12 hours for 10 days              









                                        Discontinued 

 

             Name         Start Date    Discontinued Date    SIG          Comments

 

                amoxicillin 500 mg oral capsule    11/10/2011      10/3/2012       take 1 capsule (500 mg) 

by oral route 3 times per day            

 

                Anusol-HC 25 mg rectal suppository    2011      10/3/2012       insert 1 suppository 

(25 mg) by rectal route 2 times per day     

 

                Proctofoam 1 % topical foam    2011       apply by external route daily

                                         

 

             Aerochamber    2014    Use with inhaler as directed     

 

                hydrochlorothiazide 25 mg oral tablet    1/15/2015       2015      TAKE 1 TABLET DAILY

                                         

 

                promethazine-codeine 6.25-10 mg/5 mL oral syrup    11/10/2016      10/25/2017      take 5 

milliliters by oral route every 6 hours as needed, not to exceed 30 mL in 24 
hours                                    

 

                prednisone 20 mg oral tablet    2017       10/25/2017      4 x 2 days, 3 x 2 days, 2 x

 2 days 1 x 2 days                       

 

                Augmentin 875-125 mg oral tablet    2017       take 1 tablet by oral route

 every 12 hours for 7 days               

 

                Keflex 500 mg oral capsule    2017       take 1 capsule (500 mg) by oral

 route every 12 hours                    

 

                Keflex 500 mg oral capsule    10/10/2017      2018       take 1 capsule (500 mg) by oral

 route every 12 hours for 10 days        

 

                Levaquin 500 mg oral tablet    2018       take 1 tablet (500 mg) by oral

 route once daily for 10 days            







Problem List







                    Description         Status              Onset

 

                    Chronic Obstructive Pulmonary Disease    Active               

 

                    Hyperlipidemia      Active               

 

                    Anxiety disorder    Active              10/29/2013

 

                    Pain in joint; Hip    Active              06/10/2014

 

                    Pulmonary nodule seen on imaging study    Active              10/29/2014

 

                    Exercise hypoxemia    Active              10/29/2014

 

                    Anxiety about health    Active              2016

 

                    Primary osteoarthritis involving multiple joints    Active              2017

 

                    Medication management    Active              2017

 

                    Cellulitis of left lower extremity    Active              2017

 

                    Dog bite of calf, left, sequela    Active              2017







Vital Signs







      Date    Time    BP-Sys(mm[Hg]    BP-Noni(mm[Hg])    HR(bpm)    RR(rpm)    Temp    WT    HT    HC    BMI

                    BSA                 BMI Percentile      O2 Sat(%)

 

        2018    1:35:00 PM    112 mmHg    74 mmHg    114 bpm    18 rpm    99.6 F    139 lbs    63 in

                          24.62 kg/m2    1.67 m2                   98 %

 

       2018    3:57:00 PM    128 mmHg    80 mmHg    78 bpm    18 rpm    98.4 F    148 lbs             

                                                                90 %

 

        10/24/2017    1:51:00 PM    132 mmHg    80 mmHg    82 bpm    16 rpm    98.2 F    145 lbs    62 in

                          26.52 kg/m2    1.70 m2                   95 %

 

       2017    3:29:00 PM    128 mmHg    80 mmHg    68 bpm    16 rpm    98 F    144 lbs    63 in    

                25.5082 kg/m    1.7039 m                      93 %

 

        2017    11:37:00 AM    118 mmHg    72 mmHg    96 bpm    16 rpm    97.4 F    141 lbs    63 in

                          24.98 kg/m2    1.69 m2                   91 %

 

        2017    8:55:00 AM    105 mmHg    60 mmHg    96 bpm    18 rpm    95.8 F    142 lbs    63 in 

                          25.1539 kg/m    1.6921 m                 95 %

 

       2017    12:10:00 PM    122 mmHg    68 mmHg    76 bpm    18 rpm    98 F    143 lbs    63 in    

                25.33 kg/m2     1.70 m2                         98 %

 

        2017    4:03:00 PM    118 mmHg    64 mmHg    106 bpm    18 rpm    97.4 F    137 lbs    63 in

                          24.2682 kg/m    1.662 m                 98 %

 

        2016    12:11:00 PM    120 mmHg    84 mmHg    110 bpm    16 rpm    98.1 F    130 lbs    63

 in                       23.03 kg/m2    1.62 m2                   90 %

 

        11/10/2016    3:57:00 PM    148 mmHg    72 mmHg    88 bpm    16 rpm    98.2 F    132 lbs    63 in

                          23.3825 kg/m    1.6314 m                 98 %

 

        3/10/2016    2:12:00 PM    122 mmHg    60 mmHg    106 bpm    18 rpm    97 F    137 lbs    63 in 

                          24.27 kg/m2    1.66 m2                   93 %

 

        12/15/2015    2:33:00 PM    120 mmHg    75 mmHg    102 bpm    16 rpm    98.2 F    137 lbs    63 

in                        24.27 kg/m2    1.662 m                 94 %

 

        10/26/2015    2:46:00 PM    130 mmHg    80 mmHg    96 bpm    16 rpm    97.9 F    141 lbs    66 in

                          22.7578 kg/m    1.73 m2                   98 %

 

        10/6/2015    9:37:00 AM    140 mmHg    78 mmHg    110 bpm    16 rpm    97.9 F    141 lbs    63 in

                          24.98 kg/m2    1.6861 m                 95 %

 

        10/28/2014    2:25:00 PM    132 mmHg    66 mmHg    92 bpm    24 rpm    97.5 F    147 lbs    63 in

                          26.0396 kg/m    1.72 m2                   92 %

 

        10/16/2014    9:45:00 AM    132 mmHg    64 mmHg    74 bpm    24 rpm    97.7 F    146 lbs    63 in

                          25.86 kg/m2    1.7157 m                 92 %

 

        2014    2:31:00 PM    136 mmHg    68 mmHg    90 bpm    22 rpm    98.2 F    148 lbs    63 in 

                          26.2168 kg/m    1.73 m2                   95 %

 

        2014    3:19:00 PM    124 mmHg    70 mmHg    64 bpm    20 rpm    97.8 F    147 lbs    63 in

                          26.04 kg/m2    1.7216 m                 95 %

 

        10/28/2013    10:31:00 AM    140 mmHg    70 mmHg    92 bpm    24 rpm    97.8 F    143 lbs    63 

in                        25.3311 kg/m    1.70 m2                   95 %

 

      2013    2:51:00 PM    130 mmHg    78 mmHg    90 bpm          98.2 F    168 lbs    65 in          

27.96 kg/m2         1.8694 m                               

 

       2013    2:43:00 PM    110 mmHg    76 mmHg    103 bpm           96.6 F    137 lbs    63 in      

                24.2682 kg/m    1.66 m2                          

 

        2013    9:50:00 AM    150 mmHg    66 mmHg    108 bpm    20 rpm    97.8 F    138 lbs    63 in

                          24.45 kg/m2    1.6681 m                 94 %

 

        10/25/2012    9:08:00 AM    110 mmHg    60 mmHg    88 bpm    18 rpm    97.5 F    142 lbs    63 in

                          25.1539 kg/m    1.69 m2                   94 %

 

      10/3/2012    9:33:00 AM    130 mmHg    60 mmHg    98 bpm    18 rpm          146 lbs    63 in          

25.86 kg/m2         1.7157 m                              95 %

 

      2012    2:31:00 PM    116 mmHg    76 mmHg    88 bpm                140 lbs    63 in          24.7996

 kg/m             1.68 m2                                 96 %

 

     2011    10:02:00 AM    122 mmHg    80 mmHg    110 bpm              144 lbs                        

                                        94 %

 

      2011    2:58:00 PM    124 mmHg    84 mmHg    106 bpm                155 lbs    63 in          27.4567

 kg/m             1.77 m2                                 96 %

 

     2011    9:55:00 AM    114 mmHg    78 mmHg    92 bpm              146 lbs                             95

 %

 

     6/3/2011    8:36:00 AM    116 mmHg    74 mmHg    90 bpm              146 lbs                             96

 %

 

     2010    3:01:00 PM    132 mmHg    84 mmHg    102 bpm              149 lbs                          

                                        94 %

 

     2010    11:46:00 AM    124 mmHg    78 mmHg    104 bpm              151 lbs                         

                                        94 %

 

     2010    8:47:00 AM    116 mmHg    78 mmHg    103 bpm              151 lbs                          

                                        95 %







Social History







                    Name                Description         Comments

 

                    Alcohol             Never                

 

                    Uses seatbelts                           

 

                    Tobacco             Never smoker         







History of Procedures







                    Date Ordered        Description         Order Status

 

                    2011 12:00 AM    THER/PROPH/DIAG INJ SC/IM    Reviewed

 

                    2011 12:00 AM    Decadron Inj.1mg-([facility]) Ndc #[id number]    Reviewed

 

                    2011 12:00 AM    Depo-Medrol 80 Mg Im/[facility] NDC 0009-165543    Reviewed

 

                    2011 12:00 AM    Rocephin, Per 250MG - 1 Gram Vial NDC 0749-839408-[facility]    Reviewed



 

                    3/16/2012 12:00 AM    ROUTINE VENIPUNCTURE    Reviewed

 

                    3/16/2012 12:00 AM    COMPLETE CBC W/AUTO DIFF WBC    Reviewed

 

                    3/16/2012 12:00 AM    COMPREHEN METABOLIC PANEL    Reviewed

 

                    3/16/2012 12:00 AM    ASSAY THYROID STIM HORMONE    Reviewed

 

                    11/10/2016 12:00 AM    Decadron, Per 1 Mg NDC# [address]-49    Reviewed

 

                    11/10/2016 12:00 AM    Depo-Medrol, Per 80 Mg NDC#2164-8314-41    Reviewed

 

                    11/10/2016 12:00 AM    Rocephin 1 gram NDC#8561-7518-95    Reviewed

 

                    2016 12:00 AM    THER/PROPH/DIAG INJ SC/IM    Reviewed

 

                    2016 12:00 AM    Decadron 8mg Injection, [facility]    Reviewed

 

                    2016 12:00 AM    Depo-Medrol 80mg Injection, [facility]    Reviewed

 

                    2016 12:00 AM    Rocephin 1 gram Injection, [facility]    Reviewed

 

                    2017 12:00 AM    COMPLETE CBC W/AUTO DIFF WBC    Reviewed

 

                    2017 12:00 AM    COMPREHEN METABOLIC PANEL    Reviewed

 

                    2017 12:00 AM    LIPID PANEL         Reviewed

 

                    2017 12:00 AM    THERAPEUTIC PROPHYLACTIC/DX INJECTION SUBQ/IM    Reviewed

 

                    2017 12:00 AM    Decadron 8mg Injection, [facility]    Reviewed

 

                    2017 12:00 AM    Depo-Medrol 80mg Injection, [facility]    Reviewed

 

                    2017 12:00 AM    LIPID PANEL         Returned

 

                    2017 12:00 AM    THERAPEUTIC PROPHYLACTIC/DX INJECTION SUBQ/IM    Reviewed

 

                    2017 12:00 AM    Decadron 8mg Injection, [facility]    Reviewed

 

                    2017 12:00 AM    Depo-Medrol 80mg Injection, [facility]    Reviewed

 

                    10/3/2012 12:00 AM    THER/PROPH/DIAG INJ SC/IM    Reviewed

 

                    10/3/2012 12:00 AM    Decadron, Per 1 Mg NDC# [address]-64    Reviewed

 

                    10/3/2012 12:00 AM    Depo-Medrol, Per 80 Mg NDC#5701-1826-08    Reviewed

 

                    10/3/2012 12:00 AM    Toradol 60 Mg NDC#0286-3462-58    Reviewed

 

                    10/24/2017 12:00 AM    THERAPEUTIC PROPHYLACTIC/DX INJECTION SUBQ/IM    Reviewed

 

                    10/24/2017 12:00 AM    Decadron 8mg Injection, [facility]    Reviewed

 

                    10/24/2017 12:00 AM    Depo-Medrol 80mg Injection, [facility]    Reviewed

 

                    2018 12:00 AM    THERAPEUTIC PROPHYLACTIC/DX INJECTION SUBQ/IM    Reviewed

 

                    2018 12:00 AM    Rocephin 1 gram Injection, [facility]    Reviewed

 

                    2018 12:00 AM    THERAPEUTIC PROPHYLACTIC/DX INJECTION SUBQ/IM    Reviewed

 

                    2018 12:00 AM    Decadron 8mg Injection, [facility]    Reviewed

 

                    2018 12:00 AM    Depo-Medrol 80mg Injection, [facility]    Reviewed

 

                    2018 12:00 AM    Rocephin 1 gram Injection, [facility]    Reviewed

 

                    2013 12:00 AM    THER/PROPH/DIAG INJ SC/IM    Reviewed

 

                    2013 12:00 AM    Decadron, Per 1 Mg NDC# [address]-82    Reviewed

 

                    2013 12:00 AM    Depo-Medrol, Per 80 Mg NDC#0148-1282-49    Reviewed

 

                    2013 12:00 AM    Toradol 60 Mg NDC#4746-7091-43    Reviewed

 

                    2010 12:00 AM    ROUTINE VENIPUNCTURE    Reviewed

 

                    2010 12:00 AM    COMPLETE CBC W/AUTO DIFF WBC    Reviewed

 

                    2010 12:00 AM    COMPREHEN METABOLIC PANEL    Reviewed

 

                    2010 12:00 AM    LIPID PANEL         Reviewed

 

                    10/28/2014 12:00 AM    CHEST X-RAY 4/> VIEWS    Reviewed

 

                    6/3/2011 12:00 AM    ROUTINE VENIPUNCTURE    Reviewed

 

                    6/3/2011 12:00 AM    COMPLETE CBC AUTOMATED    Reviewed

 

                    6/3/2011 12:00 AM    COMPREHEN METABOLIC PANEL    Reviewed

 

                    6/3/2011 12:00 AM    LIPID PANEL         Reviewed

 

                    2011 12:00 AM    THER/PROPH/DIAG INJ SC/IM    Reviewed

 

                    2011 12:00 AM    Decadron Inj.8mg-([facility]) Ndc #[id number]    Reviewed

 

                    2011 12:00 AM    Depo-Medrol 80 Mg Im/[facility] NDC 0009-038058    Reviewed







Results Summary







                          Date and Description      Results

 

                          6/3/2011 1:53 PM          TRIGLYCERIDES 155.0 mg/dLCHOLESTEROL 248.0 mg/dLHDL 51.0 mg/dLTOT

 CHOL/HDL 4.9 .0 mg/dLGLUCOSE 97.0 mg/dLSODIUM 139.0 mmol/LPOTASSIUM 3.70
 mmol/LCHLORIDE 101.0 mmol/LCO2 27.0 mmol/LBUN 19.0 mg/dLCREATININE 0.90 
mg/dLSGOT/AST 19.0 IU/LSGPT/ALT 11.0 IU/LALK PHOS 111.0 IU/LTOTAL PROTEIN 7.40 
g/dLALBUMIN 4.20 g/dLTOTAL BILI 0.50 mg/dLCALCIUM 9.50 mg/dLAGE 70 GFR NonAA 62 
GFR AA 75 eGFR >60 mL/min/1.73 m2eGFR AA* >60 







History Of Immunizations

Not available.



History of Past Illness







                    Name                Date of Onset       Comments

 

                    Chronic Obstructive Pulmonary Disease                         

 

                    Hyperlipidemia                           

 

                    Cough               2010  8:49AM     

 

                    General Medical Exam, Adult    2010  8:49AM     

 

                    Seasonal Allergies    2010  8:49AM     

 

                    Sinusitis, Chronic    2010  8:49AM     

 

                    Ganglion cyst of synovium, tendon, and bursa; other    2010 11:48AM     

 

                    Anxiety disorder    10/29/2013           

 

                    Pain in joint; Hip    06/10/2014           

 

                    Pulmonary nodule seen on imaging study    10/29/2014           

 

                    Exercise hypoxemia    10/29/2014           

 

                    Chronic Obstructive Pulmonary Disease    2010  3:02PM     

 

                    Edema               2010  3:02PM     

 

                    Sinusitis, Acute    2010  3:02PM     

 

                    Anxiety about health    2016           

 

                    Chronic Obstructive Pulmonary Disease    Trey  3 2011  8:38AM     

 

                    Palpitations        Trey  3 2011  8:38AM     

 

                    Cough               2011  9:54AM     

 

                    Sinusitis, Acute    2011  9:54AM     

 

                    Seasonal Allergies    2011  9:54AM     

 

                    Post-nasal drainage    2011  9:54AM     

 

                    Primary osteoarthritis involving multiple joints    2017           

 

                    Medication management    2017           

 

                    Cellulitis of left lower extremity    2017           

 

                    Dog bite of calf, left, sequela    2017           

 

                    Cough               Sep 22 2011  2:55PM     

 

                    Seasonal Allergies    Sep 22 2011  2:55PM     

 

                    Pharyngitis, Acute    Sep 22 2011  2:55PM     

 

                    Post-nasal drainage    Sep 22 2011  2:55PM     

 

                    Blood In Stool, Occult    2011  9:58AM     

 

                    Hemorrhoids         2011  9:58AM     

 

                    Chronic Obstructive Pulmonary Disease    2012  2:33PM     

 

                    Cardiac Dysrhythmia    2012  2:33PM     

 

                    Sinoatrial Node Dysfunction    Mar 16 2012  9:12AM     

 

                    Palpitations        Mar 16 2012  9:12AM     

 

                    Osteoarthrosis, generalized, multiple sites    Oct  3 2012  9:34AM     

 

                    Pain in joint; Hip    Oct  3 2012  9:34AM     

 

                    Osteoarthrosis      Oct 25 2012  9:09AM     

 

                    Bronchitis, Acute    Oct 25 2012  9:09AM     

 

                    Cough               Oct 25 2012  9:09AM     

 

                    Pain in joint; Left Hip    Oct 25 2012  9:09AM     

 

                    Pain in joint; Hip    2013  9:50AM     

 

                    Osteoarthrosis, generalized, multiple sites    2013  2:45PM     

 

                    Pain in joint; Hip bilateral    2013  2:45PM     

 

                    Anxiety Disorder    Oct 28 2013 10:32AM     

 

                    Chronic Obstructive Pulmonary Disease    Oct 28 2013 10:32AM     

 

                    Hyperlipidemia      Oct 28 2013 10:32AM     

 

                    Pain in joint; Left Hip    Oct 28 2013 10:32AM     

 

                    Sinusitis, Acute    Oct 28 2013 10:32AM     

 

                    Essential Hypertension    2014  3:19PM     

 

                    Osteoarthrosis, generalized, multiple sites    2014  3:19PM     

 

                    Chronic Obstructive Pulmonary Disease    2014  3:19PM     

 

                    Pain in joint; Hip    2014  3:19PM     

 

                    Chronic Obstructive Pulmonary Disease    2014  2:31PM     

 

                    Pain in joint; Hip    2014  2:31PM     

 

                    pre-operative examination, unspecified    2014  2:31PM     

 

                    Lung nodule seen on imaging study    Oct 28 2014 10:24AM     

 

                    Bronchitis, Acute    Oct 16 2014  9:45AM     

 

                    Respiratory System And Chest Symptoms    Oct 16 2014  9:45AM     

 

                    COPD (chronic obstructive pulmonary disease)    Oct 16 2014  9:45AM     

 

                    Chronic Obstructive Pulmonary Disease    Oct 28 2014  2:26PM     

 

                    Pulmonary nodule seen on imaging study    Oct 28 2014  2:26PM     

 

                    Shortness of breath    Oct 28 2014  2:26PM     

 

                    Exercise hypoxemia    Oct 28 2014  2:26PM     

 

                    Nail avulsion       Oct  6 2015  9:38AM     

 

                          Contusion of left index finger with damage to nail, initial encounter    Oct 26 2015

  2:53PM                                 

 

                    Forehead contusion, subsequent encounter    Dec 15 2015  2:39PM     

 

                    Generalized anxiety disorder    Dec 15 2015  2:39PM     

 

                    Chronic Obstructive Pulmonary Disease    Dec 15 2015  2:39PM     

 

                    Osteoarthrosis, generalized, multiple sites    Mar 10 2016  2:12PM     

 

                    Pain of right thigh    Mar 10 2016  2:12PM     

 

                    Mild Acute Hip pain, acute, right Improving    Mar 10 2016  2:12PM     

 

                    Anxiety about health    Mar 10 2016  2:12PM     

 

                    Hyperlipidemia      Aug 22 2016 10:58AM     

 

                    Sinoatrial Node Dysfunction    Aug 22 2016 10:58AM     

 

                    Palpitations        Aug 22 2016 10:58AM     

 

                    Chronic Obstructive Pulmonary Disease    Aug 22 2016 10:58AM     

 

                    Exercise hypoxemia    Aug 22 2016 10:58AM     

 

                    Pain in joint; Hip    Aug 22 2016 10:58AM     

 

                    Pulmonary nodule seen on imaging study    Aug 22 2016 10:58AM     

 

                    Eustachian tube dysfunction, bilateral    Nov 10 2016  3:57PM     

 

                    Moderate Acute Cough    Nov 10 2016  3:57PM     

 

                    Pharyngitis, Acute    Nov 10 2016  3:57PM     

 

                    Upper Respiratory Infection    Nov 10 2016  3:57PM     

 

                          COPD (chronic obstructive pulmonary disease) with acute bronchitis    Nov 10 2016 

 3:57PM                                  

 

                    Mild Chronic Cough Worsening    Dec 12 2016 12:12PM     

 

                          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

 12 2016 12:12PM                         

 

                    Moderate Shortness of breath on exertion    Dec 12 2016 12:12PM     

 

                    Hyperlipidemia      May  4 2017 10:27AM     

 

                    Sinoatrial Node Dysfunction    May  4 2017 10:27AM     

 

                    Palpitations        May  4 2017 10:27AM     

 

                    Chronic Obstructive Pulmonary Disease    May  4 2017 10:27AM     

 

                    Exercise hypoxemia    May  4 2017 10:27AM     

 

                    Pain in joint; Hip    May  4 2017 10:27AM     

 

                    Pulmonary nodule seen on imaging study    May  4 2017 10:27AM     

 

                    Chest congestion    May 16 2017  4:04PM     

 

                          COPD (chronic obstructive pulmonary disease) with acute bronchitis    May 16 2017 

 4:04PM                                  

 

                    Productive cough    May 16 2017  4:04PM     

 

                    Anxiety about health    May 16 2017  4:04PM     

 

                          Chronic obstructive pulmonary disease, unspecified COPD type    May 16 2017  4:04PM

                                         

 

                    Exercise hypoxemia    May 16 2017  4:04PM     

 

                    Mixed hyperlipidemia    May 16 2017  4:04PM     

 

                    Medication management    May 16 2017  4:04PM     

 

                    Primary osteoarthritis involving multiple joints    May 16 2017  4:04PM     

 

                    Cellulitis of left lower extremity    2017 12:10PM     

 

                    Bitten by dog, initial encounter    2017 12:10PM     

 

                    Cellulitis of left lower extremity    2017  8:56AM     

 

                    Open bite, left lower leg, sequela    2017  8:56AM     

 

                    Bitten by dog, sequela    2017  8:56AM     

 

                    Medication management    2017  8:56AM     

 

                    Cellulitis of left lower extremity    2017 11:38AM     

 

                    Open bite, left lower leg, subsequent encounter    2017 11:38AM     

 

                    Bitten by dog, subsequent encounter    2017 11:38AM     

 

                    Medication management    2017 11:38AM     

 

                    Cough               Aug 10 2017  4:09PM     

 

                    Abnormal chest xray    Aug 10 2017  4:09PM     

 

                    Hyperlipidemia      Aug 29 2017  9:02AM     

 

                    Sinoatrial Node Dysfunction    Aug 29 2017  9:02AM     

 

                    Palpitations        Aug 29 2017  9:02AM     

 

                    Chronic Obstructive Pulmonary Disease    Aug 29 2017  9:02AM     

 

                    Exercise hypoxemia    Aug 29 2017  9:02AM     

 

                    Pain in joint; Hip    Aug 29 2017  9:02AM     

 

                    Pulmonary nodule seen on imaging study    Aug 29 2017  9:02AM     

 

                    Eustachian tube dysfunction, bilateral    Aug 28 2017  3:30PM     

 

                    Purulent postnasal drainage    Aug 28 2017  3:30PM     

 

                    Chest congestion    Aug 28 2017  3:30PM     

 

                    Upper respiratory tract infection, unspecified type    Aug 28 2017  3:30PM     

 

                    Moderate Acute Sinus pressure    Aug 28 2017  3:30PM     

 

                          COPD (chronic obstructive pulmonary disease) with acute bronchitis    Aug 28 2017 

 3:30PM                                  

 

                    Moderate Chronic Purulent postnasal drainage    Oct 24 2017  1:52PM     

 

                    Mild Chronic Sinus pressure    Oct 24 2017  1:52PM     

 

                          Moderate Chronic Acute seasonal allergic rhinitis, unspecified trigger Stable    Oct

 24 2017  1:52PM                         

 

                    Mild Acute Labyrinthitis    Oct 24 2017  1:52PM     

 

                    Moderate Acute Vertigo    Oct 24 2017  1:52PM     

 

                    Purulent postnasal drainage    2018  3:58PM     

 

                    Upper respiratory tract infection, unspecified type    2018  3:58PM     

 

                    Mild Acute Left Enlarged lymph node in neck    2018  3:58PM     

 

                    Cough               2018  1:36PM     

 

                    Moderate Acute Recurrent Chest congestion    2018  1:36PM     

 

                    COPD exacerbation    2018  1:36PM     

 

                          Chronic obstructive pulmonary disease with acute lower respiratory infection    2018  1:36PM                         

 

                    Acute bronchitis, unspecified    2018  1:36PM     







Payers







           Insurance Name    Company Name    Plan Name    Plan Number    Policy Number    Policy Group

 Number                                 Start Date

 

                    Medicare [facility]    Medicare [facility]              [id number]              N/A

 

                          [place] [employer]    [place] [employer]                 [id number]

                                                    N/A

 

                    [employer] Medicare A    Test Medicare A              [id number]              N/A

 

                                [facility]      

                    [id number]                             N/A

 

                    Medicare Part A    Medicare - Lab/Xray              [id number]              N/A

 

                          [employer]    [employer]                 [id number]

                                                    N/A

 

                    Medicare Part A    Medicare Part A              [id number]              N/A

 

                    Medicare Part B    [employer] [place]              [id number]              N/A







History of Encounters







                    Visit Date          Visit Type          Provider

 

                    2018            Office visit        DEON ESCALANTE

 

                    2018           Office visit        DEON ESCALANTE

 

                    10/24/2017          Office visit        DEON LEON PA

 

                    2017           Voided              DEON LEON PA

 

                    2017           Office visit        DEON LEON PA

 

                    2017           Office visit        DEON LEON PA

 

                    2017            Office visit        DEON LEON PA

 

                    2017            Office visit        DEON LEON PA

 

                    2017           Office visit        DEON LEON PA

 

                    2016          Office visit        DEON LEON PA

 

                    11/10/2016          Office visit        DEON LEON PA

 

                    3/10/2016           Office visit        DEON LEON PA

 

                    12/15/2015          Office visit        DEON LEON PA

 

                    10/26/2015          Office visit        DEON LEON PA

 

                    10/6/2015           Office visit        DEON LEON PA

 

                    10/28/2014          Office visit        DEON LEON PA

 

                    10/16/2014          Office visit        DEON LEON PA

 

                    2014            Office visit        DEON LEON PA

 

                    2014           [facility]            DEWEY Garcia MD

 

                    2014           Office visit        DEON LEON PA

 

                    10/28/2013          Office visit        DEON LEON PA

 

                    10/14/2013          [facility]            DEWEY Garcia MD

 

                    2013           Office visit        DEON LEON PA

 

                    2013            Office visit        DEON LEON PA

 

                    10/25/2012          Office visit        DEON LEON PA

 

                    10/3/2012           Office visit        DEON LEON PA

 

                    3/16/2012           Office visit        DEON LEON PA

 

                    2012            Office visit        DEON LEON PA

 

                    2012            [facility]            DEWEY Garcia MD

 

                    2011          Office visit        DEON LEON PA

 

                    2011           Office visit        Deon Leon PA-C

 

                    2011            Office visit        Deon Leon PA-C

 

                    6/3/2011            Office visit        Deon Leon PA-C

 

                    2010           Office visit        Deon Leon PA-C

 

                    2010           Office visit        Deon Leon PA-C

 

                    2010           Office visit        Deon Leon PA-C

## 2019-06-25 NOTE — NUR
Pt placed on bipap by RT Melissa, at this time.

-------------------------------------------------------------------------------

Addendum: 06/25/19 at 1035 by [id number]

-------------------------------------------------------------------------------

bipap settings 15/5 at this time

## 2019-06-25 NOTE — XMS REPORT
MU2 Ambulatory Summary

                             Created on: 2016



Elsi Casillas

External Reference #: [id number]

: 1940

Sex: Female



Demographics







                          Address                   [address] [place]  [address]

 

                          Home Phone                [phone]

 

                          Preferred Language        English

 

                          Marital Status            

 

                          Sikhism Affiliation     Unknown

 

                          Race                      White

 

                          Ethnic Group              Not  or 





Author







                          Author                    DEON LEON

 

                          [facility]

 

                          Address                   [address]

[place], [place]  [id number]



 

                          Phone                     [phone]







Care Team Providers







                    Care Team Member Name    Role                Phone

 

                    DEON LEON    PCP                 Unavailable







Allergies and Adverse Reactions







                    Name                Reaction            Notes

 

                    NO KNOWN DRUG ALLERGIES                         







Plan of Treatment







             Planned Activity    Comments     Planned Date    Planned Time    Plan/Goal

 

             Lipid Profile                 2016    12:00 AM      

 

             Injection, Subcutaneous/IM                 2016    12:00 AM      







Medications







                                        Active 

 

             Name         Start Date    Estimated Completion Date    SIG          Comments

 

             Proctofoam 1 % topical foam    2011                 apply by external route daily     

 

                Calcium 600 + D(3) 600 mg(1,500mg) -200 unit oral tablet                                    take 2 tablets by

 oral route daily                        

 

             Aerochamber    2014                 Use with inhaler as directed     

 

                pravastatin 20 mg oral tablet                                    take 1 tablet (20 mg) by oral route once daily

                                         

 

                Toprol XL 25 mg oral tablet extended release 24 hr                                    take 1 tablet (25 mg) 

by oral route once daily                 

 

                hydrochlorothiazide 25 mg oral tablet    1/15/2015                       take 1 tablet (25 mg) by oral

 route once daily for 30 days            

 

                metoprolol succinate 25 mg oral tablet extended release 24 hr    2015                      take

 1 tablet (25 mg) by oral route once daily     

 

                promethazine-codeine 6.25-10 mg/5 mL oral syrup    11/10/2016                      take 5 milliliters

 by oral route every 6 hours as needed, not to exceed 30 mL in 24 hours     

 

                metoprolol succinate 25 mg oral tablet extended release 24 hr    2016                      TAKE

 1 TABLET DAILY                          

 

                                        ipratropium-albuterol 0.5 mg-3 mg(2.5 mg base)/3 mL inhalation solution for nebulization

                    2016                              inhale 3 milliliters by nebulization route 4 times per day for COPD

                                         

 

                Symbicort 160-4.5 mcg/actuation inhalation HFA aerosol inhaler    11/10/2016                      inhale

 2 puffs by inhalation route 2 times per day in the morning and evening     

 

             hydrochlorothiazide 25 mg oral tablet    2016                 TAKE 1 TABLET DAILY     

 

                Levaquin 500 mg oral tablet    2016      take 1 tablet (500 mg) by oral

 route once daily for 10 days            









                                         

 

             Name         Start Date    Expiration Date    SIG          Comments

 

                Singulair 10 mg oral tablet    9/3/2010        2011        take 1 tablet (10 mg) by oral route

 daily  for 60 days                      

 

                Zithromax Z-Christopher 250 mg oral tablet    2011       take 2 tablets (500 mg)

 by oral route once daily for 1 day then 1 tablet (250 mg) by oral route once 
daily for 4 days                         

 

                Medrol (Christopher) 4 mg oral tablets,dose pack    2011        take as directed

 for 6 days                              

 

                Keflex 500 mg oral capsule    3/1/2012        3/11/2012       take 1 capsule by oral route 3 

times a day for 10 days                  

 

                Cipro 500 mg oral tablet    2012        take 1 tablet (500 mg) by oral route

 every 12 hours for 15 days              

 

                benzonatate 100 mg oral capsule    2013       take 1 capsule (100 mg) by

 oral route 3 times per day for cough     

 

             Medrol (Christopher) 4 mg oral tablets,dose pack    2013     take as directed    

 

 

                Zyrtec 10 mg oral tablet    2013       take 1 tablet (10 mg) by oral route

 once daily for 30 days                  

 

                Flonase 50 mcg/actuation nasal spray,suspension    2013       inhale 1 spray

 in each nostril by intranasal route 2 times per day for 30 days     

 

                cephalexin 500 mg oral capsule    2013        take 1 capsule (500 mg) by oral

 route every 8 hours                     

 

                promethazine-codeine 6.25-10 mg/5 mL oral syrup    2013                       take 5 milliliters

 by oral route every 4 hours as needed, not to exceed 30 mL in 24 hours     

 

                Zithromax Z-Christopher 250 mg oral tablet    10/2/2013       10/7/2013       take 2 tablets (500 mg)

 by oral route once daily for 1 day then 1 tablet (250 mg) by oral route once 
daily for 4 days                         

 

                amoxicillin 500 mg oral capsule    2014       take 1 capsule by oral route

 3 times a day for 15 days               

 

                lovastatin 20 mg oral tablet    2014        take 1 tablet (20 mg) by oral 

route daily  for 30 days                 

 

                Zithromax Z-Christopher 250 mg oral tablet    2014       take 2 tablets (500 mg)

 by oral route once daily for 1 day then 1 tablet (250 mg) by oral route once 
daily for 4 days                         

 

             Medrol (Christopher) 4 mg oral tablets,dose pack    2014                 take as directed     

 

                amoxicillin 500 mg oral capsule    2014                       take 1 capsule (500 mg) by oral route

 3 times per day                         

 

                Levaquin 500 mg oral tablet    10/16/2014      10/26/2014      take 1 tablet (500 mg) by oral

 route once daily for 10 days            

 

                prednisone 20 mg oral tablet    10/16/2014      10/24/2014      4x2 days 3x2 days 2x2 days

 1x2 days                                

 

                Zithromax Z-Christopher 250 mg oral tablet    2014       take 2 tablets (500 mg)

 by oral route once daily for 1 day then 1 tablet (250 mg) by oral route once 
daily for 4 days                         

 

                Bactrim -160 mg oral tablet    1/15/2015       2015       take 1 tablet by oral route

 every 12 hours for 10 days              

 

                Zithromax Z-Christopher 250 mg oral tablet    2015      take 2 tablets (500 mg)

 by oral route once daily for 1 day then 1 tablet (250 mg) by oral route once 
daily for 4 days                         

 

                Keflex 500 mg oral capsule    2016       take 1 capsule by oral route 3

 times a day for 7 days                  

 

                amoxicillin 500 mg oral capsule    10/4/2016       10/14/2016      take 1 capsule by oral route

 3 times a day for 10 days               

 

                Tessalon Perles 100 mg oral capsule    10/4/2016                       take 1 capsule by oral route 

every 4 hours                            

 

                Zithromax Z-Christopher 250 mg oral tablet    11/10/2016      11/15/2016      take 2 tablets (500 

mg) by oral route once daily for 1 day then 1 tablet (250 mg) by oral route once
daily for 4 days                         

 

                amoxicillin 500 mg oral tablet    2016                       take 1 tablet (500 mg) by oral route

 3 times per day                         









                                        Discontinued 

 

             Name         Start Date    Discontinued Date    SIG          Comments

 

                amoxicillin 500 mg oral capsule    11/10/2011      10/3/2012       take 1 capsule (500 mg) 

by oral route 3 times per day            

 

                Anusol-HC 25 mg rectal suppository    2011      10/3/2012       insert 1 suppository 

(25 mg) by rectal route 2 times per day     

 

                hydrochlorothiazide 25 mg oral tablet    1/15/2015       2015      TAKE 1 TABLET DAILY

                                         







Problem List







                    Description         Status              Onset

 

                    Chronic Obstructive Pulmonary Disease    Active               

 

                    Hyperlipidemia      Active               

 

                    Anxiety Disorder    Active              10/29/2013

 

                    Pain in joint; Hip    Active              06/10/2014

 

                    Pulmonary nodule seen on imaging study    Active              10/29/2014

 

                    Exercise hypoxemia    Active              10/29/2014

 

                    Anxiety about health    Active              2016







Vital Signs







      Date    Time    BP-Sys(mm[Hg]    BP-Noni(mm[Hg])    HR(bpm)    RR(rpm)    Temp    WT    HT    HC    BMI

                    BSA                 BMI Percentile      O2 Sat(%)

 

        2016    12:11:00 PM    120 mmHg    84 mmHg    110 bpm    16 rpm    98.1 F    130 lbs    63

 in                       23.03 kg/m2    1.62 m2                   90 %

 

        11/10/2016    3:57:00 PM    148 mmHg    72 mmHg    88 bpm    16 rpm    98.2 F    132 lbs    63 in

                          23.3825 kg/m    1.6314 m                 98 %

 

        3/10/2016    2:12:00 PM    122 mmHg    60 mmHg    106 bpm    18 rpm    97 F    137 lbs    63 in 

                          24.27 kg/m2    1.66 m2                   93 %

 

        12/15/2015    2:33:00 PM    120 mmHg    75 mmHg    102 bpm    16 rpm    98.2 F    137 lbs    63 

in                        24.2682 kg/m    1.662 m                 94 %

 

        10/26/2015    2:46:00 PM    130 mmHg    80 mmHg    96 bpm    16 rpm    97.9 F    141 lbs    66 in

                          22.76 kg/m2    1.73 m2                   98 %

 

        10/6/2015    9:37:00 AM    140 mmHg    78 mmHg    110 bpm    16 rpm    97.9 F    141 lbs    63 in

                          24.9768 kg/m    1.6861 m                 95 %

 

        10/28/2014    2:25:00 PM    132 mmHg    66 mmHg    92 bpm    24 rpm    97.5 F    147 lbs    63 in

                          26.04 kg/m2    1.72 m2                   92 %

 

        10/16/2014    9:45:00 AM    132 mmHg    64 mmHg    74 bpm    24 rpm    97.7 F    146 lbs    63 in

                          25.8625 kg/m    1.7157 m                 92 %

 

        2014    2:31:00 PM    136 mmHg    68 mmHg    90 bpm    22 rpm    98.2 F    148 lbs    63 in 

                          26.22 kg/m2    1.73 m2                   95 %

 

        2014    3:19:00 PM    124 mmHg    70 mmHg    64 bpm    20 rpm    97.8 F    147 lbs    63 in

                          26.0396 kg/m    1.7216 m                 95 %

 

        10/28/2013    10:31:00 AM    140 mmHg    70 mmHg    92 bpm    24 rpm    97.8 F    143 lbs    63 

in                        25.33 kg/m2    1.70 m2                   95 %

 

      2013    2:51:00 PM    130 mmHg    78 mmHg    90 bpm          98.2 F    168 lbs    65 in          

27.9564 kg/m      1.8694 m                               

 

       2013    2:43:00 PM    110 mmHg    76 mmHg    103 bpm           96.6 F    137 lbs    63 in      

                24.27 kg/m2     1.66 m2                          

 

        2013    9:50:00 AM    150 mmHg    66 mmHg    108 bpm    20 rpm    97.8 F    138 lbs    63 in

                          24.4454 kg/m    1.6681 m                 94 %

 

        10/25/2012    9:08:00 AM    110 mmHg    60 mmHg    88 bpm    18 rpm    97.5 F    142 lbs    63 in

                          25.15 kg/m2    1.69 m2                   94 %

 

      10/3/2012    9:33:00 AM    130 mmHg    60 mmHg    98 bpm    18 rpm          146 lbs    63 in          

25.8625 kg/m      1.7157 m                              95 %

 

      2012    2:31:00 PM    116 mmHg    76 mmHg    88 bpm                140 lbs    63 in          24.80 

kg/m2               1.68 m2                                 96 %

 

     2011    10:02:00 AM    122 mmHg    80 mmHg    110 bpm              144 lbs                        

                                        94 %

 

      2011    2:58:00 PM    124 mmHg    84 mmHg    106 bpm                155 lbs    63 in          27.46

 kg/m2              1.77 m2                                 96 %

 

     2011    9:55:00 AM    114 mmHg    78 mmHg    92 bpm              146 lbs                             95

 %

 

     6/3/2011    8:36:00 AM    116 mmHg    74 mmHg    90 bpm              146 lbs                             96

 %

 

     2010    3:01:00 PM    132 mmHg    84 mmHg    102 bpm              149 lbs                          

                                        94 %

 

     2010    11:46:00 AM    124 mmHg    78 mmHg    104 bpm              151 lbs                         

                                        94 %

 

     2010    8:47:00 AM    116 mmHg    78 mmHg    103 bpm              151 lbs                          

                                        95 %







Social History







                    Name                Description         Comments

 

                    Tobacco             Never smoker         

 

                    denies alcohol use                         







History of Procedures







                    Date Ordered        Description         Order Status

 

                    2011 12:00 AM    THER/PROPH/DIAG INJ SC/IM    Reviewed

 

                    2011 12:00 AM    Decadron Inj.1mg-([facility]) Ndc #[id number]    Reviewed

 

                    2011 12:00 AM    Depo-Medrol 80 Mg Im/[facility] NDC [id number]    Reviewed

 

                    2011 12:00 AM    Rocephin, Per 250MG - 1 Gram Vial NDC 4816-737881-[facility]    Reviewed



 

                    3/16/2012 12:00 AM    ROUTINE VENIPUNCTURE    Reviewed

 

                    3/16/2012 12:00 AM    COMPLETE CBC W/AUTO DIFF WBC    Reviewed

 

                    3/16/2012 12:00 AM    COMPREHEN METABOLIC PANEL    Reviewed

 

                    3/16/2012 12:00 AM    ASSAY THYROID STIM HORMONE    Reviewed

 

                    11/10/2016 12:00 AM    Decadron, Per 1 Mg NDC# [address]-14    Reviewed

 

                    11/10/2016 12:00 AM    Depo-Medrol, Per 80 Mg NDC#3914-2913-23    Reviewed

 

                    11/10/2016 12:00 AM    Rocephin 1 gram NDC#6756-1236-53    Reviewed

 

                    10/3/2012 12:00 AM    THER/PROPH/DIAG INJ SC/IM    Reviewed

 

                    10/3/2012 12:00 AM    Decadron, Per 1 Mg NDC# [address]-94    Reviewed

 

                    10/3/2012 12:00 AM    Depo-Medrol, Per 80 Mg NDC#7179-5536-56    Reviewed

 

                    10/3/2012 12:00 AM    Toradol 60 Mg NDC#7600-0126-87    Reviewed

 

                    2013 12:00 AM    THER/PROPH/DIAG INJ SC/IM    Reviewed

 

                    2013 12:00 AM    Decadron, Per 1 Mg NDC# [address]-18    Reviewed

 

                    2013 12:00 AM    Depo-Medrol, Per 80 Mg NDC#1966-4292-45    Reviewed

 

                    2013 12:00 AM    Toradol 60 Mg NDC#8019-8115-26    Reviewed

 

                    2010 12:00 AM    ROUTINE VENIPUNCTURE    Reviewed

 

                    2010 12:00 AM    COMPLETE CBC W/AUTO DIFF WBC    Reviewed

 

                    2010 12:00 AM    COMPREHEN METABOLIC PANEL    Reviewed

 

                    2010 12:00 AM    LIPID PANEL         Reviewed

 

                    10/28/2014 12:00 AM    CHEST X-RAY 4/> VIEWS    Reviewed

 

                    6/3/2011 12:00 AM    ROUTINE VENIPUNCTURE    Reviewed

 

                    6/3/2011 12:00 AM    COMPLETE CBC AUTOMATED    Reviewed

 

                    6/3/2011 12:00 AM    COMPREHEN METABOLIC PANEL    Reviewed

 

                    6/3/2011 12:00 AM    LIPID PANEL         Reviewed

 

                    2011 12:00 AM    THER/PROPH/DIAG INJ SC/IM    Reviewed

 

                    2011 12:00 AM    Decadron Inj.8mg-([facility]) Ndc #[id number]    Reviewed

 

                    2011 12:00 AM    Depo-Medrol 80 Mg Im/[facility] NDC [id number]    Reviewed







Results Summary







                          Data and Description      Results

 

                          6/3/2011 1:52 PM          WBC 8.6 RBC 4.66 HGB 13.20 g/dLHCT 42.10 %MCV 90.0 fLMCH 28.30

 pgMCHC 31.40 g/dLRDW SD 44 RDW CV 13.60 %MPV 10.90 fLPLT 383 NRBC# 0.00 NRBC% 
0.0 %NEUT 68.0 %%LYMP 20.30 %%MONO 9.30 %%EOS 2.20 %%BASO 0.20 %#NEUT 5.81 #LYMP
 1.74 #MONO 0.80 #EOS 0.19 #BASO 0.02 MANUAL DIFF NOT IND 

 

                          6/3/2011 1:53 PM          TRIGLYCERIDES 155.0 mg/dLCHOLESTEROL 248.0 mg/dLHDL 51.0 mg/dLTOT

 CHOL/HDL 4.9 .0 mg/dLGLUCOSE 97.0 mg/dLSODIUM 139.0 mmol/LPOTASSIUM 3.70
 mmol/LCHLORIDE 101.0 mmol/LCO2 27.0 mmol/LBUN 19.0 mg/dLCREATININE 0.90 
mg/dLSGOT/AST 19.0 IU/LSGPT/ALT 11.0 IU/LALK PHOS 111.0 IU/LTOTAL PROTEIN 7.40 
g/dLALBUMIN 4.20 g/dLTOTAL BILI 0.50 mg/dLCALCIUM 9.50 mg/dLAGE 70 GFR NonAA 62 
GFR AA 75 eGFR >60 mL/min/1.73 m2eGFR AA* >60 







History Of Immunizations

Not available.



History of Past Illness







                    Name                Date of Onset       Comments

 

                    Chronic Obstructive Pulmonary Disease                         

 

                    Hyperlipidemia                           

 

                    Cough               2010  8:49AM     

 

                    General Medical Exam, Adult    2010  8:49AM     

 

                    Seasonal Allergies    2010  8:49AM     

 

                    Sinusitis, Chronic    2010  8:49AM     

 

                    Ganglion cyst of synovium, tendon, and bursa; other    2010 11:48AM     

 

                    Anxiety Disorder    10/29/2013           

 

                    Pain in joint; Hip    06/10/2014           

 

                    Pulmonary nodule seen on imaging study    10/29/2014           

 

                    Exercise hypoxemia    10/29/2014           

 

                    Chronic Obstructive Pulmonary Disease    2010  3:02PM     

 

                    Edema               2010  3:02PM     

 

                    Sinusitis, Acute    2010  3:02PM     

 

                    Anxiety about health    2016           

 

                    Chronic Obstructive Pulmonary Disease    Trey  3 2011  8:38AM     

 

                    Palpitations        Trey  3 2011  8:38AM     

 

                    Cough               2011  9:54AM     

 

                    Sinusitis, Acute    2011  9:54AM     

 

                    Seasonal Allergies    2011  9:54AM     

 

                    Post-nasal drainage    2011  9:54AM     

 

                    Cough               Sep 22 2011  2:55PM     

 

                    Seasonal Allergies    Sep 22 2011  2:55PM     

 

                    Pharyngitis, Acute    Sep 22 2011  2:55PM     

 

                    Post-nasal drainage    Sep 22 2011  2:55PM     

 

                    Blood In Stool, Occult    2011  9:58AM     

 

                    Hemorrhoids         2011  9:58AM     

 

                    Chronic Obstructive Pulmonary Disease    2012  2:33PM     

 

                    Cardiac Dysrhythmia    2012  2:33PM     

 

                    Sinoatrial Node Dysfunction    Mar 16 2012  9:12AM     

 

                    Palpitations        Mar 16 2012  9:12AM     

 

                    Osteoarthrosis, generalized, multiple sites    Oct  3 2012  9:34AM     

 

                    Pain in joint; Hip    Oct  3 2012  9:34AM     

 

                    Osteoarthrosis      Oct 25 2012  9:09AM     

 

                    Bronchitis, Acute    Oct 25 2012  9:09AM     

 

                    Cough               Oct 25 2012  9:09AM     

 

                    Pain in joint; Left Hip    Oct 25 2012  9:09AM     

 

                    Pain in joint; Hip    2013  9:50AM     

 

                    Osteoarthrosis, generalized, multiple sites    2013  2:45PM     

 

                    Pain in joint; Hip bilateral    2013  2:45PM     

 

                    Anxiety Disorder    Oct 28 2013 10:32AM     

 

                    Chronic Obstructive Pulmonary Disease    Oct 28 2013 10:32AM     

 

                    Hyperlipidemia      Oct 28 2013 10:32AM     

 

                    Pain in joint; Left Hip    Oct 28 2013 10:32AM     

 

                    Sinusitis, Acute    Oct 28 2013 10:32AM     

 

                    Essential Hypertension    2014  3:19PM     

 

                    Osteoarthrosis, generalized, multiple sites    2014  3:19PM     

 

                    Chronic Obstructive Pulmonary Disease    2014  3:19PM     

 

                    Pain in joint; Hip    2014  3:19PM     

 

                    Chronic Obstructive Pulmonary Disease    2014  2:31PM     

 

                    Pain in joint; Hip    2014  2:31PM     

 

                    pre-operative examination, unspecified    2014  2:31PM     

 

                    Lung nodule seen on imaging study    Oct 28 2014 10:24AM     

 

                    Bronchitis, Acute    Oct 16 2014  9:45AM     

 

                    Respiratory System And Chest Symptoms    Oct 16 2014  9:45AM     

 

                    COPD (chronic obstructive pulmonary disease)    Oct 16 2014  9:45AM     

 

                    Chronic Obstructive Pulmonary Disease    Oct 28 2014  2:26PM     

 

                    Pulmonary nodule seen on imaging study    Oct 28 2014  2:26PM     

 

                    Shortness of breath    Oct 28 2014  2:26PM     

 

                    Exercise hypoxemia    Oct 28 2014  2:26PM     

 

                    Nail avulsion       Oct  6 2015  9:38AM     

 

                          Contusion of left index finger with damage to nail, initial encounter    Oct 26 2015

  2:53PM                                 

 

                    Forehead contusion, subsequent encounter    Dec 15 2015  2:39PM     

 

                    Generalized anxiety disorder    Dec 15 2015  2:39PM     

 

                    Chronic Obstructive Pulmonary Disease    Dec 15 2015  2:39PM     

 

                    Osteoarthrosis, generalized, multiple sites    Mar 10 2016  2:12PM     

 

                    Pain of right thigh    Mar 10 2016  2:12PM     

 

                    Mild Acute Hip pain, acute, right Improving    Mar 10 2016  2:12PM     

 

                    Anxiety about health    Mar 10 2016  2:12PM     

 

                    Hyperlipidemia      Aug 22 2016 10:58AM     

 

                    Sinoatrial Node Dysfunction    Aug 22 2016 10:58AM     

 

                    Palpitations        Aug 22 2016 10:58AM     

 

                    Chronic Obstructive Pulmonary Disease    Aug 22 2016 10:58AM     

 

                    Exercise hypoxemia    Aug 22 2016 10:58AM     

 

                    Pain in joint; Hip    Aug 22 2016 10:58AM     

 

                    Pulmonary nodule seen on imaging study    Aug 22 2016 10:58AM     

 

                    Eustachian tube dysfunction, bilateral    Nov 10 2016  3:57PM     

 

                    Moderate Acute Cough    Nov 10 2016  3:57PM     

 

                    Pharyngitis, Acute    Nov 10 2016  3:57PM     

 

                    Upper Respiratory Infection    Nov 10 2016  3:57PM     

 

                          COPD (chronic obstructive pulmonary disease) with acute bronchitis    Nov 10 2016 

 3:57PM                                  

 

                    Mild Chronic Cough Worsening    Dec 12 2016 12:12PM     

 

                          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

 12 2016 12:12PM                         

 

                    Moderate Shortness of breath on exertion    Dec 12 2016 12:12PM     







Payers







           Insurance Name    Company Name    Plan Name    Plan Number    Policy Number    Policy Group

 Number                                 Start Date

 

                    Medicare Part A    Medicare [place]              [id number]              N/A

 

                          [place] [employer] [facility]    [place] [employer]                 [id number]

                                                    N/A

 

                    [employer]zTest Medicare A    Test Medicare A              [id number]              N/A

 

                                [facility] - RHC - [facility] RHC Comm      

                    [id number]                             N/A

 

                    Medicare Part A    Medicare - Lab/Xray              [id number]              N/A

 

                          [employer] [place]    [employer]                 [id number]

                                                    N/A

 

                    Medicare Part A    Medicare Part A              [id number]              N/A

 

                    Medicare Part B    [employer] [place]              [id number]              N/A







History of Encounters







                    Visit Date          Visit Type          Provider

 

                    2016          Office visit        DEON ESCALANTE

 

                    11/10/2016          Office visit        DEON ESCALANTE

 

                    3/10/2016           Office visit        DEON ESCALANTE

 

                    12/15/2015          Office visit        DEON ESCALANTE

 

                    10/26/2015          Office visit        DEON ESCALANTE

 

                    10/6/2015           Office visit        DEON ESCALANTE

 

                    10/28/2014          Office visit        DEON ESCALANTE

 

                    10/16/2014          Office visit        DEON ESCALANTE

 

                    2014            Office visit        DEON ESCALANTE

 

                    2014           [facility]            DEWEY Garcia MD

 

                    2014           Office visit        DEON ESCALANTE

 

                    10/28/2013          Office visit        DEON ESCALANTE

 

                    10/14/2013          [facility]            DEWEY Garcia MD

 

                    2013           Office visit        DEON ESCALANTE

 

                    2013            Office visit        DEON ESCALANTE

 

                    10/25/2012          Office visit        DEON ESCALANTE

 

                    10/3/2012           Office visit        DEON ESCALANTE

 

                    3/16/2012           Office visit        DEON ESCALANTE

 

                    2012            Office visit        DEON ESCALANTE

 

                    2012            [facility]            DEWEY Garcia MD

 

                    2011          Office visit        DEON ESCALANTE

 

                    2011           Office visit        Deon ESCALANTE-C

 

                    2011            Office visit        Deon ESCALANTE-C

 

                    6/3/2011            Office visit        Deon Leon PA-C

 

                    2010           Office visit        Deon Leon PA-C

 

                    2010           Office visit        Deon Leon PA-C

 

                    2010           Office visit        Deon Leon PA-C

## 2019-06-25 NOTE — DIAGNOSTIC IMAGING REPORT
INDICATION: Head pain



FINDINGS:



Some bibasilar pulmonary opacity greater right than left which

may be atelectasis and scarring but could conceivably reflect

some acute infiltrate although felt less likely with background

COPD. There is no failure, effusion, or pneumothorax.



IMPRESSION:



Mild basilar pulmonary opacities likely reflect some partial

atelectasis and/or chronic scarring. Infiltrate in the right

could not be absolutely excluded.



Dictated by: 



  Dictated on workstation # [id number]

## 2019-06-25 NOTE — XMS REPORT
MU2 Ambulatory Summary

                             Created on: 2019



Elsi Casillas

External Reference #: [id number]

: 1940

Sex: Female



Demographics







                          Address                   [address] [place]  [address]

 

                          Home Phone                [phone]

 

                          Preferred Language        English

 

                          Marital Status            

 

                          Jewish Affiliation     Unknown

 

                          Race                      White

 

                          Ethnic Group              Not  or 





Author







                          Author                    DEON LEON

 

                          [facility]

 

                          Address                   [address]

[place], [place]  [id number]



 

                          Phone                     [phone]







Care Team Providers







                    Care Team Member Name    Role                Phone

 

                    DEON LEON    PCP                 [phone]

 

                    DEON LEON    PreferredProvider    [phone]







Allergies and Adverse Reactions







                    Name                Reaction            Notes

 

                    SULFA (SULFONAMIDES)    nausea               







Plan of Treatment







             Planned Activity    Comments     Planned Date    Planned Time    Plan/Goal

 

             Lipid Profile                 2016    12:00 AM      

 

             Chest PA and Lateral - Main                 8/10/2017    12:00 AM      

 

             Lipid Profile                 2018    12:00 AM      

 

             Chest PA and Lateral - Main                 2019    12:00 AM      

 

             Sinuses Limited - Main                 2019    12:00 AM      

 

             Lipid Profile                 2019    12:00 AM      







Medications







                                        Active 

 

             Name         Start Date    Estimated Completion Date    SIG          Comments

 

                Calcium 600 + D(3) 600 mg(1,500mg) -200 unit oral tablet                                    take 2 tablets by

 oral route daily                        

 

                pravastatin 20 mg oral tablet                                    take 1 tablet (20 mg) by oral route once daily

                                         

 

                Toprol XL 25 mg oral tablet extended release 24 hr                                    take 1 tablet (25 mg) 

by oral route once daily                 

 

                    albuterol sulfate 1.25 mg/3 mL inhalation solution for nebulization    2017           

                                        inhale 3 milliliters (1.25 mg) via nebulizer by inhalation route 4 times per day

  DX J44.9                               

 

                                        ipratropium-albuterol 0.5 mg-3 mg(2.5 mg base)/3 mL inhalation solution for nebulization

                    2018                                inhale 3 milliliters by nebulization route 4 times per day for COPD

                                         

 

                Lasix 40 mg oral tablet    2018                       take 1 tablet (40 mg) by oral route once 

daily                                    

 

                fluticasone 50 mcg/actuation nasal spray,suspension    10/2/2018                       inhale 1 spray

 (50 mcg) in each nostril by intranasal route 2 times per day     

 

                Sudogest 30 mg oral tablet    2018                      take 1-2 tablets by oral route every 

6 hours as needed                        

 

             lisinopril 10 mg oral tablet    2019                 TAKE 1 TABLET BY MOUTH ONCE DAILY     



 

                doxycycline hyclate 100 mg oral capsule    3/5/2019                        take 1 capsule (100 mg) by

 oral route 2 times per day              

 

             hydrochlorothiazide 25 mg oral tablet    2019                 TAKE 1 TABLET DAILY     

 

                metoprolol succinate 25 mg oral tablet extended release 24 hr    2019                       TAKE

 1 TABLET DAILY                          

 

                Cozaar 50 mg oral tablet    6/10/2019                       take 1 tablet (50 mg) by oral route once

 daily                                   









                                         

 

             Name         Start Date    Expiration Date    SIG          Comments

 

                Singulair 10 mg oral tablet    9/3/2010        2011        take 1 tablet (10 mg) by oral route

 daily  for 60 days                      

 

                Zithromax Z-Christopher 250 mg oral tablet    2011       take 2 tablets (500 mg)

 by oral route once daily for 1 day then 1 tablet (250 mg) by oral route once 
daily for 4 days                         

 

                Medrol (Christopher) 4 mg oral tablets,dose pack    2011        take as directed

 for 6 days                              

 

                Keflex 500 mg oral capsule    3/1/2012        3/11/2012       take 1 capsule by oral route 3 

times a day for 10 days                  

 

                Cipro 500 mg oral tablet    2012        take 1 tablet (500 mg) by oral route

 every 12 hours for 15 days              

 

                benzonatate 100 mg oral capsule    2013       take 1 capsule (100 mg) by

 oral route 3 times per day for cough     

 

             Medrol (Christopher) 4 mg oral tablets,dose pack    2013     take as directed    

 

 

                Zyrtec 10 mg oral tablet    2013       take 1 tablet (10 mg) by oral route

 once daily for 30 days                  

 

                Flonase 50 mcg/actuation nasal spray,suspension    2013       inhale 1 spray

 in each nostril by intranasal route 2 times per day for 30 days     

 

                cephalexin 500 mg oral capsule    2013        take 1 capsule (500 mg) by oral

 route every 8 hours                     

 

                promethazine-codeine 6.25-10 mg/5 mL oral syrup    2013                       take 5 milliliters

 by oral route every 4 hours as needed, not to exceed 30 mL in 24 hours     

 

                Zithromax Z-Christopher 250 mg oral tablet    10/2/2013       10/7/2013       take 2 tablets (500 mg)

 by oral route once daily for 1 day then 1 tablet (250 mg) by oral route once 
daily for 4 days                         

 

                amoxicillin 500 mg oral capsule    2014       take 1 capsule by oral route

 3 times a day for 15 days               

 

                lovastatin 20 mg oral tablet    2014        take 1 tablet (20 mg) by oral 

route daily  for 30 days                 

 

                Zithromax Z-Christopher 250 mg oral tablet    2014       take 2 tablets (500 mg)

 by oral route once daily for 1 day then 1 tablet (250 mg) by oral route once 
daily for 4 days                         

 

             Medrol (Christopher) 4 mg oral tablets,dose pack    2014                 take as directed     

 

                amoxicillin 500 mg oral capsule    2014                       take 1 capsule (500 mg) by oral route

 3 times per day                         

 

                Levaquin 500 mg oral tablet    10/16/2014      10/26/2014      take 1 tablet (500 mg) by oral

 route once daily for 10 days            

 

                prednisone 20 mg oral tablet    10/16/2014      10/24/2014      4x2 days 3x2 days 2x2 days

 1x2 days                                

 

                Zithromax Z-Christopher 250 mg oral tablet    2014       take 2 tablets (500 mg)

 by oral route once daily for 1 day then 1 tablet (250 mg) by oral route once 
daily for 4 days                         

 

                Bactrim -160 mg oral tablet    1/15/2015       2015       take 1 tablet by oral route

 every 12 hours for 10 days              

 

                Zithromax Z-Christopher 250 mg oral tablet    2015      take 2 tablets (500 mg)

 by oral route once daily for 1 day then 1 tablet (250 mg) by oral route once 
daily for 4 days                         

 

                Keflex 500 mg oral capsule    2016       take 1 capsule by oral route 3

 times a day for 7 days                  

 

                amoxicillin 500 mg oral capsule    10/4/2016       10/14/2016      take 1 capsule by oral route

 3 times a day for 10 days               

 

                Tessalon Perles 100 mg oral capsule    10/4/2016                       take 1 capsule by oral route 

every 4 hours                            

 

                Zithromax Z-Christopher 250 mg oral tablet    11/10/2016      11/15/2016      take 2 tablets (500 

mg) by oral route once daily for 1 day then 1 tablet (250 mg) by oral route once
daily for 4 days                         

 

                amoxicillin 500 mg oral tablet    2016                       take 1 tablet (500 mg) by oral route

 3 times per day                         

 

                amoxicillin 500 mg oral capsule    2017       take 1 capsule (500 mg) by

 oral route 3 times per day for 10 days     

 

                Bactrim -160 mg oral tablet    2017       take 1 tablet by oral route

 2 times a day for 10 days               

 

                Levaquin 500 mg oral tablet    2017        take 1 tablet (500 mg) by oral

 route once daily for 10 days            

 

                amoxicillin 500 mg oral capsule    2017                       take 1 capsule (500 mg) by oral route

 every 12 hours for 7 days               

 

                Zithromax Z-Christopher 250 mg oral tablet    2017                      take 2 tablets (500 mg) by oral

 route once daily for 1 day then 1 tablet (250 mg) by oral route once daily for 
4 days                                   

 

                amoxicillin 500 mg oral tablet    2018                       take 1 tablet (500 mg) by oral route

 every 12 hours for 10 days              

 

                Cipro 500 mg oral tablet    2018        2/15/2018       take 1 tablet (500 mg) by oral route

 2 times per day for 10 days             

 

                Zithromax Z-Christopher 250 mg oral tablet    2018       take 2 tablets (500 mg)

 by oral route once daily for 1 day then 1 tablet (250 mg) by oral route once 
daily for 4 days                         

 

                Keflex 500 mg oral capsule    2018       take 1 capsule (500 mg) by oral

 route every 12 hours for 7 days         

 

                prednisone 20 mg oral tablet    2018       2X4 days 1X4 days 1/2X4 days 

                                         

 

                Levaquin 500 mg oral tablet    2018       take 1 tablet (500 mg) by oral

 route once daily for 7 days             

 

                Medrol (Christopher) 4 mg oral tablets,dose pack    5/10/2019       5/15/2019       take as directed

 for 5 days                              

 

                amoxicillin 500 mg oral capsule    2019       6/10/2019       take 1 capsule (500 mg) by

 oral route 3 times per day for 10 days     









                                        Discontinued 

 

             Name         Start Date    Discontinued Date    SIG          Comments

 

                amoxicillin 500 mg oral capsule    11/10/2011      10/3/2012       take 1 capsule (500 mg) 

by oral route 3 times per day            

 

                Anusol-HC 25 mg rectal suppository    2011      10/3/2012       insert 1 suppository 

(25 mg) by rectal route 2 times per day     

 

                Proctofoam 1 % topical foam    2011       apply by external route daily

                                         

 

             Aerochamber    2014    Use with inhaler as directed     

 

                hydrochlorothiazide 25 mg oral tablet    1/15/2015       2015      TAKE 1 TABLET DAILY

                                         

 

                promethazine-codeine 6.25-10 mg/5 mL oral syrup    11/10/2016      10/25/2017      take 5 

milliliters by oral route every 6 hours as needed, not to exceed 30 mL in 24 
hours                                    

 

                prednisone 20 mg oral tablet    2017       10/25/2017      4 x 2 days, 3 x 2 days, 2 x

 2 days 1 x 2 days                       

 

                Augmentin 875-125 mg oral tablet    2017       take 1 tablet by oral route

 every 12 hours for 7 days               

 

                Keflex 500 mg oral capsule    2017       take 1 capsule (500 mg) by oral

 route every 12 hours                    

 

                Keflex 500 mg oral capsule    10/10/2017      2018       take 1 capsule (500 mg) by oral

 route every 12 hours for 10 days        

 

                    Symbicort 160-4.5 mcg/actuation inhalation HFA aerosol inhaler    10/25/2017          10/6/2018

                          inhale 2 puffs by inhalation route 2 times per day in the morning and evening    

 

 

                Levaquin 500 mg oral tablet    2018       take 1 tablet (500 mg) by oral

 route once daily for 10 days            

 

                Bactrim -160 mg oral tablet    2018       take 1 tablet by oral route

 every 12 hours for 10 days             nausea

 

                Tessalon Perles 100 mg oral capsule    2018        10/6/2018       take 1 capsule (100 mg)

 by oral route 3 times per day as needed for cough     

 

                Sudogest 30 mg oral tablet    2018        10/6/2018       take 1 tablets (60 mg) by oral 

route every 6 hours as needed            







Problem List







                    Description         Status              Onset

 

                    Chronic Obstructive Pulmonary Disease    Active               

 

                    Hyperlipidemia      Active               

 

                    Anxiety disorder    Active              10/29/2013

 

                    Pain in joint; Hip    Active              06/10/2014

 

                    Pulmonary nodule seen on imaging study    Active              10/29/2014

 

                    Exercise hypoxemia    Active              10/29/2014

 

                    Anxiety about health    Active              2016

 

                    Primary osteoarthritis involving multiple joints    Active              2017

 

                    Medication management    Active              2017

 

                    Cellulitis of left lower extremity    Active              2017

 

                    Dog bite of calf, left, sequela    Active              2017

 

                    Peripheral edema    Active              2018







Vital Signs







      Date    Time    BP-Sys(mm[Hg]    BP-Noni(mm[Hg])    HR(bpm)    RR(rpm)    Temp    WT    HT    HC    BMI

                    BSA                 BMI Percentile      O2 Sat(%)

 

        2019    11:23:00 AM    132 mmHg    80 mmHg    80 bpm    18 rpm    98.2 F    143 lbs    63 in

                          25.3311 kg/m    1.698 m                 98 %

 

        2019    11:25:00 AM    118 mmHg    74 mmHg    82 bpm    18 rpm    98.4 F    139 lbs    62 in

                          25.42 kg/m2    1.66 m2                   98 %

 

        2019    3:13:00 PM    136 mmHg    86 mmHg    110 bpm    20 rpm    98.1 F    144 lbs    62 in

                          26.3377 kg/m    1.6904 m                 88 %

 

        2019    3:30:00 PM    108 mmHg    72 mmHg    82 bpm    18 rpm    98.1 F    142 lbs    62 in 

                          25.97 kg/m2    1.68 m2                   89 %

 

        2018    9:30:00 AM    154 mmHg    100 mmHg    108 bpm    20 rpm    98.1 F    145 lbs    62 

in                        26.5206 kg/m    1.6962 m                 91 %

 

        10/4/2018    9:00:00 AM    114 mmHg    74 mmHg    76 bpm    18 rpm    98.4 F    145 lbs    61 in

                          27.40 kg/m2    1.68 m2                   97 %

 

        2018    10:07:00 AM    124 mmHg    82 mmHg    86 bpm    18 rpm    98.2 F    149 lbs    61 in

                          28.15 kg/m2    1.71 m2                   92 %

 

        2018    10:48:00 AM    118 mmHg    80 mmHg    82 bpm    18 rpm    98.3 F    144 lbs    61 in

                          27.2083 kg/m    1.6767 m                 93 %

 

        2018    2:35:00 PM    120 mmHg    82 mmHg    106 bpm    20 rpm    98.2 F    142 lbs    62 in

                          25.97 kg/m2    1.68 m2                   92 %

 

       2018    3:28:00 PM    122 mmHg    68 mmHg    114 bpm    18 rpm    98.2 F    142 lbs            

                                                                90 %

 

        2018    1:35:00 PM    112 mmHg    74 mmHg    114 bpm    18 rpm    99.6 F    139 lbs    63 in

                          24.6225 kg/m    1.6741 m                 98 %

 

       2018    3:57:00 PM    128 mmHg    80 mmHg    78 bpm    18 rpm    98.4 F    148 lbs             

                                                                90 %

 

        10/24/2017    1:51:00 PM    132 mmHg    80 mmHg    82 bpm    16 rpm    98.2 F    145 lbs    62 in

                          26.52 kg/m2    1.70 m2                   95 %

 

       2017    3:29:00 PM    128 mmHg    80 mmHg    68 bpm    16 rpm    98 F    144 lbs    63 in    

                25.5082 kg/m    1.7039 m                      93 %

 

        2017    11:37:00 AM    118 mmHg    72 mmHg    96 bpm    16 rpm    97.4 F    141 lbs    63 in

                          24.98 kg/m2    1.69 m2                   91 %

 

        2017    8:55:00 AM    105 mmHg    60 mmHg    96 bpm    18 rpm    95.8 F    142 lbs    63 in 

                          25.1539 kg/m    1.6921 m                 95 %

 

       2017    12:10:00 PM    122 mmHg    68 mmHg    76 bpm    18 rpm    98 F    143 lbs    63 in    

                25.33 kg/m2     1.70 m2                         98 %

 

        2017    4:03:00 PM    118 mmHg    64 mmHg    106 bpm    18 rpm    97.4 F    137 lbs    63 in

                          24.2682 kg/m    1.662 m                 98 %

 

        2016    12:11:00 PM    120 mmHg    84 mmHg    110 bpm    16 rpm    98.1 F    130 lbs    63

 in                       23.03 kg/m2    1.62 m2                   90 %

 

        11/10/2016    3:57:00 PM    148 mmHg    72 mmHg    88 bpm    16 rpm    98.2 F    132 lbs    63 in

                          23.3825 kg/m    1.6314 m                 98 %

 

        3/10/2016    2:12:00 PM    122 mmHg    60 mmHg    106 bpm    18 rpm    97 F    137 lbs    63 in 

                          24.27 kg/m2    1.66 m2                   93 %

 

        12/15/2015    2:33:00 PM    120 mmHg    75 mmHg    102 bpm    16 rpm    98.2 F    137 lbs    63 

in                        24.2682 kg/m    1.662 m                 94 %

 

        10/26/2015    2:46:00 PM    130 mmHg    80 mmHg    96 bpm    16 rpm    97.9 F    141 lbs    66 in

                          22.76 kg/m2    1.73 m2                   98 %

 

        10/6/2015    9:37:00 AM    140 mmHg    78 mmHg    110 bpm    16 rpm    97.9 F    141 lbs    63 in

                          24.9768 kg/m    1.6861 m                 95 %

 

        10/28/2014    2:25:00 PM    132 mmHg    66 mmHg    92 bpm    24 rpm    97.5 F    147 lbs    63 in

                          26.04 kg/m2    1.72 m2                   92 %

 

        10/16/2014    9:45:00 AM    132 mmHg    64 mmHg    74 bpm    24 rpm    97.7 F    146 lbs    63 in

                          25.8625 kg/m    1.7157 m                 92 %

 

        2014    2:31:00 PM    136 mmHg    68 mmHg    90 bpm    22 rpm    98.2 F    148 lbs    63 in 

                          26.22 kg/m2    1.73 m2                   95 %

 

        2014    3:19:00 PM    124 mmHg    70 mmHg    64 bpm    20 rpm    97.8 F    147 lbs    63 in

                          26.0396 kg/m    1.7216 m                 95 %

 

        10/28/2013    10:31:00 AM    140 mmHg    70 mmHg    92 bpm    24 rpm    97.8 F    143 lbs    63 

in                        25.33 kg/m2    1.70 m2                   95 %

 

      2013    2:51:00 PM    130 mmHg    78 mmHg    90 bpm          98.2 F    168 lbs    65 in          

27.9564 kg/m      1.8694 m                               

 

       2013    2:43:00 PM    110 mmHg    76 mmHg    103 bpm           96.6 F    137 lbs    63 in      

                24.27 kg/m2     1.66 m2                          

 

        2013    9:50:00 AM    150 mmHg    66 mmHg    108 bpm    20 rpm    97.8 F    138 lbs    63 in

                          24.4454 kg/m    1.6681 m                 94 %

 

        10/25/2012    9:08:00 AM    110 mmHg    60 mmHg    88 bpm    18 rpm    97.5 F    142 lbs    63 in

                          25.15 kg/m2    1.69 m2                   94 %

 

      10/3/2012    9:33:00 AM    130 mmHg    60 mmHg    98 bpm    18 rpm          146 lbs    63 in          

25.8625 kg/m      1.7157 m                              95 %

 

      2012    2:31:00 PM    116 mmHg    76 mmHg    88 bpm                140 lbs    63 in          24.80 

kg/m2               1.68 m2                                 96 %

 

     2011    10:02:00 AM    122 mmHg    80 mmHg    110 bpm              144 lbs                        

                                        94 %

 

      2011    2:58:00 PM    124 mmHg    84 mmHg    106 bpm                155 lbs    63 in          27.4567

 kg/m             1.7678 m                              96 %

 

     2011    9:55:00 AM    114 mmHg    78 mmHg    92 bpm              146 lbs                             95

 %

 

     6/3/2011    8:36:00 AM    116 mmHg    74 mmHg    90 bpm              146 lbs                             96

 %

 

     2010    3:01:00 PM    132 mmHg    84 mmHg    102 bpm              149 lbs                          

                                        94 %

 

     2010    11:46:00 AM    124 mmHg    78 mmHg    104 bpm              151 lbs                         

                                        94 %

 

     2010    8:47:00 AM    116 mmHg    78 mmHg    103 bpm              151 lbs                          

                                        95 %







Social History







                    Name                Description         Comments

 

                    Alcohol             Never                

 

                    Uses seatbelts                           

 

                    Tobacco             Never smoker         







History of Procedures







                    Date Ordered        Description         Order Status

 

                    2011 12:00 AM    THER/PROPH/DIAG INJ SC/IM    Reviewed

 

                    2011 12:00 AM    Decadron Inj.1mg-([facility]) [facility] #[id number]    Reviewed

 

                    2011 12:00 AM    Depo-Medrol 80 Mg Im/[facility] 0009-634103    Reviewed

 

                    2011 12:00 AM    Rocephin, Per 250MG - 1 Gram Vial NDC [id number]-[facility] [place]    Reviewed



 

                    3/16/2012 12:00 AM    ROUTINE VENIPUNCTURE    Reviewed

 

                    3/16/2012 12:00 AM    COMPLETE CBC W/AUTO DIFF WBC    Reviewed

 

                    3/16/2012 12:00 AM    COMPREHEN METABOLIC PANEL    Reviewed

 

                    3/16/2012 12:00 AM    ASSAY THYROID STIM HORMONE    Reviewed

 

                    11/10/2016 12:00 AM    Decadron, Per 1 Mg N[facility]# [address]-33    Reviewed

 

                    11/10/2016 12:00 AM    Depo-Medrol, Per 80 Mg N[facility]#6053-1479-59    Reviewed

 

                    11/10/2016 12:00 AM    Rocephin 1 gram NDC#7761-7908-43    Reviewed

 

                    2016 12:00 AM    THER/PROPH/DIAG INJ SC/IM    Reviewed

 

                    2016 12:00 AM    Decadron 8mg Injection, [facility]    Reviewed

 

                    2016 12:00 AM    Depo-Medrol 80mg Injection, [facility]    Reviewed

 

                    2016 12:00 AM    Rocephin 1 gram Injection, [facility]    Reviewed

 

                    2017 12:00 AM    COMPLETE CBC W/AUTO DIFF WBC    Reviewed

 

                    2017 12:00 AM    COMPREHEN METABOLIC PANEL    Reviewed

 

                    2017 12:00 AM    LIPID PANEL         Reviewed

 

                    2017 12:00 AM    THERAPEUTIC PROPHYLACTIC/DX INJECTION SUBQ/IM    Reviewed

 

                    2017 12:00 AM    Decadron 8mg Injection, [facility]    Reviewed

 

                    2017 12:00 AM    Depo-Medrol 80mg Injection, [facility]    Reviewed

 

                    2017 12:00 AM    LIPID PANEL         Returned

 

                    2017 12:00 AM    THERAPEUTIC PROPHYLACTIC/DX INJECTION SUBQ/IM    Reviewed

 

                    2017 12:00 AM    Decadron 8mg Injection, [facility]    Reviewed

 

                    2017 12:00 AM    Depo-Medrol 80mg Injection, [facility]    Reviewed

 

                    10/3/2012 12:00 AM    THER/PROPH/DIAG INJ SC/IM    Reviewed

 

                    10/3/2012 12:00 AM    Decadron, Per 1 Mg NDC# [address]-32    Reviewed

 

                    10/3/2012 12:00 AM    Depo-Medrol, Per 80 Mg NDC#1787-5867-43    Reviewed

 

                    10/3/2012 12:00 AM    Toradol 60 Mg NDC#5961-2086-90    Reviewed

 

                    10/24/2017 12:00 AM    THERAPEUTIC PROPHYLACTIC/DX INJECTION SUBQ/IM    Reviewed

 

                    10/24/2017 12:00 AM    Decadron 8mg Injection, [facility]    Reviewed

 

                    10/24/2017 12:00 AM    Depo-Medrol 80mg Injection, [facility]    Reviewed

 

                    2018 12:00 AM    THERAPEUTIC PROPHYLACTIC/DX INJECTION SUBQ/IM    Reviewed

 

                    2018 12:00 AM    Rocephin 1 gram Injection, [facility]    Reviewed

 

                    2018 12:00 AM    THERAPEUTIC PROPHYLACTIC/DX INJECTION SUBQ/IM    Reviewed

 

                    2018 12:00 AM    Decadron 8mg Injection, [facility]    Reviewed

 

                    2018 12:00 AM    Depo-Medrol 80mg Injection, [facility]    Reviewed

 

                    2018 12:00 AM    Rocephin 1 gram Injection, [facility]    Reviewed

 

                    2018 12:00 AM    COMPLETE CBC W/AUTO DIFF WBC    Returned

 

                    2018 12:00 AM    COMPREHEN METABOLIC PANEL    Returned

 

                    2018 12:00 AM    ELECTROCARDIOGRAM TRACING    Reviewed

 

                    2013 12:00 AM    THER/PROPH/DIAG INJ SC/IM    Reviewed

 

                    2013 12:00 AM    Decadron, Per 1 Mg NDC# [address]-46    Reviewed

 

                    2013 12:00 AM    Depo-Medrol, Per 80 Mg NDC#4242-6925-72    Reviewed

 

                    2013 12:00 AM    Toradol 60 Mg NDC#0412-6293-67    Reviewed

 

                    2019 12:00 AM    THERAPEUTIC PROPHYLACTIC/DX INJECTION SUBQ/IM    Reviewed

 

                    2019 12:00 AM    Decadron 8mg Injection, [facility]    Reviewed

 

                    2019 12:00 AM    Depo-Medrol 80mg Injection, [facility]    Reviewed

 

                    2019 12:00 AM    THERAPEUTIC PROPHYLACTIC/DX INJECTION SUBQ/IM    Reviewed

 

                    2019 12:00 AM    Decadron 8mg Injection, [facility]    Reviewed

 

                    2019 12:00 AM    Rocephin 1 gram Injection, [facility]    Reviewed

 

                    2019 12:00 AM    THERAPEUTIC PROPHYLACTIC/DX INJECTION SUBQ/IM    Reviewed

 

                    2019 12:00 AM    Decadron 8mg Injection, [facility]    Reviewed

 

                    2010 12:00 AM    ROUTINE VENIPUNCTURE    Reviewed

 

                    2010 12:00 AM    COMPLETE CBC W/AUTO DIFF WBC    Reviewed

 

                    2010 12:00 AM    COMPREHEN METABOLIC PANEL    Reviewed

 

                    2010 12:00 AM    LIPID PANEL         Reviewed

 

                    10/28/2014 12:00 AM    CHEST X-RAY 4/> VIEWS    Reviewed

 

                    6/3/2011 12:00 AM    ROUTINE VENIPUNCTURE    Reviewed

 

                    6/3/2011 12:00 AM    COMPLETE CBC AUTOMATED    Reviewed

 

                    6/3/2011 12:00 AM    COMPREHEN METABOLIC PANEL    Reviewed

 

                    6/3/2011 12:00 AM    LIPID PANEL         Reviewed

 

                    2011 12:00 AM    THER/PROPH/DIAG INJ SC/IM    Reviewed

 

                    2011 12:00 AM    Decadron Inj.8mg-([place]) Ndc #[id number]    Reviewed

 

                    2011 12:00 AM    Depo-Medrol 80 Mg Im/[facility] [id number]    Reviewed







Results Summary







                          Date and Description      Results

 

                          6/3/2011 1:53 PM          TRIGLYCERIDES 155.0 mg/dLCHOLESTEROL 248.0 mg/dLHDL 51.0 mg/dLTOT

 CHOL/HDL 4.9 .0 mg/dLGLUCOSE 97.0 mg/dLSODIUM 139.0 mmol/LPOTASSIUM 3.70
 mmol/LCHLORIDE 101.0 mmol/LCO2 27.0 mmol/LBUN 19.0 mg/dLCREATININE 0.90 
mg/dLSGOT/AST 19.0 IU/LSGPT/ALT 11.0 IU/LALK PHOS 111.0 IU/LTOTAL PROTEIN 7.40 
g/dLALBUMIN 4.20 g/dLTOTAL BILI 0.50 mg/dLCALCIUM 9.50 mg/dLAGE 70 GFR NonAA 62 
GFR AA 75 eGFR >60 mL/min/1.73 m2eGFR AA* >60 







History Of Immunizations

Not available.



History of Past Illness







                    Name                Date of Onset       Comments

 

                    Chronic Obstructive Pulmonary Disease                         

 

                    Hyperlipidemia                           

 

                    Cough               2010  8:49AM     

 

                    General Medical Exam, Adult    2010  8:49AM     

 

                    Seasonal Allergies    2010  8:49AM     

 

                    Sinusitis, Chronic    2010  8:49AM     

 

                    Ganglion cyst of synovium, tendon, and bursa; other    2010 11:48AM     

 

                    Anxiety disorder    10/29/2013           

 

                    Pain in joint; Hip    06/10/2014           

 

                    Pulmonary nodule seen on imaging study    10/29/2014           

 

                    Exercise hypoxemia    10/29/2014           

 

                    Chronic Obstructive Pulmonary Disease    2010  3:02PM     

 

                    Edema               2010  3:02PM     

 

                    Sinusitis, Acute    2010  3:02PM     

 

                    Anxiety about health    2016           

 

                    Chronic Obstructive Pulmonary Disease    Trey  3 2011  8:38AM     

 

                    Palpitations        Trey  3 2011  8:38AM     

 

                    Cough               2011  9:54AM     

 

                    Sinusitis, Acute    2011  9:54AM     

 

                    Seasonal Allergies    2011  9:54AM     

 

                    Post-nasal drainage    2011  9:54AM     

 

                    Primary osteoarthritis involving multiple joints    2017           

 

                    Medication management    2017           

 

                    Cellulitis of left lower extremity    2017           

 

                    Dog bite of calf, left, sequela    2017           

 

                    Cough               Sep 22 2011  2:55PM     

 

                    Seasonal Allergies    Sep 22 2011  2:55PM     

 

                    Pharyngitis, Acute    Sep 22 2011  2:55PM     

 

                    Post-nasal drainage    Sep 22 2011  2:55PM     

 

                    Peripheral edema    2018           

 

                    Blood In Stool, Occult    2011  9:58AM     

 

                    Hemorrhoids         2011  9:58AM     

 

                    Chronic Obstructive Pulmonary Disease    2012  2:33PM     

 

                    Cardiac Dysrhythmia    2012  2:33PM     

 

                    Sinoatrial Node Dysfunction    Mar 16 2012  9:12AM     

 

                    Palpitations        Mar 16 2012  9:12AM     

 

                    Osteoarthrosis, generalized, multiple sites    Oct  3 2012  9:34AM     

 

                    Pain in joint; Hip    Oct  3 2012  9:34AM     

 

                    Osteoarthrosis      Oct 25 2012  9:09AM     

 

                    Bronchitis, Acute    Oct 25 2012  9:09AM     

 

                    Cough               Oct 25 2012  9:09AM     

 

                    Pain in joint; Left Hip    Oct 25 2012  9:09AM     

 

                    Pain in joint; Hip    2013  9:50AM     

 

                    Osteoarthrosis, generalized, multiple sites    2013  2:45PM     

 

                    Pain in joint; Hip bilateral    2013  2:45PM     

 

                    Anxiety Disorder    Oct 28 2013 10:32AM     

 

                    Chronic Obstructive Pulmonary Disease    Oct 28 2013 10:32AM     

 

                    Hyperlipidemia      Oct 28 2013 10:32AM     

 

                    Pain in joint; Left Hip    Oct 28 2013 10:32AM     

 

                    Sinusitis, Acute    Oct 28 2013 10:32AM     

 

                    Essential Hypertension    2014  3:19PM     

 

                    Osteoarthrosis, generalized, multiple sites    2014  3:19PM     

 

                    Chronic Obstructive Pulmonary Disease    2014  3:19PM     

 

                    Pain in joint; Hip    2014  3:19PM     

 

                    Chronic Obstructive Pulmonary Disease    2014  2:31PM     

 

                    Pain in joint; Hip    2014  2:31PM     

 

                    pre-operative examination, unspecified    2014  2:31PM     

 

                    Lung nodule seen on imaging study    Oct 28 2014 10:24AM     

 

                    Bronchitis, Acute    Oct 16 2014  9:45AM     

 

                    Respiratory System And Chest Symptoms    Oct 16 2014  9:45AM     

 

                    COPD (chronic obstructive pulmonary disease)    Oct 16 2014  9:45AM     

 

                    Chronic Obstructive Pulmonary Disease    Oct 28 2014  2:26PM     

 

                    Pulmonary nodule seen on imaging study    Oct 28 2014  2:26PM     

 

                    Shortness of breath    Oct 28 2014  2:26PM     

 

                    Exercise hypoxemia    Oct 28 2014  2:26PM     

 

                    Nail avulsion       Oct  6 2015  9:38AM     

 

                          Contusion of left index finger with damage to nail, initial encounter    Oct 26 2015

  2:53PM                                 

 

                    Forehead contusion, subsequent encounter    Dec 15 2015  2:39PM     

 

                    Generalized anxiety disorder    Dec 15 2015  2:39PM     

 

                    Chronic Obstructive Pulmonary Disease    Dec 15 2015  2:39PM     

 

                    Osteoarthrosis, generalized, multiple sites    Mar 10 2016  2:12PM     

 

                    Pain of right thigh    Mar 10 2016  2:12PM     

 

                    Mild Acute Hip pain, acute, right Improving    Mar 10 2016  2:12PM     

 

                    Anxiety about health    Mar 10 2016  2:12PM     

 

                    Hyperlipidemia      Aug 22 2016 10:58AM     

 

                    Sinoatrial Node Dysfunction    Aug 22 2016 10:58AM     

 

                    Palpitations        Aug 22 2016 10:58AM     

 

                    Chronic Obstructive Pulmonary Disease    Aug 22 2016 10:58AM     

 

                    Exercise hypoxemia    Aug 22 2016 10:58AM     

 

                    Pain in joint; Hip    Aug 22 2016 10:58AM     

 

                    Pulmonary nodule seen on imaging study    Aug 22 2016 10:58AM     

 

                    Eustachian tube dysfunction, bilateral    Nov 10 2016  3:57PM     

 

                    Moderate Acute Cough    Nov 10 2016  3:57PM     

 

                    Pharyngitis, Acute    Nov 10 2016  3:57PM     

 

                    Upper Respiratory Infection    Nov 10 2016  3:57PM     

 

                          COPD (chronic obstructive pulmonary disease) with acute bronchitis    Nov 10 2016 

 3:57PM                                  

 

                    Mild Chronic Cough Worsening    Dec 12 2016 12:12PM     

 

                          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

 12 2016 12:12PM                         

 

                    Moderate Shortness of breath on exertion    Dec 12 2016 12:12PM     

 

                    Hyperlipidemia      May  4 2017 10:27AM     

 

                    Sinoatrial Node Dysfunction    May  4 2017 10:27AM     

 

                    Palpitations        May  4 2017 10:27AM     

 

                    Chronic Obstructive Pulmonary Disease    May  4 2017 10:27AM     

 

                    Exercise hypoxemia    May  4 2017 10:27AM     

 

                    Pain in joint; Hip    May  4 2017 10:27AM     

 

                    Pulmonary nodule seen on imaging study    May  4 2017 10:27AM     

 

                    Chest congestion    May 16 2017  4:04PM     

 

                          COPD (chronic obstructive pulmonary disease) with acute bronchitis    May 16 2017 

 4:04PM                                  

 

                    Productive cough    May 16 2017  4:04PM     

 

                    Anxiety about health    May 16 2017  4:04PM     

 

                          Chronic obstructive pulmonary disease, unspecified COPD type    May 16 2017  4:04PM

                                         

 

                    Exercise hypoxemia    May 16 2017  4:04PM     

 

                    Mixed hyperlipidemia    May 16 2017  4:04PM     

 

                    Medication management    May 16 2017  4:04PM     

 

                    Primary osteoarthritis involving multiple joints    May 16 2017  4:04PM     

 

                    Cellulitis of left lower extremity    2017 12:10PM     

 

                    Bitten by dog, initial encounter    2017 12:10PM     

 

                    Cellulitis of left lower extremity    2017  8:56AM     

 

                    Open bite, left lower leg, sequela    2017  8:56AM     

 

                    Bitten by dog, sequela    2017  8:56AM     

 

                    Medication management    2017  8:56AM     

 

                    Cellulitis of left lower extremity    2017 11:38AM     

 

                    Open bite, left lower leg, subsequent encounter    2017 11:38AM     

 

                    Bitten by dog, subsequent encounter    2017 11:38AM     

 

                    Medication management    2017 11:38AM     

 

                    Cough               Aug 10 2017  4:09PM     

 

                    Abnormal chest xray    Aug 10 2017  4:09PM     

 

                    Hyperlipidemia      Aug 29 2017  9:02AM     

 

                    Sinoatrial Node Dysfunction    Aug 29 2017  9:02AM     

 

                    Palpitations        Aug 29 2017  9:02AM     

 

                    Chronic Obstructive Pulmonary Disease    Aug 29 2017  9:02AM     

 

                    Exercise hypoxemia    Aug 29 2017  9:02AM     

 

                    Pain in joint; Hip    Aug 29 2017  9:02AM     

 

                    Pulmonary nodule seen on imaging study    Aug 29 2017  9:02AM     

 

                    Eustachian tube dysfunction, bilateral    Aug 28 2017  3:30PM     

 

                    Purulent postnasal drainage    Aug 28 2017  3:30PM     

 

                    Chest congestion    Aug 28 2017  3:30PM     

 

                    Upper respiratory tract infection, unspecified type    Aug 28 2017  3:30PM     

 

                    Moderate Acute Sinus pressure    Aug 28 2017  3:30PM     

 

                          COPD (chronic obstructive pulmonary disease) with acute bronchitis    Aug 28 2017 

 3:30PM                                  

 

                    Moderate Chronic Purulent postnasal drainage    Oct 24 2017  1:52PM     

 

                    Mild Chronic Sinus pressure    Oct 24 2017  1:52PM     

 

                          Moderate Chronic Acute seasonal allergic rhinitis, unspecified trigger Stable    Oct

 24 2017  1:52PM                         

 

                    Mild Acute Labyrinthitis    Oct 24 2017  1:52PM     

 

                    Moderate Acute Vertigo    Oct 24 2017  1:52PM     

 

                    Purulent postnasal drainage    2018  3:58PM     

 

                    Upper respiratory tract infection, unspecified type    2018  3:58PM     

 

                    Mild Acute Left Enlarged lymph node in neck    2018  3:58PM     

 

                    Cough               2018  1:36PM     

 

                    Moderate Acute Recurrent Chest congestion    2018  1:36PM     

 

                    COPD exacerbation    2018  1:36PM     

 

                          Chronic obstructive pulmonary disease with acute lower respiratory infection    2018  1:36PM                         

 

                    Acute bronchitis, unspecified    2018  1:36PM     

 

                    Cough               2018  3:29PM     

 

                    Acute pharyngitis, unspecified etiology    2018  3:29PM     

 

                    Chest congestion    2018  3:29PM     

 

                    COPD (chronic obstructive pulmonary disease)    2018  3:29PM     

 

                    Cellulitis of left lower extremity    May 22 2018  2:35PM     

 

                    Cellulitis of left lower extremity    2018 10:48AM     

 

                    Peripheral edema    2018 10:48AM     

 

                    Hyperlipidemia      Aug 22 2018 10:03AM     

 

                    Sinoatrial Node Dysfunction    Aug 22 2018 10:03AM     

 

                    Palpitations        Aug 22 2018 10:03AM     

 

                    Chronic Obstructive Pulmonary Disease    Aug 22 2018 10:03AM     

 

                    Exercise hypoxemia    Aug 22 2018 10:03AM     

 

                    Pain in joint; Hip    Aug 22 2018 10:03AM     

 

                    Pulmonary nodule seen on imaging study    Aug 22 2018 10:03AM     

 

                    Exercise Counseling    Aug 21 2018 10:08AM     

 

                    Moderate Acute Bilateral Peripheral edema    Aug 21 2018 10:08AM     

 

                          Chronic obstructive pulmonary disease, unspecified COPD type    Aug 21 2018 10:08AM

                                         

 

                    Medication management    Aug 21 2018 10:08AM     

 

                    Acute nasopharyngitis (common cold)    Oct  4 2018  9:03AM     

 

                    Purulent postnasal drainage    Oct  4 2018  9:03AM     

 

                    Ear pain, right     Oct  4 2018  9:03AM     

 

                    Essential hypertension    2018  9:31AM     

 

                    Ear pain, bilateral    2018  9:31AM     

 

                    Purulent postnasal drainage    2019  3:35PM     

 

                    Chest congestion    2019  3:35PM     

 

                    Upper respiratory tract infection, unspecified type    2019  3:35PM     

 

                    Sinus pressure      2019  3:35PM     

 

                          Chronic obstructive pulmonary disease with acute lower respiratory infection    2019  3:35PM                         

 

                    Acute bronchitis, unspecified    2019  3:35PM     

 

                    Cough               2019  4:44PM     

 

                    Sinus pressure      2019  4:44PM     

 

                    Chest congestion    2019  3:15PM     

 

                    Upper respiratory tract infection, unspecified type    2019  3:15PM     

 

                    COPD exacerbation    2019  3:15PM     

 

                    Essential hypertension    2019 11:26AM     

 

                    COPD exacerbation    2019 11:26AM     

 

                    Medication management    2019 11:26AM     

 

                    Hyperlipidemia      2019  8:55AM     

 

                    Sinoatrial Node Dysfunction    2019  8:55AM     

 

                    Palpitations        2019  8:55AM     

 

                    Chronic Obstructive Pulmonary Disease    2019  8:55AM     

 

                    Exercise hypoxemia    2019  8:55AM     

 

                    Pain in joint; Hip    2019  8:55AM     

 

                    Pulmonary nodule seen on imaging study    2019  8:55AM     







Payers







           Insurance Name    Company Name    Plan Name    Plan Number    Policy Number    Policy Group

 Number                                 Start Date

 

                    Medicare [facility]    Medicare [facility]              [id number]              N/A

 

                          [place] [facility] [employer] [facility]    [place] [employer]                 [id number]

                                                    N/A

 

                    [employer] Medicare A    Test Medicare A              [id number]              N/A

 

                                [facility]      

                    [id number]                             N/A

 

                    Medicare Part A    Medicare - Lab/Xray              [id number]              N/A

 

                          [facility]                 [id number]

                                                    N/A

 

                    Medicare Part A    Medicare Part A              [id number]              N/A

 

                    Medicare Part B    [employer] [place]              [id number]              N/A







History of Encounters







                    Visit Date          Visit Type          Provider

 

                    2019           Laboratory          Yoko Rossi RN

 

                    2019           Office visit        DEON LEON PA

 

                    2019            Office visit        DEON LEON PA

 

                    2019            Office visit        DEON LEON PA

 

                    2019            Office visit        DEON LEON PA

 

                    2018           Office visit        DEON LEON PA

 

                    10/2/2018           Office visit        DEON LEON PA

 

                    2018           Office visit        DEON LEON PA

 

                    2018           Office visit        DEON LEON PA

 

                    2018           Office visit        DEON LEON PA

 

                    2018           Office visit        DEON LEON PA

 

                    2018            Office visit        DEON LEON PA

 

                    2018           Office visit        DEON LEON PA

 

                    10/24/2017          Office visit        DEON LEON PA

 

                    2017           Voided              DEON LEON PA

 

                    2017           Office visit        DEON LEON PA

 

                    2017           Office visit        DEON LEON PA

 

                    2017            Office visit        DEON LEON PA

 

                    2017            Office visit        DEON LEON PA

 

                    2017           Office visit        DEON LEON PA

 

                    2016          Office visit        DEON LEON PA

 

                    11/10/2016          Office visit        DEON LEON PA

 

                    3/10/2016           Office visit        DEON LEON PA

 

                    12/15/2015          Office visit        DEON LEON PA

 

                    10/26/2015          Office visit        DEON LEON PA

 

                    10/6/2015           Office visit        DEON LEON PA

 

                    10/28/2014          Office visit        DEON LEON PA

 

                    10/16/2014          Office visit        DEON LEON PA

 

                    2014            Office visit        DEON LEON PA

 

                    2014           Hospital            DEWEY Garcia MD

 

                    2014           Office visit        DEON LEON PA

 

                    10/28/2013          Office visit        DEON LEON PA

 

                    10/14/2013          [place] [facility]            DEWEY Garcia MD

 

                    2013           Office visit        DEON LEON PA

 

                    2013            Office visit        DEON LEON PA

 

                    10/25/2012          Office visit        DEON ESCALANTE

 

                    10/3/2012           Office visit        DEON LEON PA

 

                    3/16/2012           Office visit        DEON ESCALANTE

 

                    2012            Office visit        DEON LEON PA

 

                    2012            [facility]            DEWEY Garcia MD

 

                    2011          Office visit        DEON LEON PA

 

                    2011           Office visit        Deon Leon PA-C

 

                    2011            Office visit        Deon Leon PA-C

 

                    6/3/2011            Office visit        Deon Leon PA-C

 

                    2010           Office visit        Deon Leon PA-C

 

                    2010           Office visit        Deon Leon PA-C

 

                    2010           Office visit        Deon Leon PA-C

## 2019-06-25 NOTE — XMS REPORT
MU2 Ambulatory Summary

                             Created on: 10/12/2015



Elsi Casillas

External Reference #: [id number]

: 1940

Sex: Female



Demographics







                          Address                   [address] [place]  [address]

 

                          Home Phone                [phone]

 

                          Preferred Language        English

 

                          Marital Status            

 

                          Jewish Affiliation     Unknown

 

                          Race                      White

 

                          Ethnic Group              Not  or 





Author







                          Author                    DEON LEON

 

                          [facility]

 

                          Address                   [address]

[place], [place]  [id number]



 

                          Phone                     [phone]







Care Team Providers







                    Care Team Member Name    Role                Phone

 

                    DEON LEON    PCP                 Unavailable







Allergies and Adverse Reactions







                    Name                Reaction            Notes

 

                    NO KNOWN DRUG ALLERGIES                         







Plan of Treatment

Not available.



Medications







                                        Active 

 

             Name         Start Date    Estimated Completion Date    SIG          Comments

 

             Proctofoam 1 % topical foam    2011                 apply by external route daily     

 

                Calcium 600 + D(3) 600 mg(1,500mg) -200 unit oral tablet                                    take 2 tablets by

 oral route daily                        

 

             Aerochamber    2014                 Use with inhaler as directed     

 

                Symbicort 160-4.5 mcg/actuation inhalation HFA aerosol inhaler    2014                       inhale

 2 puffs by inhalation route 2 times per day in the morning and evening     

 

                pravastatin 20 mg oral tablet                                    take 1 tablet (20 mg) by oral route once daily

                                         

 

                Toprol XL 25 mg oral tablet extended release 24 hr                                    take 1 tablet (25 mg) 

by oral route once daily                 

 

                                        ipratropium-albuterol 0.5 mg-3 mg(2.5 mg base)/3 mL inhalation solution for nebulization

                    10/29/2014                              inhale 3 milliliters by nebulization route 4 times per day for COPD

                                         

 

                hydrochlorothiazide 25 mg oral tablet    1/15/2015                       take 1 tablet (25 mg) by oral

 route once daily for 30 days            

 

             hydrochlorothiazide 25 mg oral tablet    1/15/2015                 TAKE 1 TABLET DAILY     









                                         

 

             Name         Start Date    Expiration Date    SIG          Comments

 

                Singulair 10 mg oral tablet    9/3/2010        2011        take 1 tablet (10 mg) by oral route

 daily  for 60 days                      

 

                Zithromax Z-Christopher 250 mg oral tablet    2011       take 2 tablets (500 mg)

 by oral route once daily for 1 day then 1 tablet (250 mg) by oral route once 
daily for 4 days                         

 

                Medrol (Christopher) 4 mg oral tablets,dose pack    2011        take as directed

 for 6 days                              

 

                Keflex 500 mg oral capsule    3/1/2012        3/11/2012       take 1 capsule by oral route 3 

times a day for 10 days                  

 

                Cipro 500 mg oral tablet    2012        take 1 tablet (500 mg) by oral route

 every 12 hours for 15 days              

 

                benzonatate 100 mg oral capsule    2013       take 1 capsule (100 mg) by

 oral route 3 times per day for cough     

 

             Medrol (Christopher) 4 mg oral tablets,dose pack    2013     take as directed    

 

 

                Zyrtec 10 mg oral tablet    2013       take 1 tablet (10 mg) by oral route

 once daily for 30 days                  

 

                Flonase 50 mcg/actuation nasal spray,suspension    2013       inhale 1 spray

 in each nostril by intranasal route 2 times per day for 30 days     

 

                cephalexin 500 mg oral capsule    2013        take 1 capsule (500 mg) by oral

 route every 8 hours                     

 

                promethazine-codeine 6.25-10 mg/5 mL oral syrup    2013                       take 5 milliliters

 by oral route every 4 hours as needed, not to exceed 30 mL in 24 hours     

 

                Zithromax Z-Christopher 250 mg oral tablet    10/2/2013       10/7/2013       take 2 tablets (500 mg)

 by oral route once daily for 1 day then 1 tablet (250 mg) by oral route once 
daily for 4 days                         

 

                amoxicillin 500 mg oral capsule    2014       take 1 capsule by oral route

 3 times a day for 15 days               

 

                lovastatin 20 mg oral tablet    2014        take 1 tablet (20 mg) by oral 

route daily  for 30 days                 

 

                Zithromax Z-Christopher 250 mg oral tablet    2014       take 2 tablets (500 mg)

 by oral route once daily for 1 day then 1 tablet (250 mg) by oral route once 
daily for 4 days                         

 

             Medrol (Christopher) 4 mg oral tablets,dose pack    2014                 take as directed     

 

                amoxicillin 500 mg oral capsule    2014                       take 1 capsule (500 mg) by oral route

 3 times per day                         

 

                Levaquin 500 mg oral tablet    10/16/2014      10/26/2014      take 1 tablet (500 mg) by oral

 route once daily for 10 days            

 

                prednisone 20 mg oral tablet    10/16/2014      10/24/2014      4x2 days 3x2 days 2x2 days

 1x2 days                                

 

                Zithromax Z-Christopher 250 mg oral tablet    2014       take 2 tablets (500 mg)

 by oral route once daily for 1 day then 1 tablet (250 mg) by oral route once 
daily for 4 days                         

 

                Bactrim -160 mg oral tablet    1/15/2015       2015       take 1 tablet by oral route

 every 12 hours for 10 days              

 

                Zithromax Z-Christopher 250 mg oral tablet    9/10/2015       9/15/2015       take 2 tablets (500 mg)

 by oral route once daily for 1 day then 1 tablet (250 mg) by oral route once 
daily for 4 days                         









                                        Discontinued 

 

             Name         Start Date    Discontinued Date    SIG          Comments

 

                amoxicillin 500 mg oral capsule    11/10/2011      10/3/2012       take 1 capsule (500 mg) 

by oral route 3 times per day            

 

                Anusol-HC 25 mg rectal suppository    2011      10/3/2012       insert 1 suppository 

(25 mg) by rectal route 2 times per day     







Problem List







                    Description         Status              Onset

 

                    Chronic Obstructive Pulmonary Disease    Active               

 

                    Hyperlipidemia      Active               

 

                    Anxiety Disorder    Active              10/29/2013

 

                    Pain in joint; Hip    Active              06/10/2014

 

                    Pulmonary nodule seen on imaging study    Active              10/29/2014

 

                    Exercise hypoxemia    Active              10/29/2014







Vital Signs







      Date    Time    BP-Sys(mm[Hg]    BP-Noni(mm[Hg])    HR(bpm)    RR(rpm)    Temp    WT    HT    HC    BMI

                    BSA                 BMI Percentile      O2 Sat(%)

 

        10/6/2015    9:37:00 AM    140 mmHg    78 mmHg    110 bpm    16 rpm    97.9 F    141 lbs    63 in

                          24.98 kg/m2    1.69 m2                   95 %

 

        10/28/2014    2:25:00 PM    132 mmHg    66 mmHg    92 bpm    24 rpm    97.5 F    147 lbs    63 in

                          26.0396 kg/m    1.7216 m                 92 %

 

        10/16/2014    9:45:00 AM    132 mmHg    64 mmHg    74 bpm    24 rpm    97.7 F    146 lbs    63 in

                          25.86 kg/m2    1.72 m2                   92 %

 

        2014    2:31:00 PM    136 mmHg    68 mmHg    90 bpm    22 rpm    98.2 F    148 lbs    63 in 

                          26.2168 kg/m    1.7274 m                 95 %

 

        2014    3:19:00 PM    124 mmHg    70 mmHg    64 bpm    20 rpm    97.8 F    147 lbs    63 in

                          26.04 kg/m2    1.72 m2                   95 %

 

        10/28/2013    10:31:00 AM    140 mmHg    70 mmHg    92 bpm    24 rpm    97.8 F    143 lbs    63 

in                        25.3311 kg/m    1.698 m                 95 %

 

      2013    2:51:00 PM    130 mmHg    78 mmHg    90 bpm          98.2 F    168 lbs    65 in          

27.96 kg/m2         1.87 m2                                  

 

       2013    2:43:00 PM    110 mmHg    76 mmHg    103 bpm           96.6 F    137 lbs    63 in      

                24.2682 kg/m    1.662 m                        

 

        2013    9:50:00 AM    150 mmHg    66 mmHg    108 bpm    20 rpm    97.8 F    138 lbs    63 in

                          24.45 kg/m2    1.67 m2                   94 %

 

        10/25/2012    9:08:00 AM    110 mmHg    60 mmHg    88 bpm    18 rpm    97.5 F    142 lbs    63 in

                          25.1539 kg/m    1.6921 m                 94 %

 

      10/3/2012    9:33:00 AM    130 mmHg    60 mmHg    98 bpm    18 rpm          146 lbs    63 in          

25.86 kg/m2         1.72 m2                                 95 %

 

      2012    2:31:00 PM    116 mmHg    76 mmHg    88 bpm                140 lbs    63 in          24.7996

 kg/m             1.6801 m                              96 %

 

     2011    10:02:00 AM    122 mmHg    80 mmHg    110 bpm              144 lbs                        

                                        94 %

 

      2011    2:58:00 PM    124 mmHg    84 mmHg    106 bpm                155 lbs    63 in          27.4567

 kg/m             1.7678 m                              96 %

 

     2011    9:55:00 AM    114 mmHg    78 mmHg    92 bpm              146 lbs                             95

 %

 

     6/3/2011    8:36:00 AM    116 mmHg    74 mmHg    90 bpm              146 lbs                             96

 %

 

     2010    3:01:00 PM    132 mmHg    84 mmHg    102 bpm              149 lbs                          

                                        94 %

 

     2010    11:46:00 AM    124 mmHg    78 mmHg    104 bpm              151 lbs                         

                                        94 %

 

     2010    8:47:00 AM    116 mmHg    78 mmHg    103 bpm              151 lbs                          

                                        95 %







Social History







                    Name                Description         Comments

 

                    Tobacco             Never smoker         

 

                    denies alcohol use                         







History of Procedures







                    Date Ordered        Description         Order Status

 

                    2011 12:00 AM    THER/PROPH/DIAG INJ SC/IM    Reviewed

 

                    2011 12:00 AM    Decadron Inj.1mg-([facility]) Ndc #[id number]    Reviewed

 

                    2011 12:00 AM    Depo-Medrol 80 Mg Im/[facility] NDC 0009-079649    Reviewed

 

                    2011 12:00 AM    Rocephin, Per 250MG - 1 Gram Vial NDC 8388-851625-[facility]    Reviewed



 

                    3/16/2012 12:00 AM    ROUTINE VENIPUNCTURE    Reviewed

 

                    3/16/2012 12:00 AM    COMPLETE CBC W/AUTO DIFF WBC    Reviewed

 

                    3/16/2012 12:00 AM    COMPREHEN METABOLIC PANEL    Reviewed

 

                    3/16/2012 12:00 AM    ASSAY THYROID STIM HORMONE    Reviewed

 

                    10/3/2012 12:00 AM    THER/PROPH/DIAG INJ SC/IM    Reviewed

 

                    10/3/2012 12:00 AM    Decadron, Per 1 Mg NDC# [address]-67    Reviewed

 

                    10/3/2012 12:00 AM    Depo-Medrol, Per 80 Mg NDC#3732-3206-79    Reviewed

 

                    10/3/2012 12:00 AM    Toradol 60 Mg NDC#0677-2854-17    Reviewed

 

                    2013 12:00 AM    THER/PROPH/DIAG INJ SC/IM    Reviewed

 

                    2013 12:00 AM    Decadron, Per 1 Mg NDC# [address]-66    Reviewed

 

                    2013 12:00 AM    Depo-Medrol, Per 80 Mg NDC#9480-7811-22    Reviewed

 

                    2013 12:00 AM    Toradol 60 Mg NDC#0513-3707-92    Reviewed

 

                    2010 12:00 AM    ROUTINE VENIPUNCTURE    Reviewed

 

                    2010 12:00 AM    COMPLETE CBC W/AUTO DIFF WBC    Reviewed

 

                    2010 12:00 AM    COMPREHEN METABOLIC PANEL    Reviewed

 

                    2010 12:00 AM    LIPID PANEL         Reviewed

 

                    10/28/2014 12:00 AM    CHEST X-RAY 4/> VIEWS    Returned

 

                    6/3/2011 12:00 AM    ROUTINE VENIPUNCTURE    Reviewed

 

                    6/3/2011 12:00 AM    COMPLETE CBC AUTOMATED    Reviewed

 

                    6/3/2011 12:00 AM    COMPREHEN METABOLIC PANEL    Reviewed

 

                    6/3/2011 12:00 AM    LIPID PANEL         Reviewed

 

                    2011 12:00 AM    THER/PROPH/DIAG INJ SC/IM    Reviewed

 

                    2011 12:00 AM    Decadron Inj.8mg-() Ndc #[id number]    Reviewed

 

                    2011 12:00 AM    Depo-Medrol 80 Mg Im/[facility] NDC [id number]    Reviewed







Results Summary







                          Data and Description      Results

 

                          6/3/2011 1:52 PM          WBC 8.6 RBC 4.66 HGB 13.20 g/dLHCT 42.10 %MCV 90.0 fLMCH 28.30

 pgMCHC 31.40 g/dLRDW CV 13.60 %MPV 10.90 fLPLT 383 %NEUT 68.0 %%LYMP 20.30 
%%MONO 9.30 %%EOS 2.20 %%BASO 0.20 %#NEUT 5.81 #LYMP 1.74 #MONO 0.80 #EOS 0.19 
#BASO 0.02 

 

                          6/3/2011 1:53 PM          TRIGLYCERIDES 155.0 mg/dLCHOLESTEROL 248.0 mg/dLHDL 51.0 mg/dLLDL

 168.0 mg/dLGLUCOSE 97.0 mg/dLSODIUM 139.0 mmol/LPOTASSIUM 3.70 mmol/LCHLORIDE 
101.0 mmol/LCO2 27.0 mmol/LBUN 19.0 mg/dLCREATININE 0.90 mg/dLSGOT/AST 19.0 
IU/LSGPT/ALT 11.0 IU/LALK PHOS 111.0 IU/LTOTAL PROTEIN 7.40 g/dLALBUMIN 4.20 
g/dLTOTAL BILI 0.50 mg/dLCALCIUM 9.50 mg/dLeGFR >60 mL/min/1.73 m2







History Of Immunizations

Not available.



History of Past Illness







                    Name                Date of Onset       Comments

 

                    Chronic Obstructive Pulmonary Disease                         

 

                    Hyperlipidemia                           

 

                    Cough               2010  8:49AM     

 

                    General Medical Exam, Adult    2010  8:49AM     

 

                    Seasonal Allergies    2010  8:49AM     

 

                    Sinusitis, Chronic    2010  8:49AM     

 

                    Ganglion cyst of synovium, tendon, and bursa; other    2010 11:48AM     

 

                    Anxiety Disorder    10/29/2013           

 

                    Pain in joint; Hip    06/10/2014           

 

                    Pulmonary nodule seen on imaging study    10/29/2014           

 

                    Exercise hypoxemia    10/29/2014           

 

                    Chronic Obstructive Pulmonary Disease    Nov  4 2010  3:02PM     

 

                    Edema               2010  3:02PM     

 

                    Sinusitis, Acute    2010  3:02PM     

 

                    Chronic Obstructive Pulmonary Disease    Trey  3 2011  8:38AM     

 

                    Palpitations        Trey  3 2011  8:38AM     

 

                    Cough               2011  9:54AM     

 

                    Sinusitis, Acute    2011  9:54AM     

 

                    Seasonal Allergies    2011  9:54AM     

 

                    Post-nasal drainage    2011  9:54AM     

 

                    Cough               Sep 22 2011  2:55PM     

 

                    Seasonal Allergies    Sep 22 2011  2:55PM     

 

                    Pharyngitis, Acute    Sep 22 2011  2:55PM     

 

                    Post-nasal drainage    Sep 22 2011  2:55PM     

 

                    Blood In Stool, Occult    2011  9:58AM     

 

                    Hemorrhoids         2011  9:58AM     

 

                    Chronic Obstructive Pulmonary Disease    2012  2:33PM     

 

                    Cardiac Dysrhythmia    2012  2:33PM     

 

                    Sinoatrial Node Dysfunction    Mar 16 2012  9:12AM     

 

                    Palpitations        Mar 16 2012  9:12AM     

 

                    Osteoarthrosis, generalized, multiple sites    Oct  3 2012  9:34AM     

 

                    Pain in joint; Hip    Oct  3 2012  9:34AM     

 

                    Osteoarthrosis      Oct 25 2012  9:09AM     

 

                    Bronchitis, Acute    Oct 25 2012  9:09AM     

 

                    Cough               Oct 25 2012  9:09AM     

 

                    Pain in joint; Left Hip    Oct 25 2012  9:09AM     

 

                    Pain in joint; Hip    2013  9:50AM     

 

                    Osteoarthrosis, generalized, multiple sites    2013  2:45PM     

 

                    Pain in joint; Hip bilateral    2013  2:45PM     

 

                    Anxiety Disorder    Oct 28 2013 10:32AM     

 

                    Chronic Obstructive Pulmonary Disease    Oct 28 2013 10:32AM     

 

                    Hyperlipidemia      Oct 28 2013 10:32AM     

 

                    Pain in joint; Left Hip    Oct 28 2013 10:32AM     

 

                    Sinusitis, Acute    Oct 28 2013 10:32AM     

 

                    Essential Hypertension    2014  3:19PM     

 

                    Osteoarthrosis, generalized, multiple sites    2014  3:19PM     

 

                    Chronic Obstructive Pulmonary Disease    2014  3:19PM     

 

                    Pain in joint; Hip    2014  3:19PM     

 

                    Chronic Obstructive Pulmonary Disease    2014  2:31PM     

 

                    Pain in joint; Hip    2014  2:31PM     

 

                    pre-operative examination, unspecified    2014  2:31PM     

 

                    Lung nodule seen on imaging study    Oct 28 2014 10:24AM     

 

                    Bronchitis, Acute    Oct 16 2014  9:45AM     

 

                    Respiratory System And Chest Symptoms    Oct 16 2014  9:45AM     

 

                    COPD (chronic obstructive pulmonary disease)    Oct 16 2014  9:45AM     

 

                    Chronic Obstructive Pulmonary Disease    Oct 28 2014  2:26PM     

 

                    Pulmonary nodule seen on imaging study    Oct 28 2014  2:26PM     

 

                    Shortness of breath    Oct 28 2014  2:26PM     

 

                    Exercise hypoxemia    Oct 28 2014  2:26PM     

 

                    Nail avulsion       Oct  6 2015  9:38AM     







Payers







           Insurance Name    Company Name    Plan Name    Plan Number    Policy Number    Policy Group

 Number                                 Start Date

 

                    Medicare Part A    Medicare Part A              [id number]              N/A

 

                                [facility]      

                    [id number]                             N/A

 

                    Medicare Part B    [employer] [place]              [id number]              N/A

 

                          [place] [employer] [facility]                 [id number]

                                                    N/A

 

                    [facility]              [id number]              N/A







History of Encounters







                    Visit Date          Visit Type          Provider

 

                    10/6/2015           Office visit        DEON ESCALANTE

 

                    10/28/2014          Office visit        DEON ESCALANTE

 

                    10/16/2014          Office visit        DEON ESCALANTE

 

                    2014            Office visit        DEON ESCALANTE

 

                    2014           [facility]            DEWEY Garcia MD

 

                    2014           Office visit        DEON ESCALANTE

 

                    10/28/2013          Office visit        DEON ESCALANTE

 

                    10/14/2013          [facility]            DEWEY Garcia MD

 

                    2013           Office visit        DEON ESCALANTE

 

                    2013            Office visit        DEON ESCALANTE

 

                    10/25/2012          Office visit        DEON ESCALANTE

 

                    10/3/2012           Office visit        DEON ESCALANTE

 

                    3/16/2012           Office visit        DEON ESCALANTE

 

                    2012            Office visit        DEON ESCALANTE

 

                    2012            [facility]            DEWEY Garcia MD

 

                    2011          Office visit        DEON ESCALANTE

 

                    2011           Office visit        Deon ESCALANTE-C

 

                    2011            Office visit        Deon ESCALANTE-C

 

                    6/3/2011            Office visit        Deon ESCALANTE-C

 

                    2010           Office visit        Deon ESCALANTE-C

 

                    2010           Office visit        Deon ESCALANTE-C

 

                    2010           Office visit        Deon Leon PA-C

## 2019-06-25 NOTE — XMS REPORT
MU2 Ambulatory Summary

                             Created on: 2017



Elsi Casillas

External Reference #: [id number]

: 1940

Sex: Female



Demographics







                          Address                   [address] [place]  [address]

 

                          Home Phone                [phone]

 

                          Preferred Language        English

 

                          Marital Status            

 

                          Mandaeism Affiliation     Unknown

 

                          Race                      White

 

                          Ethnic Group              Not  or 





Author







                          DEON Ferguson

 

                          [facility]

 

                          Address                   [address]

[place], [place]  [id number]



 

                          Phone                     [phone]







Care Team Providers







                    Care Team Member Name    Role                Phone

 

                    DEON LEON    PCP                 Unavailable

 

                    DEON LEON    PreferredProvider    Unavailable







Allergies and Adverse Reactions







                    Name                Reaction            Notes

 

                    NO KNOWN DRUG ALLERGIES                         







Plan of Treatment







             Planned Activity    Comments     Planned Date    Planned Time    Plan/Goal

 

             Lipid Profile                 2016    12:00 AM      







Medications







                                        Active 

 

             Name         Start Date    Estimated Completion Date    SIG          Comments

 

                Calcium 600 + D(3) 600 mg(1,500mg) -200 unit oral tablet                                    take 2 tablets by

 oral route daily                        

 

                pravastatin 20 mg oral tablet                                    take 1 tablet (20 mg) by oral route once daily

                                         

 

                Toprol XL 25 mg oral tablet extended release 24 hr                                    take 1 tablet (25 mg) 

by oral route once daily                 

 

                hydrochlorothiazide 25 mg oral tablet    1/15/2015                       take 1 tablet (25 mg) by oral

 route once daily for 30 days            

 

                metoprolol succinate 25 mg oral tablet extended release 24 hr    2015                      take

 1 tablet (25 mg) by oral route once daily     

 

                promethazine-codeine 6.25-10 mg/5 mL oral syrup    11/10/2016                      take 5 milliliters

 by oral route every 6 hours as needed, not to exceed 30 mL in 24 hours     

 

                                        ipratropium-albuterol 0.5 mg-3 mg(2.5 mg base)/3 mL inhalation solution for nebulization

                    2016                              inhale 3 milliliters by nebulization route 4 times per day for COPD

                                         

 

                Symbicort 160-4.5 mcg/actuation inhalation HFA aerosol inhaler    11/10/2016                      inhale

 2 puffs by inhalation route 2 times per day in the morning and evening     

 

                metoprolol succinate 25 mg oral tablet extended release 24 hr    3/16/2017                       TAKE

 1 TABLET DAILY                          

 

                prednisone 20 mg oral tablet    2017                       4 x 2 days, 3 x 2 days, 2 x 2 days 1

 x 2 days                                

 

             hydrochlorothiazide 25 mg oral tablet    2017                  TAKE 1 TABLET DAILY     

 

                Keflex 500 mg oral capsule    2017                       take 1 capsule (500 mg) by oral route 

every 12 hours                           









                                         

 

             Name         Start Date    Expiration Date    SIG          Comments

 

                Singulair 10 mg oral tablet    9/3/2010        2011        take 1 tablet (10 mg) by oral route

 daily  for 60 days                      

 

                Zithromax Z-Christopher 250 mg oral tablet    2011       take 2 tablets (500 mg)

 by oral route once daily for 1 day then 1 tablet (250 mg) by oral route once 
daily for 4 days                         

 

                Medrol (Christopher) 4 mg oral tablets,dose pack    2011        take as directed

 for 6 days                              

 

                Keflex 500 mg oral capsule    3/1/2012        3/11/2012       take 1 capsule by oral route 3 

times a day for 10 days                  

 

                Cipro 500 mg oral tablet    2012        take 1 tablet (500 mg) by oral route

 every 12 hours for 15 days              

 

                benzonatate 100 mg oral capsule    2013       take 1 capsule (100 mg) by

 oral route 3 times per day for cough     

 

             Medrol (Christopher) 4 mg oral tablets,dose pack    2013     take as directed    

 

 

                Zyrtec 10 mg oral tablet    2013       take 1 tablet (10 mg) by oral route

 once daily for 30 days                  

 

                Flonase 50 mcg/actuation nasal spray,suspension    2013       inhale 1 spray

 in each nostril by intranasal route 2 times per day for 30 days     

 

                cephalexin 500 mg oral capsule    2013        take 1 capsule (500 mg) by oral

 route every 8 hours                     

 

                promethazine-codeine 6.25-10 mg/5 mL oral syrup    2013                       take 5 milliliters

 by oral route every 4 hours as needed, not to exceed 30 mL in 24 hours     

 

                Zithromax Z-Christopher 250 mg oral tablet    10/2/2013       10/7/2013       take 2 tablets (500 mg)

 by oral route once daily for 1 day then 1 tablet (250 mg) by oral route once 
daily for 4 days                         

 

                amoxicillin 500 mg oral capsule    2014       take 1 capsule by oral route

 3 times a day for 15 days               

 

                lovastatin 20 mg oral tablet    2014        2015        take 1 tablet (20 mg) by oral 

route daily  for 30 days                 

 

                Zithromax Z-Christopher 250 mg oral tablet    2014       take 2 tablets (500 mg)

 by oral route once daily for 1 day then 1 tablet (250 mg) by oral route once 
daily for 4 days                         

 

             Medrol (Christopher) 4 mg oral tablets,dose pack    2014                 take as directed     

 

                amoxicillin 500 mg oral capsule    2014                       take 1 capsule (500 mg) by oral route

 3 times per day                         

 

                Levaquin 500 mg oral tablet    10/16/2014      10/26/2014      take 1 tablet (500 mg) by oral

 route once daily for 10 days            

 

                prednisone 20 mg oral tablet    10/16/2014      10/24/2014      4x2 days 3x2 days 2x2 days

 1x2 days                                

 

                Zithromax Z-Christopher 250 mg oral tablet    2014       take 2 tablets (500 mg)

 by oral route once daily for 1 day then 1 tablet (250 mg) by oral route once 
daily for 4 days                         

 

                Bactrim -160 mg oral tablet    1/15/2015       2015       take 1 tablet by oral route

 every 12 hours for 10 days              

 

                Zithromax Z-Christopher 250 mg oral tablet    2015      take 2 tablets (500 mg)

 by oral route once daily for 1 day then 1 tablet (250 mg) by oral route once 
daily for 4 days                         

 

                Keflex 500 mg oral capsule    2016       take 1 capsule by oral route 3

 times a day for 7 days                  

 

                amoxicillin 500 mg oral capsule    10/4/2016       10/14/2016      take 1 capsule by oral route

 3 times a day for 10 days               

 

                Tessalon Perles 100 mg oral capsule    10/4/2016                       take 1 capsule by oral route 

every 4 hours                            

 

                Zithromax Z-Christopher 250 mg oral tablet    11/10/2016      11/15/2016      take 2 tablets (500 

mg) by oral route once daily for 1 day then 1 tablet (250 mg) by oral route once
daily for 4 days                         

 

                amoxicillin 500 mg oral tablet    2016                       take 1 tablet (500 mg) by oral route

 3 times per day                         

 

                amoxicillin 500 mg oral capsule    2017       take 1 capsule (500 mg) by

 oral route 3 times per day for 10 days     

 

                Levaquin 500 mg oral tablet    2017       take 1 tablet (500 mg) by oral

 route once daily for 10 days            

 

                Bactrim -160 mg oral tablet    2017       take 1 tablet by oral route

 2 times a day for 10 days               









                                        Discontinued 

 

             Name         Start Date    Discontinued Date    SIG          Comments

 

                amoxicillin 500 mg oral capsule    11/10/2011      10/3/2012       take 1 capsule (500 mg) 

by oral route 3 times per day            

 

                Anusol-HC 25 mg rectal suppository    2011      10/3/2012       insert 1 suppository 

(25 mg) by rectal route 2 times per day     

 

                Proctofoam 1 % topical foam    2011       apply by external route daily

                                         

 

             Aerochamber    2014    Use with inhaler as directed     

 

                hydrochlorothiazide 25 mg oral tablet    1/15/2015       2015      TAKE 1 TABLET DAILY

                                         

 

                Augmentin 875-125 mg oral tablet    2017       take 1 tablet by oral route

 every 12 hours for 7 days               







Problem List







                    Description         Status              Onset

 

                    Chronic Obstructive Pulmonary Disease    Active               

 

                    Hyperlipidemia      Active               

 

                    Anxiety Disorder    Active              10/29/2013

 

                    Pain in joint; Hip    Active              06/10/2014

 

                    Pulmonary nodule seen on imaging study    Active              10/29/2014

 

                    Exercise hypoxemia    Active              10/29/2014

 

                    Anxiety about health    Active              2016

 

                    Primary osteoarthritis involving multiple joints    Active              2017

 

                    Medication management    Active              2017

 

                    Cellulitis of left lower extremity    Active              2017

 

                    Dog bite of calf, left, sequela    Active              2017







Vital Signs







      Date    Time    BP-Sys(mm[Hg]    BP-Noni(mm[Hg])    HR(bpm)    RR(rpm)    Temp    WT    HT    HC    BMI

                    BSA                 BMI Percentile      O2 Sat(%)

 

        2017    11:37:00 AM    118 mmHg    72 mmHg    96 bpm    16 rpm    97.4 F    141 lbs    63 in

                          24.98 kg/m2    1.69 m2                   91 %

 

        2017    8:55:00 AM    105 mmHg    60 mmHg    96 bpm    18 rpm    95.8 F    142 lbs    63 in 

                          25.1539 kg/m    1.6921 m                 95 %

 

       2017    12:10:00 PM    122 mmHg    68 mmHg    76 bpm    18 rpm    98 F    143 lbs    63 in    

                25.33 kg/m2     1.70 m2                         98 %

 

        2017    4:03:00 PM    118 mmHg    64 mmHg    106 bpm    18 rpm    97.4 F    137 lbs    63 in

                          24.2682 kg/m    1.662 m                 98 %

 

        2016    12:11:00 PM    120 mmHg    84 mmHg    110 bpm    16 rpm    98.1 F    130 lbs    63

 in                       23.03 kg/m2    1.62 m2                   90 %

 

        11/10/2016    3:57:00 PM    148 mmHg    72 mmHg    88 bpm    16 rpm    98.2 F    132 lbs    63 in

                          23.3825 kg/m    1.6314 m                 98 %

 

        3/10/2016    2:12:00 PM    122 mmHg    60 mmHg    106 bpm    18 rpm    97 F    137 lbs    63 in 

                          24.27 kg/m2    1.66 m2                   93 %

 

        12/15/2015    2:33:00 PM    120 mmHg    75 mmHg    102 bpm    16 rpm    98.2 F    137 lbs    63 

in                        24.2682 kg/m    1.662 m                 94 %

 

        10/26/2015    2:46:00 PM    130 mmHg    80 mmHg    96 bpm    16 rpm    97.9 F    141 lbs    66 in

                          22.76 kg/m2    1.73 m2                   98 %

 

        10/6/2015    9:37:00 AM    140 mmHg    78 mmHg    110 bpm    16 rpm    97.9 F    141 lbs    63 in

                          24.9768 kg/m    1.6861 m                 95 %

 

        10/28/2014    2:25:00 PM    132 mmHg    66 mmHg    92 bpm    24 rpm    97.5 F    147 lbs    63 in

                          26.04 kg/m2    1.72 m2                   92 %

 

        10/16/2014    9:45:00 AM    132 mmHg    64 mmHg    74 bpm    24 rpm    97.7 F    146 lbs    63 in

                          25.8625 kg/m    1.7157 m                 92 %

 

        2014    2:31:00 PM    136 mmHg    68 mmHg    90 bpm    22 rpm    98.2 F    148 lbs    63 in 

                          26.22 kg/m2    1.73 m2                   95 %

 

        2014    3:19:00 PM    124 mmHg    70 mmHg    64 bpm    20 rpm    97.8 F    147 lbs    63 in

                          26.0396 kg/m    1.7216 m                 95 %

 

        10/28/2013    10:31:00 AM    140 mmHg    70 mmHg    92 bpm    24 rpm    97.8 F    143 lbs    63 

in                        25.33 kg/m2    1.70 m2                   95 %

 

      2013    2:51:00 PM    130 mmHg    78 mmHg    90 bpm          98.2 F    168 lbs    65 in          

27.9564 kg/m      1.8694 m                               

 

       2013    2:43:00 PM    110 mmHg    76 mmHg    103 bpm           96.6 F    137 lbs    63 in      

                24.27 kg/m2     1.66 m2                          

 

        2013    9:50:00 AM    150 mmHg    66 mmHg    108 bpm    20 rpm    97.8 F    138 lbs    63 in

                          24.4454 kg/m    1.6681 m                 94 %

 

        10/25/2012    9:08:00 AM    110 mmHg    60 mmHg    88 bpm    18 rpm    97.5 F    142 lbs    63 in

                          25.15 kg/m2    1.69 m2                   94 %

 

      10/3/2012    9:33:00 AM    130 mmHg    60 mmHg    98 bpm    18 rpm          146 lbs    63 in          

25.8625 kg/m      1.7157 m                              95 %

 

      2012    2:31:00 PM    116 mmHg    76 mmHg    88 bpm                140 lbs    63 in          24.80 

kg/m2               1.68 m2                                 96 %

 

     2011    10:02:00 AM    122 mmHg    80 mmHg    110 bpm              144 lbs                        

                                        94 %

 

      2011    2:58:00 PM    124 mmHg    84 mmHg    106 bpm                155 lbs    63 in          27.46

 kg/m2              1.77 m2                                 96 %

 

     2011    9:55:00 AM    114 mmHg    78 mmHg    92 bpm              146 lbs                             95

 %

 

     6/3/2011    8:36:00 AM    116 mmHg    74 mmHg    90 bpm              146 lbs                             96

 %

 

     2010    3:01:00 PM    132 mmHg    84 mmHg    102 bpm              149 lbs                          

                                        94 %

 

     2010    11:46:00 AM    124 mmHg    78 mmHg    104 bpm              151 lbs                         

                                        94 %

 

     2010    8:47:00 AM    116 mmHg    78 mmHg    103 bpm              151 lbs                          

                                        95 %







Social History







                    Name                Description         Comments

 

                    Tobacco             Never smoker         

 

                    denies alcohol use                         







History of Procedures







                    Date Ordered        Description         Order Status

 

                    2011 12:00 AM    THER/PROPH/DIAG INJ SC/IM    Reviewed

 

                    2011 12:00 AM    Decadron Inj.1mg-([facility]) Ndc #[id number]    Reviewed

 

                    2011 12:00 AM    Depo-Medrol 80 Mg Im/[facility] NDC 0009-864285    Reviewed

 

                    2011 12:00 AM    Rocephin, Per 250MG - 1 Gram Vial NDC 6281-874625-[facility]    Reviewed



 

                    3/16/2012 12:00 AM    ROUTINE VENIPUNCTURE    Reviewed

 

                    3/16/2012 12:00 AM    COMPLETE CBC W/AUTO DIFF WBC    Reviewed

 

                    3/16/2012 12:00 AM    COMPREHEN METABOLIC PANEL    Reviewed

 

                    3/16/2012 12:00 AM    ASSAY THYROID STIM HORMONE    Reviewed

 

                    11/10/2016 12:00 AM    Decadron, Per 1 Mg NDC# [address]-70    Reviewed

 

                    11/10/2016 12:00 AM    Depo-Medrol, Per 80 Mg NDC#8011-4121-66    Reviewed

 

                    11/10/2016 12:00 AM    Rocephin 1 gram NDC#0335-2204-91    Reviewed

 

                    2016 12:00 AM    THER/PROPH/DIAG INJ SC/IM    Reviewed

 

                    2016 12:00 AM    Decadron 8mg Injection, [facility]    Reviewed

 

                    2016 12:00 AM    Depo-Medrol 80mg Injection, [facility]    Reviewed

 

                    2016 12:00 AM    Rocephin 1 gram Injection, [facility]    Reviewed

 

                    2017 12:00 AM    COMPLETE CBC W/AUTO DIFF WBC    Reviewed

 

                    2017 12:00 AM    COMPREHEN METABOLIC PANEL    Reviewed

 

                    2017 12:00 AM    LIPID PANEL         Reviewed

 

                    2017 12:00 AM    THERAPEUTIC PROPHYLACTIC/DX INJECTION SUBQ/IM    Reviewed

 

                    2017 12:00 AM    Decadron 8mg Injection, [facility]    Reviewed

 

                    2017 12:00 AM    Depo-Medrol 80mg Injection, [facility]    Reviewed

 

                    10/3/2012 12:00 AM    THER/PROPH/DIAG INJ SC/IM    Reviewed

 

                    10/3/2012 12:00 AM    Decadron, Per 1 Mg NDC# [address]-37    Reviewed

 

                    10/3/2012 12:00 AM    Depo-Medrol, Per 80 Mg NDC#4851-0237-96    Reviewed

 

                    10/3/2012 12:00 AM    Toradol 60 Mg NDC#5052-5520-00    Reviewed

 

                    2013 12:00 AM    THER/PROPH/DIAG INJ SC/IM    Reviewed

 

                    2013 12:00 AM    Decadron, Per 1 Mg NDC# [address]-07    Reviewed

 

                    2013 12:00 AM    Depo-Medrol, Per 80 Mg NDC#6766-0554-68    Reviewed

 

                    2013 12:00 AM    Toradol 60 Mg NDC#5016-8827-03    Reviewed

 

                    2010 12:00 AM    ROUTINE VENIPUNCTURE    Reviewed

 

                    2010 12:00 AM    COMPLETE CBC W/AUTO DIFF WBC    Reviewed

 

                    2010 12:00 AM    COMPREHEN METABOLIC PANEL    Reviewed

 

                    2010 12:00 AM    LIPID PANEL         Reviewed

 

                    10/28/2014 12:00 AM    CHEST X-RAY 4/> VIEWS    Reviewed

 

                    6/3/2011 12:00 AM    ROUTINE VENIPUNCTURE    Reviewed

 

                    6/3/2011 12:00 AM    COMPLETE CBC AUTOMATED    Reviewed

 

                    6/3/2011 12:00 AM    COMPREHEN METABOLIC PANEL    Reviewed

 

                    6/3/2011 12:00 AM    LIPID PANEL         Reviewed

 

                    2011 12:00 AM    THER/PROPH/DIAG INJ SC/IM    Reviewed

 

                    2011 12:00 AM    Decadron Inj.8mg-([facility]) Ndc #[id number]    Reviewed

 

                    2011 12:00 AM    Depo-Medrol 80 Mg Im/[facility] NDC 0009-707445    Reviewed







Results Summary







                          Date and Description      Results

 

                          6/3/2011 1:52 PM          WBC 8.6 RBC 4.66 HGB 13.20 g/dLHCT 42.10 %MCV 90.0 fLMCH 28.30

 pgMCHC 31.40 g/dLRDW SD 44 RDW CV 13.60 %MPV 10.90 fLPLT 383 NRBC# 0.00 NRBC% 
0.0 %NEUT 68.0 %%LYMP 20.30 %%MONO 9.30 %%EOS 2.20 %%BASO 0.20 %#NEUT 5.81 #LYMP
 1.74 #MONO 0.80 #EOS 0.19 #BASO 0.02 MANUAL DIFF NOT IND 

 

                          6/3/2011 1:53 PM          TRIGLYCERIDES 155.0 mg/dLCHOLESTEROL 248.0 mg/dLHDL 51.0 mg/dLTOT

 CHOL/HDL 4.9 .0 mg/dLGLUCOSE 97.0 mg/dLSODIUM 139.0 mmol/LPOTASSIUM 3.70
 mmol/LCHLORIDE 101.0 mmol/LCO2 27.0 mmol/LBUN 19.0 mg/dLCREATININE 0.90 
mg/dLSGOT/AST 19.0 IU/LSGPT/ALT 11.0 IU/LALK PHOS 111.0 IU/LTOTAL PROTEIN 7.40 
g/dLALBUMIN 4.20 g/dLTOTAL BILI 0.50 mg/dLCALCIUM 9.50 mg/dLAGE 70 GFR NonAA 62 
GFR AA 75 eGFR >60 mL/min/1.73 m2eGFR AA* >60 







History Of Immunizations

Not available.



History of Past Illness







                    Name                Date of Onset       Comments

 

                    Chronic Obstructive Pulmonary Disease                         

 

                    Hyperlipidemia                           

 

                    Cough               2010  8:49AM     

 

                    General Medical Exam, Adult    2010  8:49AM     

 

                    Seasonal Allergies    2010  8:49AM     

 

                    Sinusitis, Chronic    2010  8:49AM     

 

                    Ganglion cyst of synovium, tendon, and bursa; other    2010 11:48AM     

 

                    Anxiety Disorder    10/29/2013           

 

                    Pain in joint; Hip    06/10/2014           

 

                    Pulmonary nodule seen on imaging study    10/29/2014           

 

                    Exercise hypoxemia    10/29/2014           

 

                    Chronic Obstructive Pulmonary Disease    2010  3:02PM     

 

                    Edema               2010  3:02PM     

 

                    Sinusitis, Acute    2010  3:02PM     

 

                    Anxiety about health    2016           

 

                    Chronic Obstructive Pulmonary Disease    Trey  3 2011  8:38AM     

 

                    Palpitations        Trey  3 2011  8:38AM     

 

                    Cough               2011  9:54AM     

 

                    Sinusitis, Acute    2011  9:54AM     

 

                    Seasonal Allergies    2011  9:54AM     

 

                    Post-nasal drainage    2011  9:54AM     

 

                    Primary osteoarthritis involving multiple joints    2017           

 

                    Medication management    2017           

 

                    Cellulitis of left lower extremity    2017           

 

                    Dog bite of calf, left, sequela    2017           

 

                    Cough               Sep 22 2011  2:55PM     

 

                    Seasonal Allergies    Sep 22 2011  2:55PM     

 

                    Pharyngitis, Acute    Sep 22 2011  2:55PM     

 

                    Post-nasal drainage    Sep 22 2011  2:55PM     

 

                    Blood In Stool, Occult    2011  9:58AM     

 

                    Hemorrhoids         2011  9:58AM     

 

                    Chronic Obstructive Pulmonary Disease    2012  2:33PM     

 

                    Cardiac Dysrhythmia    2012  2:33PM     

 

                    Sinoatrial Node Dysfunction    Mar 16 2012  9:12AM     

 

                    Palpitations        Mar 16 2012  9:12AM     

 

                    Osteoarthrosis, generalized, multiple sites    Oct  3 2012  9:34AM     

 

                    Pain in joint; Hip    Oct  3 2012  9:34AM     

 

                    Osteoarthrosis      Oct 25 2012  9:09AM     

 

                    Bronchitis, Acute    Oct 25 2012  9:09AM     

 

                    Cough               Oct 25 2012  9:09AM     

 

                    Pain in joint; Left Hip    Oct 25 2012  9:09AM     

 

                    Pain in joint; Hip    2013  9:50AM     

 

                    Osteoarthrosis, generalized, multiple sites    2013  2:45PM     

 

                    Pain in joint; Hip bilateral    2013  2:45PM     

 

                    Anxiety Disorder    Oct 28 2013 10:32AM     

 

                    Chronic Obstructive Pulmonary Disease    Oct 28 2013 10:32AM     

 

                    Hyperlipidemia      Oct 28 2013 10:32AM     

 

                    Pain in joint; Left Hip    Oct 28 2013 10:32AM     

 

                    Sinusitis, Acute    Oct 28 2013 10:32AM     

 

                    Essential Hypertension    2014  3:19PM     

 

                    Osteoarthrosis, generalized, multiple sites    2014  3:19PM     

 

                    Chronic Obstructive Pulmonary Disease    2014  3:19PM     

 

                    Pain in joint; Hip    2014  3:19PM     

 

                    Chronic Obstructive Pulmonary Disease    2014  2:31PM     

 

                    Pain in joint; Hip    2014  2:31PM     

 

                    pre-operative examination, unspecified    2014  2:31PM     

 

                    Lung nodule seen on imaging study    Oct 28 2014 10:24AM     

 

                    Bronchitis, Acute    Oct 16 2014  9:45AM     

 

                    Respiratory System And Chest Symptoms    Oct 16 2014  9:45AM     

 

                    COPD (chronic obstructive pulmonary disease)    Oct 16 2014  9:45AM     

 

                    Chronic Obstructive Pulmonary Disease    Oct 28 2014  2:26PM     

 

                    Pulmonary nodule seen on imaging study    Oct 28 2014  2:26PM     

 

                    Shortness of breath    Oct 28 2014  2:26PM     

 

                    Exercise hypoxemia    Oct 28 2014  2:26PM     

 

                    Nail avulsion       Oct  6 2015  9:38AM     

 

                          Contusion of left index finger with damage to nail, initial encounter    Oct 26 2015

  2:53PM                                 

 

                    Forehead contusion, subsequent encounter    Dec 15 2015  2:39PM     

 

                    Generalized anxiety disorder    Dec 15 2015  2:39PM     

 

                    Chronic Obstructive Pulmonary Disease    Dec 15 2015  2:39PM     

 

                    Osteoarthrosis, generalized, multiple sites    Mar 10 2016  2:12PM     

 

                    Pain of right thigh    Mar 10 2016  2:12PM     

 

                    Mild Acute Hip pain, acute, right Improving    Mar 10 2016  2:12PM     

 

                    Anxiety about health    Mar 10 2016  2:12PM     

 

                    Hyperlipidemia      Aug 22 2016 10:58AM     

 

                    Sinoatrial Node Dysfunction    Aug 22 2016 10:58AM     

 

                    Palpitations        Aug 22 2016 10:58AM     

 

                    Chronic Obstructive Pulmonary Disease    Aug 22 2016 10:58AM     

 

                    Exercise hypoxemia    Aug 22 2016 10:58AM     

 

                    Pain in joint; Hip    Aug 22 2016 10:58AM     

 

                    Pulmonary nodule seen on imaging study    Aug 22 2016 10:58AM     

 

                    Eustachian tube dysfunction, bilateral    Nov 10 2016  3:57PM     

 

                    Moderate Acute Cough    Nov 10 2016  3:57PM     

 

                    Pharyngitis, Acute    Nov 10 2016  3:57PM     

 

                    Upper Respiratory Infection    Nov 10 2016  3:57PM     

 

                          COPD (chronic obstructive pulmonary disease) with acute bronchitis    Nov 10 2016 

 3:57PM                                  

 

                    Mild Chronic Cough Worsening    Dec 12 2016 12:12PM     

 

                          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

 12 2016 12:12PM                         

 

                    Moderate Shortness of breath on exertion    Dec 12 2016 12:12PM     

 

                    Hyperlipidemia      May  4 2017 10:27AM     

 

                    Sinoatrial Node Dysfunction    May  4 2017 10:27AM     

 

                    Palpitations        May  4 2017 10:27AM     

 

                    Chronic Obstructive Pulmonary Disease    May  4 2017 10:27AM     

 

                    Exercise hypoxemia    May  4 2017 10:27AM     

 

                    Pain in joint; Hip    May  4 2017 10:27AM     

 

                    Pulmonary nodule seen on imaging study    May  4 2017 10:27AM     

 

                    Chest congestion    May 16 2017  4:04PM     

 

                          COPD (chronic obstructive pulmonary disease) with acute bronchitis    May 16 2017 

 4:04PM                                  

 

                    Productive cough    May 16 2017  4:04PM     

 

                    Anxiety about health    May 16 2017  4:04PM     

 

                          Chronic obstructive pulmonary disease, unspecified COPD type    May 16 2017  4:04PM

                                         

 

                    Exercise hypoxemia    May 16 2017  4:04PM     

 

                    Mixed hyperlipidemia    May 16 2017  4:04PM     

 

                    Medication management    May 16 2017  4:04PM     

 

                    Primary osteoarthritis involving multiple joints    May 16 2017  4:04PM     

 

                    Cellulitis of left lower extremity    2017 12:10PM     

 

                    Bitten by dog, initial encounter    2017 12:10PM     

 

                    Cellulitis of left lower extremity    2017  8:56AM     

 

                    Open bite, left lower leg, sequela    2017  8:56AM     

 

                    Bitten by dog, sequela    2017  8:56AM     

 

                    Medication management    2017  8:56AM     

 

                    Cellulitis of left lower extremity    2017 11:38AM     

 

                    Open bite, left lower leg, subsequent encounter    2017 11:38AM     

 

                    Bitten by dog, subsequent encounter    2017 11:38AM     

 

                    Medication management    2017 11:38AM     







Payers







           Insurance Name    Company Name    Plan Name    Plan Number    Policy Number    Policy Group

 Number                                 Start Date

 

                    Medicare [facility]    Medicare RH              [id number]              N/A

 

                          [place] [employer]    [place] [employer] [place]                 [id number]

                                                    N/A

 

                    [employer] Medicare A    Test Medicare A              [id number]              N/A

 

                                [facility]      

                    [id number]                             N/A

 

                    Medicare Part A    Medicare - Lab/Xray              [id number]              N/A

 

                          [facility]    [facility]                 [id number]

                                                    N/A

 

                    Medicare Part A    Medicare Part A              [id number]              N/A

 

                    Medicare Part B    [employer] [place]              [id number]              N/A







History of Encounters







                    Visit Date          Visit Type          Provider

 

                    2017           Office visit        DEON LEON PA

 

                    2017            Office visit        DEON LEON PA

 

                    2017            Office visit        DEON LEON PA

 

                    2017           Office visit        DEON LEON PA

 

                    2016          Office visit        DEON LEON PA

 

                    11/10/2016          Office visit        DEON LEON PA

 

                    3/10/2016           Office visit        DEON LEON PA

 

                    12/15/2015          Office visit        DEON LEON PA

 

                    10/26/2015          Office visit        DEON LEON PA

 

                    10/6/2015           Office visit        DEON LEON PA

 

                    10/28/2014          Office visit        DEON LEON PA

 

                    10/16/2014          Office visit        DEON LEON PA

 

                    2014            Office visit        DEON ESCALANTE

 

                    2014           [facility]            DEWEY Garcia MD

 

                    2014           Office visit        DEON LEON PA

 

                    10/28/2013          Office visit        DEON ESCALANTE

 

                    10/14/2013          [facility]            DEWEY Garcia MD

 

                    2013           Office visit        DEON LEON PA

 

                    2013            Office visit        DEON LEON PA

 

                    10/25/2012          Office visit        DEON LEON PA

 

                    10/3/2012           Office visit        DEON LEON PA

 

                    3/16/2012           Office visit        DEON LEON PA

 

                    2012            Office visit        DEON LEON PA

 

                    2012            [facility]            DEWEY Garcia MD

 

                    2011          Office visit        DEON LEON PA

 

                    2011           Office visit        Deon Leon PA-C

 

                    2011            Office visit        Deon Leon PA-C

 

                    6/3/2011            Office visit        Deon Leon PA-C

 

                    2010           Office visit        Deon Leon PA-C

 

                    2010           Office visit        Deon Leon PA-C

 

                    2010           Office visit        Deon Leon PA-C

## 2019-06-25 NOTE — XMS REPORT
MU2 Ambulatory Summary

                             Created on: 2019



Elsi Casillas

External Reference #: [id number]

: 1940

Sex: Female



Demographics







                          Address                   [address] [place]  [address]

 

                          Home Phone                [phone]

 

                          Preferred Language        English

 

                          Marital Status            

 

                          Oriental orthodox Affiliation     Unknown

 

                          Race                      White

 

                          Ethnic Group              Not  or 





Author







                          Author                    DEON LEON

 

                          [facility]

 

                          Address                   [address]

[place], [place]  [id number]



 

                          Phone                     [phone]







Care Team Providers







                    Care Team Member Name    Role                Phone

 

                    DEON LEON    PCP                 [phone]

 

                    DEON LEON    PreferredProvider    [phone]







Allergies and Adverse Reactions







                    Name                Reaction            Notes

 

                    SULFA (SULFONAMIDES)    nausea               







Plan of Treatment







             Planned Activity    Comments     Planned Date    Planned Time    Plan/Goal

 

             Lipid Profile                 2016    12:00 AM      

 

             Chest PA and Lateral - Main                 8/10/2017    12:00 AM      

 

             Lipid Profile                 2018    12:00 AM      

 

             Chest PA and Lateral - Main                 2019    12:00 AM      

 

             Sinuses Limited - Main                 2019    12:00 AM      







Medications







                                        Active 

 

             Name         Start Date    Estimated Completion Date    SIG          Comments

 

                Calcium 600 + D(3) 600 mg(1,500mg) -200 unit oral tablet                                    take 2 tablets by

 oral route daily                        

 

                pravastatin 20 mg oral tablet                                    take 1 tablet (20 mg) by oral route once daily

                                         

 

                Toprol XL 25 mg oral tablet extended release 24 hr                                    take 1 tablet (25 mg) 

by oral route once daily                 

 

                    albuterol sulfate 1.25 mg/3 mL inhalation solution for nebulization    2017           

                                        inhale 3 milliliters (1.25 mg) via nebulizer by inhalation route 4 times per day

  DX J44.9                               

 

                                        ipratropium-albuterol 0.5 mg-3 mg(2.5 mg base)/3 mL inhalation solution for nebulization

                    2018                                inhale 3 milliliters by nebulization route 4 times per day for COPD

                                         

 

                metoprolol succinate 25 mg oral tablet extended release 24 hr    2018                       TAKE

 1 TABLET DAILY                          

 

                Lasix 40 mg oral tablet    2018                       take 1 tablet (40 mg) by oral route once 

daily                                    

 

                fluticasone 50 mcg/actuation nasal spray,suspension    10/2/2018                       inhale 1 spray

 (50 mcg) in each nostril by intranasal route 2 times per day     

 

             hydrochlorothiazide 25 mg oral tablet    2018                 TAKE 1 TABLET DAILY     

 

                lisinopril 10 mg oral tablet    2018      3/11/2019       take 1 tablet (10 mg) by oral

 route once daily for 30 days            

 

                Sudogest 30 mg oral tablet    2018                      take 1-2 tablets by oral route every 

6 hours as needed                        









                                         

 

             Name         Start Date    Expiration Date    SIG          Comments

 

                Singulair 10 mg oral tablet    9/3/2010        2011        take 1 tablet (10 mg) by oral route

 daily  for 60 days                      

 

                Zithromax Z-Christopher 250 mg oral tablet    2011       take 2 tablets (500 mg)

 by oral route once daily for 1 day then 1 tablet (250 mg) by oral route once 
daily for 4 days                         

 

                Medrol (Christopher) 4 mg oral tablets,dose pack    2011        take as directed

 for 6 days                              

 

                Keflex 500 mg oral capsule    3/1/2012        3/11/2012       take 1 capsule by oral route 3 

times a day for 10 days                  

 

                Cipro 500 mg oral tablet    2012        take 1 tablet (500 mg) by oral route

 every 12 hours for 15 days              

 

                benzonatate 100 mg oral capsule    2013       take 1 capsule (100 mg) by

 oral route 3 times per day for cough     

 

             Medrol (Christopher) 4 mg oral tablets,dose pack    2013     take as directed    

 

 

                Zyrtec 10 mg oral tablet    2013       take 1 tablet (10 mg) by oral route

 once daily for 30 days                  

 

                Flonase 50 mcg/actuation nasal spray,suspension    2013       inhale 1 spray

 in each nostril by intranasal route 2 times per day for 30 days     

 

                cephalexin 500 mg oral capsule    2013        take 1 capsule (500 mg) by oral

 route every 8 hours                     

 

                promethazine-codeine 6.25-10 mg/5 mL oral syrup    2013                       take 5 milliliters

 by oral route every 4 hours as needed, not to exceed 30 mL in 24 hours     

 

                Zithromax Z-Christopher 250 mg oral tablet    10/2/2013       10/7/2013       take 2 tablets (500 mg)

 by oral route once daily for 1 day then 1 tablet (250 mg) by oral route once 
daily for 4 days                         

 

                amoxicillin 500 mg oral capsule    2014       take 1 capsule by oral route

 3 times a day for 15 days               

 

                lovastatin 20 mg oral tablet    2014        take 1 tablet (20 mg) by oral 

route daily  for 30 days                 

 

                Zithromax Z-Christopher 250 mg oral tablet    2014       take 2 tablets (500 mg)

 by oral route once daily for 1 day then 1 tablet (250 mg) by oral route once 
daily for 4 days                         

 

             Medrol (Christopher) 4 mg oral tablets,dose pack    2014                 take as directed     

 

                amoxicillin 500 mg oral capsule    2014                       take 1 capsule (500 mg) by oral route

 3 times per day                         

 

                Levaquin 500 mg oral tablet    10/16/2014      10/26/2014      take 1 tablet (500 mg) by oral

 route once daily for 10 days            

 

                prednisone 20 mg oral tablet    10/16/2014      10/24/2014      4x2 days 3x2 days 2x2 days

 1x2 days                                

 

                Zithromax Z-Christopher 250 mg oral tablet    2014       take 2 tablets (500 mg)

 by oral route once daily for 1 day then 1 tablet (250 mg) by oral route once 
daily for 4 days                         

 

                Bactrim -160 mg oral tablet    1/15/2015       2015       take 1 tablet by oral route

 every 12 hours for 10 days              

 

                Zithromax Z-Christopher 250 mg oral tablet    2015      take 2 tablets (500 mg)

 by oral route once daily for 1 day then 1 tablet (250 mg) by oral route once 
daily for 4 days                         

 

                Keflex 500 mg oral capsule    2016       take 1 capsule by oral route 3

 times a day for 7 days                  

 

                amoxicillin 500 mg oral capsule    10/4/2016       10/14/2016      take 1 capsule by oral route

 3 times a day for 10 days               

 

                Tessalon Perles 100 mg oral capsule    10/4/2016                       take 1 capsule by oral route 

every 4 hours                            

 

                Zithromax Z-Christopher 250 mg oral tablet    11/10/2016      11/15/2016      take 2 tablets (500 

mg) by oral route once daily for 1 day then 1 tablet (250 mg) by oral route once
daily for 4 days                         

 

                amoxicillin 500 mg oral tablet    2016                       take 1 tablet (500 mg) by oral route

 3 times per day                         

 

                amoxicillin 500 mg oral capsule    2017       take 1 capsule (500 mg) by

 oral route 3 times per day for 10 days     

 

                Bactrim -160 mg oral tablet    2017       take 1 tablet by oral route

 2 times a day for 10 days               

 

                Levaquin 500 mg oral tablet    2017        take 1 tablet (500 mg) by oral

 route once daily for 10 days            

 

                amoxicillin 500 mg oral capsule    2017                       take 1 capsule (500 mg) by oral route

 every 12 hours for 7 days               

 

                Zithromax Z-Christopher 250 mg oral tablet    2017                      take 2 tablets (500 mg) by oral

 route once daily for 1 day then 1 tablet (250 mg) by oral route once daily for 
4 days                                   

 

                amoxicillin 500 mg oral tablet    2018                       take 1 tablet (500 mg) by oral route

 every 12 hours for 10 days              

 

                Cipro 500 mg oral tablet    2018        2/15/2018       take 1 tablet (500 mg) by oral route

 2 times per day for 10 days             

 

                Zithromax Z-Christopher 250 mg oral tablet    2018       take 2 tablets (500 mg)

 by oral route once daily for 1 day then 1 tablet (250 mg) by oral route once 
daily for 4 days                         

 

                Keflex 500 mg oral capsule    2018       take 1 capsule (500 mg) by oral

 route every 12 hours for 7 days         

 

                prednisone 20 mg oral tablet    2018       2X4 days 1X4 days 1/2X4 days 

                                         

 

                Levaquin 500 mg oral tablet    2018       take 1 tablet (500 mg) by oral

 route once daily for 7 days             

 

                amoxicillin 500 mg oral capsule    2019       take 1 capsule (500 mg) by

 oral route 3 times per day for 10 days     









                                        Discontinued 

 

             Name         Start Date    Discontinued Date    SIG          Comments

 

                amoxicillin 500 mg oral capsule    11/10/2011      10/3/2012       take 1 capsule (500 mg) 

by oral route 3 times per day            

 

                Anusol-HC 25 mg rectal suppository    2011      10/3/2012       insert 1 suppository 

(25 mg) by rectal route 2 times per day     

 

                Proctofoam 1 % topical foam    2011       apply by external route daily

                                         

 

             Aerochamber    2014    Use with inhaler as directed     

 

                hydrochlorothiazide 25 mg oral tablet    1/15/2015       2015      TAKE 1 TABLET DAILY

                                         

 

                promethazine-codeine 6.25-10 mg/5 mL oral syrup    11/10/2016      10/25/2017      take 5 

milliliters by oral route every 6 hours as needed, not to exceed 30 mL in 24 
hours                                    

 

                prednisone 20 mg oral tablet    2017       10/25/2017      4 x 2 days, 3 x 2 days, 2 x

 2 days 1 x 2 days                       

 

                Augmentin 875-125 mg oral tablet    2017       take 1 tablet by oral route

 every 12 hours for 7 days               

 

                Keflex 500 mg oral capsule    2017       take 1 capsule (500 mg) by oral

 route every 12 hours                    

 

                Keflex 500 mg oral capsule    10/10/2017      2018       take 1 capsule (500 mg) by oral

 route every 12 hours for 10 days        

 

                    Symbicort 160-4.5 mcg/actuation inhalation HFA aerosol inhaler    10/25/2017          10/6/2018

                          inhale 2 puffs by inhalation route 2 times per day in the morning and evening    

 

 

                Levaquin 500 mg oral tablet    2018       take 1 tablet (500 mg) by oral

 route once daily for 10 days            

 

                Bactrim -160 mg oral tablet    2018       take 1 tablet by oral route

 every 12 hours for 10 days             nausea

 

                Tessalon Perles 100 mg oral capsule    2018        10/6/2018       take 1 capsule (100 mg)

 by oral route 3 times per day as needed for cough     

 

                Sudogest 30 mg oral tablet    2018        10/6/2018       take 1 tablets (60 mg) by oral 

route every 6 hours as needed            







Problem List







                    Description         Status              Onset

 

                    Chronic Obstructive Pulmonary Disease    Active               

 

                    Hyperlipidemia      Active               

 

                    Anxiety disorder    Active              10/29/2013

 

                    Pain in joint; Hip    Active              06/10/2014

 

                    Pulmonary nodule seen on imaging study    Active              10/29/2014

 

                    Exercise hypoxemia    Active              10/29/2014

 

                    Anxiety about health    Active              2016

 

                    Primary osteoarthritis involving multiple joints    Active              2017

 

                    Medication management    Active              2017

 

                    Cellulitis of left lower extremity    Active              2017

 

                    Dog bite of calf, left, sequela    Active              2017

 

                    Peripheral edema    Active              2018







Vital Signs







      Date    Time    BP-Sys(mm[Hg]    BP-Noni(mm[Hg])    HR(bpm)    RR(rpm)    Temp    WT    HT    HC    BMI

                    BSA                 BMI Percentile      O2 Sat(%)

 

        2019    3:30:00 PM    108 mmHg    72 mmHg    82 bpm    18 rpm    98.1 F    142 lbs    62 in 

                          25.9719 kg/m    1.6786 m                 89 %

 

        2018    9:30:00 AM    154 mmHg    100 mmHg    108 bpm    20 rpm    98.1 F    145 lbs    62 

in                        26.52 kg/m2    1.70 m2                   91 %

 

        10/4/2018    9:00:00 AM    114 mmHg    74 mmHg    76 bpm    18 rpm    98.4 F    145 lbs    61 in

                          27.3972 kg/m    1.6825 m                 97 %

 

        2018    10:07:00 AM    124 mmHg    82 mmHg    86 bpm    18 rpm    98.2 F    149 lbs    61 in

                          28.15 kg/m2    1.71 m2                   92 %

 

        2018    10:48:00 AM    118 mmHg    80 mmHg    82 bpm    18 rpm    98.3 F    144 lbs    61 in

                          27.2083 kg/m    1.6767 m                 93 %

 

        2018    2:35:00 PM    120 mmHg    82 mmHg    106 bpm    20 rpm    98.2 F    142 lbs    62 in

                          25.97 kg/m2    1.68 m2                   92 %

 

       2018    3:28:00 PM    122 mmHg    68 mmHg    114 bpm    18 rpm    98.2 F    142 lbs            

                                                                90 %

 

        2018    1:35:00 PM    112 mmHg    74 mmHg    114 bpm    18 rpm    99.6 F    139 lbs    63 in

                          24.6225 kg/m    1.6741 m                 98 %

 

       2018    3:57:00 PM    128 mmHg    80 mmHg    78 bpm    18 rpm    98.4 F    148 lbs             

                                                                90 %

 

        10/24/2017    1:51:00 PM    132 mmHg    80 mmHg    82 bpm    16 rpm    98.2 F    145 lbs    62 in

                          26.52 kg/m2    1.70 m2                   95 %

 

       2017    3:29:00 PM    128 mmHg    80 mmHg    68 bpm    16 rpm    98 F    144 lbs    63 in    

                25.5082 kg/m    1.7039 m                      93 %

 

        2017    11:37:00 AM    118 mmHg    72 mmHg    96 bpm    16 rpm    97.4 F    141 lbs    63 in

                          24.98 kg/m2    1.69 m2                   91 %

 

        2017    8:55:00 AM    105 mmHg    60 mmHg    96 bpm    18 rpm    95.8 F    142 lbs    63 in 

                          25.1539 kg/m    1.6921 m                 95 %

 

       2017    12:10:00 PM    122 mmHg    68 mmHg    76 bpm    18 rpm    98 F    143 lbs    63 in    

                25.33 kg/m2     1.70 m2                         98 %

 

        2017    4:03:00 PM    118 mmHg    64 mmHg    106 bpm    18 rpm    97.4 F    137 lbs    63 in

                          24.2682 kg/m    1.662 m                 98 %

 

        2016    12:11:00 PM    120 mmHg    84 mmHg    110 bpm    16 rpm    98.1 F    130 lbs    63

 in                       23.03 kg/m2    1.62 m2                   90 %

 

        11/10/2016    3:57:00 PM    148 mmHg    72 mmHg    88 bpm    16 rpm    98.2 F    132 lbs    63 in

                          23.3825 kg/m    1.6314 m                 98 %

 

        3/10/2016    2:12:00 PM    122 mmHg    60 mmHg    106 bpm    18 rpm    97 F    137 lbs    63 in 

                          24.27 kg/m2    1.66 m2                   93 %

 

        12/15/2015    2:33:00 PM    120 mmHg    75 mmHg    102 bpm    16 rpm    98.2 F    137 lbs    63 

in                        24.2682 kg/m    1.662 m                 94 %

 

        10/26/2015    2:46:00 PM    130 mmHg    80 mmHg    96 bpm    16 rpm    97.9 F    141 lbs    66 in

                          22.76 kg/m2    1.73 m2                   98 %

 

        10/6/2015    9:37:00 AM    140 mmHg    78 mmHg    110 bpm    16 rpm    97.9 F    141 lbs    63 in

                          24.9768 kg/m    1.6861 m                 95 %

 

        10/28/2014    2:25:00 PM    132 mmHg    66 mmHg    92 bpm    24 rpm    97.5 F    147 lbs    63 in

                          26.04 kg/m2    1.72 m2                   92 %

 

        10/16/2014    9:45:00 AM    132 mmHg    64 mmHg    74 bpm    24 rpm    97.7 F    146 lbs    63 in

                          25.8625 kg/m    1.7157 m                 92 %

 

        2014    2:31:00 PM    136 mmHg    68 mmHg    90 bpm    22 rpm    98.2 F    148 lbs    63 in 

                          26.22 kg/m2    1.73 m2                   95 %

 

        2014    3:19:00 PM    124 mmHg    70 mmHg    64 bpm    20 rpm    97.8 F    147 lbs    63 in

                          26.0396 kg/m    1.7216 m                 95 %

 

        10/28/2013    10:31:00 AM    140 mmHg    70 mmHg    92 bpm    24 rpm    97.8 F    143 lbs    63 

in                        25.33 kg/m2    1.70 m2                   95 %

 

      2013    2:51:00 PM    130 mmHg    78 mmHg    90 bpm          98.2 F    168 lbs    65 in          

27.9564 kg/m      1.8694 m                               

 

       2013    2:43:00 PM    110 mmHg    76 mmHg    103 bpm           96.6 F    137 lbs    63 in      

                24.27 kg/m2     1.66 m2                          

 

        2013    9:50:00 AM    150 mmHg    66 mmHg    108 bpm    20 rpm    97.8 F    138 lbs    63 in

                          24.4454 kg/m    1.6681 m                 94 %

 

        10/25/2012    9:08:00 AM    110 mmHg    60 mmHg    88 bpm    18 rpm    97.5 F    142 lbs    63 in

                          25.15 kg/m2    1.69 m2                   94 %

 

      10/3/2012    9:33:00 AM    130 mmHg    60 mmHg    98 bpm    18 rpm          146 lbs    63 in          

25.8625 kg/m      1.7157 m                              95 %

 

      2012    2:31:00 PM    116 mmHg    76 mmHg    88 bpm                140 lbs    63 in          24.80 

kg/m2               1.68 m2                                 96 %

 

     2011    10:02:00 AM    122 mmHg    80 mmHg    110 bpm              144 lbs                        

                                        94 %

 

      2011    2:58:00 PM    124 mmHg    84 mmHg    106 bpm                155 lbs    63 in          27.4567

 kg/m             1.7678 m                              96 %

 

     2011    9:55:00 AM    114 mmHg    78 mmHg    92 bpm              146 lbs                             95

 %

 

     6/3/2011    8:36:00 AM    116 mmHg    74 mmHg    90 bpm              146 lbs                             96

 %

 

     2010    3:01:00 PM    132 mmHg    84 mmHg    102 bpm              149 lbs                          

                                        94 %

 

     2010    11:46:00 AM    124 mmHg    78 mmHg    104 bpm              151 lbs                         

                                        94 %

 

     2010    8:47:00 AM    116 mmHg    78 mmHg    103 bpm              151 lbs                          

                                        95 %







Social History







                    Name                Description         Comments

 

                    Alcohol             Never                

 

                    Uses seatbelts                           

 

                    Tobacco             Never smoker         







History of Procedures







                    Date Ordered        Description         Order Status

 

                    2011 12:00 AM    THER/PROPH/DIAG INJ SC/IM    Reviewed

 

                    2011 12:00 AM    Decadron Inj.1mg-([facility]) Ndc #[id number]    Reviewed

 

                    2011 12:00 AM    Depo-Medrol 80 Mg Im/[facility] NDC 0009-048020    Reviewed

 

                    2011 12:00 AM    Rocephin, Per 250MG - 1 Gram Vial NDC 5980-744438-[facility]    Reviewed



 

                    3/16/2012 12:00 AM    ROUTINE VENIPUNCTURE    Reviewed

 

                    3/16/2012 12:00 AM    COMPLETE CBC W/AUTO DIFF WBC    Reviewed

 

                    3/16/2012 12:00 AM    COMPREHEN METABOLIC PANEL    Reviewed

 

                    3/16/2012 12:00 AM    ASSAY THYROID STIM HORMONE    Reviewed

 

                    11/10/2016 12:00 AM    Decadron, Per 1 Mg NDC# [address]-95    Reviewed

 

                    11/10/2016 12:00 AM    Depo-Medrol, Per 80 Mg NDC#4554-3643-53    Reviewed

 

                    11/10/2016 12:00 AM    Rocephin 1 gram NDC#2577-5563-21    Reviewed

 

                    2016 12:00 AM    THER/PROPH/DIAG INJ SC/IM    Reviewed

 

                    2016 12:00 AM    Decadron 8mg Injection, [facility]    Reviewed

 

                    2016 12:00 AM    Depo-Medrol 80mg Injection, [facility]    Reviewed

 

                    2016 12:00 AM    Rocephin 1 gram Injection, [facility]    Reviewed

 

                    2017 12:00 AM    COMPLETE CBC W/AUTO DIFF WBC    Reviewed

 

                    2017 12:00 AM    COMPREHEN METABOLIC PANEL    Reviewed

 

                    2017 12:00 AM    LIPID PANEL         Reviewed

 

                    2017 12:00 AM    THERAPEUTIC PROPHYLACTIC/DX INJECTION SUBQ/IM    Reviewed

 

                    2017 12:00 AM    Decadron 8mg Injection, [facility]    Reviewed

 

                    2017 12:00 AM    Depo-Medrol 80mg Injection, [facility]    Reviewed

 

                    2017 12:00 AM    LIPID PANEL         Returned

 

                    2017 12:00 AM    THERAPEUTIC PROPHYLACTIC/DX INJECTION SUBQ/IM    Reviewed

 

                    2017 12:00 AM    Decadron 8mg Injection, [facility]    Reviewed

 

                    2017 12:00 AM    Depo-Medrol 80mg Injection, [facility]    Reviewed

 

                    10/3/2012 12:00 AM    THER/PROPH/DIAG INJ SC/IM    Reviewed

 

                    10/3/2012 12:00 AM    Decadron, Per 1 Mg NDC# [address]-83    Reviewed

 

                    10/3/2012 12:00 AM    Depo-Medrol, Per 80 Mg NDC#2999-7994-08    Reviewed

 

                    10/3/2012 12:00 AM    Toradol 60 Mg NDC#7425-9063-89    Reviewed

 

                    10/24/2017 12:00 AM    THERAPEUTIC PROPHYLACTIC/DX INJECTION SUBQ/IM    Reviewed

 

                    10/24/2017 12:00 AM    Decadron 8mg Injection, [facility]    Reviewed

 

                    10/24/2017 12:00 AM    Depo-Medrol 80mg Injection, [facility]    Reviewed

 

                    2018 12:00 AM    THERAPEUTIC PROPHYLACTIC/DX INJECTION SUBQ/IM    Reviewed

 

                    2018 12:00 AM    Rocephin 1 gram Injection, [facility]    Reviewed

 

                    2018 12:00 AM    THERAPEUTIC PROPHYLACTIC/DX INJECTION SUBQ/IM    Reviewed

 

                    2018 12:00 AM    Decadron 8mg Injection, [facility]    Reviewed

 

                    2018 12:00 AM    Depo-Medrol 80mg Injection, [facility]    Reviewed

 

                    2018 12:00 AM    Rocephin 1 gram Injection, [facility]    Reviewed

 

                    2018 12:00 AM    COMPLETE CBC W/AUTO DIFF WBC    Returned

 

                    2018 12:00 AM    COMPREHEN METABOLIC PANEL    Returned

 

                    2018 12:00 AM    ELECTROCARDIOGRAM TRACING    Reviewed

 

                    2013 12:00 AM    THER/PROPH/DIAG INJ SC/IM    Reviewed

 

                    2013 12:00 AM    Decadron, Per 1 Mg NDC# [address]-25    Reviewed

 

                    2013 12:00 AM    Depo-Medrol, Per 80 Mg NDC#3859-3135-93    Reviewed

 

                    2013 12:00 AM    Toradol 60 Mg NDC#1761-6752-64    Reviewed

 

                    2019 12:00 AM    THERAPEUTIC PROPHYLACTIC/DX INJECTION SUBQ/IM    Reviewed

 

                    2019 12:00 AM    Decadron 8mg Injection, [facility]    Reviewed

 

                    2019 12:00 AM    Depo-Medrol 80mg Injection, [facility]    Reviewed

 

                    2010 12:00 AM    ROUTINE VENIPUNCTURE    Reviewed

 

                    2010 12:00 AM    COMPLETE CBC W/AUTO DIFF WBC    Reviewed

 

                    2010 12:00 AM    COMPREHEN METABOLIC PANEL    Reviewed

 

                    2010 12:00 AM    LIPID PANEL         Reviewed

 

                    10/28/2014 12:00 AM    CHEST X-RAY 4/> VIEWS    Reviewed

 

                    6/3/2011 12:00 AM    ROUTINE VENIPUNCTURE    Reviewed

 

                    6/3/2011 12:00 AM    COMPLETE CBC AUTOMATED    Reviewed

 

                    6/3/2011 12:00 AM    COMPREHEN METABOLIC PANEL    Reviewed

 

                    6/3/2011 12:00 AM    LIPID PANEL         Reviewed

 

                    2011 12:00 AM    THER/PROPH/DIAG INJ SC/IM    Reviewed

 

                    2011 12:00 AM    Decadron Inj.8mg-([facility]) Ndc #[id number]    Reviewed

 

                    2011 12:00 AM    Depo-Medrol 80 Mg Im/[facility] [place] NDC [id number]    Reviewed







Results Summary







                          Date and Description      Results

 

                          6/3/2011 1:53 PM          TRIGLYCERIDES 155.0 mg/dLCHOLESTEROL 248.0 mg/dLHDL 51.0 mg/dLTOT

 CHOL/HDL 4.9 .0 mg/dLGLUCOSE 97.0 mg/dLSODIUM 139.0 mmol/LPOTASSIUM 3.70
 mmol/LCHLORIDE 101.0 mmol/LCO2 27.0 mmol/LBUN 19.0 mg/dLCREATININE 0.90 
mg/dLSGOT/AST 19.0 IU/LSGPT/ALT 11.0 IU/LALK PHOS 111.0 IU/LTOTAL PROTEIN 7.40 
g/dLALBUMIN 4.20 g/dLTOTAL BILI 0.50 mg/dLCALCIUM 9.50 mg/dLAGE 70 GFR NonAA 62 
GFR AA 75 eGFR >60 mL/min/1.73 m2eGFR AA* >60 







History Of Immunizations

Not available.



History of Past Illness







                    Name                Date of Onset       Comments

 

                    Chronic Obstructive Pulmonary Disease                         

 

                    Hyperlipidemia                           

 

                    Cough               2010  8:49AM     

 

                    General Medical Exam, Adult    2010  8:49AM     

 

                    Seasonal Allergies    2010  8:49AM     

 

                    Sinusitis, Chronic    2010  8:49AM     

 

                    Ganglion cyst of synovium, tendon, and bursa; other    2010 11:48AM     

 

                    Anxiety disorder    10/29/2013           

 

                    Pain in joint; Hip    06/10/2014           

 

                    Pulmonary nodule seen on imaging study    10/29/2014           

 

                    Exercise hypoxemia    10/29/2014           

 

                    Chronic Obstructive Pulmonary Disease    2010  3:02PM     

 

                    Edema               2010  3:02PM     

 

                    Sinusitis, Acute    2010  3:02PM     

 

                    Anxiety about health    2016           

 

                    Chronic Obstructive Pulmonary Disease    Trey  3 2011  8:38AM     

 

                    Palpitations        Trey  3 2011  8:38AM     

 

                    Cough               2011  9:54AM     

 

                    Sinusitis, Acute    2011  9:54AM     

 

                    Seasonal Allergies    2011  9:54AM     

 

                    Post-nasal drainage    2011  9:54AM     

 

                    Primary osteoarthritis involving multiple joints    2017           

 

                    Medication management    2017           

 

                    Cellulitis of left lower extremity    2017           

 

                    Dog bite of calf, left, sequela    2017           

 

                    Cough               Sep 22 2011  2:55PM     

 

                    Seasonal Allergies    Sep 22 2011  2:55PM     

 

                    Pharyngitis, Acute    Sep 22 2011  2:55PM     

 

                    Post-nasal drainage    Sep 22 2011  2:55PM     

 

                    Peripheral edema    2018           

 

                    Blood In Stool, Occult    2011  9:58AM     

 

                    Hemorrhoids         2011  9:58AM     

 

                    Chronic Obstructive Pulmonary Disease    2012  2:33PM     

 

                    Cardiac Dysrhythmia    2012  2:33PM     

 

                    Sinoatrial Node Dysfunction    Mar 16 2012  9:12AM     

 

                    Palpitations        Mar 16 2012  9:12AM     

 

                    Osteoarthrosis, generalized, multiple sites    Oct  3 2012  9:34AM     

 

                    Pain in joint; Hip    Oct  3 2012  9:34AM     

 

                    Osteoarthrosis      Oct 25 2012  9:09AM     

 

                    Bronchitis, Acute    Oct 25 2012  9:09AM     

 

                    Cough               Oct 25 2012  9:09AM     

 

                    Pain in joint; Left Hip    Oct 25 2012  9:09AM     

 

                    Pain in joint; Hip    2013  9:50AM     

 

                    Osteoarthrosis, generalized, multiple sites    2013  2:45PM     

 

                    Pain in joint; Hip bilateral    2013  2:45PM     

 

                    Anxiety Disorder    Oct 28 2013 10:32AM     

 

                    Chronic Obstructive Pulmonary Disease    Oct 28 2013 10:32AM     

 

                    Hyperlipidemia      Oct 28 2013 10:32AM     

 

                    Pain in joint; Left Hip    Oct 28 2013 10:32AM     

 

                    Sinusitis, Acute    Oct 28 2013 10:32AM     

 

                    Essential Hypertension    2014  3:19PM     

 

                    Osteoarthrosis, generalized, multiple sites    2014  3:19PM     

 

                    Chronic Obstructive Pulmonary Disease    2014  3:19PM     

 

                    Pain in joint; Hip    2014  3:19PM     

 

                    Chronic Obstructive Pulmonary Disease    2014  2:31PM     

 

                    Pain in joint; Hip    2014  2:31PM     

 

                    pre-operative examination, unspecified    2014  2:31PM     

 

                    Lung nodule seen on imaging study    Oct 28 2014 10:24AM     

 

                    Bronchitis, Acute    Oct 16 2014  9:45AM     

 

                    Respiratory System And Chest Symptoms    Oct 16 2014  9:45AM     

 

                    COPD (chronic obstructive pulmonary disease)    Oct 16 2014  9:45AM     

 

                    Chronic Obstructive Pulmonary Disease    Oct 28 2014  2:26PM     

 

                    Pulmonary nodule seen on imaging study    Oct 28 2014  2:26PM     

 

                    Shortness of breath    Oct 28 2014  2:26PM     

 

                    Exercise hypoxemia    Oct 28 2014  2:26PM     

 

                    Nail avulsion       Oct  6 2015  9:38AM     

 

                          Contusion of left index finger with damage to nail, initial encounter    Oct 26 2015

  2:53PM                                 

 

                    Forehead contusion, subsequent encounter    Dec 15 2015  2:39PM     

 

                    Generalized anxiety disorder    Dec 15 2015  2:39PM     

 

                    Chronic Obstructive Pulmonary Disease    Dec 15 2015  2:39PM     

 

                    Osteoarthrosis, generalized, multiple sites    Mar 10 2016  2:12PM     

 

                    Pain of right thigh    Mar 10 2016  2:12PM     

 

                    Mild Acute Hip pain, acute, right Improving    Mar 10 2016  2:12PM     

 

                    Anxiety about health    Mar 10 2016  2:12PM     

 

                    Hyperlipidemia      Aug 22 2016 10:58AM     

 

                    Sinoatrial Node Dysfunction    Aug 22 2016 10:58AM     

 

                    Palpitations        Aug 22 2016 10:58AM     

 

                    Chronic Obstructive Pulmonary Disease    Aug 22 2016 10:58AM     

 

                    Exercise hypoxemia    Aug 22 2016 10:58AM     

 

                    Pain in joint; Hip    Aug 22 2016 10:58AM     

 

                    Pulmonary nodule seen on imaging study    Aug 22 2016 10:58AM     

 

                    Eustachian tube dysfunction, bilateral    Nov 10 2016  3:57PM     

 

                    Moderate Acute Cough    Nov 10 2016  3:57PM     

 

                    Pharyngitis, Acute    Nov 10 2016  3:57PM     

 

                    Upper Respiratory Infection    Nov 10 2016  3:57PM     

 

                          COPD (chronic obstructive pulmonary disease) with acute bronchitis    Nov 10 2016 

 3:57PM                                  

 

                    Mild Chronic Cough Worsening    Dec 12 2016 12:12PM     

 

                          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

 12 2016 12:12PM                         

 

                    Moderate Shortness of breath on exertion    Dec 12 2016 12:12PM     

 

                    Hyperlipidemia      May  4 2017 10:27AM     

 

                    Sinoatrial Node Dysfunction    May  4 2017 10:27AM     

 

                    Palpitations        May  4 2017 10:27AM     

 

                    Chronic Obstructive Pulmonary Disease    May  4 2017 10:27AM     

 

                    Exercise hypoxemia    May  4 2017 10:27AM     

 

                    Pain in joint; Hip    May  4 2017 10:27AM     

 

                    Pulmonary nodule seen on imaging study    May  4 2017 10:27AM     

 

                    Chest congestion    May 16 2017  4:04PM     

 

                          COPD (chronic obstructive pulmonary disease) with acute bronchitis    May 16 2017 

 4:04PM                                  

 

                    Productive cough    May 16 2017  4:04PM     

 

                    Anxiety about health    May 16 2017  4:04PM     

 

                          Chronic obstructive pulmonary disease, unspecified COPD type    May 16 2017  4:04PM

                                         

 

                    Exercise hypoxemia    May 16 2017  4:04PM     

 

                    Mixed hyperlipidemia    May 16 2017  4:04PM     

 

                    Medication management    May 16 2017  4:04PM     

 

                    Primary osteoarthritis involving multiple joints    May 16 2017  4:04PM     

 

                    Cellulitis of left lower extremity    2017 12:10PM     

 

                    Bitten by dog, initial encounter    2017 12:10PM     

 

                    Cellulitis of left lower extremity    2017  8:56AM     

 

                    Open bite, left lower leg, sequela    2017  8:56AM     

 

                    Bitten by dog, sequela    2017  8:56AM     

 

                    Medication management    2017  8:56AM     

 

                    Cellulitis of left lower extremity    2017 11:38AM     

 

                    Open bite, left lower leg, subsequent encounter    2017 11:38AM     

 

                    Bitten by dog, subsequent encounter    2017 11:38AM     

 

                    Medication management    2017 11:38AM     

 

                    Cough               Aug 10 2017  4:09PM     

 

                    Abnormal chest xray    Aug 10 2017  4:09PM     

 

                    Hyperlipidemia      Aug 29 2017  9:02AM     

 

                    Sinoatrial Node Dysfunction    Aug 29 2017  9:02AM     

 

                    Palpitations        Aug 29 2017  9:02AM     

 

                    Chronic Obstructive Pulmonary Disease    Aug 29 2017  9:02AM     

 

                    Exercise hypoxemia    Aug 29 2017  9:02AM     

 

                    Pain in joint; Hip    Aug 29 2017  9:02AM     

 

                    Pulmonary nodule seen on imaging study    Aug 29 2017  9:02AM     

 

                    Eustachian tube dysfunction, bilateral    Aug 28 2017  3:30PM     

 

                    Purulent postnasal drainage    Aug 28 2017  3:30PM     

 

                    Chest congestion    Aug 28 2017  3:30PM     

 

                    Upper respiratory tract infection, unspecified type    Aug 28 2017  3:30PM     

 

                    Moderate Acute Sinus pressure    Aug 28 2017  3:30PM     

 

                          COPD (chronic obstructive pulmonary disease) with acute bronchitis    Aug 28 2017 

 3:30PM                                  

 

                    Moderate Chronic Purulent postnasal drainage    Oct 24 2017  1:52PM     

 

                    Mild Chronic Sinus pressure    Oct 24 2017  1:52PM     

 

                          Moderate Chronic Acute seasonal allergic rhinitis, unspecified trigger Stable    Oct

 24 2017  1:52PM                         

 

                    Mild Acute Labyrinthitis    Oct 24 2017  1:52PM     

 

                    Moderate Acute Vertigo    Oct 24 2017  1:52PM     

 

                    Purulent postnasal drainage    2018  3:58PM     

 

                    Upper respiratory tract infection, unspecified type    2018  3:58PM     

 

                    Mild Acute Left Enlarged lymph node in neck    2018  3:58PM     

 

                    Cough               2018  1:36PM     

 

                    Moderate Acute Recurrent Chest congestion    2018  1:36PM     

 

                    COPD exacerbation    2018  1:36PM     

 

                          Chronic obstructive pulmonary disease with acute lower respiratory infection    2018  1:36PM                         

 

                    Acute bronchitis, unspecified    2018  1:36PM     

 

                    Cough               2018  3:29PM     

 

                    Acute pharyngitis, unspecified etiology    2018  3:29PM     

 

                    Chest congestion    2018  3:29PM     

 

                    COPD (chronic obstructive pulmonary disease)    2018  3:29PM     

 

                    Cellulitis of left lower extremity    May 22 2018  2:35PM     

 

                    Cellulitis of left lower extremity    2018 10:48AM     

 

                    Peripheral edema    2018 10:48AM     

 

                    Hyperlipidemia      Aug 22 2018 10:03AM     

 

                    Sinoatrial Node Dysfunction    Aug 22 2018 10:03AM     

 

                    Palpitations        Aug 22 2018 10:03AM     

 

                    Chronic Obstructive Pulmonary Disease    Aug 22 2018 10:03AM     

 

                    Exercise hypoxemia    Aug 22 2018 10:03AM     

 

                    Pain in joint; Hip    Aug 22 2018 10:03AM     

 

                    Pulmonary nodule seen on imaging study    Aug 22 2018 10:03AM     

 

                    Exercise Counseling    Aug 21 2018 10:08AM     

 

                    Moderate Acute Bilateral Peripheral edema    Aug 21 2018 10:08AM     

 

                          Chronic obstructive pulmonary disease, unspecified COPD type    Aug 21 2018 10:08AM

                                         

 

                    Medication management    Aug 21 2018 10:08AM     

 

                    Acute nasopharyngitis (common cold)    Oct  4 2018  9:03AM     

 

                    Purulent postnasal drainage    Oct  4 2018  9:03AM     

 

                    Ear pain, right     Oct  4 2018  9:03AM     

 

                    Essential hypertension    2018  9:31AM     

 

                    Ear pain, bilateral    2018  9:31AM     

 

                    Purulent postnasal drainage    2019  3:35PM     

 

                    Chest congestion    2019  3:35PM     

 

                    Upper respiratory tract infection, unspecified type    2019  3:35PM     

 

                    Sinus pressure      2019  3:35PM     

 

                          Chronic obstructive pulmonary disease with acute lower respiratory infection    2019  3:35PM                         

 

                    Acute bronchitis, unspecified    2019  3:35PM     

 

                    Cough               2019  4:44PM     

 

                    Sinus pressure      2019  4:44PM     







Payers







           Insurance Name    Company Name    Plan Name    Plan Number    Policy Number    Policy Group

 Number                                 Start Date

 

                    Medicare RHC Medicare RHC              [id number]              N/A

 

                          [place] [employer]    [place] [employer] [place]                 [id number]

                                                    N/A

 

                    [employer]zTest Medicare A    Test Medicare A              [id number]              N/A

 

                                [employer] - RHC - [facility]      

                    [id number]                             N/A

 

                    Medicare Part A    Medicare - Lab/Xray              [id number]              N/A

 

                          [facility]                 [id number]

                                                    N/A

 

                    Medicare Part A    Medicare Part A              [id number]              N/A

 

                    Medicare Part B    [employer] [place]              [id number]              N/A







History of Encounters







                    Visit Date          Visit Type          Provider

 

                    2019            Office visit        DEON LEON PA

 

                    2018           Office visit        DEON LEON PA

 

                    10/2/2018           Office visit        DEON LEON PA

 

                    2018           Office visit        DEON LEON PA

 

                    2018           Office visit        DEON ESCALANTE

 

                    2018           Office visit        DEON LEON PA

 

                    2018           Office visit        DEON LEON PA

 

                    2018            Office visit        DEON LEON PA

 

                    2018           Office visit        DEON ECSALANTE

 

                    10/24/2017          Office visit        DEON LEON PA

 

                    2017           Voided              DEON LEON PA

 

                    2017           Office visit        DEON LEON PA

 

                    2017           Office visit        DEON LEON PA

 

                    2017            Office visit        DEON LEON PA

 

                    2017            Office visit        DEON LEON PA

 

                    2017           Office visit        DEON LEON PA

 

                    2016          Office visit        DEON LEON PA

 

                    11/10/2016          Office visit        DEON LEON PA

 

                    3/10/2016           Office visit        DEON LEON PA

 

                    12/15/2015          Office visit        DEON LEON PA

 

                    10/26/2015          Office visit        DEON LEON PA

 

                    10/6/2015           Office visit        DEON LEON PA

 

                    10/28/2014          Office visit        DEON LEON PA

 

                    10/16/2014          Office visit        DEON LEON PA

 

                    2014            Office visit        DEON LEON PA

 

                    2014           Hospital            DEWEY Garcia MD

 

                    2014           Office visit        DEON LEON PA

 

                    10/28/2013          Office visit        DEON LEON PA

 

                    10/14/2013          [facility]            DEWEY Garcia MD

 

                    2013           Office visit        DEON ESCALANTE

 

                    2013            Office visit        DEON LEON PA

 

                    10/25/2012          Office visit        DEON LEON PA

 

                    10/3/2012           Office visit        DEON LEON PA

 

                    3/16/2012           Office visit        DEON LEON PA

 

                    2012            Office visit        DEON LEON PA

 

                    2012            [facility]            DEWEY Garcia MD

 

                    2011          Office visit        DEON LEON PA

 

                    2011           Office visit        Deon Leon PA-C

 

                    2011            Office visit        Deon Leon PA-C

 

                    6/3/2011            Office visit        Deon Leon PA-C

 

                    2010           Office visit        Deon Leon PA-C

 

                    2010           Office visit        Deon Leon PA-C

 

                    2010           Office visit        Deon Leon PA-C

## 2019-06-25 NOTE — XMS REPORT
MU2 Ambulatory Summary

                             Created on: 2016



Elsi Casillas

External Reference #: [id number]

: 1940

Sex: Female



Demographics







                          Address                   [address] [place]  [address]

 

                          Home Phone                [phone]

 

                          Preferred Language        English

 

                          Marital Status            

 

                          Yarsani Affiliation     Unknown

 

                          Race                      White

 

                          Ethnic Group              Not  or 





Author







                          DEON Ferguson

 

                          [facility]

 

                          Address                   [address]

[place], [place]  [id number]



 

                          Phone                     [phone]







Care Team Providers







                    Care Team Member Name    Role                Phone

 

                    DEON LEON    PCP                 Unavailable







Allergies and Adverse Reactions







                    Name                Reaction            Notes

 

                    NO KNOWN DRUG ALLERGIES                         







Plan of Treatment







             Planned Activity    Comments     Planned Date    Planned Time    Plan/Goal

 

             Lipid Profile                 2016    12:00 AM      







Medications







                                        Active 

 

             Name         Start Date    Estimated Completion Date    SIG          Comments

 

             Proctofoam 1 % topical foam    2011                 apply by external route daily     

 

                Calcium 600 + D(3) 600 mg(1,500mg) -200 unit oral tablet                                    take 2 tablets by

 oral route daily                        

 

             Aerochamber    2014                 Use with inhaler as directed     

 

                pravastatin 20 mg oral tablet                                    take 1 tablet (20 mg) by oral route once daily

                                         

 

                Toprol XL 25 mg oral tablet extended release 24 hr                                    take 1 tablet (25 mg) 

by oral route once daily                 

 

                hydrochlorothiazide 25 mg oral tablet    1/15/2015                       take 1 tablet (25 mg) by oral

 route once daily for 30 days            

 

                metoprolol succinate 25 mg oral tablet extended release 24 hr    2015                      take

 1 tablet (25 mg) by oral route once daily     

 

                Tessalon Perles 100 mg oral capsule    10/4/2016                       take 1 capsule by oral route 

every 4 hours                            

 

             hydrochlorothiazide 25 mg oral tablet    2016                 TAKE 1 TABLET DAILY     

 

                promethazine-codeine 6.25-10 mg/5 mL oral syrup    11/10/2016                      take 5 milliliters

 by oral route every 6 hours as needed, not to exceed 30 mL in 24 hours     

 

                metoprolol succinate 25 mg oral tablet extended release 24 hr    2016                      TAKE

 1 TABLET DAILY                          

 

                                        ipratropium-albuterol 0.5 mg-3 mg(2.5 mg base)/3 mL inhalation solution for nebulization

                    2016                              inhale 3 milliliters by nebulization route 4 times per day for COPD

                                         

 

                Symbicort 160-4.5 mcg/actuation inhalation HFA aerosol inhaler    11/10/2016                      inhale

 2 puffs by inhalation route 2 times per day in the morning and evening     









                                         

 

             Name         Start Date    Expiration Date    SIG          Comments

 

                Singulair 10 mg oral tablet    9/3/2010        2011        take 1 tablet (10 mg) by oral route

 daily  for 60 days                      

 

                Zithromax Z-Christopher 250 mg oral tablet    2011       take 2 tablets (500 mg)

 by oral route once daily for 1 day then 1 tablet (250 mg) by oral route once 
daily for 4 days                         

 

                Medrol (Christopher) 4 mg oral tablets,dose pack    2011        take as directed

 for 6 days                              

 

                Keflex 500 mg oral capsule    3/1/2012        3/11/2012       take 1 capsule by oral route 3 

times a day for 10 days                  

 

                Cipro 500 mg oral tablet    2012        take 1 tablet (500 mg) by oral route

 every 12 hours for 15 days              

 

                benzonatate 100 mg oral capsule    2013       take 1 capsule (100 mg) by

 oral route 3 times per day for cough     

 

             Medrol (Christopher) 4 mg oral tablets,dose pack    2013     take as directed    

 

 

                Zyrtec 10 mg oral tablet    2013       take 1 tablet (10 mg) by oral route

 once daily for 30 days                  

 

                Flonase 50 mcg/actuation nasal spray,suspension    2013       inhale 1 spray

 in each nostril by intranasal route 2 times per day for 30 days     

 

                cephalexin 500 mg oral capsule    2013        take 1 capsule (500 mg) by oral

 route every 8 hours                     

 

                promethazine-codeine 6.25-10 mg/5 mL oral syrup    2013                       take 5 milliliters

 by oral route every 4 hours as needed, not to exceed 30 mL in 24 hours     

 

                Zithromax Z-Christopher 250 mg oral tablet    10/2/2013       10/7/2013       take 2 tablets (500 mg)

 by oral route once daily for 1 day then 1 tablet (250 mg) by oral route once 
daily for 4 days                         

 

                amoxicillin 500 mg oral capsule    2014       take 1 capsule by oral route

 3 times a day for 15 days               

 

                lovastatin 20 mg oral tablet    2014        take 1 tablet (20 mg) by oral 

route daily  for 30 days                 

 

                Zithromax Z-Christopher 250 mg oral tablet    2014       take 2 tablets (500 mg)

 by oral route once daily for 1 day then 1 tablet (250 mg) by oral route once 
daily for 4 days                         

 

             Medrol (Christopher) 4 mg oral tablets,dose pack    2014                 take as directed     

 

                amoxicillin 500 mg oral capsule    2014                       take 1 capsule (500 mg) by oral route

 3 times per day                         

 

                Levaquin 500 mg oral tablet    10/16/2014      10/26/2014      take 1 tablet (500 mg) by oral

 route once daily for 10 days            

 

                prednisone 20 mg oral tablet    10/16/2014      10/24/2014      4x2 days 3x2 days 2x2 days

 1x2 days                                

 

                Zithromax Z-Christopher 250 mg oral tablet    2014       take 2 tablets (500 mg)

 by oral route once daily for 1 day then 1 tablet (250 mg) by oral route once 
daily for 4 days                         

 

                Bactrim -160 mg oral tablet    1/15/2015       2015       take 1 tablet by oral route

 every 12 hours for 10 days              

 

                Zithromax Z-Christopher 250 mg oral tablet    2015      take 2 tablets (500 mg)

 by oral route once daily for 1 day then 1 tablet (250 mg) by oral route once 
daily for 4 days                         

 

                Keflex 500 mg oral capsule    2016       take 1 capsule by oral route 3

 times a day for 7 days                  

 

                amoxicillin 500 mg oral capsule    10/4/2016       10/14/2016      take 1 capsule by oral route

 3 times a day for 10 days               

 

                Zithromax Z-Chrisotpher 250 mg oral tablet    11/10/2016      11/15/2016      take 2 tablets (500 

mg) by oral route once daily for 1 day then 1 tablet (250 mg) by oral route once
daily for 4 days                         









                                        Discontinued 

 

             Name         Start Date    Discontinued Date    SIG          Comments

 

                amoxicillin 500 mg oral capsule    11/10/2011      10/3/2012       take 1 capsule (500 mg) 

by oral route 3 times per day            

 

                Anusol-HC 25 mg rectal suppository    2011      10/3/2012       insert 1 suppository 

(25 mg) by rectal route 2 times per day     

 

                hydrochlorothiazide 25 mg oral tablet    1/15/2015       2015      TAKE 1 TABLET DAILY

                                         







Problem List







                    Description         Status              Onset

 

                    Chronic Obstructive Pulmonary Disease    Active               

 

                    Hyperlipidemia      Active               

 

                    Anxiety Disorder    Active              10/29/2013

 

                    Pain in joint; Hip    Active              06/10/2014

 

                    Pulmonary nodule seen on imaging study    Active              10/29/2014

 

                    Exercise hypoxemia    Active              10/29/2014

 

                    Anxiety about health    Active              2016







Vital Signs







      Date    Time    BP-Sys(mm[Hg]    BP-Noni(mm[Hg])    HR(bpm)    RR(rpm)    Temp    WT    HT    HC    BMI

                    BSA                 BMI Percentile      O2 Sat(%)

 

        11/10/2016    3:57:00 PM    148 mmHg    72 mmHg    88 bpm    16 rpm    98.2 F    132 lbs    63 in

                          23.38 kg/m2    1.63 m2                   98 %

 

        3/10/2016    2:12:00 PM    122 mmHg    60 mmHg    106 bpm    18 rpm    97 F    137 lbs    63 in 

                          24.2682 kg/m    1.662 m                 93 %

 

        12/15/2015    2:33:00 PM    120 mmHg    75 mmHg    102 bpm    16 rpm    98.2 F    137 lbs    63 

in                        24.27 kg/m2    1.66 m2                   94 %

 

        10/26/2015    2:46:00 PM    130 mmHg    80 mmHg    96 bpm    16 rpm    97.9 F    141 lbs    66 in

                          22.7578 kg/m    1.7258 m                 98 %

 

        10/6/2015    9:37:00 AM    140 mmHg    78 mmHg    110 bpm    16 rpm    97.9 F    141 lbs    63 in

                          24.98 kg/m2    1.69 m2                   95 %

 

        10/28/2014    2:25:00 PM    132 mmHg    66 mmHg    92 bpm    24 rpm    97.5 F    147 lbs    63 in

                          26.0396 kg/m    1.7216 m                 92 %

 

        10/16/2014    9:45:00 AM    132 mmHg    64 mmHg    74 bpm    24 rpm    97.7 F    146 lbs    63 in

                          25.86 kg/m2    1.72 m2                   92 %

 

        2014    2:31:00 PM    136 mmHg    68 mmHg    90 bpm    22 rpm    98.2 F    148 lbs    63 in 

                          26.2168 kg/m    1.7274 m                 95 %

 

        2014    3:19:00 PM    124 mmHg    70 mmHg    64 bpm    20 rpm    97.8 F    147 lbs    63 in

                          26.04 kg/m2    1.72 m2                   95 %

 

        10/28/2013    10:31:00 AM    140 mmHg    70 mmHg    92 bpm    24 rpm    97.8 F    143 lbs    63 

in                        25.3311 kg/m    1.698 m                 95 %

 

      2013    2:51:00 PM    130 mmHg    78 mmHg    90 bpm          98.2 F    168 lbs    65 in          

27.96 kg/m2         1.87 m2                                  

 

       2013    2:43:00 PM    110 mmHg    76 mmHg    103 bpm           96.6 F    137 lbs    63 in      

                24.2682 kg/m    1.662 m                        

 

        2013    9:50:00 AM    150 mmHg    66 mmHg    108 bpm    20 rpm    97.8 F    138 lbs    63 in

                          24.45 kg/m2    1.67 m2                   94 %

 

        10/25/2012    9:08:00 AM    110 mmHg    60 mmHg    88 bpm    18 rpm    97.5 F    142 lbs    63 in

                          25.1539 kg/m    1.6921 m                 94 %

 

      10/3/2012    9:33:00 AM    130 mmHg    60 mmHg    98 bpm    18 rpm          146 lbs    63 in          

25.86 kg/m2         1.72 m2                                 95 %

 

      2012    2:31:00 PM    116 mmHg    76 mmHg    88 bpm                140 lbs    63 in          24.7996

 kg/m             1.6801 m                              96 %

 

     2011    10:02:00 AM    122 mmHg    80 mmHg    110 bpm              144 lbs                        

                                        94 %

 

      2011    2:58:00 PM    124 mmHg    84 mmHg    106 bpm                155 lbs    63 in          27.4567

 kg/m             1.7678 m                              96 %

 

     2011    9:55:00 AM    114 mmHg    78 mmHg    92 bpm              146 lbs                             95

 %

 

     6/3/2011    8:36:00 AM    116 mmHg    74 mmHg    90 bpm              146 lbs                             96

 %

 

     2010    3:01:00 PM    132 mmHg    84 mmHg    102 bpm              149 lbs                          

                                        94 %

 

     2010    11:46:00 AM    124 mmHg    78 mmHg    104 bpm              151 lbs                         

                                        94 %

 

     2010    8:47:00 AM    116 mmHg    78 mmHg    103 bpm              151 lbs                          

                                        95 %







Social History







                    Name                Description         Comments

 

                    Tobacco             Never smoker         

 

                    denies alcohol use                         







History of Procedures







                    Date Ordered        Description         Order Status

 

                    2011 12:00 AM    THER/PROPH/DIAG INJ SC/IM    Reviewed

 

                    2011 12:00 AM    Decadron Inj.1mg-([facility]) Ndc #[id number]    Reviewed

 

                    2011 12:00 AM    Depo-Medrol 80 Mg Im/[facility] NDC [id number]    Reviewed

 

                    2011 12:00 AM    Rocephin, Per 250MG - 1 Gram Vial NDC [id number]-[facility]    Reviewed



 

                    3/16/2012 12:00 AM    ROUTINE VENIPUNCTURE    Reviewed

 

                    3/16/2012 12:00 AM    COMPLETE CBC W/AUTO DIFF WBC    Reviewed

 

                    3/16/2012 12:00 AM    COMPREHEN METABOLIC PANEL    Reviewed

 

                    3/16/2012 12:00 AM    ASSAY THYROID STIM HORMONE    Reviewed

 

                    10/3/2012 12:00 AM    THER/PROPH/DIAG INJ SC/IM    Reviewed

 

                    10/3/2012 12:00 AM    Decadron, Per 1 Mg NDC# [address]-46    Reviewed

 

                    10/3/2012 12:00 AM    Depo-Medrol, Per 80 Mg NDC#0955-8210-83    Reviewed

 

                    10/3/2012 12:00 AM    Toradol 60 Mg NDC#9506-4319-80    Reviewed

 

                    2013 12:00 AM    THER/PROPH/DIAG INJ SC/IM    Reviewed

 

                    2013 12:00 AM    Decadron, Per 1 Mg NDC# [address]-97    Reviewed

 

                    2013 12:00 AM    Depo-Medrol, Per 80 Mg NDC#3707-4610-70    Reviewed

 

                    2013 12:00 AM    Toradol 60 Mg NDC#6198-7382-32    Reviewed

 

                    2010 12:00 AM    ROUTINE VENIPUNCTURE    Reviewed

 

                    2010 12:00 AM    COMPLETE CBC W/AUTO DIFF WBC    Reviewed

 

                    2010 12:00 AM    COMPREHEN METABOLIC PANEL    Reviewed

 

                    2010 12:00 AM    LIPID PANEL         Reviewed

 

                    10/28/2014 12:00 AM    CHEST X-RAY 4/> VIEWS    Reviewed

 

                    6/3/2011 12:00 AM    ROUTINE VENIPUNCTURE    Reviewed

 

                    6/3/2011 12:00 AM    COMPLETE CBC AUTOMATED    Reviewed

 

                    6/3/2011 12:00 AM    COMPREHEN METABOLIC PANEL    Reviewed

 

                    6/3/2011 12:00 AM    LIPID PANEL         Reviewed

 

                    2011 12:00 AM    THER/PROPH/DIAG INJ SC/IM    Reviewed

 

                    2011 12:00 AM    Decadron Inj.8mg-([facility]) Ndc #[id number]    Reviewed

 

                    2011 12:00 AM    Depo-Medrol 80 Mg Im/[facility] [id number]    Reviewed







Results Summary







                          Data and Description      Results

 

                          6/3/2011 1:52 PM          WBC 8.6 RBC 4.66 HGB 13.20 g/dLHCT 42.10 %MCV 90.0 fLMCH 28.30

 pgMCHC 31.40 g/dLRDW SD 44 RDW CV 13.60 %MPV 10.90 fLPLT 383 NRBC# 0.00 NRBC% 
0.0 %NEUT 68.0 %%LYMP 20.30 %%MONO 9.30 %%EOS 2.20 %%BASO 0.20 %#NEUT 5.81 #LYMP
 1.74 #MONO 0.80 #EOS 0.19 #BASO 0.02 MANUAL DIFF NOT IND 

 

                          6/3/2011 1:53 PM          TRIGLYCERIDES 155.0 mg/dLCHOLESTEROL 248.0 mg/dLHDL 51.0 mg/dLTOT

 CHOL/HDL 4.9 .0 mg/dLGLUCOSE 97.0 mg/dLSODIUM 139.0 mmol/LPOTASSIUM 3.70
 mmol/LCHLORIDE 101.0 mmol/LCO2 27.0 mmol/LBUN 19.0 mg/dLCREATININE 0.90 
mg/dLSGOT/AST 19.0 IU/LSGPT/ALT 11.0 IU/LALK PHOS 111.0 IU/LTOTAL PROTEIN 7.40 
g/dLALBUMIN 4.20 g/dLTOTAL BILI 0.50 mg/dLCALCIUM 9.50 mg/dLAGE 70 GFR NonAA 62 
GFR AA 75 eGFR >60 mL/min/1.73 m2eGFR AA* >60 







History Of Immunizations

Not available.



History of Past Illness







                    Name                Date of Onset       Comments

 

                    Chronic Obstructive Pulmonary Disease                         

 

                    Hyperlipidemia                           

 

                    Cough               2010  8:49AM     

 

                    General Medical Exam, Adult    2010  8:49AM     

 

                    Seasonal Allergies    2010  8:49AM     

 

                    Sinusitis, Chronic    2010  8:49AM     

 

                    Ganglion cyst of synovium, tendon, and bursa; other    2010 11:48AM     

 

                    Anxiety Disorder    10/29/2013           

 

                    Pain in joint; Hip    06/10/2014           

 

                    Pulmonary nodule seen on imaging study    10/29/2014           

 

                    Exercise hypoxemia    10/29/2014           

 

                    Chronic Obstructive Pulmonary Disease    2010  3:02PM     

 

                    Edema               2010  3:02PM     

 

                    Sinusitis, Acute    2010  3:02PM     

 

                    Anxiety about health    2016           

 

                    Chronic Obstructive Pulmonary Disease    Trey  3 2011  8:38AM     

 

                    Palpitations        Trey  3 2011  8:38AM     

 

                    Cough               2011  9:54AM     

 

                    Sinusitis, Acute    2011  9:54AM     

 

                    Seasonal Allergies    2011  9:54AM     

 

                    Post-nasal drainage    2011  9:54AM     

 

                    Cough               Sep 22 2011  2:55PM     

 

                    Seasonal Allergies    Sep 22 2011  2:55PM     

 

                    Pharyngitis, Acute    Sep 22 2011  2:55PM     

 

                    Post-nasal drainage    Sep 22 2011  2:55PM     

 

                    Blood In Stool, Occult    2011  9:58AM     

 

                    Hemorrhoids         2011  9:58AM     

 

                    Chronic Obstructive Pulmonary Disease    2012  2:33PM     

 

                    Cardiac Dysrhythmia    2012  2:33PM     

 

                    Sinoatrial Node Dysfunction    Mar 16 2012  9:12AM     

 

                    Palpitations        Mar 16 2012  9:12AM     

 

                    Osteoarthrosis, generalized, multiple sites    Oct  3 2012  9:34AM     

 

                    Pain in joint; Hip    Oct  3 2012  9:34AM     

 

                    Osteoarthrosis      Oct 25 2012  9:09AM     

 

                    Bronchitis, Acute    Oct 25 2012  9:09AM     

 

                    Cough               Oct 25 2012  9:09AM     

 

                    Pain in joint; Left Hip    Oct 25 2012  9:09AM     

 

                    Pain in joint; Hip    2013  9:50AM     

 

                    Osteoarthrosis, generalized, multiple sites    2013  2:45PM     

 

                    Pain in joint; Hip bilateral    2013  2:45PM     

 

                    Anxiety Disorder    Oct 28 2013 10:32AM     

 

                    Chronic Obstructive Pulmonary Disease    Oct 28 2013 10:32AM     

 

                    Hyperlipidemia      Oct 28 2013 10:32AM     

 

                    Pain in joint; Left Hip    Oct 28 2013 10:32AM     

 

                    Sinusitis, Acute    Oct 28 2013 10:32AM     

 

                    Essential Hypertension    2014  3:19PM     

 

                    Osteoarthrosis, generalized, multiple sites    2014  3:19PM     

 

                    Chronic Obstructive Pulmonary Disease    2014  3:19PM     

 

                    Pain in joint; Hip    2014  3:19PM     

 

                    Chronic Obstructive Pulmonary Disease    2014  2:31PM     

 

                    Pain in joint; Hip    2014  2:31PM     

 

                    pre-operative examination, unspecified    2014  2:31PM     

 

                    Lung nodule seen on imaging study    Oct 28 2014 10:24AM     

 

                    Bronchitis, Acute    Oct 16 2014  9:45AM     

 

                    Respiratory System And Chest Symptoms    Oct 16 2014  9:45AM     

 

                    COPD (chronic obstructive pulmonary disease)    Oct 16 2014  9:45AM     

 

                    Chronic Obstructive Pulmonary Disease    Oct 28 2014  2:26PM     

 

                    Pulmonary nodule seen on imaging study    Oct 28 2014  2:26PM     

 

                    Shortness of breath    Oct 28 2014  2:26PM     

 

                    Exercise hypoxemia    Oct 28 2014  2:26PM     

 

                    Nail avulsion       Oct  6 2015  9:38AM     

 

                          Contusion of left index finger with damage to nail, initial encounter    Oct 26 2015

  2:53PM                                 

 

                    Forehead contusion, subsequent encounter    Dec 15 2015  2:39PM     

 

                    Generalized anxiety disorder    Dec 15 2015  2:39PM     

 

                    Chronic Obstructive Pulmonary Disease    Dec 15 2015  2:39PM     

 

                    Osteoarthrosis, generalized, multiple sites    Mar 10 2016  2:12PM     

 

                    Pain of right thigh    Mar 10 2016  2:12PM     

 

                    Mild Acute Hip pain, acute, right Improving    Mar 10 2016  2:12PM     

 

                    Anxiety about health    Mar 10 2016  2:12PM     

 

                    Hyperlipidemia      Aug 22 2016 10:58AM     

 

                    Sinoatrial Node Dysfunction    Aug 22 2016 10:58AM     

 

                    Palpitations        Aug 22 2016 10:58AM     

 

                    Chronic Obstructive Pulmonary Disease    Aug 22 2016 10:58AM     

 

                    Exercise hypoxemia    Aug 22 2016 10:58AM     

 

                    Pain in joint; Hip    Aug 22 2016 10:58AM     

 

                    Pulmonary nodule seen on imaging study    Aug 22 2016 10:58AM     

 

                    Eustachian tube dysfunction, bilateral    Nov 10 2016  3:57PM     

 

                    Moderate Acute Cough    Nov 10 2016  3:57PM     

 

                    Pharyngitis, Acute    Nov 10 2016  3:57PM     

 

                    Upper Respiratory Infection    Nov 10 2016  3:57PM     

 

                          COPD (chronic obstructive pulmonary disease) with acute bronchitis    Nov 10 2016 

 3:57PM                                  







Payers







           Insurance Name    Company Name    Plan Name    Plan Number    Policy Number    Policy Group

 Number                                 Start Date

 

                    Medicare Part A    Medicare [facility]              [id number]              N/A

 

                          [place] [employer]    [place] [employer]                 [id number]

                                                    N/A

 

                    zzzTest Medicare A    Test Medicare A              [id number]              N/A

 

                                [facility]      

                    [id number]                             N/A

 

                    Medicare Part A    Medicare - Lab/Xray              [id number]              N/A

 

                          [facility]    [facility]                 [id number]

                                                    N/A

 

                    Medicare Part A    Medicare Part A              [id number]              N/A

 

                    Medicare Part B    [employer] [place]              [id number]              N/A







History of Encounters







                    Visit Date          Visit Type          Provider

 

                    11/10/2016          Office visit        DEON ESCALANTE

 

                    3/10/2016           Office visit        DEON ESCALANTE

 

                    12/15/2015          Office visit        DEON ESCALANTE

 

                    10/26/2015          Office visit        DEON LEON PA

 

                    10/6/2015           Office visit        DEON LEON PA

 

                    10/28/2014          Office visit        DEON LEON PA

 

                    10/16/2014          Office visit        DEON LEON PA

 

                    2014            Office visit        DEON LEON PA

 

                    2014           [facility]            DEWEY Garcia MD

 

                    2014           Office visit        DEON LEON PA

 

                    10/28/2013          Office visit        DEON LEON PA

 

                    10/14/2013          [facility]            DEWEY Garcia MD

 

                    2013           Office visit        DEON LEON PA

 

                    2013            Office visit        DEON LEON PA

 

                    10/25/2012          Office visit        DEON LEON PA

 

                    10/3/2012           Office visit        DEON LEON PA

 

                    3/16/2012           Office visit        DEON LEON PA

 

                    2012            Office visit        DEON LEON PA

 

                    2012            [facility]            DEWEY Garcia MD

 

                    2011          Office visit        DEON LEON PA

 

                    2011           Office visit        Deon Leon PA-C

 

                    2011            Office visit        Deon Leon PA-C

 

                    6/3/2011            Office visit        Deon Leon PA-C

 

                    2010           Office visit        Deon Leon PA-C

 

                    2010           Office visit        Deon Leon PA-C

 

                    2010           Office visit        Deon Leon PA-C

## 2019-06-25 NOTE — XMS REPORT
MU2 Ambulatory Summary

                             Created on: 04/10/2019



Elsi Casillas

External Reference #: [id number]

: 1940

Sex: Female



Demographics







                          Address                   [address] [place]  [address]

 

                          Home Phone                [phone]

 

                          Preferred Language        English

 

                          Marital Status            

 

                          Spiritism Affiliation     Unknown

 

                          Race                      White

 

                          Ethnic Group              Not  or 





Author







                          Author                    DEON LEON

 

                          [facility]

 

                          Address                   [address]

[place], [place]  [id number]



 

                          Phone                     [phone]







Care Team Providers







                    Care Team Member Name    Role                Phone

 

                    DEON LEON    PCP                 [phone]

 

                    DEON LEON    PreferredProvider    [phone]







Allergies and Adverse Reactions







                    Name                Reaction            Notes

 

                    SULFA (SULFONAMIDES)    nausea               







Plan of Treatment







             Planned Activity    Comments     Planned Date    Planned Time    Plan/Goal

 

             Lipid Profile                 2016    12:00 AM      

 

             Chest PA and Lateral - Main                 8/10/2017    12:00 AM      

 

             Lipid Profile                 2018    12:00 AM      

 

             Chest PA and Lateral - Main                 2019    12:00 AM      

 

             Sinuses Limited - Main                 2019    12:00 AM      







Medications







                                        Active 

 

             Name         Start Date    Estimated Completion Date    SIG          Comments

 

                Calcium 600 + D(3) 600 mg(1,500mg) -200 unit oral tablet                                    take 2 tablets by

 oral route daily                        

 

                pravastatin 20 mg oral tablet                                    take 1 tablet (20 mg) by oral route once daily

                                         

 

                Toprol XL 25 mg oral tablet extended release 24 hr                                    take 1 tablet (25 mg) 

by oral route once daily                 

 

                    albuterol sulfate 1.25 mg/3 mL inhalation solution for nebulization    2017           

                                        inhale 3 milliliters (1.25 mg) via nebulizer by inhalation route 4 times per day

  DX J44.9                               

 

                                        ipratropium-albuterol 0.5 mg-3 mg(2.5 mg base)/3 mL inhalation solution for nebulization

                    2018                                inhale 3 milliliters by nebulization route 4 times per day for COPD

                                         

 

                metoprolol succinate 25 mg oral tablet extended release 24 hr    2018                       TAKE

 1 TABLET DAILY                          

 

                Lasix 40 mg oral tablet    2018                       take 1 tablet (40 mg) by oral route once 

daily                                    

 

                fluticasone 50 mcg/actuation nasal spray,suspension    10/2/2018                       inhale 1 spray

 (50 mcg) in each nostril by intranasal route 2 times per day     

 

             hydrochlorothiazide 25 mg oral tablet    2018                 TAKE 1 TABLET DAILY     

 

                Sudogest 30 mg oral tablet    2018                      take 1-2 tablets by oral route every 

6 hours as needed                        

 

             lisinopril 10 mg oral tablet    2019                 TAKE 1 TABLET BY MOUTH ONCE DAILY     



 

                doxycycline hyclate 100 mg oral capsule    3/5/2019                        take 1 capsule (100 mg) by

 oral route 2 times per day              









                                         

 

             Name         Start Date    Expiration Date    SIG          Comments

 

                Singulair 10 mg oral tablet    9/3/2010        2011        take 1 tablet (10 mg) by oral route

 daily  for 60 days                      

 

                Zithromax Z-Christopher 250 mg oral tablet    2011       take 2 tablets (500 mg)

 by oral route once daily for 1 day then 1 tablet (250 mg) by oral route once 
daily for 4 days                         

 

                Medrol (Christopher) 4 mg oral tablets,dose pack    2011        take as directed

 for 6 days                              

 

                Keflex 500 mg oral capsule    3/1/2012        3/11/2012       take 1 capsule by oral route 3 

times a day for 10 days                  

 

                Cipro 500 mg oral tablet    2012        take 1 tablet (500 mg) by oral route

 every 12 hours for 15 days              

 

                benzonatate 100 mg oral capsule    2013       take 1 capsule (100 mg) by

 oral route 3 times per day for cough     

 

             Medrol (Christopher) 4 mg oral tablets,dose pack    2013     take as directed    

 

 

                Zyrtec 10 mg oral tablet    2013       take 1 tablet (10 mg) by oral route

 once daily for 30 days                  

 

                Flonase 50 mcg/actuation nasal spray,suspension    2013       inhale 1 spray

 in each nostril by intranasal route 2 times per day for 30 days     

 

                cephalexin 500 mg oral capsule    2013        take 1 capsule (500 mg) by oral

 route every 8 hours                     

 

                promethazine-codeine 6.25-10 mg/5 mL oral syrup    2013                       take 5 milliliters

 by oral route every 4 hours as needed, not to exceed 30 mL in 24 hours     

 

                Zithromax Z-Christopher 250 mg oral tablet    10/2/2013       10/7/2013       take 2 tablets (500 mg)

 by oral route once daily for 1 day then 1 tablet (250 mg) by oral route once 
daily for 4 days                         

 

                amoxicillin 500 mg oral capsule    2014       take 1 capsule by oral route

 3 times a day for 15 days               

 

                lovastatin 20 mg oral tablet    2014        take 1 tablet (20 mg) by oral 

route daily  for 30 days                 

 

                Zithromax Z-Christopher 250 mg oral tablet    2014       take 2 tablets (500 mg)

 by oral route once daily for 1 day then 1 tablet (250 mg) by oral route once 
daily for 4 days                         

 

             Medrol (Christopher) 4 mg oral tablets,dose pack    2014                 take as directed     

 

                amoxicillin 500 mg oral capsule    2014                       take 1 capsule (500 mg) by oral route

 3 times per day                         

 

                Levaquin 500 mg oral tablet    10/16/2014      10/26/2014      take 1 tablet (500 mg) by oral

 route once daily for 10 days            

 

                prednisone 20 mg oral tablet    10/16/2014      10/24/2014      4x2 days 3x2 days 2x2 days

 1x2 days                                

 

                Zithromax Z-Christopher 250 mg oral tablet    2014       take 2 tablets (500 mg)

 by oral route once daily for 1 day then 1 tablet (250 mg) by oral route once 
daily for 4 days                         

 

                Bactrim -160 mg oral tablet    1/15/2015       2015       take 1 tablet by oral route

 every 12 hours for 10 days              

 

                Zithromax Z-Christopher 250 mg oral tablet    2015      take 2 tablets (500 mg)

 by oral route once daily for 1 day then 1 tablet (250 mg) by oral route once 
daily for 4 days                         

 

                Keflex 500 mg oral capsule    2016       take 1 capsule by oral route 3

 times a day for 7 days                  

 

                amoxicillin 500 mg oral capsule    10/4/2016       10/14/2016      take 1 capsule by oral route

 3 times a day for 10 days               

 

                Tessalon Perles 100 mg oral capsule    10/4/2016                       take 1 capsule by oral route 

every 4 hours                            

 

                Zithromax Z-Christopher 250 mg oral tablet    11/10/2016      11/15/2016      take 2 tablets (500 

mg) by oral route once daily for 1 day then 1 tablet (250 mg) by oral route once
daily for 4 days                         

 

                amoxicillin 500 mg oral tablet    2016                       take 1 tablet (500 mg) by oral route

 3 times per day                         

 

                amoxicillin 500 mg oral capsule    2017       take 1 capsule (500 mg) by

 oral route 3 times per day for 10 days     

 

                Bactrim -160 mg oral tablet    2017       take 1 tablet by oral route

 2 times a day for 10 days               

 

                Levaquin 500 mg oral tablet    2017        take 1 tablet (500 mg) by oral

 route once daily for 10 days            

 

                amoxicillin 500 mg oral capsule    2017                       take 1 capsule (500 mg) by oral route

 every 12 hours for 7 days               

 

                Zithromax Z-Christopher 250 mg oral tablet    2017                      take 2 tablets (500 mg) by oral

 route once daily for 1 day then 1 tablet (250 mg) by oral route once daily for 
4 days                                   

 

                amoxicillin 500 mg oral tablet    2018                       take 1 tablet (500 mg) by oral route

 every 12 hours for 10 days              

 

                Cipro 500 mg oral tablet    2018        2/15/2018       take 1 tablet (500 mg) by oral route

 2 times per day for 10 days             

 

                Zithromax Z-Christopher 250 mg oral tablet    2018       take 2 tablets (500 mg)

 by oral route once daily for 1 day then 1 tablet (250 mg) by oral route once 
daily for 4 days                         

 

                Keflex 500 mg oral capsule    2018       take 1 capsule (500 mg) by oral

 route every 12 hours for 7 days         

 

                prednisone 20 mg oral tablet    2018       2X4 days 1X4 days 1/2X4 days 

                                         

 

                Levaquin 500 mg oral tablet    2018       take 1 tablet (500 mg) by oral

 route once daily for 7 days             

 

                amoxicillin 500 mg oral capsule    3/22/2019       2019        take 1 capsule (500 mg) by

 oral route 3 times per day for 10 days     

 

                Medrol (Christopher) 4 mg oral tablets,dose pack    3/22/2019       3/27/2019       take as directed

 for 5 days                              









                                        Discontinued 

 

             Name         Start Date    Discontinued Date    SIG          Comments

 

                amoxicillin 500 mg oral capsule    11/10/2011      10/3/2012       take 1 capsule (500 mg) 

by oral route 3 times per day            

 

                Anusol-HC 25 mg rectal suppository    2011      10/3/2012       insert 1 suppository 

(25 mg) by rectal route 2 times per day     

 

                Proctofoam 1 % topical foam    2011       apply by external route daily

                                         

 

             Aerochamber    2014    Use with inhaler as directed     

 

                hydrochlorothiazide 25 mg oral tablet    1/15/2015       2015      TAKE 1 TABLET DAILY

                                         

 

                promethazine-codeine 6.25-10 mg/5 mL oral syrup    11/10/2016      10/25/2017      take 5 

milliliters by oral route every 6 hours as needed, not to exceed 30 mL in 24 
hours                                    

 

                prednisone 20 mg oral tablet    2017       10/25/2017      4 x 2 days, 3 x 2 days, 2 x

 2 days 1 x 2 days                       

 

                Augmentin 875-125 mg oral tablet    2017       take 1 tablet by oral route

 every 12 hours for 7 days               

 

                Keflex 500 mg oral capsule    2017       take 1 capsule (500 mg) by oral

 route every 12 hours                    

 

                Keflex 500 mg oral capsule    10/10/2017      2018       take 1 capsule (500 mg) by oral

 route every 12 hours for 10 days        

 

                    Symbicort 160-4.5 mcg/actuation inhalation HFA aerosol inhaler    10/25/2017          10/6/2018

                          inhale 2 puffs by inhalation route 2 times per day in the morning and evening    

 

 

                Levaquin 500 mg oral tablet    2018       take 1 tablet (500 mg) by oral

 route once daily for 10 days            

 

                Bactrim -160 mg oral tablet    2018       take 1 tablet by oral route

 every 12 hours for 10 days             nausea

 

                Tessalon Perles 100 mg oral capsule    2018        10/6/2018       take 1 capsule (100 mg)

 by oral route 3 times per day as needed for cough     

 

                Sudogest 30 mg oral tablet    2018        10/6/2018       take 1 tablets (60 mg) by oral 

route every 6 hours as needed            







Problem List







                    Description         Status              Onset

 

                    Chronic Obstructive Pulmonary Disease    Active               

 

                    Hyperlipidemia      Active               

 

                    Anxiety disorder    Active              10/29/2013

 

                    Pain in joint; Hip    Active              06/10/2014

 

                    Pulmonary nodule seen on imaging study    Active              10/29/2014

 

                    Exercise hypoxemia    Active              10/29/2014

 

                    Anxiety about health    Active              2016

 

                    Primary osteoarthritis involving multiple joints    Active              2017

 

                    Medication management    Active              2017

 

                    Cellulitis of left lower extremity    Active              2017

 

                    Dog bite of calf, left, sequela    Active              2017

 

                    Peripheral edema    Active              2018







Vital Signs







      Date    Time    BP-Sys(mm[Hg]    BP-Noni(mm[Hg])    HR(bpm)    RR(rpm)    Temp    WT    HT    HC    BMI

                    BSA                 BMI Percentile      O2 Sat(%)

 

        2019    3:13:00 PM    136 mmHg    86 mmHg    110 bpm    20 rpm    98.1 F    144 lbs    62 in

                          26.3377 kg/m    1.6904 m                 88 %

 

        2019    3:30:00 PM    108 mmHg    72 mmHg    82 bpm    18 rpm    98.1 F    142 lbs    62 in 

                          25.97 kg/m2    1.68 m2                   89 %

 

        2018    9:30:00 AM    154 mmHg    100 mmHg    108 bpm    20 rpm    98.1 F    145 lbs    62 

in                        26.5206 kg/m    1.6962 m                 91 %

 

        10/4/2018    9:00:00 AM    114 mmHg    74 mmHg    76 bpm    18 rpm    98.4 F    145 lbs    61 in

                          27.3972 kg/m    1.68 m2                   97 %

 

        2018    10:07:00 AM    124 mmHg    82 mmHg    86 bpm    18 rpm    98.2 F    149 lbs    61 in

                          28.15 kg/m2    1.71 m2                   92 %

 

        2018    10:48:00 AM    118 mmHg    80 mmHg    82 bpm    18 rpm    98.3 F    144 lbs    61 in

                          27.2083 kg/m    1.6767 m                 93 %

 

        2018    2:35:00 PM    120 mmHg    82 mmHg    106 bpm    20 rpm    98.2 F    142 lbs    62 in

                          25.97 kg/m2    1.68 m2                   92 %

 

       2018    3:28:00 PM    122 mmHg    68 mmHg    114 bpm    18 rpm    98.2 F    142 lbs            

                                                                90 %

 

        2018    1:35:00 PM    112 mmHg    74 mmHg    114 bpm    18 rpm    99.6 F    139 lbs    63 in

                          24.6225 kg/m    1.6741 m                 98 %

 

       2018    3:57:00 PM    128 mmHg    80 mmHg    78 bpm    18 rpm    98.4 F    148 lbs             

                                                                90 %

 

        10/24/2017    1:51:00 PM    132 mmHg    80 mmHg    82 bpm    16 rpm    98.2 F    145 lbs    62 in

                          26.52 kg/m2    1.70 m2                   95 %

 

       2017    3:29:00 PM    128 mmHg    80 mmHg    68 bpm    16 rpm    98 F    144 lbs    63 in    

                25.5082 kg/m    1.7039 m                      93 %

 

        2017    11:37:00 AM    118 mmHg    72 mmHg    96 bpm    16 rpm    97.4 F    141 lbs    63 in

                          24.98 kg/m2    1.69 m2                   91 %

 

        2017    8:55:00 AM    105 mmHg    60 mmHg    96 bpm    18 rpm    95.8 F    142 lbs    63 in 

                          25.1539 kg/m    1.6921 m                 95 %

 

       2017    12:10:00 PM    122 mmHg    68 mmHg    76 bpm    18 rpm    98 F    143 lbs    63 in    

                25.33 kg/m2     1.70 m2                         98 %

 

        2017    4:03:00 PM    118 mmHg    64 mmHg    106 bpm    18 rpm    97.4 F    137 lbs    63 in

                          24.2682 kg/m    1.662 m                 98 %

 

        2016    12:11:00 PM    120 mmHg    84 mmHg    110 bpm    16 rpm    98.1 F    130 lbs    63

 in                       23.03 kg/m2    1.62 m2                   90 %

 

        11/10/2016    3:57:00 PM    148 mmHg    72 mmHg    88 bpm    16 rpm    98.2 F    132 lbs    63 in

                          23.3825 kg/m    1.6314 m                 98 %

 

        3/10/2016    2:12:00 PM    122 mmHg    60 mmHg    106 bpm    18 rpm    97 F    137 lbs    63 in 

                          24.27 kg/m2    1.66 m2                   93 %

 

        12/15/2015    2:33:00 PM    120 mmHg    75 mmHg    102 bpm    16 rpm    98.2 F    137 lbs    63 

in                        24.2682 kg/m    1.662 m                 94 %

 

        10/26/2015    2:46:00 PM    130 mmHg    80 mmHg    96 bpm    16 rpm    97.9 F    141 lbs    66 in

                          22.76 kg/m2    1.73 m2                   98 %

 

        10/6/2015    9:37:00 AM    140 mmHg    78 mmHg    110 bpm    16 rpm    97.9 F    141 lbs    63 in

                          24.9768 kg/m    1.6861 m                 95 %

 

        10/28/2014    2:25:00 PM    132 mmHg    66 mmHg    92 bpm    24 rpm    97.5 F    147 lbs    63 in

                          26.04 kg/m2    1.72 m2                   92 %

 

        10/16/2014    9:45:00 AM    132 mmHg    64 mmHg    74 bpm    24 rpm    97.7 F    146 lbs    63 in

                          25.8625 kg/m    1.7157 m                 92 %

 

        2014    2:31:00 PM    136 mmHg    68 mmHg    90 bpm    22 rpm    98.2 F    148 lbs    63 in 

                          26.22 kg/m2    1.73 m2                   95 %

 

        2014    3:19:00 PM    124 mmHg    70 mmHg    64 bpm    20 rpm    97.8 F    147 lbs    63 in

                          26.0396 kg/m    1.7216 m                 95 %

 

        10/28/2013    10:31:00 AM    140 mmHg    70 mmHg    92 bpm    24 rpm    97.8 F    143 lbs    63 

in                        25.33 kg/m2    1.70 m2                   95 %

 

      2013    2:51:00 PM    130 mmHg    78 mmHg    90 bpm          98.2 F    168 lbs    65 in          

27.9564 kg/m      1.8694 m                               

 

       2013    2:43:00 PM    110 mmHg    76 mmHg    103 bpm           96.6 F    137 lbs    63 in      

                24.27 kg/m2     1.66 m2                          

 

        2013    9:50:00 AM    150 mmHg    66 mmHg    108 bpm    20 rpm    97.8 F    138 lbs    63 in

                          24.4454 kg/m    1.6681 m                 94 %

 

        10/25/2012    9:08:00 AM    110 mmHg    60 mmHg    88 bpm    18 rpm    97.5 F    142 lbs    63 in

                          25.15 kg/m2    1.69 m2                   94 %

 

      10/3/2012    9:33:00 AM    130 mmHg    60 mmHg    98 bpm    18 rpm          146 lbs    63 in          

25.8625 kg/m      1.7157 m                              95 %

 

      2012    2:31:00 PM    116 mmHg    76 mmHg    88 bpm                140 lbs    63 in          24.80 

kg/m2               1.68 m2                                 96 %

 

     2011    10:02:00 AM    122 mmHg    80 mmHg    110 bpm              144 lbs                        

                                        94 %

 

      2011    2:58:00 PM    124 mmHg    84 mmHg    106 bpm                155 lbs    63 in          27.4567

 kg/m             1.7678 m                              96 %

 

     2011    9:55:00 AM    114 mmHg    78 mmHg    92 bpm              146 lbs                             95

 %

 

     6/3/2011    8:36:00 AM    116 mmHg    74 mmHg    90 bpm              146 lbs                             96

 %

 

     2010    3:01:00 PM    132 mmHg    84 mmHg    102 bpm              149 lbs                          

                                        94 %

 

     2010    11:46:00 AM    124 mmHg    78 mmHg    104 bpm              151 lbs                         

                                        94 %

 

     2010    8:47:00 AM    116 mmHg    78 mmHg    103 bpm              151 lbs                          

                                        95 %







Social History







                    Name                Description         Comments

 

                    Alcohol             Never                

 

                    Uses seatbelts                           

 

                    Tobacco             Never smoker         







History of Procedures







                    Date Ordered        Description         Order Status

 

                    2011 12:00 AM    THER/PROPH/DIAG INJ SC/IM    Reviewed

 

                    2011 12:00 AM    Decadron Inj.1mg-([facility]) Ndc #3189695539    Reviewed

 

                    2011 12:00 AM    Depo-Medrol 80 Mg Im/[facility] [place]DC 0009-075292    Reviewed

 

                    2011 12:00 AM    Rocephin, Per 250MG - 1 Gram Vial NDC [id number]-[facility]    Reviewed



 

                    3/16/2012 12:00 AM    ROUTINE VENIPUNCTURE    Reviewed

 

                    3/16/2012 12:00 AM    COMPLETE CBC W/AUTO DIFF WBC    Reviewed

 

                    3/16/2012 12:00 AM    COMPREHEN METABOLIC PANEL    Reviewed

 

                    3/16/2012 12:00 AM    ASSAY THYROID STIM HORMONE    Reviewed

 

                    11/10/2016 12:00 AM    Decadron, Per 1 Mg NDC# [address]-74    Reviewed

 

                    11/10/2016 12:00 AM    Depo-Medrol, Per 80 Mg NDC#6414-9282-59    Reviewed

 

                    11/10/2016 12:00 AM    Rocephin 1 gram NDC#2247-5488-44    Reviewed

 

                    2016 12:00 AM    THER/PROPH/DIAG INJ SC/IM    Reviewed

 

                    2016 12:00 AM    Decadron 8mg Injection, [facility]    Reviewed

 

                    2016 12:00 AM    Depo-Medrol 80mg Injection, [facility]    Reviewed

 

                    2016 12:00 AM    Rocephin 1 gram Injection, [facility]    Reviewed

 

                    2017 12:00 AM    COMPLETE CBC W/AUTO DIFF WBC    Reviewed

 

                    2017 12:00 AM    COMPREHEN METABOLIC PANEL    Reviewed

 

                    2017 12:00 AM    LIPID PANEL         Reviewed

 

                    2017 12:00 AM    THERAPEUTIC PROPHYLACTIC/DX INJECTION SUBQ/IM    Reviewed

 

                    2017 12:00 AM    Decadron 8mg Injection, [facility]    Reviewed

 

                    2017 12:00 AM    Depo-Medrol 80mg Injection, [facility]    Reviewed

 

                    2017 12:00 AM    LIPID PANEL         Returned

 

                    2017 12:00 AM    THERAPEUTIC PROPHYLACTIC/DX INJECTION SUBQ/IM    Reviewed

 

                    2017 12:00 AM    Decadron 8mg Injection, [facility]    Reviewed

 

                    2017 12:00 AM    Depo-Medrol 80mg Injection, [facility] Medicare    Reviewed

 

                    10/3/2012 12:00 AM    THER/PROPH/DIAG INJ SC/IM    Reviewed

 

                    10/3/2012 12:00 AM    Decadron, Per 1 Mg NDC# [address]-23    Reviewed

 

                    10/3/2012 12:00 AM    Depo-Medrol, Per 80 Mg NDC#4698-6343-02    Reviewed

 

                    10/3/2012 12:00 AM    Toradol 60 Mg NDC#0528-7661-44    Reviewed

 

                    10/24/2017 12:00 AM    THERAPEUTIC PROPHYLACTIC/DX INJECTION SUBQ/IM    Reviewed

 

                    10/24/2017 12:00 AM    Decadron 8mg Injection, [facility]    Reviewed

 

                    10/24/2017 12:00 AM    Depo-Medrol 80mg Injection, [facility]    Reviewed

 

                    2018 12:00 AM    THERAPEUTIC PROPHYLACTIC/DX INJECTION SUBQ/IM    Reviewed

 

                    2018 12:00 AM    Rocephin 1 gram Injection, [facility]    Reviewed

 

                    2018 12:00 AM    THERAPEUTIC PROPHYLACTIC/DX INJECTION SUBQ/IM    Reviewed

 

                    2018 12:00 AM    Decadron 8mg Injection, [facility]    Reviewed

 

                    2018 12:00 AM    Depo-Medrol 80mg Injection, [facility]    Reviewed

 

                    2018 12:00 AM    Rocephin 1 gram Injection, [facility]    Reviewed

 

                    2018 12:00 AM    COMPLETE CBC W/AUTO DIFF WBC    Returned

 

                    2018 12:00 AM    COMPREHEN METABOLIC PANEL    Returned

 

                    2018 12:00 AM    ELECTROCARDIOGRAM TRACING    Reviewed

 

                    2013 12:00 AM    THER/PROPH/DIAG INJ SC/IM    Reviewed

 

                    2013 12:00 AM    Decadron, Per 1 Mg NDC# [address]-21    Reviewed

 

                    2013 12:00 AM    Depo-Medrol, Per 80 Mg NDC#6502-5543-91    Reviewed

 

                    2013 12:00 AM    Toradol 60 Mg NDC#6912-0229-29    Reviewed

 

                    2019 12:00 AM    THERAPEUTIC PROPHYLACTIC/DX INJECTION SUBQ/IM    Reviewed

 

                    2019 12:00 AM    Decadron 8mg Injection, [facility]    Reviewed

 

                    2019 12:00 AM    Depo-Medrol 80mg Injection, [facility] Medicare    Reviewed

 

                    2019 12:00 AM    THERAPEUTIC PROPHYLACTIC/DX INJECTION SUBQ/IM    Reviewed

 

                    2019 12:00 AM    Decadron 8mg Injection, [facility] Medicare    Reviewed

 

                    2019 12:00 AM    Rocephin 1 gram Injection, [facility]    Reviewed

 

                    2010 12:00 AM    ROUTINE VENIPUNCTURE    Reviewed

 

                    2010 12:00 AM    COMPLETE CBC W/AUTO DIFF WBC    Reviewed

 

                    2010 12:00 AM    COMPREHEN METABOLIC PANEL    Reviewed

 

                    2010 12:00 AM    LIPID PANEL         Reviewed

 

                    10/28/2014 12:00 AM    CHEST X-RAY 4/> VIEWS    Reviewed

 

                    6/3/2011 12:00 AM    ROUTINE VENIPUNCTURE    Reviewed

 

                    6/3/2011 12:00 AM    COMPLETE CBC AUTOMATED    Reviewed

 

                    6/3/2011 12:00 AM    COMPREHEN METABOLIC PANEL    Reviewed

 

                    6/3/2011 12:00 AM    LIPID PANEL         Reviewed

 

                    2011 12:00 AM    THER/PROPH/DIAG INJ SC/IM    Reviewed

 

                    2011 12:00 AM    Decadron Inj.8mg-([facility]) Ndc #[id number]    Reviewed

 

                    2011 12:00 AM    Depo-Medrol 80 Mg Im/[facility] [place] NDC [id number]    Reviewed







Results Summary







                          Date and Description      Results

 

                          6/3/2011 1:53 PM          TRIGLYCERIDES 155.0 mg/dLCHOLESTEROL 248.0 mg/dLHDL 51.0 mg/dLTOT

 CHOL/HDL 4.9 .0 mg/dLGLUCOSE 97.0 mg/dLSODIUM 139.0 mmol/LPOTASSIUM 3.70
 mmol/LCHLORIDE 101.0 mmol/LCO2 27.0 mmol/LBUN 19.0 mg/dLCREATININE 0.90 
mg/dLSGOT/AST 19.0 IU/LSGPT/ALT 11.0 IU/LALK PHOS 111.0 IU/LTOTAL PROTEIN 7.40 
g/dLALBUMIN 4.20 g/dLTOTAL BILI 0.50 mg/dLCALCIUM 9.50 mg/dLAGE 70 GFR NonAA 62 
GFR AA 75 eGFR >60 mL/min/1.73 m2eGFR AA* >60 







History Of Immunizations

Not available.



History of Past Illness







                    Name                Date of Onset       Comments

 

                    Chronic Obstructive Pulmonary Disease                         

 

                    Hyperlipidemia                           

 

                    Cough               2010  8:49AM     

 

                    General Medical Exam, Adult    2010  8:49AM     

 

                    Seasonal Allergies    2010  8:49AM     

 

                    Sinusitis, Chronic    2010  8:49AM     

 

                    Ganglion cyst of synovium, tendon, and bursa; other    2010 11:48AM     

 

                    Anxiety disorder    10/29/2013           

 

                    Pain in joint; Hip    06/10/2014           

 

                    Pulmonary nodule seen on imaging study    10/29/2014           

 

                    Exercise hypoxemia    10/29/2014           

 

                    Chronic Obstructive Pulmonary Disease    2010  3:02PM     

 

                    Edema               2010  3:02PM     

 

                    Sinusitis, Acute    2010  3:02PM     

 

                    Anxiety about health    2016           

 

                    Chronic Obstructive Pulmonary Disease    Trey  3 2011  8:38AM     

 

                    Palpitations        Trey  3 2011  8:38AM     

 

                    Cough               2011  9:54AM     

 

                    Sinusitis, Acute    2011  9:54AM     

 

                    Seasonal Allergies    2011  9:54AM     

 

                    Post-nasal drainage    2011  9:54AM     

 

                    Primary osteoarthritis involving multiple joints    2017           

 

                    Medication management    2017           

 

                    Cellulitis of left lower extremity    2017           

 

                    Dog bite of calf, left, sequela    2017           

 

                    Cough               Sep 22 2011  2:55PM     

 

                    Seasonal Allergies    Sep 22 2011  2:55PM     

 

                    Pharyngitis, Acute    Sep 22 2011  2:55PM     

 

                    Post-nasal drainage    Sep 22 2011  2:55PM     

 

                    Peripheral edema    2018           

 

                    Blood In Stool, Occult    2011  9:58AM     

 

                    Hemorrhoids         2011  9:58AM     

 

                    Chronic Obstructive Pulmonary Disease    2012  2:33PM     

 

                    Cardiac Dysrhythmia    2012  2:33PM     

 

                    Sinoatrial Node Dysfunction    Mar 16 2012  9:12AM     

 

                    Palpitations        Mar 16 2012  9:12AM     

 

                    Osteoarthrosis, generalized, multiple sites    Oct  3 2012  9:34AM     

 

                    Pain in joint; Hip    Oct  3 2012  9:34AM     

 

                    Osteoarthrosis      Oct 25 2012  9:09AM     

 

                    Bronchitis, Acute    Oct 25 2012  9:09AM     

 

                    Cough               Oct 25 2012  9:09AM     

 

                    Pain in joint; Left Hip    Oct 25 2012  9:09AM     

 

                    Pain in joint; Hip    2013  9:50AM     

 

                    Osteoarthrosis, generalized, multiple sites    2013  2:45PM     

 

                    Pain in joint; Hip bilateral    2013  2:45PM     

 

                    Anxiety Disorder    Oct 28 2013 10:32AM     

 

                    Chronic Obstructive Pulmonary Disease    Oct 28 2013 10:32AM     

 

                    Hyperlipidemia      Oct 28 2013 10:32AM     

 

                    Pain in joint; Left Hip    Oct 28 2013 10:32AM     

 

                    Sinusitis, Acute    Oct 28 2013 10:32AM     

 

                    Essential Hypertension    2014  3:19PM     

 

                    Osteoarthrosis, generalized, multiple sites    2014  3:19PM     

 

                    Chronic Obstructive Pulmonary Disease    2014  3:19PM     

 

                    Pain in joint; Hip    2014  3:19PM     

 

                    Chronic Obstructive Pulmonary Disease    2014  2:31PM     

 

                    Pain in joint; Hip    2014  2:31PM     

 

                    pre-operative examination, unspecified    2014  2:31PM     

 

                    Lung nodule seen on imaging study    Oct 28 2014 10:24AM     

 

                    Bronchitis, Acute    Oct 16 2014  9:45AM     

 

                    Respiratory System And Chest Symptoms    Oct 16 2014  9:45AM     

 

                    COPD (chronic obstructive pulmonary disease)    Oct 16 2014  9:45AM     

 

                    Chronic Obstructive Pulmonary Disease    Oct 28 2014  2:26PM     

 

                    Pulmonary nodule seen on imaging study    Oct 28 2014  2:26PM     

 

                    Shortness of breath    Oct 28 2014  2:26PM     

 

                    Exercise hypoxemia    Oct 28 2014  2:26PM     

 

                    Nail avulsion       Oct  6 2015  9:38AM     

 

                          Contusion of left index finger with damage to nail, initial encounter    Oct 26 2015

  2:53PM                                 

 

                    Forehead contusion, subsequent encounter    Dec 15 2015  2:39PM     

 

                    Generalized anxiety disorder    Dec 15 2015  2:39PM     

 

                    Chronic Obstructive Pulmonary Disease    Dec 15 2015  2:39PM     

 

                    Osteoarthrosis, generalized, multiple sites    Mar 10 2016  2:12PM     

 

                    Pain of right thigh    Mar 10 2016  2:12PM     

 

                    Mild Acute Hip pain, acute, right Improving    Mar 10 2016  2:12PM     

 

                    Anxiety about health    Mar 10 2016  2:12PM     

 

                    Hyperlipidemia      Aug 22 2016 10:58AM     

 

                    Sinoatrial Node Dysfunction    Aug 22 2016 10:58AM     

 

                    Palpitations        Aug 22 2016 10:58AM     

 

                    Chronic Obstructive Pulmonary Disease    Aug 22 2016 10:58AM     

 

                    Exercise hypoxemia    Aug 22 2016 10:58AM     

 

                    Pain in joint; Hip    Aug 22 2016 10:58AM     

 

                    Pulmonary nodule seen on imaging study    Aug 22 2016 10:58AM     

 

                    Eustachian tube dysfunction, bilateral    Nov 10 2016  3:57PM     

 

                    Moderate Acute Cough    Nov 10 2016  3:57PM     

 

                    Pharyngitis, Acute    Nov 10 2016  3:57PM     

 

                    Upper Respiratory Infection    Nov 10 2016  3:57PM     

 

                          COPD (chronic obstructive pulmonary disease) with acute bronchitis    Nov 10 2016 

 3:57PM                                  

 

                    Mild Chronic Cough Worsening    Dec 12 2016 12:12PM     

 

                          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

 12 2016 12:12PM                         

 

                    Moderate Shortness of breath on exertion    Dec 12 2016 12:12PM     

 

                    Hyperlipidemia      May  4 2017 10:27AM     

 

                    Sinoatrial Node Dysfunction    May  4 2017 10:27AM     

 

                    Palpitations        May  4 2017 10:27AM     

 

                    Chronic Obstructive Pulmonary Disease    May  4 2017 10:27AM     

 

                    Exercise hypoxemia    May  4 2017 10:27AM     

 

                    Pain in joint; Hip    May  4 2017 10:27AM     

 

                    Pulmonary nodule seen on imaging study    May  4 2017 10:27AM     

 

                    Chest congestion    May 16 2017  4:04PM     

 

                          COPD (chronic obstructive pulmonary disease) with acute bronchitis    May 16 2017 

 4:04PM                                  

 

                    Productive cough    May 16 2017  4:04PM     

 

                    Anxiety about health    May 16 2017  4:04PM     

 

                          Chronic obstructive pulmonary disease, unspecified COPD type    May 16 2017  4:04PM

                                         

 

                    Exercise hypoxemia    May 16 2017  4:04PM     

 

                    Mixed hyperlipidemia    May 16 2017  4:04PM     

 

                    Medication management    May 16 2017  4:04PM     

 

                    Primary osteoarthritis involving multiple joints    May 16 2017  4:04PM     

 

                    Cellulitis of left lower extremity    2017 12:10PM     

 

                    Bitten by dog, initial encounter    2017 12:10PM     

 

                    Cellulitis of left lower extremity    2017  8:56AM     

 

                    Open bite, left lower leg, sequela    2017  8:56AM     

 

                    Bitten by dog, sequela    2017  8:56AM     

 

                    Medication management    2017  8:56AM     

 

                    Cellulitis of left lower extremity    2017 11:38AM     

 

                    Open bite, left lower leg, subsequent encounter    2017 11:38AM     

 

                    Bitten by dog, subsequent encounter    2017 11:38AM     

 

                    Medication management    2017 11:38AM     

 

                    Cough               Aug 10 2017  4:09PM     

 

                    Abnormal chest xray    Aug 10 2017  4:09PM     

 

                    Hyperlipidemia      Aug 29 2017  9:02AM     

 

                    Sinoatrial Node Dysfunction    Aug 29 2017  9:02AM     

 

                    Palpitations        Aug 29 2017  9:02AM     

 

                    Chronic Obstructive Pulmonary Disease    Aug 29 2017  9:02AM     

 

                    Exercise hypoxemia    Aug 29 2017  9:02AM     

 

                    Pain in joint; Hip    Aug 29 2017  9:02AM     

 

                    Pulmonary nodule seen on imaging study    Aug 29 2017  9:02AM     

 

                    Eustachian tube dysfunction, bilateral    Aug 28 2017  3:30PM     

 

                    Purulent postnasal drainage    Aug 28 2017  3:30PM     

 

                    Chest congestion    Aug 28 2017  3:30PM     

 

                    Upper respiratory tract infection, unspecified type    Aug 28 2017  3:30PM     

 

                    Moderate Acute Sinus pressure    Aug 28 2017  3:30PM     

 

                          COPD (chronic obstructive pulmonary disease) with acute bronchitis    Aug 28 2017 

 3:30PM                                  

 

                    Moderate Chronic Purulent postnasal drainage    Oct 24 2017  1:52PM     

 

                    Mild Chronic Sinus pressure    Oct 24 2017  1:52PM     

 

                          Moderate Chronic Acute seasonal allergic rhinitis, unspecified trigger Stable    Oct

 24 2017  1:52PM                         

 

                    Mild Acute Labyrinthitis    Oct 24 2017  1:52PM     

 

                    Moderate Acute Vertigo    Oct 24 2017  1:52PM     

 

                    Purulent postnasal drainage    2018  3:58PM     

 

                    Upper respiratory tract infection, unspecified type    2018  3:58PM     

 

                    Mild Acute Left Enlarged lymph node in neck    2018  3:58PM     

 

                    Cough               2018  1:36PM     

 

                    Moderate Acute Recurrent Chest congestion    2018  1:36PM     

 

                    COPD exacerbation    2018  1:36PM     

 

                          Chronic obstructive pulmonary disease with acute lower respiratory infection    2018  1:36PM                         

 

                    Acute bronchitis, unspecified    2018  1:36PM     

 

                    Cough               2018  3:29PM     

 

                    Acute pharyngitis, unspecified etiology    2018  3:29PM     

 

                    Chest congestion    2018  3:29PM     

 

                    COPD (chronic obstructive pulmonary disease)    2018  3:29PM     

 

                    Cellulitis of left lower extremity    May 22 2018  2:35PM     

 

                    Cellulitis of left lower extremity    2018 10:48AM     

 

                    Peripheral edema    2018 10:48AM     

 

                    Hyperlipidemia      Aug 22 2018 10:03AM     

 

                    Sinoatrial Node Dysfunction    Aug 22 2018 10:03AM     

 

                    Palpitations        Aug 22 2018 10:03AM     

 

                    Chronic Obstructive Pulmonary Disease    Aug 22 2018 10:03AM     

 

                    Exercise hypoxemia    Aug 22 2018 10:03AM     

 

                    Pain in joint; Hip    Aug 22 2018 10:03AM     

 

                    Pulmonary nodule seen on imaging study    Aug 22 2018 10:03AM     

 

                    Exercise Counseling    Aug 21 2018 10:08AM     

 

                    Moderate Acute Bilateral Peripheral edema    Aug 21 2018 10:08AM     

 

                          Chronic obstructive pulmonary disease, unspecified COPD type    Aug 21 2018 10:08AM

                                         

 

                    Medication management    Aug 21 2018 10:08AM     

 

                    Acute nasopharyngitis (common cold)    Oct  4 2018  9:03AM     

 

                    Purulent postnasal drainage    Oct  4 2018  9:03AM     

 

                    Ear pain, right     Oct  4 2018  9:03AM     

 

                    Essential hypertension    2018  9:31AM     

 

                    Ear pain, bilateral    2018  9:31AM     

 

                    Purulent postnasal drainage    2019  3:35PM     

 

                    Chest congestion    2019  3:35PM     

 

                    Upper respiratory tract infection, unspecified type    2019  3:35PM     

 

                    Sinus pressure      2019  3:35PM     

 

                          Chronic obstructive pulmonary disease with acute lower respiratory infection    2019  3:35PM                         

 

                    Acute bronchitis, unspecified    2019  3:35PM     

 

                    Cough               2019  4:44PM     

 

                    Sinus pressure      2019  4:44PM     

 

                    Chest congestion    2019  3:15PM     

 

                    Upper respiratory tract infection, unspecified type    2019  3:15PM     

 

                    COPD exacerbation    2019  3:15PM     







Payers







           Insurance Name    Company Name    Plan Name    Plan Number    Policy Number    Policy Group

 Number                                 Start Date

 

                    Medicare [facility]    Medicare [facility]              [id number]              N/A

 

                          [place] [employer]    [place] [employer]                 [id number]

                                                    N/A

 

                    [employer] Medicare A    Test Medicare A              [id number]              N/A

 

                                [facility] - [facility] - [facility] Comm      

                    [id number]                             N/A

 

                    Medicare Part A    Medicare - Lab/Xray              [id number]              N/A

 

                          [employer]    [employer]                 [id number]

                                                    N/A

 

                    Medicare Part A    Medicare Part A              [id number]              N/A

 

                    Medicare Part B    [employer] [place]              [id number]              N/A







History of Encounters







                    Visit Date          Visit Type          Provider

 

                    2019            Office visit        DEON ESCALANTE

 

                    2019            Office visit        DEON ESCALANTE

 

                    2018           Office visit        DEON ESCALANTE

 

                    10/2/2018           Office visit        DEON ESCALANTE

 

                    2018           Office visit        DEON ESCALANTE

 

                    2018           Office visit        DEON ESCALANTE

 

                    2018           Office visit        DEON ESCALANTE

 

                    2018           Office visit        DEON ESCALANTE

 

                    2018            Office visit        DEON ESCALANTE

 

                    2018           Office visit        DEON ESCALANTE

 

                    10/24/2017          Office visit        DEON ESCALANTE

 

                    2017           Voided              DEON ESCALANTE

 

                    2017           Office visit        DEON ESCALANTE

 

                    2017           Office visit        DEON ESCALANTE

 

                    2017            Office visit        DEON ESCALANTE

 

                    2017            Office visit        DEON ESCALANTE

 

                    2017           Office visit        DEON ESCALANTE

 

                    2016          Office visit        DEON LEON PA

 

                    11/10/2016          Office visit        DEON LEON PA

 

                    3/10/2016           Office visit        DEON LEON PA

 

                    12/15/2015          Office visit        DEON LEON PA

 

                    10/26/2015          Office visit        DEON LEON PA

 

                    10/6/2015           Office visit        DEON LEON PA

 

                    10/28/2014          Office visit        DEON LEON PA

 

                    10/16/2014          Office visit        DEON LEON PA

 

                    2014            Office visit        DEON LEON PA

 

                    2014           [facility]            DEWEY Garcia MD

 

                    2014           Office visit        DEON LEON PA

 

                    10/28/2013          Office visit        DEON LEON PA

 

                    10/14/2013          [facility]            DEWEY Garcia MD

 

                    2013           Office visit        DEON LEON PA

 

                    2013            Office visit        DEON LEON PA

 

                    10/25/2012          Office visit        DEON LEON PA

 

                    10/3/2012           Office visit        DEON LEON PA

 

                    3/16/2012           Office visit        DEON LEON PA

 

                    2012            Office visit        DEON LEON PA

 

                    2012            [facility]            DEWEY Garcia MD

 

                    2011          Office visit        DEON LEON PA

 

                    2011           Office visit        Deon Leon PA-C

 

                    2011            Office visit        Deon Leon PA-C

 

                    6/3/2011            Office visit        Deon Leon PA-C

 

                    2010           Office visit        Deon Leon PA-C

 

                    2010           Office visit        Deon Leon PA-C

 

                    2010           Office visit        Deon Leon PA-C

## 2019-06-25 NOTE — XMS REPORT
MU2 Ambulatory Summary

                             Created on: 2017



Elsi Casillas

External Reference #: [id number]

: 1940

Sex: Female



Demographics







                          Address                   [address] [place]  [address]

 

                          Home Phone                [phone]

 

                          Preferred Language        English

 

                          Marital Status            

 

                          Christian Affiliation     Unknown

 

                          Race                      White

 

                          Ethnic Group              Not  or 





Author







                          DEON Ferguson

 

                          [facility]

 

                          Address                   [address]

[place], [place]  [address]



 

                          Phone                     [phone]







Care Team Providers







                    Care Team Member Name    Role                Phone

 

                    DEON LEON    PCP                 Unavailable

 

                    DEON LEON    PreferredProvider    Unavailable







Allergies and Adverse Reactions







                    Name                Reaction            Notes

 

                    NO KNOWN DRUG ALLERGIES                         







Plan of Treatment







             Planned Activity    Comments     Planned Date    Planned Time    Plan/Goal

 

             Lipid Profile                 2016    12:00 AM      

 

             Injection,Subcutaneous/Intramuscul, [facility]                 2017    12:00 AM      







Medications







                                        Active 

 

             Name         Start Date    Estimated Completion Date    SIG          Comments

 

                Calcium 600 + D(3) 600 mg(1,500mg) -200 unit oral tablet                                    take 2 tablets by

 oral route daily                        

 

                pravastatin 20 mg oral tablet                                    take 1 tablet (20 mg) by oral route once daily

                                         

 

                Toprol XL 25 mg oral tablet extended release 24 hr                                    take 1 tablet (25 mg) 

by oral route once daily                 

 

                hydrochlorothiazide 25 mg oral tablet    1/15/2015                       take 1 tablet (25 mg) by oral

 route once daily for 30 days            

 

                metoprolol succinate 25 mg oral tablet extended release 24 hr    2015                      take

 1 tablet (25 mg) by oral route once daily     

 

                promethazine-codeine 6.25-10 mg/5 mL oral syrup    11/10/2016                      take 5 milliliters

 by oral route every 6 hours as needed, not to exceed 30 mL in 24 hours     

 

                                        ipratropium-albuterol 0.5 mg-3 mg(2.5 mg base)/3 mL inhalation solution for nebulization

                    2016                              inhale 3 milliliters by nebulization route 4 times per day for COPD

                                         

 

                Symbicort 160-4.5 mcg/actuation inhalation HFA aerosol inhaler    11/10/2016                      inhale

 2 puffs by inhalation route 2 times per day in the morning and evening     

 

                metoprolol succinate 25 mg oral tablet extended release 24 hr    3/16/2017                       TAKE

 1 TABLET DAILY                          

 

             hydrochlorothiazide 25 mg oral tablet    2017                  TAKE 1 TABLET DAILY     

 

                Levaquin 500 mg oral tablet    2017       take 1 tablet (500 mg) by oral

 route once daily for 10 days            

 

                prednisone 20 mg oral tablet    2017                       4 x 2 days, 3 x 2 days, 2 x 2 days 1

 x 2 days                                









                                         

 

             Name         Start Date    Expiration Date    SIG          Comments

 

                Singulair 10 mg oral tablet    9/3/2010        2011        take 1 tablet (10 mg) by oral route

 daily  for 60 days                      

 

                Zithromax Z-Christopher 250 mg oral tablet    2011       take 2 tablets (500 mg)

 by oral route once daily for 1 day then 1 tablet (250 mg) by oral route once 
daily for 4 days                         

 

                Medrol (Christopher) 4 mg oral tablets,dose pack    2011        take as directed

 for 6 days                              

 

                Keflex 500 mg oral capsule    3/1/2012        3/11/2012       take 1 capsule by oral route 3 

times a day for 10 days                  

 

                Cipro 500 mg oral tablet    2012        take 1 tablet (500 mg) by oral route

 every 12 hours for 15 days              

 

                benzonatate 100 mg oral capsule    2013       take 1 capsule (100 mg) by

 oral route 3 times per day for cough     

 

             Medrol (Christopher) 4 mg oral tablets,dose pack    2013     take as directed    

 

 

                Zyrtec 10 mg oral tablet    2013       take 1 tablet (10 mg) by oral route

 once daily for 30 days                  

 

                Flonase 50 mcg/actuation nasal spray,suspension    2013       inhale 1 spray

 in each nostril by intranasal route 2 times per day for 30 days     

 

                cephalexin 500 mg oral capsule    2013        take 1 capsule (500 mg) by oral

 route every 8 hours                     

 

                promethazine-codeine 6.25-10 mg/5 mL oral syrup    2013                       take 5 milliliters

 by oral route every 4 hours as needed, not to exceed 30 mL in 24 hours     

 

                Zithromax Z-Christopher 250 mg oral tablet    10/2/2013       10/7/2013       take 2 tablets (500 mg)

 by oral route once daily for 1 day then 1 tablet (250 mg) by oral route once 
daily for 4 days                         

 

                amoxicillin 500 mg oral capsule    2014       take 1 capsule by oral route

 3 times a day for 15 days               

 

                lovastatin 20 mg oral tablet    2014        take 1 tablet (20 mg) by oral 

route daily  for 30 days                 

 

                Zithromax Z-Christopher 250 mg oral tablet    2014       take 2 tablets (500 mg)

 by oral route once daily for 1 day then 1 tablet (250 mg) by oral route once 
daily for 4 days                         

 

             Medrol (Christopher) 4 mg oral tablets,dose pack    2014                 take as directed     

 

                amoxicillin 500 mg oral capsule    2014                       take 1 capsule (500 mg) by oral route

 3 times per day                         

 

                Levaquin 500 mg oral tablet    10/16/2014      10/26/2014      take 1 tablet (500 mg) by oral

 route once daily for 10 days            

 

                prednisone 20 mg oral tablet    10/16/2014      10/24/2014      4x2 days 3x2 days 2x2 days

 1x2 days                                

 

                Zithromax Z-Christopher 250 mg oral tablet    2014       take 2 tablets (500 mg)

 by oral route once daily for 1 day then 1 tablet (250 mg) by oral route once 
daily for 4 days                         

 

                Bactrim -160 mg oral tablet    1/15/2015       2015       take 1 tablet by oral route

 every 12 hours for 10 days              

 

                Zithromax Z-Christopher 250 mg oral tablet    2015      take 2 tablets (500 mg)

 by oral route once daily for 1 day then 1 tablet (250 mg) by oral route once 
daily for 4 days                         

 

                Keflex 500 mg oral capsule    2016       take 1 capsule by oral route 3

 times a day for 7 days                  

 

                amoxicillin 500 mg oral capsule    10/4/2016       10/14/2016      take 1 capsule by oral route

 3 times a day for 10 days               

 

                Tessalon Perles 100 mg oral capsule    10/4/2016                       take 1 capsule by oral route 

every 4 hours                            

 

                Zithromax Z-Christopher 250 mg oral tablet    11/10/2016      11/15/2016      take 2 tablets (500 

mg) by oral route once daily for 1 day then 1 tablet (250 mg) by oral route once
daily for 4 days                         

 

                amoxicillin 500 mg oral tablet    2016                       take 1 tablet (500 mg) by oral route

 3 times per day                         

 

                amoxicillin 500 mg oral capsule    2017       take 1 capsule (500 mg) by

 oral route 3 times per day for 10 days     









                                        Discontinued 

 

             Name         Start Date    Discontinued Date    SIG          Comments

 

                amoxicillin 500 mg oral capsule    11/10/2011      10/3/2012       take 1 capsule (500 mg) 

by oral route 3 times per day            

 

                Anusol-HC 25 mg rectal suppository    2011      10/3/2012       insert 1 suppository 

(25 mg) by rectal route 2 times per day     

 

                Proctofoam 1 % topical foam    2011       apply by external route daily

                                         

 

             Aerochamber    2014    Use with inhaler as directed     

 

                hydrochlorothiazide 25 mg oral tablet    1/15/2015       2015      TAKE 1 TABLET DAILY

                                         







Problem List







                    Description         Status              Onset

 

                    Chronic Obstructive Pulmonary Disease    Active               

 

                    Hyperlipidemia      Active               

 

                    Anxiety Disorder    Active              10/29/2013

 

                    Pain in joint; Hip    Active              06/10/2014

 

                    Pulmonary nodule seen on imaging study    Active              10/29/2014

 

                    Exercise hypoxemia    Active              10/29/2014

 

                    Anxiety about health    Active              2016

 

                    Primary osteoarthritis involving multiple joints    Active              2017

 

                    Medication management    Active              2017







Vital Signs







      Date    Time    BP-Sys(mm[Hg]    BP-Noni(mm[Hg])    HR(bpm)    RR(rpm)    Temp    WT    HT    HC    BMI

                    BSA                 BMI Percentile      O2 Sat(%)

 

        2017    4:03:00 PM    118 mmHg    64 mmHg    106 bpm    18 rpm    97.4 F    137 lbs    63 in

                          24.27 kg/m2    1.66 m2                   98 %

 

        2016    12:11:00 PM    120 mmHg    84 mmHg    110 bpm    16 rpm    98.1 F    130 lbs    63

 in                       23.0282 kg/m    1.619 m                 90 %

 

        11/10/2016    3:57:00 PM    148 mmHg    72 mmHg    88 bpm    16 rpm    98.2 F    132 lbs    63 in

                          23.38 kg/m2    1.63 m2                   98 %

 

        3/10/2016    2:12:00 PM    122 mmHg    60 mmHg    106 bpm    18 rpm    97 F    137 lbs    63 in 

                          24.2682 kg/m    1.662 m                 93 %

 

        12/15/2015    2:33:00 PM    120 mmHg    75 mmHg    102 bpm    16 rpm    98.2 F    137 lbs    63 

in                        24.27 kg/m2    1.66 m2                   94 %

 

        10/26/2015    2:46:00 PM    130 mmHg    80 mmHg    96 bpm    16 rpm    97.9 F    141 lbs    66 in

                          22.7578 kg/m    1.7258 m                 98 %

 

        10/6/2015    9:37:00 AM    140 mmHg    78 mmHg    110 bpm    16 rpm    97.9 F    141 lbs    63 in

                          24.98 kg/m2    1.69 m2                   95 %

 

        10/28/2014    2:25:00 PM    132 mmHg    66 mmHg    92 bpm    24 rpm    97.5 F    147 lbs    63 in

                          26.0396 kg/m    1.7216 m                 92 %

 

        10/16/2014    9:45:00 AM    132 mmHg    64 mmHg    74 bpm    24 rpm    97.7 F    146 lbs    63 in

                          25.86 kg/m2    1.72 m2                   92 %

 

        2014    2:31:00 PM    136 mmHg    68 mmHg    90 bpm    22 rpm    98.2 F    148 lbs    63 in 

                          26.2168 kg/m    1.7274 m                 95 %

 

        2014    3:19:00 PM    124 mmHg    70 mmHg    64 bpm    20 rpm    97.8 F    147 lbs    63 in

                          26.04 kg/m2    1.72 m2                   95 %

 

        10/28/2013    10:31:00 AM    140 mmHg    70 mmHg    92 bpm    24 rpm    97.8 F    143 lbs    63 

in                        25.3311 kg/m    1.698 m                 95 %

 

      2013    2:51:00 PM    130 mmHg    78 mmHg    90 bpm          98.2 F    168 lbs    65 in          

27.96 kg/m2         1.87 m2                                  

 

       2013    2:43:00 PM    110 mmHg    76 mmHg    103 bpm           96.6 F    137 lbs    63 in      

                24.2682 kg/m    1.662 m                        

 

        2013    9:50:00 AM    150 mmHg    66 mmHg    108 bpm    20 rpm    97.8 F    138 lbs    63 in

                          24.45 kg/m2    1.67 m2                   94 %

 

        10/25/2012    9:08:00 AM    110 mmHg    60 mmHg    88 bpm    18 rpm    97.5 F    142 lbs    63 in

                          25.1539 kg/m    1.6921 m                 94 %

 

      10/3/2012    9:33:00 AM    130 mmHg    60 mmHg    98 bpm    18 rpm          146 lbs    63 in          

25.86 kg/m2         1.72 m2                                 95 %

 

      2012    2:31:00 PM    116 mmHg    76 mmHg    88 bpm                140 lbs    63 in          24.7996

 kg/m             1.6801 m                              96 %

 

     2011    10:02:00 AM    122 mmHg    80 mmHg    110 bpm              144 lbs                        

                                        94 %

 

      2011    2:58:00 PM    124 mmHg    84 mmHg    106 bpm                155 lbs    63 in          27.4567

 kg/m             1.7678 m                              96 %

 

     2011    9:55:00 AM    114 mmHg    78 mmHg    92 bpm              146 lbs                             95

 %

 

     6/3/2011    8:36:00 AM    116 mmHg    74 mmHg    90 bpm              146 lbs                             96

 %

 

     2010    3:01:00 PM    132 mmHg    84 mmHg    102 bpm              149 lbs                          

                                        94 %

 

     2010    11:46:00 AM    124 mmHg    78 mmHg    104 bpm              151 lbs                         

                                        94 %

 

     2010    8:47:00 AM    116 mmHg    78 mmHg    103 bpm              151 lbs                          

                                        95 %







Social History







                    Name                Description         Comments

 

                    Tobacco             Never smoker         

 

                    denies alcohol use                         







History of Procedures







                    Date Ordered        Description         Order Status

 

                    2011 12:00 AM    THER/PROPH/DIAG INJ SC/IM    Reviewed

 

                    2011 12:00 AM    Decadron Inj.1mg-([facility]) Ndc #[id number]    Reviewed

 

                    2011 12:00 AM    Depo-Medrol 80 Mg Im/[facility] NDC 0009-307332    Reviewed

 

                    2011 12:00 AM    Rocephin, Per 250MG - 1 Gram Vial NDC 0365-845105-[facility]    Reviewed



 

                    3/16/2012 12:00 AM    ROUTINE VENIPUNCTURE    Reviewed

 

                    3/16/2012 12:00 AM    COMPLETE CBC W/AUTO DIFF WBC    Reviewed

 

                    3/16/2012 12:00 AM    COMPREHEN METABOLIC PANEL    Reviewed

 

                    3/16/2012 12:00 AM    ASSAY THYROID STIM HORMONE    Reviewed

 

                    11/10/2016 12:00 AM    Decadron, Per 1 Mg NDC# [address]-04    Reviewed

 

                    11/10/2016 12:00 AM    Depo-Medrol, Per 80 Mg NDC#6500-3877-08    Reviewed

 

                    11/10/2016 12:00 AM    Rocephin 1 gram NDC#9287-5497-19    Reviewed

 

                    2016 12:00 AM    THER/PROPH/DIAG INJ SC/IM    Reviewed

 

                    2016 12:00 AM    Decadron 8mg Injection, [facility]    Reviewed

 

                    2016 12:00 AM    Depo-Medrol 80mg Injection, [facility]    Reviewed

 

                    2016 12:00 AM    Rocephin 1 gram Injection, [facility]    Reviewed

 

                    2017 12:00 AM    COMPLETE CBC W/AUTO DIFF WBC    Reviewed

 

                    2017 12:00 AM    COMPREHEN METABOLIC PANEL    Reviewed

 

                    2017 12:00 AM    LIPID PANEL         Reviewed

 

                    10/3/2012 12:00 AM    THER/PROPH/DIAG INJ SC/IM    Reviewed

 

                    10/3/2012 12:00 AM    Decadron, Per 1 Mg NDC# [address]-35    Reviewed

 

                    10/3/2012 12:00 AM    Depo-Medrol, Per 80 Mg NDC#8805-0107-33    Reviewed

 

                    10/3/2012 12:00 AM    Toradol 60 Mg NDC#8961-1736-40    Reviewed

 

                    2013 12:00 AM    THER/PROPH/DIAG INJ SC/IM    Reviewed

 

                    2013 12:00 AM    Decadron, Per 1 Mg NDC# [address]-58    Reviewed

 

                    2013 12:00 AM    Depo-Medrol, Per 80 Mg NDC#7709-3810-60    Reviewed

 

                    2013 12:00 AM    Toradol 60 Mg NDC#5228-9369-81    Reviewed

 

                    2010 12:00 AM    ROUTINE VENIPUNCTURE    Reviewed

 

                    2010 12:00 AM    COMPLETE CBC W/AUTO DIFF WBC    Reviewed

 

                    2010 12:00 AM    COMPREHEN METABOLIC PANEL    Reviewed

 

                    2010 12:00 AM    LIPID PANEL         Reviewed

 

                    10/28/2014 12:00 AM    CHEST X-RAY 4/> VIEWS    Reviewed

 

                    6/3/2011 12:00 AM    ROUTINE VENIPUNCTURE    Reviewed

 

                    6/3/2011 12:00 AM    COMPLETE CBC AUTOMATED    Reviewed

 

                    6/3/2011 12:00 AM    COMPREHEN METABOLIC PANEL    Reviewed

 

                    6/3/2011 12:00 AM    LIPID PANEL         Reviewed

 

                    2011 12:00 AM    THER/PROPH/DIAG INJ SC/IM    Reviewed

 

                    2011 12:00 AM    Decadron Inj.8mg-([facility]) Ndc #[id number]    Reviewed

 

                    2011 12:00 AM    Depo-Medrol 80 Mg Im/[facility] NDC 0009-989859    Reviewed







Results Summary







                          Date and Description      Results

 

                          6/3/2011 1:52 PM          WBC 8.6 RBC 4.66 HGB 13.20 g/dLHCT 42.10 %MCV 90.0 fLMCH 28.30

 pgMCHC 31.40 g/dLRDW SD 44 RDW CV 13.60 %MPV 10.90 fLPLT 383 NRBC# 0.00 NRBC% 
0.0 %NEUT 68.0 %%LYMP 20.30 %%MONO 9.30 %%EOS 2.20 %%BASO 0.20 %#NEUT 5.81 #LYMP
 1.74 #MONO 0.80 #EOS 0.19 #BASO 0.02 MANUAL DIFF NOT IND 

 

                          6/3/2011 1:53 PM          TRIGLYCERIDES 155.0 mg/dLCHOLESTEROL 248.0 mg/dLHDL 51.0 mg/dLTOT

 CHOL/HDL 4.9 .0 mg/dLGLUCOSE 97.0 mg/dLSODIUM 139.0 mmol/LPOTASSIUM 3.70
 mmol/LCHLORIDE 101.0 mmol/LCO2 27.0 mmol/LBUN 19.0 mg/dLCREATININE 0.90 
mg/dLSGOT/AST 19.0 IU/LSGPT/ALT 11.0 IU/LALK PHOS 111.0 IU/LTOTAL PROTEIN 7.40 
g/dLALBUMIN 4.20 g/dLTOTAL BILI 0.50 mg/dLCALCIUM 9.50 mg/dLAGE 70 GFR NonAA 62 
GFR AA 75 eGFR >60 mL/min/1.73 m2eGFR AA* >60 







History Of Immunizations

Not available.



History of Past Illness







                    Name                Date of Onset       Comments

 

                    Chronic Obstructive Pulmonary Disease                         

 

                    Hyperlipidemia                           

 

                    Cough               2010  8:49AM     

 

                    General Medical Exam, Adult    2010  8:49AM     

 

                    Seasonal Allergies    2010  8:49AM     

 

                    Sinusitis, Chronic    2010  8:49AM     

 

                    Ganglion cyst of synovium, tendon, and bursa; other    2010 11:48AM     

 

                    Anxiety Disorder    10/29/2013           

 

                    Pain in joint; Hip    06/10/2014           

 

                    Pulmonary nodule seen on imaging study    10/29/2014           

 

                    Exercise hypoxemia    10/29/2014           

 

                    Chronic Obstructive Pulmonary Disease    2010  3:02PM     

 

                    Edema               2010  3:02PM     

 

                    Sinusitis, Acute    2010  3:02PM     

 

                    Anxiety about health    2016           

 

                    Chronic Obstructive Pulmonary Disease    Trey  3 2011  8:38AM     

 

                    Palpitations        Trey  3 2011  8:38AM     

 

                    Cough               2011  9:54AM     

 

                    Sinusitis, Acute    2011  9:54AM     

 

                    Seasonal Allergies    2011  9:54AM     

 

                    Post-nasal drainage    2011  9:54AM     

 

                    Primary osteoarthritis involving multiple joints    2017           

 

                    Medication management    2017           

 

                    Cough               Sep 22 2011  2:55PM     

 

                    Seasonal Allergies    Sep 22 2011  2:55PM     

 

                    Pharyngitis, Acute    Sep 22 2011  2:55PM     

 

                    Post-nasal drainage    Sep 22 2011  2:55PM     

 

                    Blood In Stool, Occult    2011  9:58AM     

 

                    Hemorrhoids         2011  9:58AM     

 

                    Chronic Obstructive Pulmonary Disease    2012  2:33PM     

 

                    Cardiac Dysrhythmia    2012  2:33PM     

 

                    Sinoatrial Node Dysfunction    Mar 16 2012  9:12AM     

 

                    Palpitations        Mar 16 2012  9:12AM     

 

                    Osteoarthrosis, generalized, multiple sites    Oct  3 2012  9:34AM     

 

                    Pain in joint; Hip    Oct  3 2012  9:34AM     

 

                    Osteoarthrosis      Oct 25 2012  9:09AM     

 

                    Bronchitis, Acute    Oct 25 2012  9:09AM     

 

                    Cough               Oct 25 2012  9:09AM     

 

                    Pain in joint; Left Hip    Oct 25 2012  9:09AM     

 

                    Pain in joint; Hip    2013  9:50AM     

 

                    Osteoarthrosis, generalized, multiple sites    2013  2:45PM     

 

                    Pain in joint; Hip bilateral    2013  2:45PM     

 

                    Anxiety Disorder    Oct 28 2013 10:32AM     

 

                    Chronic Obstructive Pulmonary Disease    Oct 28 2013 10:32AM     

 

                    Hyperlipidemia      Oct 28 2013 10:32AM     

 

                    Pain in joint; Left Hip    Oct 28 2013 10:32AM     

 

                    Sinusitis, Acute    Oct 28 2013 10:32AM     

 

                    Essential Hypertension    2014  3:19PM     

 

                    Osteoarthrosis, generalized, multiple sites    2014  3:19PM     

 

                    Chronic Obstructive Pulmonary Disease    2014  3:19PM     

 

                    Pain in joint; Hip    2014  3:19PM     

 

                    Chronic Obstructive Pulmonary Disease    2014  2:31PM     

 

                    Pain in joint; Hip    2014  2:31PM     

 

                    pre-operative examination, unspecified    2014  2:31PM     

 

                    Lung nodule seen on imaging study    Oct 28 2014 10:24AM     

 

                    Bronchitis, Acute    Oct 16 2014  9:45AM     

 

                    Respiratory System And Chest Symptoms    Oct 16 2014  9:45AM     

 

                    COPD (chronic obstructive pulmonary disease)    Oct 16 2014  9:45AM     

 

                    Chronic Obstructive Pulmonary Disease    Oct 28 2014  2:26PM     

 

                    Pulmonary nodule seen on imaging study    Oct 28 2014  2:26PM     

 

                    Shortness of breath    Oct 28 2014  2:26PM     

 

                    Exercise hypoxemia    Oct 28 2014  2:26PM     

 

                    Nail avulsion       Oct  6 2015  9:38AM     

 

                          Contusion of left index finger with damage to nail, initial encounter    Oct 26 2015

  2:53PM                                 

 

                    Forehead contusion, subsequent encounter    Dec 15 2015  2:39PM     

 

                    Generalized anxiety disorder    Dec 15 2015  2:39PM     

 

                    Chronic Obstructive Pulmonary Disease    Dec 15 2015  2:39PM     

 

                    Osteoarthrosis, generalized, multiple sites    Mar 10 2016  2:12PM     

 

                    Pain of right thigh    Mar 10 2016  2:12PM     

 

                    Mild Acute Hip pain, acute, right Improving    Mar 10 2016  2:12PM     

 

                    Anxiety about health    Mar 10 2016  2:12PM     

 

                    Hyperlipidemia      Aug 22 2016 10:58AM     

 

                    Sinoatrial Node Dysfunction    Aug 22 2016 10:58AM     

 

                    Palpitations        Aug 22 2016 10:58AM     

 

                    Chronic Obstructive Pulmonary Disease    Aug 22 2016 10:58AM     

 

                    Exercise hypoxemia    Aug 22 2016 10:58AM     

 

                    Pain in joint; Hip    Aug 22 2016 10:58AM     

 

                    Pulmonary nodule seen on imaging study    Aug 22 2016 10:58AM     

 

                    Eustachian tube dysfunction, bilateral    Nov 10 2016  3:57PM     

 

                    Moderate Acute Cough    Nov 10 2016  3:57PM     

 

                    Pharyngitis, Acute    Nov 10 2016  3:57PM     

 

                    Upper Respiratory Infection    Nov 10 2016  3:57PM     

 

                          COPD (chronic obstructive pulmonary disease) with acute bronchitis    Nov 10 2016 

 3:57PM                                  

 

                    Mild Chronic Cough Worsening    Dec 12 2016 12:12PM     

 

                          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

 12 2016 12:12PM                         

 

                    Moderate Shortness of breath on exertion    Dec 12 2016 12:12PM     

 

                    Hyperlipidemia      May  4 2017 10:27AM     

 

                    Sinoatrial Node Dysfunction    May  4 2017 10:27AM     

 

                    Palpitations        May  4 2017 10:27AM     

 

                    Chronic Obstructive Pulmonary Disease    May  4 2017 10:27AM     

 

                    Exercise hypoxemia    May  4 2017 10:27AM     

 

                    Pain in joint; Hip    May  4 2017 10:27AM     

 

                    Pulmonary nodule seen on imaging study    May  4 2017 10:27AM     

 

                    Chest congestion    May 16 2017  4:04PM     

 

                          COPD (chronic obstructive pulmonary disease) with acute bronchitis    May 16 2017 

 4:04PM                                  

 

                    Productive cough    May 16 2017  4:04PM     

 

                    Anxiety about health    May 16 2017  4:04PM     

 

                          Chronic obstructive pulmonary disease, unspecified COPD type    May 16 2017  4:04PM

                                         

 

                    Exercise hypoxemia    May 16 2017  4:04PM     

 

                    Mixed hyperlipidemia    May 16 2017  4:04PM     

 

                    Medication management    May 16 2017  4:04PM     

 

                    Primary osteoarthritis involving multiple joints    May 16 2017  4:04PM     







Payers







           Insurance Name    Company Name    Plan Name    Plan Number    Policy Number    Policy Group

 Number                                 Start Date

 

                    Medicare RHC    Medicare R[employer]              [id number]              N/A

 

                          [place] [employer]    [place] [employer]                 [id number]

                                                    N/A

 

                    [employer] Medicare A    Test Medicare A              [id number]              N/A

 

                                [facility] - [facility]      

                    [id number]                             N/A

 

                    Medicare Part A    Medicare - Lab/Xray              [id number]              N/A

 

                          [employer]    [employer]                 [id number]

                                                    N/A

 

                    Medicare Part A    Medicare Part A              [id number]              N/A

 

                    Medicare Part B    [employer] [place]              [id number]              N/A







History of Encounters







                    Visit Date          Visit Type          Provider

 

                    2017           Office visit        DEON ESCALANTE

 

                    2016          Office visit        DEON ESCALANTE

 

                    11/10/2016          Office visit        DEON ESCALANTE

 

                    3/10/2016           Office visit        DEON ESCALANTE

 

                    12/15/2015          Office visit        DEON ESCALANTE

 

                    10/26/2015          Office visit        DEON ESCALANTE

 

                    10/6/2015           Office visit        DEON ESCALANTE

 

                    10/28/2014          Office visit        DEON ESCALANTE

 

                    10/16/2014          Office visit        DEON ESCALANTE

 

                    2014            Office visit        DEON ESCALANTE

 

                    2014           [facility]            DEWEY Garcia MD

 

                    2014           Office visit        DEON ESCALANTE

 

                    10/28/2013          Office visit        DEON ESCALANTE

 

                    10/14/2013          [facility]            DEWEY Garcia MD

 

                    2013           Office visit        DEON ESCALANTE

 

                    2013            Office visit        DEON ESCALANTE

 

                    10/25/2012          Office visit        DEON ESCALANTE

 

                    10/3/2012           Office visit        DEON ESCALANTE

 

                    3/16/2012           Office visit        DEON ESCALANTE

 

                    2012            Office visit        DEON ESCALANTE

 

                    2012            [facility]            DEWEY Garcia MD

 

                    2011          Office visit        DEON ESCALANTE

 

                    2011           Office visit        Deon Leon PA-C

 

                    2011            Office visit        Deon ESCALANTE-C

 

                    6/3/2011            Office visit        Deon ESCALANTE-C

 

                    2010           Office visit        Deon ESCALANTE-C

 

                    2010           Office visit        Deon Leon PA-C

 

                    2010           Office visit        Deon Leon PA-C

## 2019-06-25 NOTE — XMS REPORT
MU2 Ambulatory Summary

                             Created on: 2018



Elsi Casillas

External Reference #: [id number]

: 1940

Sex: Female



Demographics







                          Address                   [address] [place]  [address]

 

                          Home Phone                [phone]

 

                          Preferred Language        English

 

                          Marital Status            

 

                          Gnosticist Affiliation     Unknown

 

                          Race                      White

 

                          Ethnic Group              Not  or 





Author







                          Author                    DEON LEON

 

                          [facility]

 

                          Address                   [address]

[place], [place]  [id number]



 

                          Phone                     [phone]







Care Team Providers







                    Care Team Member Name    Role                Phone

 

                    DEON LEON    PCP                 [phone]

 

                    DEON LEON    PreferredProvider    [phone]







Allergies and Adverse Reactions







                    Name                Reaction            Notes

 

                    NO KNOWN DRUG ALLERGIES                         







Plan of Treatment







             Planned Activity    Comments     Planned Date    Planned Time    Plan/Goal

 

             Lipid Profile                 2016    12:00 AM      

 

             Chest PA and Lateral - Main                 8/10/2017    12:00 AM      







Medications







                                        Active 

 

             Name         Start Date    Estimated Completion Date    SIG          Comments

 

                Calcium 600 + D(3) 600 mg(1,500mg) -200 unit oral tablet                                    take 2 tablets by

 oral route daily                        

 

                pravastatin 20 mg oral tablet                                    take 1 tablet (20 mg) by oral route once daily

                                         

 

                Toprol XL 25 mg oral tablet extended release 24 hr                                    take 1 tablet (25 mg) 

by oral route once daily                 

 

                    albuterol sulfate 1.25 mg/3 mL inhalation solution for nebulization    2017           

                                        inhale 3 milliliters (1.25 mg) via nebulizer by inhalation route 4 times per day

  DX J44.9                               

 

                Symbicort 160-4.5 mcg/actuation inhalation HFA aerosol inhaler    10/25/2017                      inhale

 2 puffs by inhalation route 2 times per day in the morning and evening     

 

                metoprolol succinate 25 mg oral tablet extended release 24 hr    2017                       TAKE

 1 TABLET DAILY                          

 

                                        ipratropium-albuterol 0.5 mg-3 mg(2.5 mg base)/3 mL inhalation solution for nebulization

                    2018                                inhale 3 milliliters by nebulization route 4 times per day for COPD

                                         

 

                Sudogest 30 mg oral tablet    3/9/2018                        take 1 tablets (60 mg) by oral route every

 6 hours as needed                       

 

             hydrochlorothiazide 25 mg oral tablet    2018                 TAKE 1 TABLET DAILY     

 

                Bactrim -160 mg oral tablet    2018        take 1 tablet by oral route

 every 12 hours for 10 days              









                                         

 

             Name         Start Date    Expiration Date    SIG          Comments

 

                Singulair 10 mg oral tablet    9/3/2010        2011        take 1 tablet (10 mg) by oral route

 daily  for 60 days                      

 

                Zithromax Z-Christopher 250 mg oral tablet    2011       take 2 tablets (500 mg)

 by oral route once daily for 1 day then 1 tablet (250 mg) by oral route once 
daily for 4 days                         

 

                Medrol (Christopher) 4 mg oral tablets,dose pack    2011        take as directed

 for 6 days                              

 

                Keflex 500 mg oral capsule    3/1/2012        3/11/2012       take 1 capsule by oral route 3 

times a day for 10 days                  

 

                Cipro 500 mg oral tablet    2012        take 1 tablet (500 mg) by oral route

 every 12 hours for 15 days              

 

                benzonatate 100 mg oral capsule    2013       take 1 capsule (100 mg) by

 oral route 3 times per day for cough     

 

             Medrol (Christopher) 4 mg oral tablets,dose pack    2013     take as directed    

 

 

                Zyrtec 10 mg oral tablet    2013       take 1 tablet (10 mg) by oral route

 once daily for 30 days                  

 

                Flonase 50 mcg/actuation nasal spray,suspension    2013       inhale 1 spray

 in each nostril by intranasal route 2 times per day for 30 days     

 

                cephalexin 500 mg oral capsule    2013        take 1 capsule (500 mg) by oral

 route every 8 hours                     

 

                promethazine-codeine 6.25-10 mg/5 mL oral syrup    2013                       take 5 milliliters

 by oral route every 4 hours as needed, not to exceed 30 mL in 24 hours     

 

                Zithromax Z-Christopher 250 mg oral tablet    10/2/2013       10/7/2013       take 2 tablets (500 mg)

 by oral route once daily for 1 day then 1 tablet (250 mg) by oral route once 
daily for 4 days                         

 

                amoxicillin 500 mg oral capsule    2014       take 1 capsule by oral route

 3 times a day for 15 days               

 

                lovastatin 20 mg oral tablet    2014        take 1 tablet (20 mg) by oral 

route daily  for 30 days                 

 

                Zithromax Z-Christopher 250 mg oral tablet    2014       take 2 tablets (500 mg)

 by oral route once daily for 1 day then 1 tablet (250 mg) by oral route once 
daily for 4 days                         

 

             Medrol (Christopher) 4 mg oral tablets,dose pack    2014                 take as directed     

 

                amoxicillin 500 mg oral capsule    2014                       take 1 capsule (500 mg) by oral route

 3 times per day                         

 

                Levaquin 500 mg oral tablet    10/16/2014      10/26/2014      take 1 tablet (500 mg) by oral

 route once daily for 10 days            

 

                prednisone 20 mg oral tablet    10/16/2014      10/24/2014      4x2 days 3x2 days 2x2 days

 1x2 days                                

 

                Zithromax Z-Christopher 250 mg oral tablet    2014       take 2 tablets (500 mg)

 by oral route once daily for 1 day then 1 tablet (250 mg) by oral route once 
daily for 4 days                         

 

                Bactrim -160 mg oral tablet    1/15/2015       2015       take 1 tablet by oral route

 every 12 hours for 10 days              

 

                Zithromax Z-Christopher 250 mg oral tablet    2015      take 2 tablets (500 mg)

 by oral route once daily for 1 day then 1 tablet (250 mg) by oral route once 
daily for 4 days                         

 

                Keflex 500 mg oral capsule    2016       take 1 capsule by oral route 3

 times a day for 7 days                  

 

                amoxicillin 500 mg oral capsule    10/4/2016       10/14/2016      take 1 capsule by oral route

 3 times a day for 10 days               

 

                Tessalon Perles 100 mg oral capsule    10/4/2016                       take 1 capsule by oral route 

every 4 hours                            

 

                Zithromax Z-Christopher 250 mg oral tablet    11/10/2016      11/15/2016      take 2 tablets (500 

mg) by oral route once daily for 1 day then 1 tablet (250 mg) by oral route once
daily for 4 days                         

 

                amoxicillin 500 mg oral tablet    2016                       take 1 tablet (500 mg) by oral route

 3 times per day                         

 

                amoxicillin 500 mg oral capsule    2017       take 1 capsule (500 mg) by

 oral route 3 times per day for 10 days     

 

                Bactrim -160 mg oral tablet    2017       take 1 tablet by oral route

 2 times a day for 10 days               

 

                Levaquin 500 mg oral tablet    2017        take 1 tablet (500 mg) by oral

 route once daily for 10 days            

 

                amoxicillin 500 mg oral capsule    2017                       take 1 capsule (500 mg) by oral route

 every 12 hours for 7 days               

 

                Zithromax Z-Christopher 250 mg oral tablet    2017                      take 2 tablets (500 mg) by oral

 route once daily for 1 day then 1 tablet (250 mg) by oral route once daily for 
4 days                                   

 

                amoxicillin 500 mg oral tablet    2018                       take 1 tablet (500 mg) by oral route

 every 12 hours for 10 days              

 

                Cipro 500 mg oral tablet    2018        2/15/2018       take 1 tablet (500 mg) by oral route

 2 times per day for 10 days             

 

                prednisone 20 mg oral tablet    2018       2X4 days 1X4 days 1/2X4 days 

                                         

 

                Zithromax Z-Christopher 250 mg oral tablet    2018       take 2 tablets (500 mg)

 by oral route once daily for 1 day then 1 tablet (250 mg) by oral route once 
daily for 4 days                         









                                        Discontinued 

 

             Name         Start Date    Discontinued Date    SIG          Comments

 

                amoxicillin 500 mg oral capsule    11/10/2011      10/3/2012       take 1 capsule (500 mg) 

by oral route 3 times per day            

 

                Anusol-HC 25 mg rectal suppository    2011      10/3/2012       insert 1 suppository 

(25 mg) by rectal route 2 times per day     

 

                Proctofoam 1 % topical foam    2011       apply by external route daily

                                         

 

             Aerochamber    2014    Use with inhaler as directed     

 

                hydrochlorothiazide 25 mg oral tablet    1/15/2015       2015      TAKE 1 TABLET DAILY

                                         

 

                promethazine-codeine 6.25-10 mg/5 mL oral syrup    11/10/2016      10/25/2017      take 5 

milliliters by oral route every 6 hours as needed, not to exceed 30 mL in 24 
hours                                    

 

                prednisone 20 mg oral tablet    2017       10/25/2017      4 x 2 days, 3 x 2 days, 2 x

 2 days 1 x 2 days                       

 

                Augmentin 875-125 mg oral tablet    2017       take 1 tablet by oral route

 every 12 hours for 7 days               

 

                Keflex 500 mg oral capsule    2017       take 1 capsule (500 mg) by oral

 route every 12 hours                    

 

                Keflex 500 mg oral capsule    10/10/2017      2018       take 1 capsule (500 mg) by oral

 route every 12 hours for 10 days        

 

                Levaquin 500 mg oral tablet    2018       take 1 tablet (500 mg) by oral

 route once daily for 10 days            







Problem List







                    Description         Status              Onset

 

                    Chronic Obstructive Pulmonary Disease    Active               

 

                    Hyperlipidemia      Active               

 

                    Anxiety disorder    Active              10/29/2013

 

                    Pain in joint; Hip    Active              06/10/2014

 

                    Pulmonary nodule seen on imaging study    Active              10/29/2014

 

                    Exercise hypoxemia    Active              10/29/2014

 

                    Anxiety about health    Active              2016

 

                    Primary osteoarthritis involving multiple joints    Active              2017

 

                    Medication management    Active              2017

 

                    Cellulitis of left lower extremity    Active              2017

 

                    Dog bite of calf, left, sequela    Active              2017







Vital Signs







      Date    Time    BP-Sys(mm[Hg]    BP-Noni(mm[Hg])    HR(bpm)    RR(rpm)    Temp    WT    HT    HC    BMI

                    BSA                 BMI Percentile      O2 Sat(%)

 

        2018    2:35:00 PM    120 mmHg    82 mmHg    106 bpm    20 rpm    98.2 F    142 lbs    62 in

                          25.9719 kg/m    1.6786 m                 92 %

 

       2018    3:28:00 PM    122 mmHg    68 mmHg    114 bpm    18 rpm    98.2 F    142 lbs            

                                                                90 %

 

        2018    1:35:00 PM    112 mmHg    74 mmHg    114 bpm    18 rpm    99.6 F    139 lbs    63 in

                          24.6225 kg/m    1.6741 m                 98 %

 

       2018    3:57:00 PM    128 mmHg    80 mmHg    78 bpm    18 rpm    98.4 F    148 lbs             

                                                                90 %

 

        10/24/2017    1:51:00 PM    132 mmHg    80 mmHg    82 bpm    16 rpm    98.2 F    145 lbs    62 in

                          26.5206 kg/m    1.6962 m                 95 %

 

       2017    3:29:00 PM    128 mmHg    80 mmHg    68 bpm    16 rpm    98 F    144 lbs    63 in    

                25.51 kg/m2     1.70 m2                         93 %

 

        2017    11:37:00 AM    118 mmHg    72 mmHg    96 bpm    16 rpm    97.4 F    141 lbs    63 in

                          24.9768 kg/m    1.6861 m                 91 %

 

        2017    8:55:00 AM    105 mmHg    60 mmHg    96 bpm    18 rpm    95.8 F    142 lbs    63 in 

                          25.15 kg/m2    1.69 m2                   95 %

 

       2017    12:10:00 PM    122 mmHg    68 mmHg    76 bpm    18 rpm    98 F    143 lbs    63 in    

                25.3311 kg/m    1.698 m                       98 %

 

        2017    4:03:00 PM    118 mmHg    64 mmHg    106 bpm    18 rpm    97.4 F    137 lbs    63 in

                          24.27 kg/m2    1.66 m2                   98 %

 

        2016    12:11:00 PM    120 mmHg    84 mmHg    110 bpm    16 rpm    98.1 F    130 lbs    63

 in                       23.0282 kg/m    1.619 m                 90 %

 

        11/10/2016    3:57:00 PM    148 mmHg    72 mmHg    88 bpm    16 rpm    98.2 F    132 lbs    63 in

                          23.38 kg/m2    1.63 m2                   98 %

 

        3/10/2016    2:12:00 PM    122 mmHg    60 mmHg    106 bpm    18 rpm    97 F    137 lbs    63 in 

                          24.2682 kg/m    1.662 m                 93 %

 

        12/15/2015    2:33:00 PM    120 mmHg    75 mmHg    102 bpm    16 rpm    98.2 F    137 lbs    63 

in                        24.27 kg/m2    1.66 m2                   94 %

 

        10/26/2015    2:46:00 PM    130 mmHg    80 mmHg    96 bpm    16 rpm    97.9 F    141 lbs    66 in

                          22.7578 kg/m    1.7258 m                 98 %

 

        10/6/2015    9:37:00 AM    140 mmHg    78 mmHg    110 bpm    16 rpm    97.9 F    141 lbs    63 in

                          24.98 kg/m2    1.69 m2                   95 %

 

        10/28/2014    2:25:00 PM    132 mmHg    66 mmHg    92 bpm    24 rpm    97.5 F    147 lbs    63 in

                          26.0396 kg/m    1.7216 m                 92 %

 

        10/16/2014    9:45:00 AM    132 mmHg    64 mmHg    74 bpm    24 rpm    97.7 F    146 lbs    63 in

                          25.86 kg/m2    1.72 m2                   92 %

 

        2014    2:31:00 PM    136 mmHg    68 mmHg    90 bpm    22 rpm    98.2 F    148 lbs    63 in 

                          26.2168 kg/m    1.7274 m                 95 %

 

        2014    3:19:00 PM    124 mmHg    70 mmHg    64 bpm    20 rpm    97.8 F    147 lbs    63 in

                          26.04 kg/m2    1.72 m2                   95 %

 

        10/28/2013    10:31:00 AM    140 mmHg    70 mmHg    92 bpm    24 rpm    97.8 F    143 lbs    63 

in                        25.3311 kg/m    1.698 m                 95 %

 

      2013    2:51:00 PM    130 mmHg    78 mmHg    90 bpm          98.2 F    168 lbs    65 in          

27.96 kg/m2         1.87 m2                                  

 

       2013    2:43:00 PM    110 mmHg    76 mmHg    103 bpm           96.6 F    137 lbs    63 in      

                24.2682 kg/m    1.662 m                        

 

        2013    9:50:00 AM    150 mmHg    66 mmHg    108 bpm    20 rpm    97.8 F    138 lbs    63 in

                          24.45 kg/m2    1.67 m2                   94 %

 

        10/25/2012    9:08:00 AM    110 mmHg    60 mmHg    88 bpm    18 rpm    97.5 F    142 lbs    63 in

                          25.1539 kg/m    1.6921 m                 94 %

 

      10/3/2012    9:33:00 AM    130 mmHg    60 mmHg    98 bpm    18 rpm          146 lbs    63 in          

25.86 kg/m2         1.72 m2                                 95 %

 

      2012    2:31:00 PM    116 mmHg    76 mmHg    88 bpm                140 lbs    63 in          24.7996

 kg/m             1.6801 m                              96 %

 

     2011    10:02:00 AM    122 mmHg    80 mmHg    110 bpm              144 lbs                        

                                        94 %

 

      2011    2:58:00 PM    124 mmHg    84 mmHg    106 bpm                155 lbs    63 in          27.4567

 kg/m             1.7678 m                              96 %

 

     2011    9:55:00 AM    114 mmHg    78 mmHg    92 bpm              146 lbs                             95

 %

 

     6/3/2011    8:36:00 AM    116 mmHg    74 mmHg    90 bpm              146 lbs                             96

 %

 

     2010    3:01:00 PM    132 mmHg    84 mmHg    102 bpm              149 lbs                          

                                        94 %

 

     2010    11:46:00 AM    124 mmHg    78 mmHg    104 bpm              151 lbs                         

                                        94 %

 

     2010    8:47:00 AM    116 mmHg    78 mmHg    103 bpm              151 lbs                          

                                        95 %







Social History







                    Name                Description         Comments

 

                    Alcohol             Never                

 

                    Uses seatbelts                           

 

                    Tobacco             Never smoker         







History of Procedures







                    Date Ordered        Description         Order Status

 

                    2011 12:00 AM    THER/PROPH/DIAG INJ SC/IM    Reviewed

 

                    2011 12:00 AM    Decadron Inj.1mg-([facility]) Ndc #3043758710    Reviewed

 

                    2011 12:00 AM    Depo-Medrol 80 Mg Im/[facility]DC 0009-022891    Reviewed

 

                    2011 12:00 AM    Rocephin, Per 250MG - 1 Gram Vial NDC 2555-105268-[facility]    Reviewed



 

                    3/16/2012 12:00 AM    ROUTINE VENIPUNCTURE    Reviewed

 

                    3/16/2012 12:00 AM    COMPLETE CBC W/AUTO DIFF WBC    Reviewed

 

                    3/16/2012 12:00 AM    COMPREHEN METABOLIC PANEL    Reviewed

 

                    3/16/2012 12:00 AM    ASSAY THYROID STIM HORMONE    Reviewed

 

                    11/10/2016 12:00 AM    Decadron, Per 1 Mg NDC# [address]-44    Reviewed

 

                    11/10/2016 12:00 AM    Depo-Medrol, Per 80 Mg NDC#4324-8428-92    Reviewed

 

                    11/10/2016 12:00 AM    Rocephin 1 gram NDC#9720-9293-11    Reviewed

 

                    2016 12:00 AM    THER/PROPH/DIAG INJ SC/IM    Reviewed

 

                    2016 12:00 AM    Decadron 8mg Injection, [facility]    Reviewed

 

                    2016 12:00 AM    Depo-Medrol 80mg Injection, [facility]    Reviewed

 

                    2016 12:00 AM    Rocephin 1 gram Injection, [facility]    Reviewed

 

                    2017 12:00 AM    COMPLETE CBC W/AUTO DIFF WBC    Reviewed

 

                    2017 12:00 AM    COMPREHEN METABOLIC PANEL    Reviewed

 

                    2017 12:00 AM    LIPID PANEL         Reviewed

 

                    2017 12:00 AM    THERAPEUTIC PROPHYLACTIC/DX INJECTION SUBQ/IM    Reviewed

 

                    2017 12:00 AM    Decadron 8mg Injection, [facility]    Reviewed

 

                    2017 12:00 AM    Depo-Medrol 80mg Injection, [facility]    Reviewed

 

                    2017 12:00 AM    LIPID PANEL         Returned

 

                    2017 12:00 AM    THERAPEUTIC PROPHYLACTIC/DX INJECTION SUBQ/IM    Reviewed

 

                    2017 12:00 AM    Decadron 8mg Injection, [facility]    Reviewed

 

                    2017 12:00 AM    Depo-Medrol 80mg Injection, [facility]    Reviewed

 

                    10/3/2012 12:00 AM    THER/PROPH/DIAG INJ SC/IM    Reviewed

 

                    10/3/2012 12:00 AM    Decadron, Per 1 Mg NDC# [address]-41    Reviewed

 

                    10/3/2012 12:00 AM    Depo-Medrol, Per 80 Mg NDC#8196-8041-93    Reviewed

 

                    10/3/2012 12:00 AM    Toradol 60 Mg NDC#6830-5523-56    Reviewed

 

                    10/24/2017 12:00 AM    THERAPEUTIC PROPHYLACTIC/DX INJECTION SUBQ/IM    Reviewed

 

                    10/24/2017 12:00 AM    Decadron 8mg Injection, [facility]    Reviewed

 

                    10/24/2017 12:00 AM    Depo-Medrol 80mg Injection, [facility]    Reviewed

 

                    2018 12:00 AM    THERAPEUTIC PROPHYLACTIC/DX INJECTION SUBQ/IM    Reviewed

 

                    2018 12:00 AM    Rocephin 1 gram Injection, [facility]    Reviewed

 

                    2018 12:00 AM    THERAPEUTIC PROPHYLACTIC/DX INJECTION SUBQ/IM    Reviewed

 

                    2018 12:00 AM    Decadron 8mg Injection, [facility]    Reviewed

 

                    2018 12:00 AM    Depo-Medrol 80mg Injection, [facility]    Reviewed

 

                    2018 12:00 AM    Rocephin 1 gram Injection, [facility]    Reviewed

 

                    2013 12:00 AM    THER/PROPH/DIAG INJ SC/IM    Reviewed

 

                    2013 12:00 AM    Decadron, Per 1 Mg NDC# [address]-59    Reviewed

 

                    2013 12:00 AM    Depo-Medrol, Per 80 Mg NDC#3195-9075-43    Reviewed

 

                    2013 12:00 AM    Toradol 60 Mg NDC#2128-9282-92    Reviewed

 

                    2010 12:00 AM    ROUTINE VENIPUNCTURE    Reviewed

 

                    2010 12:00 AM    COMPLETE CBC W/AUTO DIFF WBC    Reviewed

 

                    2010 12:00 AM    COMPREHEN METABOLIC PANEL    Reviewed

 

                    2010 12:00 AM    LIPID PANEL         Reviewed

 

                    10/28/2014 12:00 AM    CHEST X-RAY 4/> VIEWS    Reviewed

 

                    6/3/2011 12:00 AM    ROUTINE VENIPUNCTURE    Reviewed

 

                    6/3/2011 12:00 AM    COMPLETE CBC AUTOMATED    Reviewed

 

                    6/3/2011 12:00 AM    COMPREHEN METABOLIC PANEL    Reviewed

 

                    6/3/2011 12:00 AM    LIPID PANEL         Reviewed

 

                    2011 12:00 AM    THER/PROPH/DIAG INJ SC/IM    Reviewed

 

                    2011 12:00 AM    Decadron Inj.8mg-() Ndc #[id number]    Reviewed

 

                    2011 12:00 AM    Depo-Medrol 80 Mg Im/[facility] NDC [id number]    Reviewed







Results Summary







                          Date and Description      Results

 

                          6/3/2011 1:53 PM          TRIGLYCERIDES 155.0 mg/dLCHOLESTEROL 248.0 mg/dLHDL 51.0 mg/dLTOT

 CHOL/HDL 4.9 .0 mg/dLGLUCOSE 97.0 mg/dLSODIUM 139.0 mmol/LPOTASSIUM 3.70
 mmol/LCHLORIDE 101.0 mmol/LCO2 27.0 mmol/LBUN 19.0 mg/dLCREATININE 0.90 
mg/dLSGOT/AST 19.0 IU/LSGPT/ALT 11.0 IU/LALK PHOS 111.0 IU/LTOTAL PROTEIN 7.40 
g/dLALBUMIN 4.20 g/dLTOTAL BILI 0.50 mg/dLCALCIUM 9.50 mg/dLAGE 70 GFR NonAA 62 
GFR AA 75 eGFR >60 mL/min/1.73 m2eGFR AA* >60 







History Of Immunizations

Not available.



History of Past Illness







                    Name                Date of Onset       Comments

 

                    Chronic Obstructive Pulmonary Disease                         

 

                    Hyperlipidemia                           

 

                    Cough               2010  8:49AM     

 

                    General Medical Exam, Adult    2010  8:49AM     

 

                    Seasonal Allergies    2010  8:49AM     

 

                    Sinusitis, Chronic    2010  8:49AM     

 

                    Ganglion cyst of synovium, tendon, and bursa; other    2010 11:48AM     

 

                    Anxiety disorder    10/29/2013           

 

                    Pain in joint; Hip    06/10/2014           

 

                    Pulmonary nodule seen on imaging study    10/29/2014           

 

                    Exercise hypoxemia    10/29/2014           

 

                    Chronic Obstructive Pulmonary Disease    2010  3:02PM     

 

                    Edema               2010  3:02PM     

 

                    Sinusitis, Acute    2010  3:02PM     

 

                    Anxiety about health    2016           

 

                    Chronic Obstructive Pulmonary Disease    Trey  3 2011  8:38AM     

 

                    Palpitations        Trey  3 2011  8:38AM     

 

                    Cough               2011  9:54AM     

 

                    Sinusitis, Acute    2011  9:54AM     

 

                    Seasonal Allergies    2011  9:54AM     

 

                    Post-nasal drainage    2011  9:54AM     

 

                    Primary osteoarthritis involving multiple joints    2017           

 

                    Medication management    2017           

 

                    Cellulitis of left lower extremity    2017           

 

                    Dog bite of calf, left, sequela    2017           

 

                    Cough               Sep 22 2011  2:55PM     

 

                    Seasonal Allergies    Sep 22 2011  2:55PM     

 

                    Pharyngitis, Acute    Sep 22 2011  2:55PM     

 

                    Post-nasal drainage    Sep 22 2011  2:55PM     

 

                    Blood In Stool, Occult    2011  9:58AM     

 

                    Hemorrhoids         2011  9:58AM     

 

                    Chronic Obstructive Pulmonary Disease    2012  2:33PM     

 

                    Cardiac Dysrhythmia    2012  2:33PM     

 

                    Sinoatrial Node Dysfunction    Mar 16 2012  9:12AM     

 

                    Palpitations        Mar 16 2012  9:12AM     

 

                    Osteoarthrosis, generalized, multiple sites    Oct  3 2012  9:34AM     

 

                    Pain in joint; Hip    Oct  3 2012  9:34AM     

 

                    Osteoarthrosis      Oct 25 2012  9:09AM     

 

                    Bronchitis, Acute    Oct 25 2012  9:09AM     

 

                    Cough               Oct 25 2012  9:09AM     

 

                    Pain in joint; Left Hip    Oct 25 2012  9:09AM     

 

                    Pain in joint; Hip    2013  9:50AM     

 

                    Osteoarthrosis, generalized, multiple sites    2013  2:45PM     

 

                    Pain in joint; Hip bilateral    2013  2:45PM     

 

                    Anxiety Disorder    Oct 28 2013 10:32AM     

 

                    Chronic Obstructive Pulmonary Disease    Oct 28 2013 10:32AM     

 

                    Hyperlipidemia      Oct 28 2013 10:32AM     

 

                    Pain in joint; Left Hip    Oct 28 2013 10:32AM     

 

                    Sinusitis, Acute    Oct 28 2013 10:32AM     

 

                    Essential Hypertension    2014  3:19PM     

 

                    Osteoarthrosis, generalized, multiple sites    2014  3:19PM     

 

                    Chronic Obstructive Pulmonary Disease    2014  3:19PM     

 

                    Pain in joint; Hip    2014  3:19PM     

 

                    Chronic Obstructive Pulmonary Disease    2014  2:31PM     

 

                    Pain in joint; Hip    2014  2:31PM     

 

                    pre-operative examination, unspecified    2014  2:31PM     

 

                    Lung nodule seen on imaging study    Oct 28 2014 10:24AM     

 

                    Bronchitis, Acute    Oct 16 2014  9:45AM     

 

                    Respiratory System And Chest Symptoms    Oct 16 2014  9:45AM     

 

                    COPD (chronic obstructive pulmonary disease)    Oct 16 2014  9:45AM     

 

                    Chronic Obstructive Pulmonary Disease    Oct 28 2014  2:26PM     

 

                    Pulmonary nodule seen on imaging study    Oct 28 2014  2:26PM     

 

                    Shortness of breath    Oct 28 2014  2:26PM     

 

                    Exercise hypoxemia    Oct 28 2014  2:26PM     

 

                    Nail avulsion       Oct  6 2015  9:38AM     

 

                          Contusion of left index finger with damage to nail, initial encounter    Oct 26 2015

  2:53PM                                 

 

                    Forehead contusion, subsequent encounter    Dec 15 2015  2:39PM     

 

                    Generalized anxiety disorder    Dec 15 2015  2:39PM     

 

                    Chronic Obstructive Pulmonary Disease    Dec 15 2015  2:39PM     

 

                    Osteoarthrosis, generalized, multiple sites    Mar 10 2016  2:12PM     

 

                    Pain of right thigh    Mar 10 2016  2:12PM     

 

                    Mild Acute Hip pain, acute, right Improving    Mar 10 2016  2:12PM     

 

                    Anxiety about health    Mar 10 2016  2:12PM     

 

                    Hyperlipidemia      Aug 22 2016 10:58AM     

 

                    Sinoatrial Node Dysfunction    Aug 22 2016 10:58AM     

 

                    Palpitations        Aug 22 2016 10:58AM     

 

                    Chronic Obstructive Pulmonary Disease    Aug 22 2016 10:58AM     

 

                    Exercise hypoxemia    Aug 22 2016 10:58AM     

 

                    Pain in joint; Hip    Aug 22 2016 10:58AM     

 

                    Pulmonary nodule seen on imaging study    Aug 22 2016 10:58AM     

 

                    Eustachian tube dysfunction, bilateral    Nov 10 2016  3:57PM     

 

                    Moderate Acute Cough    Nov 10 2016  3:57PM     

 

                    Pharyngitis, Acute    Nov 10 2016  3:57PM     

 

                    Upper Respiratory Infection    Nov 10 2016  3:57PM     

 

                          COPD (chronic obstructive pulmonary disease) with acute bronchitis    Nov 10 2016 

 3:57PM                                  

 

                    Mild Chronic Cough Worsening    Dec 12 2016 12:12PM     

 

                          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

 12 2016 12:12PM                         

 

                    Moderate Shortness of breath on exertion    Dec 12 2016 12:12PM     

 

                    Hyperlipidemia      May  4 2017 10:27AM     

 

                    Sinoatrial Node Dysfunction    May  4 2017 10:27AM     

 

                    Palpitations        May  4 2017 10:27AM     

 

                    Chronic Obstructive Pulmonary Disease    May  4 2017 10:27AM     

 

                    Exercise hypoxemia    May  4 2017 10:27AM     

 

                    Pain in joint; Hip    May  4 2017 10:27AM     

 

                    Pulmonary nodule seen on imaging study    May  4 2017 10:27AM     

 

                    Chest congestion    May 16 2017  4:04PM     

 

                          COPD (chronic obstructive pulmonary disease) with acute bronchitis    May 16 2017 

 4:04PM                                  

 

                    Productive cough    May 16 2017  4:04PM     

 

                    Anxiety about health    May 16 2017  4:04PM     

 

                          Chronic obstructive pulmonary disease, unspecified COPD type    May 16 2017  4:04PM

                                         

 

                    Exercise hypoxemia    May 16 2017  4:04PM     

 

                    Mixed hyperlipidemia    May 16 2017  4:04PM     

 

                    Medication management    May 16 2017  4:04PM     

 

                    Primary osteoarthritis involving multiple joints    May 16 2017  4:04PM     

 

                    Cellulitis of left lower extremity    2017 12:10PM     

 

                    Bitten by dog, initial encounter    2017 12:10PM     

 

                    Cellulitis of left lower extremity    2017  8:56AM     

 

                    Open bite, left lower leg, sequela    2017  8:56AM     

 

                    Bitten by dog, sequela    2017  8:56AM     

 

                    Medication management    2017  8:56AM     

 

                    Cellulitis of left lower extremity    2017 11:38AM     

 

                    Open bite, left lower leg, subsequent encounter    2017 11:38AM     

 

                    Bitten by dog, subsequent encounter    2017 11:38AM     

 

                    Medication management    2017 11:38AM     

 

                    Cough               Aug 10 2017  4:09PM     

 

                    Abnormal chest xray    Aug 10 2017  4:09PM     

 

                    Hyperlipidemia      Aug 29 2017  9:02AM     

 

                    Sinoatrial Node Dysfunction    Aug 29 2017  9:02AM     

 

                    Palpitations        Aug 29 2017  9:02AM     

 

                    Chronic Obstructive Pulmonary Disease    Aug 29 2017  9:02AM     

 

                    Exercise hypoxemia    Aug 29 2017  9:02AM     

 

                    Pain in joint; Hip    Aug 29 2017  9:02AM     

 

                    Pulmonary nodule seen on imaging study    Aug 29 2017  9:02AM     

 

                    Eustachian tube dysfunction, bilateral    Aug 28 2017  3:30PM     

 

                    Purulent postnasal drainage    Aug 28 2017  3:30PM     

 

                    Chest congestion    Aug 28 2017  3:30PM     

 

                    Upper respiratory tract infection, unspecified type    Aug 28 2017  3:30PM     

 

                    Moderate Acute Sinus pressure    Aug 28 2017  3:30PM     

 

                          COPD (chronic obstructive pulmonary disease) with acute bronchitis    Aug 28 2017 

 3:30PM                                  

 

                    Moderate Chronic Purulent postnasal drainage    Oct 24 2017  1:52PM     

 

                    Mild Chronic Sinus pressure    Oct 24 2017  1:52PM     

 

                          Moderate Chronic Acute seasonal allergic rhinitis, unspecified trigger Stable    Oct

 24 2017  1:52PM                         

 

                    Mild Acute Labyrinthitis    Oct 24 2017  1:52PM     

 

                    Moderate Acute Vertigo    Oct 24 2017  1:52PM     

 

                    Purulent postnasal drainage    2018  3:58PM     

 

                    Upper respiratory tract infection, unspecified type    2018  3:58PM     

 

                    Mild Acute Left Enlarged lymph node in neck    2018  3:58PM     

 

                    Cough               2018  1:36PM     

 

                    Moderate Acute Recurrent Chest congestion    2018  1:36PM     

 

                    COPD exacerbation    2018  1:36PM     

 

                          Chronic obstructive pulmonary disease with acute lower respiratory infection    2018  1:36PM                         

 

                    Acute bronchitis, unspecified    2018  1:36PM     

 

                    Cough               2018  3:29PM     

 

                    Acute pharyngitis, unspecified etiology    2018  3:29PM     

 

                    Chest congestion    2018  3:29PM     

 

                    COPD (chronic obstructive pulmonary disease)    2018  3:29PM     

 

                    Cellulitis of left lower extremity    May 22 2018  2:35PM     







Payers







           Insurance Name    Company Name    Plan Name    Plan Number    Policy Number    Policy Group

 Number                                 Start Date

 

                    Medicare RHC    Medicare RHC              [id number]              N/A

 

                          [place] [employer]    [place] [employer] [place] [employer]                 [id number]

                                                    N/A

 

                    [employer]zTest Medicare A    Test Medicare A              [id number]              N/A

 

                                [facility] - RHC - [facility] RHC Comm      

                    [id number]                             N/A

 

                    Medicare Part A    Medicare - Lab/Xray              [id number]              N/A

 

                          [employer] [place] [employer]    [employer] - [employer]                 [id number]

                                                    N/A

 

                    Medicare Part A    Medicare Part A              [id number]              N/A

 

                    Medicare Part B    [employer] [place]              [id number]              N/A







History of Encounters







                    Visit Date          Visit Type          Provider

 

                    2018           Office visit        DEON ESCALANTE

 

                    2018           Office visit        DEON ESCALANTE

 

                    2018            Office visit        DEON ESCALANTE

 

                    2018           Office visit        DEON ESCALANTE

 

                    10/24/2017          Office visit        DEON ESCALANTE

 

                    2017           Voided              DEON ESCALANTE

 

                    2017           Office visit        DEON ESCALANTE

 

                    2017           Office visit        DEON ESCALANTE

 

                    2017            Office visit        DEON ESCALANTE

 

                    2017            Office visit        DEON ESCALANTE

 

                    2017           Office visit        DEON ESCALANTE

 

                    2016          Office visit        DEON ESCALANTE

 

                    11/10/2016          Office visit        DEON ESCALANTE

 

                    3/10/2016           Office visit        DEON ESCALANTE

 

                    12/15/2015          Office visit        DEON ESCALANTE

 

                    10/26/2015          Office visit        DEON ESCALANTE

 

                    10/6/2015           Office visit        DEON ESCALANTE

 

                    10/28/2014          Office visit        DEON ESCALANTE

 

                    10/16/2014          Office visit        DEON ESCALANTE

 

                    2014            Office visit        DEON ESCALANTE

 

                    2014           Hospital            DEWEY Garcia MD

 

                    2014           Office visit        DEON ESCALANTE

 

                    10/28/2013          Office visit        DEON ESCALANTE

 

                    10/14/2013          Hospital            DEWEY Garcia MD

 

                    2013           Office visit        DEON LEON [facility]

 

                    2013            Office visit        DEON LEON PA

 

                    10/25/2012          Office visit        DEON LEON PA

 

                    10/3/2012           Office visit        DEON LEON PA

 

                    3/16/2012           Office visit        DEON LEON PA

 

                    2012            Office visit        DEON LEON PA

 

                    2012            [facility] SUKHDEEP Garcia MD

 

                    2011          Office visit        DEON LEON PA

 

                    2011           Office visit        Deon Leon PA-C

 

                    2011            Office visit        Deon Leon PA-C

 

                    6/3/2011            Office visit        Deon Leon PA-C

 

                    2010           Office visit        Deon Leon PA-C

 

                    2010           Office visit        Deon Leon PA-C

 

                    2010           Office visit        Deon Leon PA-C

## 2019-06-25 NOTE — XMS REPORT
MU2 Ambulatory Summary

                             Created on: 08/10/2017



Elsi Casillas

External Reference #: [id number]

: 1940

Sex: Female



Demographics







                          Address                   [address] [place]  [address]

 

                          Home Phone                [phone]

 

                          Preferred Language        English

 

                          Marital Status            

 

                          Gnosticist Affiliation     Unknown

 

                          Race                      White

 

                          Ethnic Group              Not  or 





Author







                          DEON Ferguson

 

                          [facility]

 

                          Address                   [address]

[place], [place]  [id number]



 

                          Phone                     [phone]







Care Team Providers







                    Care Team Member Name    Role                Phone

 

                    DEON LEON    PCP                 Unavailable

 

                    DEON LEON    PreferredProvider    Unavailable







Allergies and Adverse Reactions







                    Name                Reaction            Notes

 

                    NO KNOWN DRUG ALLERGIES                         







Plan of Treatment







             Planned Activity    Comments     Planned Date    Planned Time    Plan/Goal

 

             Lipid Profile                 2016    12:00 AM      

 

             Chest PA and Lateral - Main                 8/10/2017    12:00 AM      







Medications







                                        Active 

 

             Name         Start Date    Estimated Completion Date    SIG          Comments

 

                Calcium 600 + D(3) 600 mg(1,500mg) -200 unit oral tablet                                    take 2 tablets by

 oral route daily                        

 

                pravastatin 20 mg oral tablet                                    take 1 tablet (20 mg) by oral route once daily

                                         

 

                Toprol XL 25 mg oral tablet extended release 24 hr                                    take 1 tablet (25 mg) 

by oral route once daily                 

 

                hydrochlorothiazide 25 mg oral tablet    1/15/2015                       take 1 tablet (25 mg) by oral

 route once daily for 30 days            

 

                metoprolol succinate 25 mg oral tablet extended release 24 hr    2015                      take

 1 tablet (25 mg) by oral route once daily     

 

                promethazine-codeine 6.25-10 mg/5 mL oral syrup    11/10/2016                      take 5 milliliters

 by oral route every 6 hours as needed, not to exceed 30 mL in 24 hours     

 

                                        ipratropium-albuterol 0.5 mg-3 mg(2.5 mg base)/3 mL inhalation solution for nebulization

                    2016                              inhale 3 milliliters by nebulization route 4 times per day for COPD

                                         

 

                Symbicort 160-4.5 mcg/actuation inhalation HFA aerosol inhaler    11/10/2016                      inhale

 2 puffs by inhalation route 2 times per day in the morning and evening     

 

                prednisone 20 mg oral tablet    2017                       4 x 2 days, 3 x 2 days, 2 x 2 days 1

 x 2 days                                

 

             hydrochlorothiazide 25 mg oral tablet    2017                 TAKE 1 TABLET DAILY     

 

                metoprolol succinate 25 mg oral tablet extended release 24 hr    2017                       TAKE

 1 TABLET DAILY                          

 

                Keflex 500 mg oral capsule    2017                       take 1 capsule (500 mg) by oral route 

every 12 hours                           









                                         

 

             Name         Start Date    Expiration Date    SIG          Comments

 

                Singulair 10 mg oral tablet    9/3/2010        2011        take 1 tablet (10 mg) by oral route

 daily  for 60 days                      

 

                Zithromax Z-Christopher 250 mg oral tablet    2011       take 2 tablets (500 mg)

 by oral route once daily for 1 day then 1 tablet (250 mg) by oral route once 
daily for 4 days                         

 

                Medrol (Christopher) 4 mg oral tablets,dose pack    2011        take as directed

 for 6 days                              

 

                Keflex 500 mg oral capsule    3/1/2012        3/11/2012       take 1 capsule by oral route 3 

times a day for 10 days                  

 

                Cipro 500 mg oral tablet    2012        take 1 tablet (500 mg) by oral route

 every 12 hours for 15 days              

 

                benzonatate 100 mg oral capsule    2013       take 1 capsule (100 mg) by

 oral route 3 times per day for cough     

 

             Medrol (Christopher) 4 mg oral tablets,dose pack    2013     take as directed    

 

 

                Zyrtec 10 mg oral tablet    2013       take 1 tablet (10 mg) by oral route

 once daily for 30 days                  

 

                Flonase 50 mcg/actuation nasal spray,suspension    2013       inhale 1 spray

 in each nostril by intranasal route 2 times per day for 30 days     

 

                cephalexin 500 mg oral capsule    2013        take 1 capsule (500 mg) by oral

 route every 8 hours                     

 

                promethazine-codeine 6.25-10 mg/5 mL oral syrup    2013                       take 5 milliliters

 by oral route every 4 hours as needed, not to exceed 30 mL in 24 hours     

 

                Zithromax Z-Christopher 250 mg oral tablet    10/2/2013       10/7/2013       take 2 tablets (500 mg)

 by oral route once daily for 1 day then 1 tablet (250 mg) by oral route once 
daily for 4 days                         

 

                amoxicillin 500 mg oral capsule    2014       take 1 capsule by oral route

 3 times a day for 15 days               

 

                lovastatin 20 mg oral tablet    2014        take 1 tablet (20 mg) by oral 

route daily  for 30 days                 

 

                Zithromax Z-Christopher 250 mg oral tablet    2014       take 2 tablets (500 mg)

 by oral route once daily for 1 day then 1 tablet (250 mg) by oral route once 
daily for 4 days                         

 

             Medrol (Christopher) 4 mg oral tablets,dose pack    2014                 take as directed     

 

                amoxicillin 500 mg oral capsule    2014                       take 1 capsule (500 mg) by oral route

 3 times per day                         

 

                Levaquin 500 mg oral tablet    10/16/2014      10/26/2014      take 1 tablet (500 mg) by oral

 route once daily for 10 days            

 

                prednisone 20 mg oral tablet    10/16/2014      10/24/2014      4x2 days 3x2 days 2x2 days

 1x2 days                                

 

                Zithromax Z-Christopher 250 mg oral tablet    2014       take 2 tablets (500 mg)

 by oral route once daily for 1 day then 1 tablet (250 mg) by oral route once 
daily for 4 days                         

 

                Bactrim -160 mg oral tablet    1/15/2015       2015       take 1 tablet by oral route

 every 12 hours for 10 days              

 

                Zithromax Z-Christopher 250 mg oral tablet    2015      take 2 tablets (500 mg)

 by oral route once daily for 1 day then 1 tablet (250 mg) by oral route once 
daily for 4 days                         

 

                Keflex 500 mg oral capsule    2016       take 1 capsule by oral route 3

 times a day for 7 days                  

 

                amoxicillin 500 mg oral capsule    10/4/2016       10/14/2016      take 1 capsule by oral route

 3 times a day for 10 days               

 

                Tessalon Perles 100 mg oral capsule    10/4/2016                       take 1 capsule by oral route 

every 4 hours                            

 

                Zithromax Z-Christopher 250 mg oral tablet    11/10/2016      11/15/2016      take 2 tablets (500 

mg) by oral route once daily for 1 day then 1 tablet (250 mg) by oral route once
daily for 4 days                         

 

                amoxicillin 500 mg oral tablet    2016                       take 1 tablet (500 mg) by oral route

 3 times per day                         

 

                amoxicillin 500 mg oral capsule    2017       take 1 capsule (500 mg) by

 oral route 3 times per day for 10 days     

 

                Levaquin 500 mg oral tablet    2017       take 1 tablet (500 mg) by oral

 route once daily for 10 days            

 

                Bactrim -160 mg oral tablet    2017       take 1 tablet by oral route

 2 times a day for 10 days               









                                        Discontinued 

 

             Name         Start Date    Discontinued Date    SIG          Comments

 

                amoxicillin 500 mg oral capsule    11/10/2011      10/3/2012       take 1 capsule (500 mg) 

by oral route 3 times per day            

 

                Anusol-HC 25 mg rectal suppository    2011      10/3/2012       insert 1 suppository 

(25 mg) by rectal route 2 times per day     

 

                Proctofoam 1 % topical foam    2011       apply by external route daily

                                         

 

             Aerochamber    2014    Use with inhaler as directed     

 

                hydrochlorothiazide 25 mg oral tablet    1/15/2015       2015      TAKE 1 TABLET DAILY

                                         

 

                Augmentin 875-125 mg oral tablet    2017       take 1 tablet by oral route

 every 12 hours for 7 days               







Problem List







                    Description         Status              Onset

 

                    Chronic Obstructive Pulmonary Disease    Active               

 

                    Hyperlipidemia      Active               

 

                    Anxiety Disorder    Active              10/29/2013

 

                    Pain in joint; Hip    Active              06/10/2014

 

                    Pulmonary nodule seen on imaging study    Active              10/29/2014

 

                    Exercise hypoxemia    Active              10/29/2014

 

                    Anxiety about health    Active              2016

 

                    Primary osteoarthritis involving multiple joints    Active              2017

 

                    Medication management    Active              2017

 

                    Cellulitis of left lower extremity    Active              2017

 

                    Dog bite of calf, left, sequela    Active              2017







Vital Signs







      Date    Time    BP-Sys(mm[Hg]    BP-Noni(mm[Hg])    HR(bpm)    RR(rpm)    Temp    WT    HT    HC    BMI

                    BSA                 BMI Percentile      O2 Sat(%)

 

        2017    11:37:00 AM    118 mmHg    72 mmHg    96 bpm    16 rpm    97.4 F    141 lbs    63 in

                          24.98 kg/m2    1.69 m2                   91 %

 

        2017    8:55:00 AM    105 mmHg    60 mmHg    96 bpm    18 rpm    95.8 F    142 lbs    63 in 

                          25.1539 kg/m    1.6921 m                 95 %

 

       2017    12:10:00 PM    122 mmHg    68 mmHg    76 bpm    18 rpm    98 F    143 lbs    63 in    

                25.33 kg/m2     1.70 m2                         98 %

 

        2017    4:03:00 PM    118 mmHg    64 mmHg    106 bpm    18 rpm    97.4 F    137 lbs    63 in

                          24.2682 kg/m    1.662 m                 98 %

 

        2016    12:11:00 PM    120 mmHg    84 mmHg    110 bpm    16 rpm    98.1 F    130 lbs    63

 in                       23.03 kg/m2    1.62 m2                   90 %

 

        11/10/2016    3:57:00 PM    148 mmHg    72 mmHg    88 bpm    16 rpm    98.2 F    132 lbs    63 in

                          23.3825 kg/m    1.6314 m                 98 %

 

        3/10/2016    2:12:00 PM    122 mmHg    60 mmHg    106 bpm    18 rpm    97 F    137 lbs    63 in 

                          24.27 kg/m2    1.66 m2                   93 %

 

        12/15/2015    2:33:00 PM    120 mmHg    75 mmHg    102 bpm    16 rpm    98.2 F    137 lbs    63 

in                        24.2682 kg/m    1.662 m                 94 %

 

        10/26/2015    2:46:00 PM    130 mmHg    80 mmHg    96 bpm    16 rpm    97.9 F    141 lbs    66 in

                          22.76 kg/m2    1.73 m2                   98 %

 

        10/6/2015    9:37:00 AM    140 mmHg    78 mmHg    110 bpm    16 rpm    97.9 F    141 lbs    63 in

                          24.9768 kg/m    1.6861 m                 95 %

 

        10/28/2014    2:25:00 PM    132 mmHg    66 mmHg    92 bpm    24 rpm    97.5 F    147 lbs    63 in

                          26.04 kg/m2    1.72 m2                   92 %

 

        10/16/2014    9:45:00 AM    132 mmHg    64 mmHg    74 bpm    24 rpm    97.7 F    146 lbs    63 in

                          25.8625 kg/m    1.7157 m                 92 %

 

        2014    2:31:00 PM    136 mmHg    68 mmHg    90 bpm    22 rpm    98.2 F    148 lbs    63 in 

                          26.22 kg/m2    1.73 m2                   95 %

 

        2014    3:19:00 PM    124 mmHg    70 mmHg    64 bpm    20 rpm    97.8 F    147 lbs    63 in

                          26.0396 kg/m    1.7216 m                 95 %

 

        10/28/2013    10:31:00 AM    140 mmHg    70 mmHg    92 bpm    24 rpm    97.8 F    143 lbs    63 

in                        25.33 kg/m2    1.70 m2                   95 %

 

      2013    2:51:00 PM    130 mmHg    78 mmHg    90 bpm          98.2 F    168 lbs    65 in          

27.9564 kg/m      1.8694 m                               

 

       2013    2:43:00 PM    110 mmHg    76 mmHg    103 bpm           96.6 F    137 lbs    63 in      

                24.27 kg/m2     1.66 m2                          

 

        2013    9:50:00 AM    150 mmHg    66 mmHg    108 bpm    20 rpm    97.8 F    138 lbs    63 in

                          24.4454 kg/m    1.6681 m                 94 %

 

        10/25/2012    9:08:00 AM    110 mmHg    60 mmHg    88 bpm    18 rpm    97.5 F    142 lbs    63 in

                          25.15 kg/m2    1.69 m2                   94 %

 

      10/3/2012    9:33:00 AM    130 mmHg    60 mmHg    98 bpm    18 rpm          146 lbs    63 in          

25.8625 kg/m      1.7157 m                              95 %

 

      2012    2:31:00 PM    116 mmHg    76 mmHg    88 bpm                140 lbs    63 in          24.80 

kg/m2               1.68 m2                                 96 %

 

     2011    10:02:00 AM    122 mmHg    80 mmHg    110 bpm              144 lbs                        

                                        94 %

 

      2011    2:58:00 PM    124 mmHg    84 mmHg    106 bpm                155 lbs    63 in          27.46

 kg/m2              1.77 m2                                 96 %

 

     2011    9:55:00 AM    114 mmHg    78 mmHg    92 bpm              146 lbs                             95

 %

 

     6/3/2011    8:36:00 AM    116 mmHg    74 mmHg    90 bpm              146 lbs                             96

 %

 

     2010    3:01:00 PM    132 mmHg    84 mmHg    102 bpm              149 lbs                          

                                        94 %

 

     2010    11:46:00 AM    124 mmHg    78 mmHg    104 bpm              151 lbs                         

                                        94 %

 

     2010    8:47:00 AM    116 mmHg    78 mmHg    103 bpm              151 lbs                          

                                        95 %







Social History







                    Name                Description         Comments

 

                    Tobacco             Never smoker         

 

                    denies alcohol use                         







History of Procedures







                    Date Ordered        Description         Order Status

 

                    2011 12:00 AM    THER/PROPH/DIAG INJ SC/IM    Reviewed

 

                    2011 12:00 AM    Decadron Inj.1mg-([facility]) Ndc #3285963297    Reviewed

 

                    2011 12:00 AM    Depo-Medrol 80 Mg Im/[facility] NDC 0009-778548    Reviewed

 

                    2011 12:00 AM    Rocephin, Per 250MG - 1 Gram Vial NDC 1022-073994-[facility]    Reviewed



 

                    3/16/2012 12:00 AM    ROUTINE VENIPUNCTURE    Reviewed

 

                    3/16/2012 12:00 AM    COMPLETE CBC W/AUTO DIFF WBC    Reviewed

 

                    3/16/2012 12:00 AM    COMPREHEN METABOLIC PANEL    Reviewed

 

                    3/16/2012 12:00 AM    ASSAY THYROID STIM HORMONE    Reviewed

 

                    11/10/2016 12:00 AM    Decadron, Per 1 Mg NDC# [address]-56    Reviewed

 

                    11/10/2016 12:00 AM    Depo-Medrol, Per 80 Mg NDC#3406-2186-04    Reviewed

 

                    11/10/2016 12:00 AM    Rocephin 1 gram NDC#7705-7671-96    Reviewed

 

                    2016 12:00 AM    THER/PROPH/DIAG INJ SC/IM    Reviewed

 

                    2016 12:00 AM    Decadron 8mg Injection, [facility]    Reviewed

 

                    2016 12:00 AM    Depo-Medrol 80mg Injection, [facility]    Reviewed

 

                    2016 12:00 AM    Rocephin 1 gram Injection, [facility]    Reviewed

 

                    2017 12:00 AM    COMPLETE CBC W/AUTO DIFF WBC    Reviewed

 

                    2017 12:00 AM    COMPREHEN METABOLIC PANEL    Reviewed

 

                    2017 12:00 AM    LIPID PANEL         Reviewed

 

                    2017 12:00 AM    THERAPEUTIC PROPHYLACTIC/DX INJECTION SUBQ/IM    Reviewed

 

                    2017 12:00 AM    Decadron 8mg Injection, [facility]    Reviewed

 

                    2017 12:00 AM    Depo-Medrol 80mg Injection, [facility]    Reviewed

 

                    10/3/2012 12:00 AM    THER/PROPH/DIAG INJ SC/IM    Reviewed

 

                    10/3/2012 12:00 AM    Decadron, Per 1 Mg NDC# [address]-22    Reviewed

 

                    10/3/2012 12:00 AM    Depo-Medrol, Per 80 Mg NDC#6347-2459-70    Reviewed

 

                    10/3/2012 12:00 AM    Toradol 60 Mg NDC#7453-9708-74    Reviewed

 

                    2013 12:00 AM    THER/PROPH/DIAG INJ SC/IM    Reviewed

 

                    2013 12:00 AM    Decadron, Per 1 Mg NDC# [address]-10    Reviewed

 

                    2013 12:00 AM    Depo-Medrol, Per 80 Mg NDC#6120-3437-95    Reviewed

 

                    2013 12:00 AM    Toradol 60 Mg NDC#7093-5642-96    Reviewed

 

                    2010 12:00 AM    ROUTINE VENIPUNCTURE    Reviewed

 

                    2010 12:00 AM    COMPLETE CBC W/AUTO DIFF WBC    Reviewed

 

                    2010 12:00 AM    COMPREHEN METABOLIC PANEL    Reviewed

 

                    2010 12:00 AM    LIPID PANEL         Reviewed

 

                    10/28/2014 12:00 AM    CHEST X-RAY 4/> VIEWS    Reviewed

 

                    6/3/2011 12:00 AM    ROUTINE VENIPUNCTURE    Reviewed

 

                    6/3/2011 12:00 AM    COMPLETE CBC AUTOMATED    Reviewed

 

                    6/3/2011 12:00 AM    COMPREHEN METABOLIC PANEL    Reviewed

 

                    6/3/2011 12:00 AM    LIPID PANEL         Reviewed

 

                    2011 12:00 AM    THER/PROPH/DIAG INJ SC/IM    Reviewed

 

                    2011 12:00 AM    Decadron Inj.8mg-([facility]) Ndc #7905993095    Reviewed

 

                    2011 12:00 AM    Depo-Medrol 80 Mg Im/[facility] NDC 0009-301065    Reviewed







Results Summary







                          Date and Description      Results

 

                          6/3/2011 1:52 PM          WBC 8.6 RBC 4.66 HGB 13.20 g/dLHCT 42.10 %MCV 90.0 fLMCH 28.30

 pgMCHC 31.40 g/dLRDW SD 44 RDW CV 13.60 %MPV 10.90 fLPLT 383 NRBC# 0.00 NRBC% 
0.0 %NEUT 68.0 %%LYMP 20.30 %%MONO 9.30 %%EOS 2.20 %%BASO 0.20 %#NEUT 5.81 #LYMP
 1.74 #MONO 0.80 #EOS 0.19 #BASO 0.02 MANUAL DIFF NOT IND 

 

                          6/3/2011 1:53 PM          TRIGLYCERIDES 155.0 mg/dLCHOLESTEROL 248.0 mg/dLHDL 51.0 mg/dLTOT

 CHOL/HDL 4.9 .0 mg/dLGLUCOSE 97.0 mg/dLSODIUM 139.0 mmol/LPOTASSIUM 3.70
 mmol/LCHLORIDE 101.0 mmol/LCO2 27.0 mmol/LBUN 19.0 mg/dLCREATININE 0.90 
mg/dLSGOT/AST 19.0 IU/LSGPT/ALT 11.0 IU/LALK PHOS 111.0 IU/LTOTAL PROTEIN 7.40 
g/dLALBUMIN 4.20 g/dLTOTAL BILI 0.50 mg/dLCALCIUM 9.50 mg/dLAGE 70 GFR NonAA 62 
GFR AA 75 eGFR >60 mL/min/1.73 m2eGFR AA* >60 







History Of Immunizations

Not available.



History of Past Illness







                    Name                Date of Onset       Comments

 

                    Chronic Obstructive Pulmonary Disease                         

 

                    Hyperlipidemia                           

 

                    Cough               2010  8:49AM     

 

                    General Medical Exam, Adult    2010  8:49AM     

 

                    Seasonal Allergies    2010  8:49AM     

 

                    Sinusitis, Chronic    2010  8:49AM     

 

                    Ganglion cyst of synovium, tendon, and bursa; other    2010 11:48AM     

 

                    Anxiety Disorder    10/29/2013           

 

                    Pain in joint; Hip    06/10/2014           

 

                    Pulmonary nodule seen on imaging study    10/29/2014           

 

                    Exercise hypoxemia    10/29/2014           

 

                    Chronic Obstructive Pulmonary Disease    2010  3:02PM     

 

                    Edema               2010  3:02PM     

 

                    Sinusitis, Acute    2010  3:02PM     

 

                    Anxiety about health    2016           

 

                    Chronic Obstructive Pulmonary Disease    Trey  3 2011  8:38AM     

 

                    Palpitations        Trey  3 2011  8:38AM     

 

                    Cough               2011  9:54AM     

 

                    Sinusitis, Acute    2011  9:54AM     

 

                    Seasonal Allergies    2011  9:54AM     

 

                    Post-nasal drainage    2011  9:54AM     

 

                    Primary osteoarthritis involving multiple joints    2017           

 

                    Medication management    2017           

 

                    Cellulitis of left lower extremity    2017           

 

                    Dog bite of calf, left, sequela    2017           

 

                    Cough               Sep 22 2011  2:55PM     

 

                    Seasonal Allergies    Sep 22 2011  2:55PM     

 

                    Pharyngitis, Acute    Sep 22 2011  2:55PM     

 

                    Post-nasal drainage    Sep 22 2011  2:55PM     

 

                    Blood In Stool, Occult    2011  9:58AM     

 

                    Hemorrhoids         2011  9:58AM     

 

                    Chronic Obstructive Pulmonary Disease    2012  2:33PM     

 

                    Cardiac Dysrhythmia    2012  2:33PM     

 

                    Sinoatrial Node Dysfunction    Mar 16 2012  9:12AM     

 

                    Palpitations        Mar 16 2012  9:12AM     

 

                    Osteoarthrosis, generalized, multiple sites    Oct  3 2012  9:34AM     

 

                    Pain in joint; Hip    Oct  3 2012  9:34AM     

 

                    Osteoarthrosis      Oct 25 2012  9:09AM     

 

                    Bronchitis, Acute    Oct 25 2012  9:09AM     

 

                    Cough               Oct 25 2012  9:09AM     

 

                    Pain in joint; Left Hip    Oct 25 2012  9:09AM     

 

                    Pain in joint; Hip    2013  9:50AM     

 

                    Osteoarthrosis, generalized, multiple sites    2013  2:45PM     

 

                    Pain in joint; Hip bilateral    2013  2:45PM     

 

                    Anxiety Disorder    Oct 28 2013 10:32AM     

 

                    Chronic Obstructive Pulmonary Disease    Oct 28 2013 10:32AM     

 

                    Hyperlipidemia      Oct 28 2013 10:32AM     

 

                    Pain in joint; Left Hip    Oct 28 2013 10:32AM     

 

                    Sinusitis, Acute    Oct 28 2013 10:32AM     

 

                    Essential Hypertension    2014  3:19PM     

 

                    Osteoarthrosis, generalized, multiple sites    2014  3:19PM     

 

                    Chronic Obstructive Pulmonary Disease    2014  3:19PM     

 

                    Pain in joint; Hip    2014  3:19PM     

 

                    Chronic Obstructive Pulmonary Disease    2014  2:31PM     

 

                    Pain in joint; Hip    2014  2:31PM     

 

                    pre-operative examination, unspecified    2014  2:31PM     

 

                    Lung nodule seen on imaging study    Oct 28 2014 10:24AM     

 

                    Bronchitis, Acute    Oct 16 2014  9:45AM     

 

                    Respiratory System And Chest Symptoms    Oct 16 2014  9:45AM     

 

                    COPD (chronic obstructive pulmonary disease)    Oct 16 2014  9:45AM     

 

                    Chronic Obstructive Pulmonary Disease    Oct 28 2014  2:26PM     

 

                    Pulmonary nodule seen on imaging study    Oct 28 2014  2:26PM     

 

                    Shortness of breath    Oct 28 2014  2:26PM     

 

                    Exercise hypoxemia    Oct 28 2014  2:26PM     

 

                    Nail avulsion       Oct  6 2015  9:38AM     

 

                          Contusion of left index finger with damage to nail, initial encounter    Oct 26 2015

  2:53PM                                 

 

                    Forehead contusion, subsequent encounter    Dec 15 2015  2:39PM     

 

                    Generalized anxiety disorder    Dec 15 2015  2:39PM     

 

                    Chronic Obstructive Pulmonary Disease    Dec 15 2015  2:39PM     

 

                    Osteoarthrosis, generalized, multiple sites    Mar 10 2016  2:12PM     

 

                    Pain of right thigh    Mar 10 2016  2:12PM     

 

                    Mild Acute Hip pain, acute, right Improving    Mar 10 2016  2:12PM     

 

                    Anxiety about health    Mar 10 2016  2:12PM     

 

                    Hyperlipidemia      Aug 22 2016 10:58AM     

 

                    Sinoatrial Node Dysfunction    Aug 22 2016 10:58AM     

 

                    Palpitations        Aug 22 2016 10:58AM     

 

                    Chronic Obstructive Pulmonary Disease    Aug 22 2016 10:58AM     

 

                    Exercise hypoxemia    Aug 22 2016 10:58AM     

 

                    Pain in joint; Hip    Aug 22 2016 10:58AM     

 

                    Pulmonary nodule seen on imaging study    Aug 22 2016 10:58AM     

 

                    Eustachian tube dysfunction, bilateral    Nov 10 2016  3:57PM     

 

                    Moderate Acute Cough    Nov 10 2016  3:57PM     

 

                    Pharyngitis, Acute    Nov 10 2016  3:57PM     

 

                    Upper Respiratory Infection    Nov 10 2016  3:57PM     

 

                          COPD (chronic obstructive pulmonary disease) with acute bronchitis    Nov 10 2016 

 3:57PM                                  

 

                    Mild Chronic Cough Worsening    Dec 12 2016 12:12PM     

 

                          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

 12 2016 12:12PM                         

 

                    Moderate Shortness of breath on exertion    Dec 12 2016 12:12PM     

 

                    Hyperlipidemia      May  4 2017 10:27AM     

 

                    Sinoatrial Node Dysfunction    May  4 2017 10:27AM     

 

                    Palpitations        May  4 2017 10:27AM     

 

                    Chronic Obstructive Pulmonary Disease    May  4 2017 10:27AM     

 

                    Exercise hypoxemia    May  4 2017 10:27AM     

 

                    Pain in joint; Hip    May  4 2017 10:27AM     

 

                    Pulmonary nodule seen on imaging study    May  4 2017 10:27AM     

 

                    Chest congestion    May 16 2017  4:04PM     

 

                          COPD (chronic obstructive pulmonary disease) with acute bronchitis    May 16 2017 

 4:04PM                                  

 

                    Productive cough    May 16 2017  4:04PM     

 

                    Anxiety about health    May 16 2017  4:04PM     

 

                          Chronic obstructive pulmonary disease, unspecified COPD type    May 16 2017  4:04PM

                                         

 

                    Exercise hypoxemia    May 16 2017  4:04PM     

 

                    Mixed hyperlipidemia    May 16 2017  4:04PM     

 

                    Medication management    May 16 2017  4:04PM     

 

                    Primary osteoarthritis involving multiple joints    May 16 2017  4:04PM     

 

                    Cellulitis of left lower extremity    2017 12:10PM     

 

                    Bitten by dog, initial encounter    2017 12:10PM     

 

                    Cellulitis of left lower extremity    2017  8:56AM     

 

                    Open bite, left lower leg, sequela    2017  8:56AM     

 

                    Bitten by dog, sequela    2017  8:56AM     

 

                    Medication management    2017  8:56AM     

 

                    Cellulitis of left lower extremity    2017 11:38AM     

 

                    Open bite, left lower leg, subsequent encounter    2017 11:38AM     

 

                    Bitten by dog, subsequent encounter    2017 11:38AM     

 

                    Medication management    2017 11:38AM     

 

                    Cough               Aug 10 2017  4:09PM     

 

                    Abnormal chest xray    Aug 10 2017  4:09PM     







Payers







           Insurance Name    Company Name    Plan Name    Plan Number    Policy Number    Policy Group

 Number                                 Start Date

 

                    Medicare [facility]    Medicare [facility]              [id number]              N/A

 

                          [place] [employer]    [place] [employer]                 [id number]

                                                    N/A

 

                    [id number] Medicare A    Test Medicare A              [id number]              N/A

 

                                [facility]      

                    [id number]                             N/A

 

                    Medicare Part A    Medicare - Lab/Xray              [id number]              N/A

 

                          [employer] [employer] [facility]    [employer]                 [id number]

                                                    N/A

 

                    Medicare Part A    Medicare Part A              [id number]              N/A

 

                    Medicare Part B    [employer] [place]              [id number]              N/A







History of Encounters







                    Visit Date          Visit Type          Provider

 

                    2017           Office visit        DEON ESCALANTE

 

                    2017            Office visit        DEON ESCALANTE

 

                    2017            Office visit        DEON ESCALANTE

 

                    2017           Office visit        DEON ESCALANTE

 

                    2016          Office visit        DEON ESCALANTE

 

                    11/10/2016          Office visit        DEON ESCALANTE

 

                    3/10/2016           Office visit        DEON ESCALANTE

 

                    12/15/2015          Office visit        DEON ESCALANTE

 

                    10/26/2015          Office visit        DEON ESCALANTE

 

                    10/6/2015           Office visit        DEON ESCALANTE

 

                    10/28/2014          Office visit        DEON ESCALANTE

 

                    10/16/2014          Office visit        DEON ESCALANTE

 

                    2014            Office visit        DEON ESCALANTE

 

                    2014           [facility]            DEWEY Garcia MD

 

                    2014           Office visit        DEON ESCALANTE

 

                    10/28/2013          Office visit        DEON ESCALANTE

 

                    10/14/2013          [facility]            DEWEY Garcia MD

 

                    2013           Office visit        DEON ESCALANTE

 

                    2013            Office visit        DEON ESCALANTE

 

                    10/25/2012          Office visit        DEON ESCALANTE

 

                    10/3/2012           Office visit        DEON ESCALANTE

 

                    3/16/2012           Office visit        DEON LEON PA

 

                    2012            Office visit        DEON ESCALANTE

 

                    2012            [facility]            DEWEY Garcia MD

 

                    2011          Office visit        DEON ESCALANTE

 

                    2011           Office visit        Deon Leon PA-C

 

                    2011            Office visit        Deon Leon PA-C

 

                    6/3/2011            Office visit        Deon Leon PA-C

 

                    2010           Office visit        Deon Leon PA-C

 

                    2010           Office visit        Deon Leon PA-C

 

                    2010           Office visit        Deon Leon PA-C

## 2019-06-25 NOTE — NUR
Pt unresponsive.  Ex- reports pt was approached in the past about code 
status, and pt "wanted nothing to do with that."  Ex- believes pt is a 
full code.

## 2019-06-25 NOTE — XMS REPORT
MU2 Ambulatory Summary

                             Created on: 2017



Elsi Casillas

External Reference #: [id number]

: 1940

Sex: Female



Demographics







                          Address                   [address] [place]  [address]

 

                          Home Phone                [phone]

 

                          Preferred Language        English

 

                          Marital Status            

 

                          [place] Affiliation     Unknown

 

                          Race                      White

 

                          Ethnic Group              Not  or 





Author







                          DEON Ferguson

 

                          [facility]

 

                          Address                   [address]

[place], [place]  [id number]



 

                          Phone                     [phone]







Care Team Providers







                    Care Team Member Name    Role                Phone

 

                    DEON LEON    PCP                 Unavailable

 

                    DEON LEON    PreferredProvider    Unavailable







Allergies and Adverse Reactions







                    Name                Reaction            Notes

 

                    NO KNOWN DRUG ALLERGIES                         







Plan of Treatment







             Planned Activity    Comments     Planned Date    Planned Time    Plan/Goal

 

             Lipid Profile                 2016    12:00 AM      







Medications







                                        Active 

 

             Name         Start Date    Estimated Completion Date    SIG          Comments

 

                Calcium 600 + D(3) 600 mg(1,500mg) -200 unit oral tablet                                    take 2 tablets by

 oral route daily                        

 

                pravastatin 20 mg oral tablet                                    take 1 tablet (20 mg) by oral route once daily

                                         

 

                Toprol XL 25 mg oral tablet extended release 24 hr                                    take 1 tablet (25 mg) 

by oral route once daily                 

 

                hydrochlorothiazide 25 mg oral tablet    1/15/2015                       take 1 tablet (25 mg) by oral

 route once daily for 30 days            

 

                metoprolol succinate 25 mg oral tablet extended release 24 hr    2015                      take

 1 tablet (25 mg) by oral route once daily     

 

                promethazine-codeine 6.25-10 mg/5 mL oral syrup    11/10/2016                      take 5 milliliters

 by oral route every 6 hours as needed, not to exceed 30 mL in 24 hours     

 

                                        ipratropium-albuterol 0.5 mg-3 mg(2.5 mg base)/3 mL inhalation solution for nebulization

                    2016                              inhale 3 milliliters by nebulization route 4 times per day for COPD

                                         

 

                Symbicort 160-4.5 mcg/actuation inhalation HFA aerosol inhaler    11/10/2016                      inhale

 2 puffs by inhalation route 2 times per day in the morning and evening     

 

                metoprolol succinate 25 mg oral tablet extended release 24 hr    3/16/2017                       TAKE

 1 TABLET DAILY                          

 

                prednisone 20 mg oral tablet    2017                       4 x 2 days, 3 x 2 days, 2 x 2 days 1

 x 2 days                                

 

             hydrochlorothiazide 25 mg oral tablet    2017                  TAKE 1 TABLET DAILY     

 

                Bactrim -160 mg oral tablet    2017       take 1 tablet by oral route

 2 times a day for 10 days               









                                         

 

             Name         Start Date    Expiration Date    SIG          Comments

 

                Singulair 10 mg oral tablet    9/3/2010        2011        take 1 tablet (10 mg) by oral route

 daily  for 60 days                      

 

                Zithromax Z-Christopher 250 mg oral tablet    2011       take 2 tablets (500 mg)

 by oral route once daily for 1 day then 1 tablet (250 mg) by oral route once 
daily for 4 days                         

 

                Medrol (Christopher) 4 mg oral tablets,dose pack    2011        take as directed

 for 6 days                              

 

                Keflex 500 mg oral capsule    3/1/2012        3/11/2012       take 1 capsule by oral route 3 

times a day for 10 days                  

 

                Cipro 500 mg oral tablet    2012        take 1 tablet (500 mg) by oral route

 every 12 hours for 15 days              

 

                benzonatate 100 mg oral capsule    2013       take 1 capsule (100 mg) by

 oral route 3 times per day for cough     

 

             Medrol (Christopher) 4 mg oral tablets,dose pack    2013     take as directed    

 

 

                Zyrtec 10 mg oral tablet    2013       take 1 tablet (10 mg) by oral route

 once daily for 30 days                  

 

                Flonase 50 mcg/actuation nasal spray,suspension    2013       inhale 1 spray

 in each nostril by intranasal route 2 times per day for 30 days     

 

                cephalexin 500 mg oral capsule    2013        take 1 capsule (500 mg) by oral

 route every 8 hours                     

 

                promethazine-codeine 6.25-10 mg/5 mL oral syrup    2013                       take 5 milliliters

 by oral route every 4 hours as needed, not to exceed 30 mL in 24 hours     

 

                Zithromax Z-Christopher 250 mg oral tablet    10/2/2013       10/7/2013       take 2 tablets (500 mg)

 by oral route once daily for 1 day then 1 tablet (250 mg) by oral route once 
daily for 4 days                         

 

                amoxicillin 500 mg oral capsule    2014       take 1 capsule by oral route

 3 times a day for 15 days               

 

                lovastatin 20 mg oral tablet    2014        take 1 tablet (20 mg) by oral 

route daily  for 30 days                 

 

                Zithromax Z-Christopher 250 mg oral tablet    2014       take 2 tablets (500 mg)

 by oral route once daily for 1 day then 1 tablet (250 mg) by oral route once 
daily for 4 days                         

 

             Medrol (Christopher) 4 mg oral tablets,dose pack    2014                 take as directed     

 

                amoxicillin 500 mg oral capsule    2014                       take 1 capsule (500 mg) by oral route

 3 times per day                         

 

                Levaquin 500 mg oral tablet    10/16/2014      10/26/2014      take 1 tablet (500 mg) by oral

 route once daily for 10 days            

 

                prednisone 20 mg oral tablet    10/16/2014      10/24/2014      4x2 days 3x2 days 2x2 days

 1x2 days                                

 

                Zithromax Z-Christopher 250 mg oral tablet    2014       take 2 tablets (500 mg)

 by oral route once daily for 1 day then 1 tablet (250 mg) by oral route once 
daily for 4 days                         

 

                Bactrim -160 mg oral tablet    1/15/2015       2015       take 1 tablet by oral route

 every 12 hours for 10 days              

 

                Zithromax Z-Christopher 250 mg oral tablet    2015      take 2 tablets (500 mg)

 by oral route once daily for 1 day then 1 tablet (250 mg) by oral route once 
daily for 4 days                         

 

                Keflex 500 mg oral capsule    2016       take 1 capsule by oral route 3

 times a day for 7 days                  

 

                amoxicillin 500 mg oral capsule    10/4/2016       10/14/2016      take 1 capsule by oral route

 3 times a day for 10 days               

 

                Tessalon Perles 100 mg oral capsule    10/4/2016                       take 1 capsule by oral route 

every 4 hours                            

 

                Zithromax Z-Christopher 250 mg oral tablet    11/10/2016      11/15/2016      take 2 tablets (500 

mg) by oral route once daily for 1 day then 1 tablet (250 mg) by oral route once
daily for 4 days                         

 

                amoxicillin 500 mg oral tablet    2016                       take 1 tablet (500 mg) by oral route

 3 times per day                         

 

                amoxicillin 500 mg oral capsule    2017       take 1 capsule (500 mg) by

 oral route 3 times per day for 10 days     

 

                Levaquin 500 mg oral tablet    2017       take 1 tablet (500 mg) by oral

 route once daily for 10 days            









                                        Discontinued 

 

             Name         Start Date    Discontinued Date    SIG          Comments

 

                amoxicillin 500 mg oral capsule    11/10/2011      10/3/2012       take 1 capsule (500 mg) 

by oral route 3 times per day            

 

                Anusol-HC 25 mg rectal suppository    2011      10/3/2012       insert 1 suppository 

(25 mg) by rectal route 2 times per day     

 

                Proctofoam 1 % topical foam    2011       apply by external route daily

                                         

 

             Aerochamber    2014    Use with inhaler as directed     

 

                hydrochlorothiazide 25 mg oral tablet    1/15/2015       2015      TAKE 1 TABLET DAILY

                                         

 

                Augmentin 875-125 mg oral tablet    2017       take 1 tablet by oral route

 every 12 hours for 7 days               







Problem List







                    Description         Status              Onset

 

                    Chronic Obstructive Pulmonary Disease    Active               

 

                    Hyperlipidemia      Active               

 

                    Anxiety Disorder    Active              10/29/2013

 

                    Pain in joint; Hip    Active              06/10/2014

 

                    Pulmonary nodule seen on imaging study    Active              10/29/2014

 

                    Exercise hypoxemia    Active              10/29/2014

 

                    Anxiety about health    Active              2016

 

                    Primary osteoarthritis involving multiple joints    Active              2017

 

                    Medication management    Active              2017

 

                    Cellulitis of left lower extremity    Active              2017

 

                    Dog bite of calf, left, sequela    Active              2017







Vital Signs







      Date    Time    BP-Sys(mm[Hg]    BP-Noni(mm[Hg])    HR(bpm)    RR(rpm)    Temp    WT    HT    HC    BMI

                    BSA                 BMI Percentile      O2 Sat(%)

 

        2017    8:55:00 AM    105 mmHg    60 mmHg    96 bpm    18 rpm    95.8 F    142 lbs    63 in 

                          25.15 kg/m2    1.69 m2                   95 %

 

       2017    12:10:00 PM    122 mmHg    68 mmHg    76 bpm    18 rpm    98 F    143 lbs    63 in    

                25.3311 kg/m    1.698 m                       98 %

 

        2017    4:03:00 PM    118 mmHg    64 mmHg    106 bpm    18 rpm    97.4 F    137 lbs    63 in

                          24.27 kg/m2    1.66 m2                   98 %

 

        2016    12:11:00 PM    120 mmHg    84 mmHg    110 bpm    16 rpm    98.1 F    130 lbs    63

 in                       23.0282 kg/m    1.619 m                 90 %

 

        11/10/2016    3:57:00 PM    148 mmHg    72 mmHg    88 bpm    16 rpm    98.2 F    132 lbs    63 in

                          23.38 kg/m2    1.63 m2                   98 %

 

        3/10/2016    2:12:00 PM    122 mmHg    60 mmHg    106 bpm    18 rpm    97 F    137 lbs    63 in 

                          24.2682 kg/m    1.662 m                 93 %

 

        12/15/2015    2:33:00 PM    120 mmHg    75 mmHg    102 bpm    16 rpm    98.2 F    137 lbs    63 

in                        24.27 kg/m2    1.66 m2                   94 %

 

        10/26/2015    2:46:00 PM    130 mmHg    80 mmHg    96 bpm    16 rpm    97.9 F    141 lbs    66 in

                          22.7578 kg/m    1.7258 m                 98 %

 

        10/6/2015    9:37:00 AM    140 mmHg    78 mmHg    110 bpm    16 rpm    97.9 F    141 lbs    63 in

                          24.98 kg/m2    1.69 m2                   95 %

 

        10/28/2014    2:25:00 PM    132 mmHg    66 mmHg    92 bpm    24 rpm    97.5 F    147 lbs    63 in

                          26.0396 kg/m    1.7216 m                 92 %

 

        10/16/2014    9:45:00 AM    132 mmHg    64 mmHg    74 bpm    24 rpm    97.7 F    146 lbs    63 in

                          25.86 kg/m2    1.72 m2                   92 %

 

        2014    2:31:00 PM    136 mmHg    68 mmHg    90 bpm    22 rpm    98.2 F    148 lbs    63 in 

                          26.2168 kg/m    1.7274 m                 95 %

 

        2014    3:19:00 PM    124 mmHg    70 mmHg    64 bpm    20 rpm    97.8 F    147 lbs    63 in

                          26.04 kg/m2    1.72 m2                   95 %

 

        10/28/2013    10:31:00 AM    140 mmHg    70 mmHg    92 bpm    24 rpm    97.8 F    143 lbs    63 

in                        25.3311 kg/m    1.698 m                 95 %

 

      2013    2:51:00 PM    130 mmHg    78 mmHg    90 bpm          98.2 F    168 lbs    65 in          

27.96 kg/m2         1.87 m2                                  

 

       2013    2:43:00 PM    110 mmHg    76 mmHg    103 bpm           96.6 F    137 lbs    63 in      

                24.2682 kg/m    1.662 m                        

 

        2013    9:50:00 AM    150 mmHg    66 mmHg    108 bpm    20 rpm    97.8 F    138 lbs    63 in

                          24.45 kg/m2    1.67 m2                   94 %

 

        10/25/2012    9:08:00 AM    110 mmHg    60 mmHg    88 bpm    18 rpm    97.5 F    142 lbs    63 in

                          25.1539 kg/m    1.6921 m                 94 %

 

      10/3/2012    9:33:00 AM    130 mmHg    60 mmHg    98 bpm    18 rpm          146 lbs    63 in          

25.86 kg/m2         1.72 m2                                 95 %

 

      2012    2:31:00 PM    116 mmHg    76 mmHg    88 bpm                140 lbs    63 in          24.7996

 kg/m             1.6801 m                              96 %

 

     2011    10:02:00 AM    122 mmHg    80 mmHg    110 bpm              144 lbs                        

                                        94 %

 

      2011    2:58:00 PM    124 mmHg    84 mmHg    106 bpm                155 lbs    63 in          27.4567

 kg/m             1.7678 m                              96 %

 

     2011    9:55:00 AM    114 mmHg    78 mmHg    92 bpm              146 lbs                             95

 %

 

     6/3/2011    8:36:00 AM    116 mmHg    74 mmHg    90 bpm              146 lbs                             96

 %

 

     2010    3:01:00 PM    132 mmHg    84 mmHg    102 bpm              149 lbs                          

                                        94 %

 

     2010    11:46:00 AM    124 mmHg    78 mmHg    104 bpm              151 lbs                         

                                        94 %

 

     2010    8:47:00 AM    116 mmHg    78 mmHg    103 bpm              151 lbs                          

                                        95 %







Social History







                    Name                Description         Comments

 

                    Tobacco             Never smoker         

 

                    denies alcohol use                         







History of Procedures







                    Date Ordered        Description         Order Status

 

                    2011 12:00 AM    THER/PROPH/DIAG INJ SC/IM    Reviewed

 

                    2011 12:00 AM    Decadron Inj.1mg-([facility]) Ndc #[id number]    Reviewed

 

                    2011 12:00 AM    Depo-Medrol 80 Mg Im/[facility] NDC 0009-703861    Reviewed

 

                    2011 12:00 AM    Rocephin, Per 250MG - 1 Gram Vial NDC 8662-370774-[facility]    Reviewed



 

                    3/16/2012 12:00 AM    ROUTINE VENIPUNCTURE    Reviewed

 

                    3/16/2012 12:00 AM    COMPLETE CBC W/AUTO DIFF WBC    Reviewed

 

                    3/16/2012 12:00 AM    COMPREHEN METABOLIC PANEL    Reviewed

 

                    3/16/2012 12:00 AM    ASSAY THYROID STIM HORMONE    Reviewed

 

                    11/10/2016 12:00 AM    Decadron, Per 1 Mg NDC# [address]-87    Reviewed

 

                    11/10/2016 12:00 AM    Depo-Medrol, Per 80 Mg NDC#9355-9495-66    Reviewed

 

                    11/10/2016 12:00 AM    Rocephin 1 gram NDC#4177-7176-00    Reviewed

 

                    2016 12:00 AM    THER/PROPH/DIAG INJ SC/IM    Reviewed

 

                    2016 12:00 AM    Decadron 8mg Injection, [facility]    Reviewed

 

                    2016 12:00 AM    Depo-Medrol 80mg Injection, [facility]    Reviewed

 

                    2016 12:00 AM    Rocephin 1 gram Injection, [facility]    Reviewed

 

                    2017 12:00 AM    COMPLETE CBC W/AUTO DIFF WBC    Reviewed

 

                    2017 12:00 AM    COMPREHEN METABOLIC PANEL    Reviewed

 

                    2017 12:00 AM    LIPID PANEL         Reviewed

 

                    2017 12:00 AM    THERAPEUTIC PROPHYLACTIC/DX INJECTION SUBQ/IM    Reviewed

 

                    2017 12:00 AM    Decadron 8mg Injection, [facility]    Reviewed

 

                    2017 12:00 AM    Depo-Medrol 80mg Injection, [facility]    Reviewed

 

                    10/3/2012 12:00 AM    THER/PROPH/DIAG INJ SC/IM    Reviewed

 

                    10/3/2012 12:00 AM    Decadron, Per 1 Mg NDC# [address]-79    Reviewed

 

                    10/3/2012 12:00 AM    Depo-Medrol, Per 80 Mg NDC#8787-4324-30    Reviewed

 

                    10/3/2012 12:00 AM    Toradol 60 Mg NDC#9984-4734-61    Reviewed

 

                    2013 12:00 AM    THER/PROPH/DIAG INJ SC/IM    Reviewed

 

                    2013 12:00 AM    Decadron, Per 1 Mg NDC# [address]-74    Reviewed

 

                    2013 12:00 AM    Depo-Medrol, Per 80 Mg NDC#0383-0774-70    Reviewed

 

                    2013 12:00 AM    Toradol 60 Mg NDC#2549-6488-24    Reviewed

 

                    2010 12:00 AM    ROUTINE VENIPUNCTURE    Reviewed

 

                    2010 12:00 AM    COMPLETE CBC W/AUTO DIFF WBC    Reviewed

 

                    2010 12:00 AM    COMPREHEN METABOLIC PANEL    Reviewed

 

                    2010 12:00 AM    LIPID PANEL         Reviewed

 

                    10/28/2014 12:00 AM    CHEST X-RAY 4/> VIEWS    Reviewed

 

                    6/3/2011 12:00 AM    ROUTINE VENIPUNCTURE    Reviewed

 

                    6/3/2011 12:00 AM    COMPLETE CBC AUTOMATED    Reviewed

 

                    6/3/2011 12:00 AM    COMPREHEN METABOLIC PANEL    Reviewed

 

                    6/3/2011 12:00 AM    LIPID PANEL         Reviewed

 

                    2011 12:00 AM    THER/PROPH/DIAG INJ SC/IM    Reviewed

 

                    2011 12:00 AM    Decadron Inj.8mg-([facility]) Ndc #[id number]    Reviewed

 

                    2011 12:00 AM    Depo-Medrol 80 Mg Im/[facility] NDC [id number]    Reviewed







Results Summary







                          Date and Description      Results

 

                          6/3/2011 1:52 PM          WBC 8.6 RBC 4.66 HGB 13.20 g/dLHCT 42.10 %MCV 90.0 fLMCH 28.30

 pgMCHC 31.40 g/dLRDW SD 44 RDW CV 13.60 %MPV 10.90 fLPLT 383 NRBC# 0.00 NRBC% 
0.0 %NEUT 68.0 %%LYMP 20.30 %%MONO 9.30 %%EOS 2.20 %%BASO 0.20 %#NEUT 5.81 #LYMP
 1.74 #MONO 0.80 #EOS 0.19 #BASO 0.02 MANUAL DIFF NOT IND 

 

                          6/3/2011 1:53 PM          TRIGLYCERIDES 155.0 mg/dLCHOLESTEROL 248.0 mg/dLHDL 51.0 mg/dLTOT

 CHOL/HDL 4.9 .0 mg/dLGLUCOSE 97.0 mg/dLSODIUM 139.0 mmol/LPOTASSIUM 3.70
 mmol/LCHLORIDE 101.0 mmol/LCO2 27.0 mmol/LBUN 19.0 mg/dLCREATININE 0.90 
mg/dLSGOT/AST 19.0 IU/LSGPT/ALT 11.0 IU/LALK PHOS 111.0 IU/LTOTAL PROTEIN 7.40 
g/dLALBUMIN 4.20 g/dLTOTAL BILI 0.50 mg/dLCALCIUM 9.50 mg/dLAGE 70 GFR NonAA 62 
GFR AA 75 eGFR >60 mL/min/1.73 m2eGFR AA* >60 







History Of Immunizations

Not available.



History of Past Illness







                    Name                Date of Onset       Comments

 

                    Chronic Obstructive Pulmonary Disease                         

 

                    Hyperlipidemia                           

 

                    Cough               2010  8:49AM     

 

                    General Medical Exam, Adult    2010  8:49AM     

 

                    Seasonal Allergies    2010  8:49AM     

 

                    Sinusitis, Chronic    2010  8:49AM     

 

                    Ganglion cyst of synovium, tendon, and bursa; other    2010 11:48AM     

 

                    Anxiety Disorder    10/29/2013           

 

                    Pain in joint; Hip    06/10/2014           

 

                    Pulmonary nodule seen on imaging study    10/29/2014           

 

                    Exercise hypoxemia    10/29/2014           

 

                    Chronic Obstructive Pulmonary Disease    2010  3:02PM     

 

                    Edema               2010  3:02PM     

 

                    Sinusitis, Acute    2010  3:02PM     

 

                    Anxiety about health    2016           

 

                    Chronic Obstructive Pulmonary Disease    Trey  3 2011  8:38AM     

 

                    Palpitations        Trey  3 2011  8:38AM     

 

                    Cough               2011  9:54AM     

 

                    Sinusitis, Acute    2011  9:54AM     

 

                    Seasonal Allergies    2011  9:54AM     

 

                    Post-nasal drainage    2011  9:54AM     

 

                    Primary osteoarthritis involving multiple joints    2017           

 

                    Medication management    2017           

 

                    Cellulitis of left lower extremity    2017           

 

                    Dog bite of calf, left, sequela    2017           

 

                    Cough               Sep 22 2011  2:55PM     

 

                    Seasonal Allergies    Sep 22 2011  2:55PM     

 

                    Pharyngitis, Acute    Sep 22 2011  2:55PM     

 

                    Post-nasal drainage    Sep 22 2011  2:55PM     

 

                    Blood In Stool, Occult    2011  9:58AM     

 

                    Hemorrhoids         2011  9:58AM     

 

                    Chronic Obstructive Pulmonary Disease    2012  2:33PM     

 

                    Cardiac Dysrhythmia    2012  2:33PM     

 

                    Sinoatrial Node Dysfunction    Mar 16 2012  9:12AM     

 

                    Palpitations        Mar 16 2012  9:12AM     

 

                    Osteoarthrosis, generalized, multiple sites    Oct  3 2012  9:34AM     

 

                    Pain in joint; Hip    Oct  3 2012  9:34AM     

 

                    Osteoarthrosis      Oct 25 2012  9:09AM     

 

                    Bronchitis, Acute    Oct 25 2012  9:09AM     

 

                    Cough               Oct 25 2012  9:09AM     

 

                    Pain in joint; Left Hip    Oct 25 2012  9:09AM     

 

                    Pain in joint; Hip    2013  9:50AM     

 

                    Osteoarthrosis, generalized, multiple sites    2013  2:45PM     

 

                    Pain in joint; Hip bilateral    2013  2:45PM     

 

                    Anxiety Disorder    Oct 28 2013 10:32AM     

 

                    Chronic Obstructive Pulmonary Disease    Oct 28 2013 10:32AM     

 

                    Hyperlipidemia      Oct 28 2013 10:32AM     

 

                    Pain in joint; Left Hip    Oct 28 2013 10:32AM     

 

                    Sinusitis, Acute    Oct 28 2013 10:32AM     

 

                    Essential Hypertension    2014  3:19PM     

 

                    Osteoarthrosis, generalized, multiple sites    2014  3:19PM     

 

                    Chronic Obstructive Pulmonary Disease    2014  3:19PM     

 

                    Pain in joint; Hip    2014  3:19PM     

 

                    Chronic Obstructive Pulmonary Disease    2014  2:31PM     

 

                    Pain in joint; Hip    2014  2:31PM     

 

                    pre-operative examination, unspecified    2014  2:31PM     

 

                    Lung nodule seen on imaging study    Oct 28 2014 10:24AM     

 

                    Bronchitis, Acute    Oct 16 2014  9:45AM     

 

                    Respiratory System And Chest Symptoms    Oct 16 2014  9:45AM     

 

                    COPD (chronic obstructive pulmonary disease)    Oct 16 2014  9:45AM     

 

                    Chronic Obstructive Pulmonary Disease    Oct 28 2014  2:26PM     

 

                    Pulmonary nodule seen on imaging study    Oct 28 2014  2:26PM     

 

                    Shortness of breath    Oct 28 2014  2:26PM     

 

                    Exercise hypoxemia    Oct 28 2014  2:26PM     

 

                    Nail avulsion       Oct  6 2015  9:38AM     

 

                          Contusion of left index finger with damage to nail, initial encounter    Oct 26 2015

  2:53PM                                 

 

                    Forehead contusion, subsequent encounter    Dec 15 2015  2:39PM     

 

                    Generalized anxiety disorder    Dec 15 2015  2:39PM     

 

                    Chronic Obstructive Pulmonary Disease    Dec 15 2015  2:39PM     

 

                    Osteoarthrosis, generalized, multiple sites    Mar 10 2016  2:12PM     

 

                    Pain of right thigh    Mar 10 2016  2:12PM     

 

                    Mild Acute Hip pain, acute, right Improving    Mar 10 2016  2:12PM     

 

                    Anxiety about health    Mar 10 2016  2:12PM     

 

                    Hyperlipidemia      Aug 22 2016 10:58AM     

 

                    Sinoatrial Node Dysfunction    Aug 22 2016 10:58AM     

 

                    Palpitations        Aug 22 2016 10:58AM     

 

                    Chronic Obstructive Pulmonary Disease    Aug 22 2016 10:58AM     

 

                    Exercise hypoxemia    Aug 22 2016 10:58AM     

 

                    Pain in joint; Hip    Aug 22 2016 10:58AM     

 

                    Pulmonary nodule seen on imaging study    Aug 22 2016 10:58AM     

 

                    Eustachian tube dysfunction, bilateral    Nov 10 2016  3:57PM     

 

                    Moderate Acute Cough    Nov 10 2016  3:57PM     

 

                    Pharyngitis, Acute    Nov 10 2016  3:57PM     

 

                    Upper Respiratory Infection    Nov 10 2016  3:57PM     

 

                          COPD (chronic obstructive pulmonary disease) with acute bronchitis    Nov 10 2016 

 3:57PM                                  

 

                    Mild Chronic Cough Worsening    Dec 12 2016 12:12PM     

 

                          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

 12 2016 12:12PM                         

 

                    Moderate Shortness of breath on exertion    Dec 12 2016 12:12PM     

 

                    Hyperlipidemia      May  4 2017 10:27AM     

 

                    Sinoatrial Node Dysfunction    May  4 2017 10:27AM     

 

                    Palpitations        May  4 2017 10:27AM     

 

                    Chronic Obstructive Pulmonary Disease    May  4 2017 10:27AM     

 

                    Exercise hypoxemia    May  4 2017 10:27AM     

 

                    Pain in joint; Hip    May  4 2017 10:27AM     

 

                    Pulmonary nodule seen on imaging study    May  4 2017 10:27AM     

 

                    Chest congestion    May 16 2017  4:04PM     

 

                          COPD (chronic obstructive pulmonary disease) with acute bronchitis    May 16 2017 

 4:04PM                                  

 

                    Productive cough    May 16 2017  4:04PM     

 

                    Anxiety about health    May 16 2017  4:04PM     

 

                          Chronic obstructive pulmonary disease, unspecified COPD type    May 16 2017  4:04PM

                                         

 

                    Exercise hypoxemia    May 16 2017  4:04PM     

 

                    Mixed hyperlipidemia    May 16 2017  4:04PM     

 

                    Medication management    May 16 2017  4:04PM     

 

                    Primary osteoarthritis involving multiple joints    May 16 2017  4:04PM     

 

                    Cellulitis of left lower extremity    2017 12:10PM     

 

                    Bitten by dog, initial encounter    2017 12:10PM     

 

                    Cellulitis of left lower extremity    2017  8:56AM     

 

                    Open bite, left lower leg, sequela    2017  8:56AM     

 

                    Bitten by dog, sequela    2017  8:56AM     

 

                    Medication management    2017  8:56AM     







Payers







           Insurance Name    Company Name    Plan Name    Plan Number    Policy Number    Policy Group

 Number                                 Start Date

 

                    Medicare [facility] [facility]    Medicare [employer]              [id number]              N/A

 

                          [place] [employer] [facility]    [employer] [employer]                 [id number]

                                                    N/A

 

                    [employer] Medicare A    Test Medicare A              [id number]              N/A

 

                                [facility]      

                    [id number]                             N/A

 

                    Medicare Part A    Medicare - Lab/Xray              [id number]              N/A

 

                          [employer] [employer] [facility]    [employer] [facility] [employer] [facility]                 [id number]

                                                    N/A

 

                    Medicare Part A    Medicare Part A              [id number]              N/A

 

                    Medicare Part B    [employer] [place]              [id number]              N/A







History of Encounters







                    Visit Date          Visit Type          Provider

 

                    2017            Office visit        DEON ESCALANTE

 

                    2017            Office visit        DEON ESCALANTE

 

                    2017           Office visit        DEON LEON PA

 

                    2016          Office visit        DEON LEON PA

 

                    11/10/2016          Office visit        DEON LEON PA

 

                    3/10/2016           Office visit        DEON LEON PA

 

                    12/15/2015          Office visit        DEON LEON PA

 

                    10/26/2015          Office visit        DEON LEON PA

 

                    10/6/2015           Office visit        DEON LEON PA

 

                    10/28/2014          Office visit        DEON LEON PA

 

                    10/16/2014          Office visit        DEON LEON PA

 

                    2014            Office visit        DEON LEON PA

 

                    2014           [facility]            DEWEY Garcia MD

 

                    2014           Office visit        DEON LEON PA

 

                    10/28/2013          Office visit        DEON LEON PA

 

                    10/14/2013          [facility]            DEWEY Garcia MD

 

                    2013           Office visit        DEON LEON PA

 

                    2013            Office visit        DEON LEON PA

 

                    10/25/2012          Office visit        DEON LEON PA

 

                    10/3/2012           Office visit        DEON LEON PA

 

                    3/16/2012           Office visit        DEON LEON PA

 

                    2012            Office visit        DEON LEON PA

 

                    2012            [facility]            DEWEY Garcia MD

 

                    2011          Office visit        DEON LEON PA

 

                    2011           Office visit        Deon Leon PA-C

 

                    2011            Office visit        Deon Leon PA-C

 

                    6/3/2011            Office visit        Deon Leon PA-C

 

                    2010           Office visit        Deon Leon PA-C

 

                    2010           Office visit        Deon Leon PA-C

 

                    2010           Office visit        Deon Leon PA-C

## 2019-06-25 NOTE — ED NEUROLOGICAL PROBLEM
General


Chief Complaint:  Neuro-Stroke Like Symptoms


Stated Complaint:  HEADACHE


Source:  other (EX- GIVES ALL INFORMATION, BUT IS LIMITED HISTORIAN --PT 

AND EX- STILL LIVE TOGETHER)


Exam Limitations:  clinical condition, other (PT IS NOT TALKING OR FOLLOWING 

COMMANDS)





History of Present Illness


Date Seen by Provider:  Jun 25, 2019


Time Seen by Provider:  09:15


Initial Comments


PT ARRIVES VIA POV FROM HOME--PT LIVES WITH EX-, WHO BROUGHT HER TO ER


NEEDS ASSIST OUT OF VEHICLE, PT IS ABLE TO STAND WITH ASSIST FOR TRANSFERS





EX- REPORTS THAT LAST KNOWN WELL TIME WAS 2100


HE DOES REPORT THAT SHE WAS VOMITING IN THE MIDDLE OF THE NIGHT, BUT DOES NOT 

KNOW TIME


HE REPORTS THAT SHE DID GET UP AT 0730 AND C/O HEADACHE, BUT WAS NOT ACTING 

RIGHT AT ALL, AND NOT TALKING RIGHT AT THAT TIME. . SHE WAS ABLE TO WALK TO THE 

KITCHEN AT THAT TIME


PT IS NOT TALKING OR FOLLOWING COMMANDS ON ARRIVAL--IS UNCLEAR WHEN HE FIRST 

NOTICED THAT 





NO HISTORY OF SIMILAR


NO RECENT ILLNESS





NO OTHER INFORMATION IS AVAILABLE AT THIS TIME.





PCP: USHA LEON





Allergies and Home Medications


Allergies


Coded Allergies:  


     No Known Drug Allergies (Unverified , 12/7/15)





Home Medications


Acetaminophen 325 Mg/10.15 Ml Soln, 325 MG PO Q6H PRN for PAIN-MILD


   Prescribed by: BRITTA SNEED on 7/18/19 2115


Apixaban 5 Mg Tablet, 5 MG PO BID


   Prescribed by: BRITTA SNEED on 7/18/19 2115


Aspirin 81 Mg Tab.chew, 81 MG PO DAILY


   Prescribed by: BRITTA SNEED on 7/18/19 2115


Donepezil HCl 5 Mg Tablet, 5 MG PO HS


   Prescribed by: BRITTA SNEED on 7/18/19 2115


Hydrochlorothiazide 25 Mg Tablet, 25 MG PO DAILY, (Reported)


Levetiracetam 1,000 Mg Tablet, 1,000 MG PO BID


   Prescribed by: BRITTA SNEED on 7/18/19 2115


Losartan Potassium 50 Mg Tablet, 50 MG PO DAILY, (Reported)


Metoprolol Tartrate 25 Mg Tablet, 25 MG PO BID


   Prescribed by: BRITTA SNEED on 7/18/19 2115





Patient Home Medication List


Home Medication List Reviewed:  Yes





Review of Systems


Review of Systems


Constitutional:  other (UNABLE TO OBTAIN FROM PT)


Psychiatric/Neurological:  See HPI





Past Medical-Social-Family Hx


Patient Social History


Alcohol Use:  Denies Use


Recreational Drug Use:  No


2nd Hand Smoke Exposure:  No


Recent Foreign Travel:  No


Contact w/Someone Who Travel:  No





Immunizations Up To Date


Tetanus Booster (TDap):  Less than 5yrs (2015)





Past Medical History


Surgeries:  Yes (LEFT HIP REPLACEMENT; CATARACTS)


Eye Surgery, Joint Replacement, Orthopedic


Respiratory:  Yes


COPD


Cardiac:  Yes (TACHYCARDIA)


Hypertension


Neurological:  No


Genitourinary:  No


Gastrointestinal:  No


Musculoskeletal:  Yes


Arthritis


Endocrine:  No


HEENT:  Yes


Cataract


Cancer:  No


Psychosocial:  No


Integumentary:  No


Blood Disorders:  No


Adverse Reaction/Blood Tranf:  No





Physical Exam


Vital Signs


Capillary Refill :


Height, Weight, BMI


Height: 5'2"


Weight: 135lbs. oz. 61.758440ct;  BMI


Method:Estimated


General Appearance:  moderate distress, other (PT WITH BLANK STARE AND HEAD 

TURNED TO RIGHT--LEFT SIDED NEGLECT; PT IS NOT TALKING OR FOLLOWING 

COMMANDS;SLIGHTLY SNOROUS BREATHING)


HEENT:  other (+ NYSTAGMUS; PUPILS EQUAL BUT WILL  NOT TRACK --HAS LEFT SIDED 

NEGLECT. )


Neck:  normal inspection


Respiratory:  respiratory distress, rales (LEFT > RIGHT ), other (SLIGHTLY 

SNOROUS BREATHING; O2 SATS IN 80'S ON ARRIVAL)


Cardiovascular:  irregularly irregular, other (PT FLUCTUATING FROM TACHYCARDIA 

TO BRADYCARDIA, VERY IRREGULAR, WITH MULTIPLE PVC'S AND INTERMITTENT BIGEMINY)


Gastrointestinal:  soft


Extremities:  normal capillary refill, pedal edema (TRACE BILATERALLY)


Neurologic/Psychiatric:  other (PT WITH LEFT SIDED NEGLECT, HEAD TURNED TO 

RIGHT. BLANK STARE. NOT TALKING OR FOLLOWING COMMANDS. PT WAS ABLE TO STAND WITH

2 PERSON ASSIST ON ARRIVAL, BUT IS NOT MOVING ANY OF HER EXTREMITIES AT THIS 

TIME. )





Stroke


NIH Stroke Scale Assessment





   Select: Initial UNABLE TO COMPLETE DUE TO PT NOT TALKING OR FOLLOWING COMM

   ANDS, OR MOVING ANY OF HER EXTREMITIES.  Level of Consciousness-Questions: 

   2=Answer neither question (2), LOC Commands: 2=Performs neither task (2), 

   Best Language: 3=Mute (3), Extinction & Inattention: 

   2=ProfoundHemiInattention (2), Total: 9





Stroke Thrombolytic Exclusion


Age 18 or Over:  Yes


Acute intenal hemorrhage:  No


History of CVA:  No


Uncontrolled Coagulation Defec:  No


Intracranial Hemorrhage:  No


Severe Hypertension:  Yes


GI or  Bleed:  No


Subarachnoid Hemorrhage:  No


Intracranial Neoplasm/Aneurysm:  No


Oral Anticoagulants:  No


Surgery or Trauma:  No


Puncture of Non-Compressible V:  No


Recent CPR:  No


Diabetic Hemorrhagic Retinopat:  No


Organ Biopsy:  No


Recent Obstetric Delivery:  No


Glucose:  No


Significant Hepatic Dysfunctio:  No


NIH Stoke Scale >22:  No


Bacterial Endocarditis:  No


Pericarditis:  No


Improving Symptoms:  No


Platelets:  No


TPA Contraindication:  Yes (ONSET UNKNOWN--LKWT 2100 LAST PM)





IV - TPa Received


IV - TPa Procedure Performed?:  No





Progress/Results/Core Measures


Results/Orders


Lab Results





Laboratory Tests








Test


 6/25/19


09:13 6/25/19


09:19 6/25/19


09:25 6/25/19


09:50 Range/Units


 


 


Lab Scanned Report


 Referred Lab


Report 


 


 


  [id number]





 


White Blood Count


 


 8.6 


 


 


 4.3-11.0


10^3/uL


 


Red Blood Count


 


 5.06 


 


 


 4.35-5.85


10^6/uL


 


Hemoglobin  14.6    11.5-16.0  G/DL


 


Hematocrit  46    35-52  %


 


Mean Corpuscular Volume  92    80-99  FL


 


Mean Corpuscular Hemoglobin  29    25-34  PG


 


Mean Corpuscular Hemoglobin


Concent 


 32 


 


 


 32-36  G/DL





 


Red Cell Distribution Width  12.8    10.0-14.5  %


 


Platelet Count


 


 314 


 


 


 130-400


10^3/uL


 


Mean Platelet Volume  10.1    7.4-10.4  FL


 


Neutrophils (%) (Auto)  79 H   42-75  %


 


Lymphocytes (%) (Auto)  14    12-44  %


 


Monocytes (%) (Auto)  6    0-12  %


 


Eosinophils (%) (Auto)  0    0-10  %


 


Basophils (%) (Auto)  0    0-10  %


 


Neutrophils # (Auto)  6.8    1.8-7.8  X 10^3


 


Lymphocytes # (Auto)  1.2    1.0-4.0  X 10^3


 


Monocytes # (Auto)  0.5    0.0-1.0  X 10^3


 


Eosinophils # (Auto)


 


 0.0 


 


 


 0.0-0.3


10^3/uL


 


Basophils # (Auto)


 


 0.0 


 


 


 0.0-0.1


10^3/uL


 


Prothrombin Time  12.7    12.2-14.7  SEC


 


INR Comment  0.9    0.8-1.4  


 


Activated Partial


Thromboplast Time 


 33 


 


 


 24-35  SEC





 


D-Dimer


 


 1.38 H


 


 


 0.00-0.49


UG/ML


 


Sodium Level  138    135-145  MMOL/L


 


Potassium Level  4.0    3.6-5.0  MMOL/L


 


Chloride Level  100      MMOL/L


 


Carbon Dioxide Level  26    21-32  MMOL/L


 


Anion Gap  12    5-14  MMOL/L


 


Blood Urea Nitrogen  7    7-18  MG/DL


 


Creatinine


 


 0.79 


 


 


 0.60-1.30


MG/DL


 


Estimat Glomerular Filtration


Rate 


 > 60 


 


 


  





 


BUN/Creatinine Ratio  9     


 


Glucose Level  131 H     MG/DL


 


Calcium Level  9.8    8.5-10.1  MG/DL


 


Corrected Calcium  9.4    8.5-10.1  MG/DL


 


Magnesium Level  2.0    1.8-2.4  MG/DL


 


Total Bilirubin  0.7    0.1-1.0  MG/DL


 


Aspartate Amino Transf


(AST/SGOT) 


 27 


 


 


 5-34  U/L





 


Alanine Aminotransferase


(ALT/SGPT) 


 23 


 


 


 0-55  U/L





 


Alkaline Phosphatase  93      U/L


 


Troponin I  < 0.028    <0.028  NG/ML


 


B-Type Natriuretic Peptide  162.7 H   <100.0  PG/ML


 


Total Protein  8.4 H   6.4-8.2  GM/DL


 


Albumin  4.5    3.2-4.5  GM/DL


 


TSH Cascade Testing


 


 4.72 


 


 


 0.35-4.94


UIU/ML


 


Acetaminophen Level  < 10 L   10-30  UG/ML


 


Serum Alcohol  < 10    <10  MG/DL


 


Glucometer   169 H    MG/DL


 


Blood Gas Puncture Site    RT BRACHIAL   


 


Blood Gas Patient Temperature    95.8   


 


Arterial Blood pH    7.10 *L 7.37-7.43  


 


Arterial Blood Partial


Pressure CO2 


 


 


 101 *H


 35-45  MMHG





 


Arterial Blood Partial


Pressure O2 


 


 


 351 H


 79-93  MMHG





 


Arterial Blood HCO3    31 H 23-27  MMOL/L


 


Arterial Blood Total CO2


 


 


 


 34.2 H


 21.0-31.0


MMOL/L


 


Arterial Blood Oxygen


Saturation 


 


 


 99 


   %





 


Arterial Blood Base Excess


 


 


 


 1.4 


 -2.5-2.5


MMOL/L


 


Joe Test    YES-POS   


 


Blood Gas Ventilator Setting    NO   


 


Blood Gas Inspired Oxygen    100% BIPAP   


 


Test


 6/25/19


10:25 6/25/19


10:42 


 


 Range/Units


 


 


Urine Color YELLOW      


 


Urine Clarity CLEAR      


 


Urine pH 7     5-9  


 


Urine Specific Gravity 1.010 L    1.016-1.022  


 


Urine Protein 4+     NEGATIVE  


 


Urine Glucose (UA) 1+ H    NEGATIVE  


 


Urine Ketones NEGATIVE     NEGATIVE  


 


Urine Nitrite NEGATIVE     NEGATIVE  


 


Urine Bilirubin NEGATIVE     NEGATIVE  


 


Urine Urobilinogen NORMAL     NORMAL  MG/DL


 


Urine Leukocyte Esterase NEGATIVE     NEGATIVE  


 


Urine RBC (Auto) 2+ H    NEGATIVE  


 


Urine RBC 5-10 H     /HPF


 


Urine WBC RARE      /HPF


 


Urine Squamous Epithelial


Cells RARE 


 


 


 


  /HPF





 


Urine Crystals NONE      /LPF


 


Urine Bacteria TRACE      /HPF


 


Urine Casts PRESENT      /LPF


 


Urine Hyaline Casts RARE      /LPF


 


Urine Mucus NEGATIVE      /LPF


 


Urine Culture Indicated YES      


 


Urine Opiates Screen NEGATIVE     NEGATIVE  


 


Urine Oxycodone Screen NEGATIVE     NEGATIVE  


 


Urine Methadone Screen NEGATIVE     NEGATIVE  


 


Urine Propoxyphene Screen NEGATIVE     NEGATIVE  


 


Urine Barbiturates Screen NEGATIVE     NEGATIVE  


 


Ur Tricyclic Antidepressants


Screen NEGATIVE 


 


 


 


 NEGATIVE  





 


Urine Phencyclidine Screen NEGATIVE     NEGATIVE  


 


Urine Amphetamines Screen NEGATIVE     NEGATIVE  


 


Urine Methamphetamines Screen NEGATIVE     NEGATIVE  


 


Urine Benzodiazepines Screen NEGATIVE     NEGATIVE  


 


Urine Cocaine Screen NEGATIVE     NEGATIVE  


 


Urine Cannabinoids Screen NEGATIVE     NEGATIVE  


 


Blood Gas Puncture Site  RT BRACHIAL     


 


Blood Gas Patient Temperature  96.8     


 


Arterial Blood pH  7.08 *L   7.37-7.43  


 


Arterial Blood Partial


Pressure CO2 


 108 *H


 


 


 35-45  MMHG





 


Arterial Blood Partial


Pressure O2 


 203 H


 


 


 79-93  MMHG





 


Arterial Blood HCO3  31 H   23-27  MMOL/L


 


Arterial Blood Total CO2


 


 34.4 H


 


 


 21.0-31.0


MMOL/L


 


Arterial Blood Oxygen


Saturation 


 99 


 


 


   %





 


Arterial Blood Base Excess


 


 1.2 


 


 


 -2.5-2.5


MMOL/L


 


Joe Test  YES-POS     


 


Blood Gas Ventilator Setting  NO     


 


Blood Gas Inspired Oxygen  60%     








Micro Results





Microbiology


6/25/19 Urine Culture - Final, Complete


          NO GROWTH





My Orders





Orders - TIMUR MARQUEZ DO


Cbc With Automated Diff (6/25/19 09:28)


Protime With Inr (6/25/19 09:28)


Partial Thromboplastin Time (6/25/19 09:28)


Comprehensive Metabolic Panel (6/25/19 09:28)


Fibrin Degradation Products (6/25/19 09:28)


Troponin I (6/25/19 09:28)


Ua Culture If Indicated (6/25/19 09:28)


Chest 1 View, Ap/Pa Only (6/25/19 09:28)


Catheter(Urinary) Insert & Ass 03,15 (6/25/19 09:28)


Ekg Tracing (6/25/19 09:28)


Nothing By Mouth (6/25/19 Lunch)


Accucheck Stat ONCE (6/25/19 09:28)


Ed Iv/Invasive Line Start (6/25/19 09:28)


Ed Iv/Invasive Line Start (6/25/19 09:28)


Vital Signs Stroke Patient Q15M (6/25/19 09:28)


Ct Head Wo-R/O Stroke (6/25/19 09:28)


O2 (6/25/19 09:28)


Intake & Output 06,14,22 (6/25/19 09:28)


Labetalol Injection (Normodyne Injection (6/25/19 09:28)


Monitor-Rhythm Ecg Trace Only (6/25/19 09:28)


Dysphagia Screening Tool (6/25/19 09:28)


Sao2 W/End-Tidal Monitoring (O Q15M (6/25/19 09:28)


I-Stat Bedside Testing (6/25/19 09:28)


Rt Request For Service (6/25/19 09:28)


Acetaminophen (6/25/19 09:28)


Alcohol (6/25/19 09:28)


Arterial Blood Gas (6/25/19 09:50)


BNP (6/25/19 09:28)


Drug Screen Stat (Urine) (6/25/19 09:28)


Magnesium (6/25/19 09:28)


Thyroid Analyzer (6/25/19 09:28)


Levetiracetam Injection (Keppra Injectio (6/25/19 09:36)


Lorazepam Injection (Ativan Injection) (6/25/19 09:36)


Levetiracetam Injection (Keppra Injectio (6/25/19 09:45)


Ns (Ivpb) (Sodium Chloride 0.9% Ivpb Bag (6/25/19 09:44)


Ct Angio Head/Neck (6/25/19 09:52)


Iohexol Injection (Omnipaque 350 Mg/Ml 1 (6/25/19 10:15)


Received Contrast (Hold Metformin- Contr (6/25/19 10:15)


Ns (Ivpb) (Sodium Chloride 0.9% Ivpb Bag (6/25/19 10:15)


Arterial Blood Gas (6/25/19 10:42)


Labetalol Injection (Normodyne Injection (6/25/19 10:45)


Arterial Blood Draw (6/25/19 )


Lorazepam Injection (Ativan Injection) (6/25/19 11:00)


Lorazepam Injection (Ativan Injection) (6/25/19 10:44)


Urine Culture (6/25/19 10:25)


Lorazepam Injection (Ativan Injection) (6/25/19 11:00)


Ed Iv/Invasive Line Start (6/25/19 11:19)


Ns Iv 1000 Ml (Sodium Chloride 0.9%) (6/25/19 11:19)


Sodium Bicarbonate 8.4% Vial (Sodium Bic (6/25/19 11:30)


Sodium Bicarbonate 8.4% Vial (Sodium Bic (6/25/19 11:30)


Iv Infusion <= First Hr Ed (6/25/19 )





Medications Given in ED





Vital Signs/I&O








FSBG Bedside Testing


Finger Stick Blood Glucose:  169


Blood Glucose Action Taken:  marcia





Progress


Progress Note :  


Progress Note


PT IMMEDIATELY PLACED ON CPAP AND O2 SATS UP TO 96-99% 


0940--PT ACTIVELY SEIZING ON HER WAY BACK FROM CT, LASTED 30 SECONDS-- RIGHT 

ARM, HEAD, AND SOME MINOR INVOLVEMENT OF RIGHT FOOT--GIVEN ATIVAN AND KEPPRA


1048-PT HAD ANOTHER 30 SECOND SEIZURE OF SAME NATURE--GIVEN ADDITIONAL ATIVAN





/149--GIVEN LABETALOL AND BP DOWN 'S/90'S





MULTIPLE FAMILY MEMBERS ARE HERE, AND PT'S DAUGHTER AND HER EX-, AS WELL 

AS OTHER FAMILY MEMBERS AGREE THAT PT IS DNR/DNI


Initial ECG Impression Date:  Jun 25, 2019


Initial ECG Impression Time:  09:53


Initial ECG Rate:  99


Initial ECG Rhythm:  Normal Sinus





Diagnostic Imaging





Comments


CT HEAD--NO ACUTE PROCESS, PER RADIOLOGIST REPORT AT 0952





CT ANGIOGRAM HEAD/NECK--NO ACUTE LARGE VESSEL OCCLUSION OR ACUTE PROCESS, PER 

RADIOLOGIST REPORT AT 1057


   Reviewed:  Reviewed by Me





Critical Care Note


Critical Care


Total Time (minutes)


60





Departure


Communication (Admissions)


NO ICU OR MEDICAL  BEDS AVAILABLE HERE--ON FULL DIVERSION





FAMILY WILL NOT MAKE DECISION ABOUT TRANSFERRING PT, WAITING FOR OTHER FAMILY TO

GET HERE FROM OUT OF TOWN


1130--FAMILY STILL WILL NOT MAKE DECISION


1212--STILL WAITING FOR OTHER FAMILY TO ARRIVE BEFORE THEY WILL MAKE DECISION


1244--FAMILY HAS AGREED WITH TRANSFER TO '


FAMILY INFORMED OF REQUEST BY  THAT PT BE INTUBATED FOR TRANSPORT TO [facility], AND CAN BE DISCONTINUED AFTER ARRIVAL THERE IF FAMILY WISHES. FAMILY IS 

IN AGREEMENT WITH THIS PLAN. 








1100--CALLED  NEUROLOGY, SPOKE WITH DR. HASKINS. HE STATES THEY DO NOT HAVE 

ANYTHING TO OFFER PT AT THIS TIME, AND HIGHLY SUSPECTS THAT PT HAS HAD A 

BRAINSTEM INFARCT. HE DOES ACCEPT PT FOR TRANSFER/ADMIT IF FAMILY AGREES. 


1247--CALLED , AND INFORMED THEM OF FAMILY'S DECISION FOR TRANSFER. THEY 

ADVISE INTUBATION FOR TRANSPORT TO [facility]. . [facility] NOTIFIED


1325--[facility] HERE, AND WILL INTUBATE PT FOR TRANSPORT.





Impression





   Primary Impression:  


   Altered mental status


   Additional Impressions:  


   New onset seizure


   POSSIBLE STROKE


   Malignant hypertension


   Acute respiratory failure with hypoxia and hypercapnia


   Acidosis


   Encephalopathy


Disposition:  02 XFER S[facility]-TR[facility] HOSP


Condition:  Stable (BUT SERIOUS)





Transfer


Transfer Facility:  





Method of Transfer:  [facility]





Departure-Patient Inst.


Referrals:  


NO,LOCAL PHYSICIAN (PCP/Family)


Primary Care Physician











TIMUR MARQUEZ DO                 Jun 25, 2019 10:09

## 2019-06-25 NOTE — XMS REPORT
MU2 Ambulatory Summary

                             Created on: 2018



Elsi Casillas

External Reference #: [id number]

: 1940

Sex: Female



Demographics







                          Address                   [address] [place]  [address]

 

                          Home Phone                [phone]

 

                          Preferred Language        English

 

                          Marital Status            

 

                          Restorationist Affiliation     Unknown

 

                          Race                      White

 

                          Ethnic Group              Not  or 





Author







                          Author                    DEON LEON

 

                          [facility]

 

                          Address                   [address]

[place], [place]  [address]



 

                          Phone                     [phone]







Care Team Providers







                    Care Team Member Name    Role                Phone

 

                    DEON LEON    PCP                 [phone]

 

                    DEON LEON    PreferredProvider    [phone]







Allergies and Adverse Reactions







                    Name                Reaction            Notes

 

                    SULFA (SULFONAMIDES)    nausea               







Plan of Treatment







             Planned Activity    Comments     Planned Date    Planned Time    Plan/Goal

 

             Lipid Profile                 2016    12:00 AM      

 

             Chest PA and Lateral - Main                 8/10/2017    12:00 AM      

 

             CBC with Auto                 2018    12:00 AM      

 

             Comprehensive metabolic panel                 2018    12:00 AM      

 

             Lipid Profile                 2018    12:00 AM      

 

             EKG.                      2018    12:00 AM      







Medications







                                        Active 

 

             Name         Start Date    Estimated Completion Date    SIG          Comments

 

                Calcium 600 + D(3) 600 mg(1,500mg) -200 unit oral tablet                                    take 2 tablets by

 oral route daily                        

 

                pravastatin 20 mg oral tablet                                    take 1 tablet (20 mg) by oral route once daily

                                         

 

                Toprol XL 25 mg oral tablet extended release 24 hr                                    take 1 tablet (25 mg) 

by oral route once daily                 

 

                    albuterol sulfate 1.25 mg/3 mL inhalation solution for nebulization    2017           

                                        inhale 3 milliliters (1.25 mg) via nebulizer by inhalation route 4 times per day

  DX J44.9                               

 

                Symbicort 160-4.5 mcg/actuation inhalation HFA aerosol inhaler    10/25/2017                      inhale

 2 puffs by inhalation route 2 times per day in the morning and evening     

 

                                        ipratropium-albuterol 0.5 mg-3 mg(2.5 mg base)/3 mL inhalation solution for nebulization

                    2018                                inhale 3 milliliters by nebulization route 4 times per day for COPD

                                         

 

                metoprolol succinate 25 mg oral tablet extended release 24 hr    2018                       TAKE

 1 TABLET DAILY                          

 

                Tessalon Perles 100 mg oral capsule    2018                        take 1 capsule (100 mg) by oral

 route 3 times per day as needed for cough     

 

                Sudogest 30 mg oral tablet    2018                        take 1 tablets (60 mg) by oral route every

 6 hours as needed                       

 

                amoxicillin 500 mg oral capsule    7/10/2018                       take 1 capsule (500 mg) by oral route

 3 times per day                         

 

             hydrochlorothiazide 25 mg oral tablet    2018                 TAKE 1 TABLET DAILY     

 

                Lasix 40 mg oral tablet    2018                       take 1 tablet (40 mg) by oral route once 

daily                                    









                                         

 

             Name         Start Date    Expiration Date    SIG          Comments

 

                Singulair 10 mg oral tablet    9/3/2010        2011        take 1 tablet (10 mg) by oral route

 daily  for 60 days                      

 

                Zithromax Z-Christopher 250 mg oral tablet    2011       take 2 tablets (500 mg)

 by oral route once daily for 1 day then 1 tablet (250 mg) by oral route once 
daily for 4 days                         

 

                Medrol (Christopher) 4 mg oral tablets,dose pack    2011        take as directed

 for 6 days                              

 

                Keflex 500 mg oral capsule    3/1/2012        3/11/2012       take 1 capsule by oral route 3 

times a day for 10 days                  

 

                Cipro 500 mg oral tablet    2012        take 1 tablet (500 mg) by oral route

 every 12 hours for 15 days              

 

                benzonatate 100 mg oral capsule    2013       take 1 capsule (100 mg) by

 oral route 3 times per day for cough     

 

             Medrol (Christopher) 4 mg oral tablets,dose pack    2013     take as directed    

 

 

                Zyrtec 10 mg oral tablet    2013       take 1 tablet (10 mg) by oral route

 once daily for 30 days                  

 

                Flonase 50 mcg/actuation nasal spray,suspension    2013       inhale 1 spray

 in each nostril by intranasal route 2 times per day for 30 days     

 

                cephalexin 500 mg oral capsule    2013        take 1 capsule (500 mg) by oral

 route every 8 hours                     

 

                promethazine-codeine 6.25-10 mg/5 mL oral syrup    2013                       take 5 milliliters

 by oral route every 4 hours as needed, not to exceed 30 mL in 24 hours     

 

                Zithromax Z-Christopher 250 mg oral tablet    10/2/2013       10/7/2013       take 2 tablets (500 mg)

 by oral route once daily for 1 day then 1 tablet (250 mg) by oral route once 
daily for 4 days                         

 

                amoxicillin 500 mg oral capsule    2014       take 1 capsule by oral route

 3 times a day for 15 days               

 

                lovastatin 20 mg oral tablet    2014        take 1 tablet (20 mg) by oral 

route daily  for 30 days                 

 

                Zithromax Z-Christopher 250 mg oral tablet    2014       take 2 tablets (500 mg)

 by oral route once daily for 1 day then 1 tablet (250 mg) by oral route once 
daily for 4 days                         

 

             Medrol (Christopher) 4 mg oral tablets,dose pack    2014                 take as directed     

 

                amoxicillin 500 mg oral capsule    2014                       take 1 capsule (500 mg) by oral route

 3 times per day                         

 

                Levaquin 500 mg oral tablet    10/16/2014      10/26/2014      take 1 tablet (500 mg) by oral

 route once daily for 10 days            

 

                prednisone 20 mg oral tablet    10/16/2014      10/24/2014      4x2 days 3x2 days 2x2 days

 1x2 days                                

 

                Zithromax Z-Christopher 250 mg oral tablet    2014       take 2 tablets (500 mg)

 by oral route once daily for 1 day then 1 tablet (250 mg) by oral route once 
daily for 4 days                         

 

                Bactrim -160 mg oral tablet    1/15/2015       2015       take 1 tablet by oral route

 every 12 hours for 10 days              

 

                Zithromax Z-Christopher 250 mg oral tablet    2015      take 2 tablets (500 mg)

 by oral route once daily for 1 day then 1 tablet (250 mg) by oral route once 
daily for 4 days                         

 

                Keflex 500 mg oral capsule    2016       take 1 capsule by oral route 3

 times a day for 7 days                  

 

                amoxicillin 500 mg oral capsule    10/4/2016       10/14/2016      take 1 capsule by oral route

 3 times a day for 10 days               

 

                Tessalon Perles 100 mg oral capsule    10/4/2016                       take 1 capsule by oral route 

every 4 hours                            

 

                Zithromax Z-Christopher 250 mg oral tablet    11/10/2016      11/15/2016      take 2 tablets (500 

mg) by oral route once daily for 1 day then 1 tablet (250 mg) by oral route once
daily for 4 days                         

 

                amoxicillin 500 mg oral tablet    2016                       take 1 tablet (500 mg) by oral route

 3 times per day                         

 

                amoxicillin 500 mg oral capsule    2017       take 1 capsule (500 mg) by

 oral route 3 times per day for 10 days     

 

                Bactrim -160 mg oral tablet    2017       take 1 tablet by oral route

 2 times a day for 10 days               

 

                Levaquin 500 mg oral tablet    2017        take 1 tablet (500 mg) by oral

 route once daily for 10 days            

 

                amoxicillin 500 mg oral capsule    2017                       take 1 capsule (500 mg) by oral route

 every 12 hours for 7 days               

 

                Zithromax Z-Christopher 250 mg oral tablet    2017                      take 2 tablets (500 mg) by oral

 route once daily for 1 day then 1 tablet (250 mg) by oral route once daily for 
4 days                                   

 

                amoxicillin 500 mg oral tablet    2018                       take 1 tablet (500 mg) by oral route

 every 12 hours for 10 days              

 

                Cipro 500 mg oral tablet    2018        2/15/2018       take 1 tablet (500 mg) by oral route

 2 times per day for 10 days             

 

                Zithromax Z-Christopher 250 mg oral tablet    2018       take 2 tablets (500 mg)

 by oral route once daily for 1 day then 1 tablet (250 mg) by oral route once 
daily for 4 days                         

 

                Keflex 500 mg oral capsule    2018       take 1 capsule (500 mg) by oral

 route every 12 hours for 7 days         

 

                prednisone 20 mg oral tablet    2018       2X4 days 1X4 days 1/2X4 days 

                                         









                                        Discontinued 

 

             Name         Start Date    Discontinued Date    SIG          Comments

 

                amoxicillin 500 mg oral capsule    11/10/2011      10/3/2012       take 1 capsule (500 mg) 

by oral route 3 times per day            

 

                Anusol-HC 25 mg rectal suppository    2011      10/3/2012       insert 1 suppository 

(25 mg) by rectal route 2 times per day     

 

                Proctofoam 1 % topical foam    2011       apply by external route daily

                                         

 

             Aerochamber    2014    Use with inhaler as directed     

 

                hydrochlorothiazide 25 mg oral tablet    1/15/2015       2015      TAKE 1 TABLET DAILY

                                         

 

                promethazine-codeine 6.25-10 mg/5 mL oral syrup    11/10/2016      10/25/2017      take 5 

milliliters by oral route every 6 hours as needed, not to exceed 30 mL in 24 
hours                                    

 

                prednisone 20 mg oral tablet    2017       10/25/2017      4 x 2 days, 3 x 2 days, 2 x

 2 days 1 x 2 days                       

 

                Augmentin 875-125 mg oral tablet    2017       take 1 tablet by oral route

 every 12 hours for 7 days               

 

                Keflex 500 mg oral capsule    2017       take 1 capsule (500 mg) by oral

 route every 12 hours                    

 

                Keflex 500 mg oral capsule    10/10/2017      2018       take 1 capsule (500 mg) by oral

 route every 12 hours for 10 days        

 

                Levaquin 500 mg oral tablet    2018       take 1 tablet (500 mg) by oral

 route once daily for 10 days            

 

                Bactrim -160 mg oral tablet    2018       take 1 tablet by oral route

 every 12 hours for 10 days             nausea







Problem List







                    Description         Status              Onset

 

                    Chronic Obstructive Pulmonary Disease    Active               

 

                    Hyperlipidemia      Active               

 

                    Anxiety disorder    Active              10/29/2013

 

                    Pain in joint; Hip    Active              06/10/2014

 

                    Pulmonary nodule seen on imaging study    Active              10/29/2014

 

                    Exercise hypoxemia    Active              10/29/2014

 

                    Anxiety about health    Active              2016

 

                    Primary osteoarthritis involving multiple joints    Active              2017

 

                    Medication management    Active              2017

 

                    Cellulitis of left lower extremity    Active              2017

 

                    Dog bite of calf, left, sequela    Active              2017

 

                    Peripheral edema    Active              2018







Vital Signs







      Date    Time    BP-Sys(mm[Hg]    BP-Noni(mm[Hg])    HR(bpm)    RR(rpm)    Temp    WT    HT    HC    BMI

                    BSA                 BMI Percentile      O2 Sat(%)

 

        2018    10:07:00 AM    124 mmHg    82 mmHg    86 bpm    18 rpm    98.2 F    149 lbs    61 in

                          28.153 kg/m    1.7055 m                 92 %

 

        2018    10:48:00 AM    118 mmHg    80 mmHg    82 bpm    18 rpm    98.3 F    144 lbs    61 in

                          27.21 kg/m2    1.68 m2                   93 %

 

        2018    2:35:00 PM    120 mmHg    82 mmHg    106 bpm    20 rpm    98.2 F    142 lbs    62 in

                          25.9719 kg/m    1.6786 m                 92 %

 

       2018    3:28:00 PM    122 mmHg    68 mmHg    114 bpm    18 rpm    98.2 F    142 lbs            

                                                                90 %

 

        2018    1:35:00 PM    112 mmHg    74 mmHg    114 bpm    18 rpm    99.6 F    139 lbs    63 in

                          24.6225 kg/m    1.6741 m                 98 %

 

       2018    3:57:00 PM    128 mmHg    80 mmHg    78 bpm    18 rpm    98.4 F    148 lbs             

                                                                90 %

 

        10/24/2017    1:51:00 PM    132 mmHg    80 mmHg    82 bpm    16 rpm    98.2 F    145 lbs    62 in

                          26.52 kg/m2    1.70 m2                   95 %

 

       2017    3:29:00 PM    128 mmHg    80 mmHg    68 bpm    16 rpm    98 F    144 lbs    63 in    

                25.5082 kg/m    1.7039 m                      93 %

 

        2017    11:37:00 AM    118 mmHg    72 mmHg    96 bpm    16 rpm    97.4 F    141 lbs    63 in

                          24.98 kg/m2    1.69 m2                   91 %

 

        2017    8:55:00 AM    105 mmHg    60 mmHg    96 bpm    18 rpm    95.8 F    142 lbs    63 in 

                          25.1539 kg/m    1.6921 m                 95 %

 

       2017    12:10:00 PM    122 mmHg    68 mmHg    76 bpm    18 rpm    98 F    143 lbs    63 in    

                25.33 kg/m2     1.70 m2                         98 %

 

        2017    4:03:00 PM    118 mmHg    64 mmHg    106 bpm    18 rpm    97.4 F    137 lbs    63 in

                          24.2682 kg/m    1.662 m                 98 %

 

        2016    12:11:00 PM    120 mmHg    84 mmHg    110 bpm    16 rpm    98.1 F    130 lbs    63

 in                       23.03 kg/m2    1.62 m2                   90 %

 

        11/10/2016    3:57:00 PM    148 mmHg    72 mmHg    88 bpm    16 rpm    98.2 F    132 lbs    63 in

                          23.3825 kg/m    1.6314 m                 98 %

 

        3/10/2016    2:12:00 PM    122 mmHg    60 mmHg    106 bpm    18 rpm    97 F    137 lbs    63 in 

                          24.27 kg/m2    1.66 m2                   93 %

 

        12/15/2015    2:33:00 PM    120 mmHg    75 mmHg    102 bpm    16 rpm    98.2 F    137 lbs    63 

in                        24.2682 kg/m    1.662 m                 94 %

 

        10/26/2015    2:46:00 PM    130 mmHg    80 mmHg    96 bpm    16 rpm    97.9 F    141 lbs    66 in

                          22.76 kg/m2    1.73 m2                   98 %

 

        10/6/2015    9:37:00 AM    140 mmHg    78 mmHg    110 bpm    16 rpm    97.9 F    141 lbs    63 in

                          24.9768 kg/m    1.6861 m                 95 %

 

        10/28/2014    2:25:00 PM    132 mmHg    66 mmHg    92 bpm    24 rpm    97.5 F    147 lbs    63 in

                          26.04 kg/m2    1.72 m2                   92 %

 

        10/16/2014    9:45:00 AM    132 mmHg    64 mmHg    74 bpm    24 rpm    97.7 F    146 lbs    63 in

                          25.8625 kg/m    1.7157 m                 92 %

 

        2014    2:31:00 PM    136 mmHg    68 mmHg    90 bpm    22 rpm    98.2 F    148 lbs    63 in 

                          26.22 kg/m2    1.73 m2                   95 %

 

        2014    3:19:00 PM    124 mmHg    70 mmHg    64 bpm    20 rpm    97.8 F    147 lbs    63 in

                          26.0396 kg/m    1.7216 m                 95 %

 

        10/28/2013    10:31:00 AM    140 mmHg    70 mmHg    92 bpm    24 rpm    97.8 F    143 lbs    63 

in                        25.33 kg/m2    1.70 m2                   95 %

 

      2013    2:51:00 PM    130 mmHg    78 mmHg    90 bpm          98.2 F    168 lbs    65 in          

27.9564 kg/m      1.8694 m                               

 

       2013    2:43:00 PM    110 mmHg    76 mmHg    103 bpm           96.6 F    137 lbs    63 in      

                24.27 kg/m2     1.66 m2                          

 

        2013    9:50:00 AM    150 mmHg    66 mmHg    108 bpm    20 rpm    97.8 F    138 lbs    63 in

                          24.4454 kg/m    1.6681 m                 94 %

 

        10/25/2012    9:08:00 AM    110 mmHg    60 mmHg    88 bpm    18 rpm    97.5 F    142 lbs    63 in

                          25.15 kg/m2    1.69 m2                   94 %

 

      10/3/2012    9:33:00 AM    130 mmHg    60 mmHg    98 bpm    18 rpm          146 lbs    63 in          

25.8625 kg/m      1.7157 m                              95 %

 

      2012    2:31:00 PM    116 mmHg    76 mmHg    88 bpm                140 lbs    63 in          24.80 

kg/m2               1.68 m2                                 96 %

 

     2011    10:02:00 AM    122 mmHg    80 mmHg    110 bpm              144 lbs                        

                                        94 %

 

      2011    2:58:00 PM    124 mmHg    84 mmHg    106 bpm                155 lbs    63 in          27.4567

 kg/m             1.7678 m                              96 %

 

     2011    9:55:00 AM    114 mmHg    78 mmHg    92 bpm              146 lbs                             95

 %

 

     6/3/2011    8:36:00 AM    116 mmHg    74 mmHg    90 bpm              146 lbs                             96

 %

 

     2010    3:01:00 PM    132 mmHg    84 mmHg    102 bpm              149 lbs                          

                                        94 %

 

     2010    11:46:00 AM    124 mmHg    78 mmHg    104 bpm              151 lbs                         

                                        94 %

 

     2010    8:47:00 AM    116 mmHg    78 mmHg    103 bpm              151 lbs                          

                                        95 %







Social History







                    Name                Description         Comments

 

                    Alcohol             Never                

 

                    Uses seatbelts                           

 

                    Tobacco             Never smoker         







History of Procedures







                    Date Ordered        Description         Order Status

 

                    2011 12:00 AM    THER/PROPH/DIAG INJ SC/IM    Reviewed

 

                    2011 12:00 AM    Decadron Inj.1mg-([facility]) Ndc #4929592075    Reviewed

 

                    2011 12:00 AM    Depo-Medrol 80 Mg Im/[facility] [place] NDC 0009-820832    Reviewed

 

                    2011 12:00 AM    Rocephin, Per 250MG - 1 Gram Vial NDC [id number]-[place]    Reviewed



 

                    3/16/2012 12:00 AM    ROUTINE VENIPUNCTURE    Reviewed

 

                    3/16/2012 12:00 AM    COMPLETE CBC W/AUTO DIFF WBC    Reviewed

 

                    3/16/2012 12:00 AM    COMPREHEN METABOLIC PANEL    Reviewed

 

                    3/16/2012 12:00 AM    ASSAY THYROID STIM HORMONE    Reviewed

 

                    11/10/2016 12:00 AM    Decadron, Per 1 Mg NDC# [address]-44    Reviewed

 

                    11/10/2016 12:00 AM    Depo-Medrol, Per 80 Mg NDC#1655-6456-65    Reviewed

 

                    11/10/2016 12:00 AM    Rocephin 1 gram NDC#3684-1152-62    Reviewed

 

                    2016 12:00 AM    THER/PROPH/DIAG INJ SC/IM    Reviewed

 

                    2016 12:00 AM    Decadron 8mg Injection, [facility]    Reviewed

 

                    2016 12:00 AM    Depo-Medrol 80mg Injection, [facility]    Reviewed

 

                    2016 12:00 AM    Rocephin 1 gram Injection, [facility]    Reviewed

 

                    2017 12:00 AM    COMPLETE CBC W/AUTO DIFF WBC    Reviewed

 

                    2017 12:00 AM    COMPREHEN METABOLIC PANEL    Reviewed

 

                    2017 12:00 AM    LIPID PANEL         Reviewed

 

                    2017 12:00 AM    THERAPEUTIC PROPHYLACTIC/DX INJECTION SUBQ/IM    Reviewed

 

                    2017 12:00 AM    Decadron 8mg Injection, [facility]    Reviewed

 

                    2017 12:00 AM    Depo-Medrol 80mg Injection, [facility]    Reviewed

 

                    2017 12:00 AM    LIPID PANEL         Returned

 

                    2017 12:00 AM    THERAPEUTIC PROPHYLACTIC/DX INJECTION SUBQ/IM    Reviewed

 

                    2017 12:00 AM    Decadron 8mg Injection, [facility]    Reviewed

 

                    2017 12:00 AM    Depo-Medrol 80mg Injection, [facility]    Reviewed

 

                    10/3/2012 12:00 AM    THER/PROPH/DIAG INJ SC/IM    Reviewed

 

                    10/3/2012 12:00 AM    Decadron, Per 1 Mg NDC# [address]-09    Reviewed

 

                    10/3/2012 12:00 AM    Depo-Medrol, Per 80 Mg NDC#3556-9421-06    Reviewed

 

                    10/3/2012 12:00 AM    Toradol 60 Mg NDC#5354-8326-25    Reviewed

 

                    10/24/2017 12:00 AM    THERAPEUTIC PROPHYLACTIC/DX INJECTION SUBQ/IM    Reviewed

 

                    10/24/2017 12:00 AM    Decadron 8mg Injection, [facility]    Reviewed

 

                    10/24/2017 12:00 AM    Depo-Medrol 80mg Injection, [facility]    Reviewed

 

                    2018 12:00 AM    THERAPEUTIC PROPHYLACTIC/DX INJECTION SUBQ/IM    Reviewed

 

                    2018 12:00 AM    Rocephin 1 gram Injection, [facility]    Reviewed

 

                    2018 12:00 AM    THERAPEUTIC PROPHYLACTIC/DX INJECTION SUBQ/IM    Reviewed

 

                    2018 12:00 AM    Decadron 8mg Injection, [facility]    Reviewed

 

                    2018 12:00 AM    Depo-Medrol 80mg Injection, [facility]    Reviewed

 

                    2018 12:00 AM    Rocephin 1 gram Injection, [facility]    Reviewed

 

                    2013 12:00 AM    THER/PROPH/DIAG INJ SC/IM    Reviewed

 

                    2013 12:00 AM    Decadron, Per 1 Mg NDC# [address]-87    Reviewed

 

                    2013 12:00 AM    Depo-Medrol, Per 80 Mg NDC#9774-9672-56    Reviewed

 

                    2013 12:00 AM    Toradol 60 Mg NDC#3387-8698-77    Reviewed

 

                    2010 12:00 AM    ROUTINE VENIPUNCTURE    Reviewed

 

                    2010 12:00 AM    COMPLETE CBC W/AUTO DIFF WBC    Reviewed

 

                    2010 12:00 AM    COMPREHEN METABOLIC PANEL    Reviewed

 

                    2010 12:00 AM    LIPID PANEL         Reviewed

 

                    10/28/2014 12:00 AM    CHEST X-RAY 4/> VIEWS    Reviewed

 

                    6/3/2011 12:00 AM    ROUTINE VENIPUNCTURE    Reviewed

 

                    6/3/2011 12:00 AM    COMPLETE CBC AUTOMATED    Reviewed

 

                    6/3/2011 12:00 AM    COMPREHEN METABOLIC PANEL    Reviewed

 

                    6/3/2011 12:00 AM    LIPID PANEL         Reviewed

 

                    2011 12:00 AM    THER/PROPH/DIAG INJ SC/IM    Reviewed

 

                    2011 12:00 AM    Decadron Inj.8mg-([place]) Ndc #[id number]    Reviewed

 

                    2011 12:00 AM    Depo-Medrol 80 Mg Im/[place] NDC [id number]    Reviewed







Results Summary







                          Date and Description      Results

 

                          6/3/2011 1:53 PM          TRIGLYCERIDES 155.0 mg/dLCHOLESTEROL 248.0 mg/dLHDL 51.0 mg/dLTOT

 CHOL/HDL 4.9 .0 mg/dLGLUCOSE 97.0 mg/dLSODIUM 139.0 mmol/LPOTASSIUM 3.70
 mmol/LCHLORIDE 101.0 mmol/LCO2 27.0 mmol/LBUN 19.0 mg/dLCREATININE 0.90 
mg/dLSGOT/AST 19.0 IU/LSGPT/ALT 11.0 IU/LALK PHOS 111.0 IU/LTOTAL PROTEIN 7.40 
g/dLALBUMIN 4.20 g/dLTOTAL BILI 0.50 mg/dLCALCIUM 9.50 mg/dLAGE 70 GFR NonAA 62 
GFR AA 75 eGFR >60 mL/min/1.73 m2eGFR AA* >60 







History Of Immunizations

Not available.



History of Past Illness







                    Name                Date of Onset       Comments

 

                    Chronic Obstructive Pulmonary Disease                         

 

                    Hyperlipidemia                           

 

                    Cough               2010  8:49AM     

 

                    General Medical Exam, Adult    2010  8:49AM     

 

                    Seasonal Allergies    2010  8:49AM     

 

                    Sinusitis, Chronic    2010  8:49AM     

 

                    Ganglion cyst of synovium, tendon, and bursa; other    2010 11:48AM     

 

                    Anxiety disorder    10/29/2013           

 

                    Pain in joint; Hip    06/10/2014           

 

                    Pulmonary nodule seen on imaging study    10/29/2014           

 

                    Exercise hypoxemia    10/29/2014           

 

                    Chronic Obstructive Pulmonary Disease    2010  3:02PM     

 

                    Edema               2010  3:02PM     

 

                    Sinusitis, Acute    2010  3:02PM     

 

                    Anxiety about health    2016           

 

                    Chronic Obstructive Pulmonary Disease    Trey  3 2011  8:38AM     

 

                    Palpitations        Trey  3 2011  8:38AM     

 

                    Cough               2011  9:54AM     

 

                    Sinusitis, Acute    2011  9:54AM     

 

                    Seasonal Allergies    2011  9:54AM     

 

                    Post-nasal drainage    2011  9:54AM     

 

                    Primary osteoarthritis involving multiple joints    2017           

 

                    Medication management    2017           

 

                    Cellulitis of left lower extremity    2017           

 

                    Dog bite of calf, left, sequela    2017           

 

                    Cough               Sep 22 2011  2:55PM     

 

                    Seasonal Allergies    Sep 22 2011  2:55PM     

 

                    Pharyngitis, Acute    Sep 22 2011  2:55PM     

 

                    Post-nasal drainage    Sep 22 2011  2:55PM     

 

                    Peripheral edema    2018           

 

                    Blood In Stool, Occult    2011  9:58AM     

 

                    Hemorrhoids         2011  9:58AM     

 

                    Chronic Obstructive Pulmonary Disease    2012  2:33PM     

 

                    Cardiac Dysrhythmia    2012  2:33PM     

 

                    Sinoatrial Node Dysfunction    Mar 16 2012  9:12AM     

 

                    Palpitations        Mar 16 2012  9:12AM     

 

                    Osteoarthrosis, generalized, multiple sites    Oct  3 2012  9:34AM     

 

                    Pain in joint; Hip    Oct  3 2012  9:34AM     

 

                    Osteoarthrosis      Oct 25 2012  9:09AM     

 

                    Bronchitis, Acute    Oct 25 2012  9:09AM     

 

                    Cough               Oct 25 2012  9:09AM     

 

                    Pain in joint; Left Hip    Oct 25 2012  9:09AM     

 

                    Pain in joint; Hip    2013  9:50AM     

 

                    Osteoarthrosis, generalized, multiple sites    2013  2:45PM     

 

                    Pain in joint; Hip bilateral    2013  2:45PM     

 

                    Anxiety Disorder    Oct 28 2013 10:32AM     

 

                    Chronic Obstructive Pulmonary Disease    Oct 28 2013 10:32AM     

 

                    Hyperlipidemia      Oct 28 2013 10:32AM     

 

                    Pain in joint; Left Hip    Oct 28 2013 10:32AM     

 

                    Sinusitis, Acute    Oct 28 2013 10:32AM     

 

                    Essential Hypertension    2014  3:19PM     

 

                    Osteoarthrosis, generalized, multiple sites    2014  3:19PM     

 

                    Chronic Obstructive Pulmonary Disease    2014  3:19PM     

 

                    Pain in joint; Hip    2014  3:19PM     

 

                    Chronic Obstructive Pulmonary Disease    2014  2:31PM     

 

                    Pain in joint; Hip    2014  2:31PM     

 

                    pre-operative examination, unspecified    2014  2:31PM     

 

                    Lung nodule seen on imaging study    Oct 28 2014 10:24AM     

 

                    Bronchitis, Acute    Oct 16 2014  9:45AM     

 

                    Respiratory System And Chest Symptoms    Oct 16 2014  9:45AM     

 

                    COPD (chronic obstructive pulmonary disease)    Oct 16 2014  9:45AM     

 

                    Chronic Obstructive Pulmonary Disease    Oct 28 2014  2:26PM     

 

                    Pulmonary nodule seen on imaging study    Oct 28 2014  2:26PM     

 

                    Shortness of breath    Oct 28 2014  2:26PM     

 

                    Exercise hypoxemia    Oct 28 2014  2:26PM     

 

                    Nail avulsion       Oct  6 2015  9:38AM     

 

                          Contusion of left index finger with damage to nail, initial encounter    Oct 26 2015

  2:53PM                                 

 

                    Forehead contusion, subsequent encounter    Dec 15 2015  2:39PM     

 

                    Generalized anxiety disorder    Dec 15 2015  2:39PM     

 

                    Chronic Obstructive Pulmonary Disease    Dec 15 2015  2:39PM     

 

                    Osteoarthrosis, generalized, multiple sites    Mar 10 2016  2:12PM     

 

                    Pain of right thigh    Mar 10 2016  2:12PM     

 

                    Mild Acute Hip pain, acute, right Improving    Mar 10 2016  2:12PM     

 

                    Anxiety about health    Mar 10 2016  2:12PM     

 

                    Hyperlipidemia      Aug 22 2016 10:58AM     

 

                    Sinoatrial Node Dysfunction    Aug 22 2016 10:58AM     

 

                    Palpitations        Aug 22 2016 10:58AM     

 

                    Chronic Obstructive Pulmonary Disease    Aug 22 2016 10:58AM     

 

                    Exercise hypoxemia    Aug 22 2016 10:58AM     

 

                    Pain in joint; Hip    Aug 22 2016 10:58AM     

 

                    Pulmonary nodule seen on imaging study    Aug 22 2016 10:58AM     

 

                    Eustachian tube dysfunction, bilateral    Nov 10 2016  3:57PM     

 

                    Moderate Acute Cough    Nov 10 2016  3:57PM     

 

                    Pharyngitis, Acute    Nov 10 2016  3:57PM     

 

                    Upper Respiratory Infection    Nov 10 2016  3:57PM     

 

                          COPD (chronic obstructive pulmonary disease) with acute bronchitis    Nov 10 2016 

 3:57PM                                  

 

                    Mild Chronic Cough Worsening    Dec 12 2016 12:12PM     

 

                          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

 12 2016 12:12PM                         

 

                    Moderate Shortness of breath on exertion    Dec 12 2016 12:12PM     

 

                    Hyperlipidemia      May  4 2017 10:27AM     

 

                    Sinoatrial Node Dysfunction    May  4 2017 10:27AM     

 

                    Palpitations        May  4 2017 10:27AM     

 

                    Chronic Obstructive Pulmonary Disease    May  4 2017 10:27AM     

 

                    Exercise hypoxemia    May  4 2017 10:27AM     

 

                    Pain in joint; Hip    May  4 2017 10:27AM     

 

                    Pulmonary nodule seen on imaging study    May  4 2017 10:27AM     

 

                    Chest congestion    May 16 2017  4:04PM     

 

                          COPD (chronic obstructive pulmonary disease) with acute bronchitis    May 16 2017 

 4:04PM                                  

 

                    Productive cough    May 16 2017  4:04PM     

 

                    Anxiety about health    May 16 2017  4:04PM     

 

                          Chronic obstructive pulmonary disease, unspecified COPD type    May 16 2017  4:04PM

                                         

 

                    Exercise hypoxemia    May 16 2017  4:04PM     

 

                    Mixed hyperlipidemia    May 16 2017  4:04PM     

 

                    Medication management    May 16 2017  4:04PM     

 

                    Primary osteoarthritis involving multiple joints    May 16 2017  4:04PM     

 

                    Cellulitis of left lower extremity    2017 12:10PM     

 

                    Bitten by dog, initial encounter    2017 12:10PM     

 

                    Cellulitis of left lower extremity    2017  8:56AM     

 

                    Open bite, left lower leg, sequela    2017  8:56AM     

 

                    Bitten by dog, sequela    2017  8:56AM     

 

                    Medication management    2017  8:56AM     

 

                    Cellulitis of left lower extremity    2017 11:38AM     

 

                    Open bite, left lower leg, subsequent encounter    2017 11:38AM     

 

                    Bitten by dog, subsequent encounter    2017 11:38AM     

 

                    Medication management    2017 11:38AM     

 

                    Cough               Aug 10 2017  4:09PM     

 

                    Abnormal chest xray    Aug 10 2017  4:09PM     

 

                    Hyperlipidemia      Aug 29 2017  9:02AM     

 

                    Sinoatrial Node Dysfunction    Aug 29 2017  9:02AM     

 

                    Palpitations        Aug 29 2017  9:02AM     

 

                    Chronic Obstructive Pulmonary Disease    Aug 29 2017  9:02AM     

 

                    Exercise hypoxemia    Aug 29 2017  9:02AM     

 

                    Pain in joint; Hip    Aug 29 2017  9:02AM     

 

                    Pulmonary nodule seen on imaging study    Aug 29 2017  9:02AM     

 

                    Eustachian tube dysfunction, bilateral    Aug 28 2017  3:30PM     

 

                    Purulent postnasal drainage    Aug 28 2017  3:30PM     

 

                    Chest congestion    Aug 28 2017  3:30PM     

 

                    Upper respiratory tract infection, unspecified type    Aug 28 2017  3:30PM     

 

                    Moderate Acute Sinus pressure    Aug 28 2017  3:30PM     

 

                          COPD (chronic obstructive pulmonary disease) with acute bronchitis    Aug 28 2017 

 3:30PM                                  

 

                    Moderate Chronic Purulent postnasal drainage    Oct 24 2017  1:52PM     

 

                    Mild Chronic Sinus pressure    Oct 24 2017  1:52PM     

 

                          Moderate Chronic Acute seasonal allergic rhinitis, unspecified trigger Stable    Oct

 24 2017  1:52PM                         

 

                    Mild Acute Labyrinthitis    Oct 24 2017  1:52PM     

 

                    Moderate Acute Vertigo    Oct 24 2017  1:52PM     

 

                    Purulent postnasal drainage    2018  3:58PM     

 

                    Upper respiratory tract infection, unspecified type    2018  3:58PM     

 

                    Mild Acute Left Enlarged lymph node in neck    2018  3:58PM     

 

                    Cough               2018  1:36PM     

 

                    Moderate Acute Recurrent Chest congestion    2018  1:36PM     

 

                    COPD exacerbation    2018  1:36PM     

 

                          Chronic obstructive pulmonary disease with acute lower respiratory infection    2018  1:36PM                         

 

                    Acute bronchitis, unspecified    2018  1:36PM     

 

                    Cough               2018  3:29PM     

 

                    Acute pharyngitis, unspecified etiology    2018  3:29PM     

 

                    Chest congestion    2018  3:29PM     

 

                    COPD (chronic obstructive pulmonary disease)    2018  3:29PM     

 

                    Cellulitis of left lower extremity    May 22 2018  2:35PM     

 

                    Cellulitis of left lower extremity    2018 10:48AM     

 

                    Peripheral edema    2018 10:48AM     

 

                    Hyperlipidemia      Aug 22 2018 10:03AM     

 

                    Sinoatrial Node Dysfunction    Aug 22 2018 10:03AM     

 

                    Palpitations        Aug 22 2018 10:03AM     

 

                    Chronic Obstructive Pulmonary Disease    Aug 22 2018 10:03AM     

 

                    Exercise hypoxemia    Aug 22 2018 10:03AM     

 

                    Pain in joint; Hip    Aug 22 2018 10:03AM     

 

                    Pulmonary nodule seen on imaging study    Aug 22 2018 10:03AM     

 

                    Exercise Counseling    Aug 21 2018 10:08AM     

 

                    Moderate Acute Bilateral Peripheral edema    Aug 21 2018 10:08AM     

 

                          Chronic obstructive pulmonary disease, unspecified COPD type    Aug 21 2018 10:08AM

                                         

 

                    Medication management    Aug 21 2018 10:08AM     







Payers







           Insurance Name    Company Name    Plan Name    Plan Number    Policy Number    Policy Group

 Number                                 Start Date

 

                    Medicare RH    Medicare [employer]              [id number]              N/A

 

                          [place] [employer]    [place] [employer]                 [id number]

                                                    N/A

 

                    [employer] Medicare A    Test Medicare A              [id number]              N/A

 

                                [facility]      

                    [id number]                             N/A

 

                    Medicare Part A    Medicare - Lab/Xray              [id number]              N/A

 

                          [facility]    [facility]                 [id number]

                                                    N/A

 

                    Medicare Part A    Medicare Part A              [id number]              N/A

 

                    Medicare Part B    [employer] [place]              [id number]              N/A







History of Encounters







                    Visit Date          Visit Type          Provider

 

                    2018           Office visit        DEON LEON PA

 

                    2018           Office visit        DEON LEON PA

 

                    2018           Office visit        DEON LEON PA

 

                    2018           Office visit        DEON LEON PA

 

                    2018            Office visit        DEON LEON PA

 

                    2018           Office visit        DEON LEON PA

 

                    10/24/2017          Office visit        DEON LEON PA

 

                    2017           Voided              DEON LEON PA

 

                    2017           Office visit        DEON LEON PA

 

                    2017           Office visit        DEON LEON PA

 

                    2017            Office visit        DEON LEON PA

 

                    2017            Office visit        DEON LEON PA

 

                    2017           Office visit        DEON ESCALANTE

 

                    2016          Office visit        DEON LEON PA

 

                    11/10/2016          Office visit        DEON LEON PA

 

                    3/10/2016           Office visit        DEON ESCALANTE

 

                    12/15/2015          Office visit        DEON LEON PA

 

                    10/26/2015          Office visit        DOEN LEON PA

 

                    10/6/2015           Office visit        DEON LEON PA

 

                    10/28/2014          Office visit        DEON LEON PA

 

                    10/16/2014          Office visit        DEON LEON PA

 

                    2014            Office visit        DEON LEON PA

 

                    2014           [facility]            DEWEY Garcia MD

 

                    2014           Office visit        DEON LEON PA

 

                    10/28/2013          Office visit        DEON LEON PA

 

                    10/14/2013          [facility]            DEWEY Garcia MD

 

                    2013           Office visit        DEON LEON PA

 

                    2013            Office visit        DEON LEON PA

 

                    10/25/2012          Office visit        DEON LEON PA

 

                    10/3/2012           Office visit        DEON LEON PA

 

                    3/16/2012           Office visit        DEON LEON PA

 

                    2012            Office visit        DEON ESCALANTE

 

                    2012            [facility]            DEWEY Garcia MD

 

                    2011          Office visit        DEON ESCALANTE

 

                    2011           Office visit        Deon CASTROC

 

                    2011            Office visit        Deon CASTROC

 

                    6/3/2011            Office visit        Deon CASTROC

 

                    2010           Office visit        Deon Leon PA-C

 

                    2010           Office visit        Deon Leon PA-C

 

                    2010           Office visit        Deon Leon PA-C

## 2019-06-25 NOTE — XMS REPORT
MU2 Ambulatory Summary

                             Created on: 2017



Elsi Casillas

External Reference #: [id number]

: 1940

Sex: Female



Demographics







                          Address                   [address] [place]  [address]

 

                          Home Phone                [phone]

 

                          Preferred Language        English

 

                          Marital Status            

 

                          Yarsani Affiliation     Unknown

 

                          Race                      White

 

                          Ethnic Group              Not  or 





Author







                          DEON Ferguson

 

                          [facility]

 

                          Address                   [address]

[place], [place]  [id number]



 

                          Phone                     [phone]







Care Team Providers







                    Care Team Member Name    Role                Phone

 

                    DEON LEON    PCP                 Unavailable

 

                    DEON LEON    PreferredProvider    Unavailable







Allergies and Adverse Reactions







                    Name                Reaction            Notes

 

                    NO KNOWN DRUG ALLERGIES                         







Plan of Treatment







             Planned Activity    Comments     Planned Date    Planned Time    Plan/Goal

 

             Lipid Profile                 2016    12:00 AM      







Medications







                                        Active 

 

             Name         Start Date    Estimated Completion Date    SIG          Comments

 

                Calcium 600 + D(3) 600 mg(1,500mg) -200 unit oral tablet                                    take 2 tablets by

 oral route daily                        

 

                pravastatin 20 mg oral tablet                                    take 1 tablet (20 mg) by oral route once daily

                                         

 

                Toprol XL 25 mg oral tablet extended release 24 hr                                    take 1 tablet (25 mg) 

by oral route once daily                 

 

                hydrochlorothiazide 25 mg oral tablet    1/15/2015                       take 1 tablet (25 mg) by oral

 route once daily for 30 days            

 

                metoprolol succinate 25 mg oral tablet extended release 24 hr    2015                      take

 1 tablet (25 mg) by oral route once daily     

 

                promethazine-codeine 6.25-10 mg/5 mL oral syrup    11/10/2016                      take 5 milliliters

 by oral route every 6 hours as needed, not to exceed 30 mL in 24 hours     

 

                                        ipratropium-albuterol 0.5 mg-3 mg(2.5 mg base)/3 mL inhalation solution for nebulization

                    2016                              inhale 3 milliliters by nebulization route 4 times per day for COPD

                                         

 

                Symbicort 160-4.5 mcg/actuation inhalation HFA aerosol inhaler    11/10/2016                      inhale

 2 puffs by inhalation route 2 times per day in the morning and evening     

 

                metoprolol succinate 25 mg oral tablet extended release 24 hr    3/16/2017                       TAKE

 1 TABLET DAILY                          

 

                prednisone 20 mg oral tablet    2017                       4 x 2 days, 3 x 2 days, 2 x 2 days 1

 x 2 days                                

 

             hydrochlorothiazide 25 mg oral tablet    2017                  TAKE 1 TABLET DAILY     

 

                Augmentin 875-125 mg oral tablet    2017       take 1 tablet by oral route

 every 12 hours for 7 days               









                                         

 

             Name         Start Date    Expiration Date    SIG          Comments

 

                Singulair 10 mg oral tablet    9/3/2010        2011        take 1 tablet (10 mg) by oral route

 daily  for 60 days                      

 

                Zithromax Z-Christopher 250 mg oral tablet    2011       take 2 tablets (500 mg)

 by oral route once daily for 1 day then 1 tablet (250 mg) by oral route once 
daily for 4 days                         

 

                Medrol (Christopher) 4 mg oral tablets,dose pack    2011        take as directed

 for 6 days                              

 

                Keflex 500 mg oral capsule    3/1/2012        3/11/2012       take 1 capsule by oral route 3 

times a day for 10 days                  

 

                Cipro 500 mg oral tablet    2012        take 1 tablet (500 mg) by oral route

 every 12 hours for 15 days              

 

                benzonatate 100 mg oral capsule    2013       take 1 capsule (100 mg) by

 oral route 3 times per day for cough     

 

             Medrol (Christopher) 4 mg oral tablets,dose pack    2013     take as directed    

 

 

                Zyrtec 10 mg oral tablet    2013       take 1 tablet (10 mg) by oral route

 once daily for 30 days                  

 

                Flonase 50 mcg/actuation nasal spray,suspension    2013       inhale 1 spray

 in each nostril by intranasal route 2 times per day for 30 days     

 

                cephalexin 500 mg oral capsule    2013        take 1 capsule (500 mg) by oral

 route every 8 hours                     

 

                promethazine-codeine 6.25-10 mg/5 mL oral syrup    2013                       take 5 milliliters

 by oral route every 4 hours as needed, not to exceed 30 mL in 24 hours     

 

                Zithromax Z-Christopher 250 mg oral tablet    10/2/2013       10/7/2013       take 2 tablets (500 mg)

 by oral route once daily for 1 day then 1 tablet (250 mg) by oral route once 
daily for 4 days                         

 

                amoxicillin 500 mg oral capsule    2014       take 1 capsule by oral route

 3 times a day for 15 days               

 

                lovastatin 20 mg oral tablet    2014        take 1 tablet (20 mg) by oral 

route daily  for 30 days                 

 

                Zithromax Z-Christopher 250 mg oral tablet    2014       take 2 tablets (500 mg)

 by oral route once daily for 1 day then 1 tablet (250 mg) by oral route once 
daily for 4 days                         

 

             Medrol (Christopher) 4 mg oral tablets,dose pack    2014                 take as directed     

 

                amoxicillin 500 mg oral capsule    2014                       take 1 capsule (500 mg) by oral route

 3 times per day                         

 

                Levaquin 500 mg oral tablet    10/16/2014      10/26/2014      take 1 tablet (500 mg) by oral

 route once daily for 10 days            

 

                prednisone 20 mg oral tablet    10/16/2014      10/24/2014      4x2 days 3x2 days 2x2 days

 1x2 days                                

 

                Zithromax Z-Christopher 250 mg oral tablet    2014       take 2 tablets (500 mg)

 by oral route once daily for 1 day then 1 tablet (250 mg) by oral route once 
daily for 4 days                         

 

                Bactrim -160 mg oral tablet    1/15/2015       2015       take 1 tablet by oral route

 every 12 hours for 10 days              

 

                Zithromax Z-Christopher 250 mg oral tablet    2015      take 2 tablets (500 mg)

 by oral route once daily for 1 day then 1 tablet (250 mg) by oral route once 
daily for 4 days                         

 

                Keflex 500 mg oral capsule    2016       take 1 capsule by oral route 3

 times a day for 7 days                  

 

                amoxicillin 500 mg oral capsule    10/4/2016       10/14/2016      take 1 capsule by oral route

 3 times a day for 10 days               

 

                Tessalon Perles 100 mg oral capsule    10/4/2016                       take 1 capsule by oral route 

every 4 hours                            

 

                Zithromax Z-Christopher 250 mg oral tablet    11/10/2016      11/15/2016      take 2 tablets (500 

mg) by oral route once daily for 1 day then 1 tablet (250 mg) by oral route once
daily for 4 days                         

 

                amoxicillin 500 mg oral tablet    2016                       take 1 tablet (500 mg) by oral route

 3 times per day                         

 

                amoxicillin 500 mg oral capsule    2017       take 1 capsule (500 mg) by

 oral route 3 times per day for 10 days     

 

                Levaquin 500 mg oral tablet    2017       take 1 tablet (500 mg) by oral

 route once daily for 10 days            









                                        Discontinued 

 

             Name         Start Date    Discontinued Date    SIG          Comments

 

                amoxicillin 500 mg oral capsule    11/10/2011      10/3/2012       take 1 capsule (500 mg) 

by oral route 3 times per day            

 

                Anusol-HC 25 mg rectal suppository    2011      10/3/2012       insert 1 suppository 

(25 mg) by rectal route 2 times per day     

 

                Proctofoam 1 % topical foam    2011       apply by external route daily

                                         

 

             Aerochamber    2014    Use with inhaler as directed     

 

                hydrochlorothiazide 25 mg oral tablet    1/15/2015       2015      TAKE 1 TABLET DAILY

                                         







Problem List







                    Description         Status              Onset

 

                    Chronic Obstructive Pulmonary Disease    Active               

 

                    Hyperlipidemia      Active               

 

                    Anxiety Disorder    Active              10/29/2013

 

                    Pain in joint; Hip    Active              06/10/2014

 

                    Pulmonary nodule seen on imaging study    Active              10/29/2014

 

                    Exercise hypoxemia    Active              10/29/2014

 

                    Anxiety about health    Active              2016

 

                    Primary osteoarthritis involving multiple joints    Active              2017

 

                    Medication management    Active              2017







Vital Signs







      Date    Time    BP-Sys(mm[Hg]    BP-Noni(mm[Hg])    HR(bpm)    RR(rpm)    Temp    WT    HT    HC    BMI

                    BSA                 BMI Percentile      O2 Sat(%)

 

       2017    12:10:00 PM    122 mmHg    68 mmHg    76 bpm    18 rpm    98 F    143 lbs    63 in    

                25.33 kg/m2     1.70 m2                         98 %

 

        2017    4:03:00 PM    118 mmHg    64 mmHg    106 bpm    18 rpm    97.4 F    137 lbs    63 in

                          24.2682 kg/m    1.662 m                 98 %

 

        2016    12:11:00 PM    120 mmHg    84 mmHg    110 bpm    16 rpm    98.1 F    130 lbs    63

 in                       23.03 kg/m2    1.62 m2                   90 %

 

        11/10/2016    3:57:00 PM    148 mmHg    72 mmHg    88 bpm    16 rpm    98.2 F    132 lbs    63 in

                          23.3825 kg/m    1.6314 m                 98 %

 

        3/10/2016    2:12:00 PM    122 mmHg    60 mmHg    106 bpm    18 rpm    97 F    137 lbs    63 in 

                          24.27 kg/m2    1.66 m2                   93 %

 

        12/15/2015    2:33:00 PM    120 mmHg    75 mmHg    102 bpm    16 rpm    98.2 F    137 lbs    63 

in                        24.2682 kg/m    1.662 m                 94 %

 

        10/26/2015    2:46:00 PM    130 mmHg    80 mmHg    96 bpm    16 rpm    97.9 F    141 lbs    66 in

                          22.76 kg/m2    1.73 m2                   98 %

 

        10/6/2015    9:37:00 AM    140 mmHg    78 mmHg    110 bpm    16 rpm    97.9 F    141 lbs    63 in

                          24.9768 kg/m    1.6861 m                 95 %

 

        10/28/2014    2:25:00 PM    132 mmHg    66 mmHg    92 bpm    24 rpm    97.5 F    147 lbs    63 in

                          26.04 kg/m2    1.72 m2                   92 %

 

        10/16/2014    9:45:00 AM    132 mmHg    64 mmHg    74 bpm    24 rpm    97.7 F    146 lbs    63 in

                          25.8625 kg/m    1.7157 m                 92 %

 

        2014    2:31:00 PM    136 mmHg    68 mmHg    90 bpm    22 rpm    98.2 F    148 lbs    63 in 

                          26.22 kg/m2    1.73 m2                   95 %

 

        2014    3:19:00 PM    124 mmHg    70 mmHg    64 bpm    20 rpm    97.8 F    147 lbs    63 in

                          26.0396 kg/m    1.7216 m                 95 %

 

        10/28/2013    10:31:00 AM    140 mmHg    70 mmHg    92 bpm    24 rpm    97.8 F    143 lbs    63 

in                        25.33 kg/m2    1.70 m2                   95 %

 

      2013    2:51:00 PM    130 mmHg    78 mmHg    90 bpm          98.2 F    168 lbs    65 in          

27.9564 kg/m      1.8694 m                               

 

       2013    2:43:00 PM    110 mmHg    76 mmHg    103 bpm           96.6 F    137 lbs    63 in      

                24.27 kg/m2     1.66 m2                          

 

        2013    9:50:00 AM    150 mmHg    66 mmHg    108 bpm    20 rpm    97.8 F    138 lbs    63 in

                          24.4454 kg/m    1.6681 m                 94 %

 

        10/25/2012    9:08:00 AM    110 mmHg    60 mmHg    88 bpm    18 rpm    97.5 F    142 lbs    63 in

                          25.15 kg/m2    1.69 m2                   94 %

 

      10/3/2012    9:33:00 AM    130 mmHg    60 mmHg    98 bpm    18 rpm          146 lbs    63 in          

25.8625 kg/m      1.7157 m                              95 %

 

      2012    2:31:00 PM    116 mmHg    76 mmHg    88 bpm                140 lbs    63 in          24.80 

kg/m2               1.68 m2                                 96 %

 

     2011    10:02:00 AM    122 mmHg    80 mmHg    110 bpm              144 lbs                        

                                        94 %

 

      2011    2:58:00 PM    124 mmHg    84 mmHg    106 bpm                155 lbs    63 in          27.46

 kg/m2              1.77 m2                                 96 %

 

     2011    9:55:00 AM    114 mmHg    78 mmHg    92 bpm              146 lbs                             95

 %

 

     6/3/2011    8:36:00 AM    116 mmHg    74 mmHg    90 bpm              146 lbs                             96

 %

 

     2010    3:01:00 PM    132 mmHg    84 mmHg    102 bpm              149 lbs                          

                                        94 %

 

     2010    11:46:00 AM    124 mmHg    78 mmHg    104 bpm              151 lbs                         

                                        94 %

 

     2010    8:47:00 AM    116 mmHg    78 mmHg    103 bpm              151 lbs                          

                                        95 %







Social History







                    Name                Description         Comments

 

                    Tobacco             Never smoker         

 

                    denies alcohol use                         







History of Procedures







                    Date Ordered        Description         Order Status

 

                    2011 12:00 AM    THER/PROPH/DIAG INJ SC/IM    Reviewed

 

                    2011 12:00 AM    Decadron Inj.1mg-([facility]) Ndc #[id number]    Reviewed

 

                    2011 12:00 AM    Depo-Medrol 80 Mg Im/[facility] NDC 0009-436078    Reviewed

 

                    2011 12:00 AM    Rocephin, Per 250MG - 1 Gram Vial NDC 8437-778705-[facility]    Reviewed



 

                    3/16/2012 12:00 AM    ROUTINE VENIPUNCTURE    Reviewed

 

                    3/16/2012 12:00 AM    COMPLETE CBC W/AUTO DIFF WBC    Reviewed

 

                    3/16/2012 12:00 AM    COMPREHEN METABOLIC PANEL    Reviewed

 

                    3/16/2012 12:00 AM    ASSAY THYROID STIM HORMONE    Reviewed

 

                    11/10/2016 12:00 AM    Decadron, Per 1 Mg NDC# [address]-89    Reviewed

 

                    11/10/2016 12:00 AM    Depo-Medrol, Per 80 Mg NDC#6199-9632-65    Reviewed

 

                    11/10/2016 12:00 AM    Rocephin 1 gram NDC#6987-0661-95    Reviewed

 

                    2016 12:00 AM    THER/PROPH/DIAG INJ SC/IM    Reviewed

 

                    2016 12:00 AM    Decadron 8mg Injection, [facility]    Reviewed

 

                    2016 12:00 AM    Depo-Medrol 80mg Injection, [facility]    Reviewed

 

                    2016 12:00 AM    Rocephin 1 gram Injection, [facility]    Reviewed

 

                    2017 12:00 AM    COMPLETE CBC W/AUTO DIFF WBC    Reviewed

 

                    2017 12:00 AM    COMPREHEN METABOLIC PANEL    Reviewed

 

                    2017 12:00 AM    LIPID PANEL         Reviewed

 

                    2017 12:00 AM    THERAPEUTIC PROPHYLACTIC/DX INJECTION SUBQ/IM    Reviewed

 

                    2017 12:00 AM    Decadron 8mg Injection, [facility]    Reviewed

 

                    2017 12:00 AM    Depo-Medrol 80mg Injection, [facility]    Reviewed

 

                    10/3/2012 12:00 AM    THER/PROPH/DIAG INJ SC/IM    Reviewed

 

                    10/3/2012 12:00 AM    Decadron, Per 1 Mg NDC# [address]-09    Reviewed

 

                    10/3/2012 12:00 AM    Depo-Medrol, Per 80 Mg NDC#8163-8553-80    Reviewed

 

                    10/3/2012 12:00 AM    Toradol 60 Mg NDC#0174-2081-18    Reviewed

 

                    2013 12:00 AM    THER/PROPH/DIAG INJ SC/IM    Reviewed

 

                    2013 12:00 AM    Decadron, Per 1 Mg NDC# [address]-33    Reviewed

 

                    2013 12:00 AM    Depo-Medrol, Per 80 Mg NDC#4373-5834-81    Reviewed

 

                    2013 12:00 AM    Toradol 60 Mg NDC#0015-3090-53    Reviewed

 

                    2010 12:00 AM    ROUTINE VENIPUNCTURE    Reviewed

 

                    2010 12:00 AM    COMPLETE CBC W/AUTO DIFF WBC    Reviewed

 

                    2010 12:00 AM    COMPREHEN METABOLIC PANEL    Reviewed

 

                    2010 12:00 AM    LIPID PANEL         Reviewed

 

                    10/28/2014 12:00 AM    CHEST X-RAY 4/> VIEWS    Reviewed

 

                    6/3/2011 12:00 AM    ROUTINE VENIPUNCTURE    Reviewed

 

                    6/3/2011 12:00 AM    COMPLETE CBC AUTOMATED    Reviewed

 

                    6/3/2011 12:00 AM    COMPREHEN METABOLIC PANEL    Reviewed

 

                    6/3/2011 12:00 AM    LIPID PANEL         Reviewed

 

                    2011 12:00 AM    THER/PROPH/DIAG INJ SC/IM    Reviewed

 

                    2011 12:00 AM    Decadron Inj.8mg-([facility]) Ndc #[id number]    Reviewed

 

                    2011 12:00 AM    Depo-Medrol 80 Mg Im/[facility] NDC [id number]    Reviewed







Results Summary







                          Date and Description      Results

 

                          6/3/2011 1:52 PM          WBC 8.6 RBC 4.66 HGB 13.20 g/dLHCT 42.10 %MCV 90.0 fLMCH 28.30

 pgMCHC 31.40 g/dLRDW SD 44 RDW CV 13.60 %MPV 10.90 fLPLT 383 NRBC# 0.00 NRBC% 
0.0 %NEUT 68.0 %%LYMP 20.30 %%MONO 9.30 %%EOS 2.20 %%BASO 0.20 %#NEUT 5.81 #LYMP
 1.74 #MONO 0.80 #EOS 0.19 #BASO 0.02 MANUAL DIFF NOT IND 

 

                          6/3/2011 1:53 PM          TRIGLYCERIDES 155.0 mg/dLCHOLESTEROL 248.0 mg/dLHDL 51.0 mg/dLTOT

 CHOL/HDL 4.9 .0 mg/dLGLUCOSE 97.0 mg/dLSODIUM 139.0 mmol/LPOTASSIUM 3.70
 mmol/LCHLORIDE 101.0 mmol/LCO2 27.0 mmol/LBUN 19.0 mg/dLCREATININE 0.90 
mg/dLSGOT/AST 19.0 IU/LSGPT/ALT 11.0 IU/LALK PHOS 111.0 IU/LTOTAL PROTEIN 7.40 
g/dLALBUMIN 4.20 g/dLTOTAL BILI 0.50 mg/dLCALCIUM 9.50 mg/dLAGE 70 GFR NonAA 62 
GFR AA 75 eGFR >60 mL/min/1.73 m2eGFR AA* >60 







History Of Immunizations

Not available.



History of Past Illness







                    Name                Date of Onset       Comments

 

                    Chronic Obstructive Pulmonary Disease                         

 

                    Hyperlipidemia                           

 

                    Cough               2010  8:49AM     

 

                    General Medical Exam, Adult    2010  8:49AM     

 

                    Seasonal Allergies    2010  8:49AM     

 

                    Sinusitis, Chronic    2010  8:49AM     

 

                    Ganglion cyst of synovium, tendon, and bursa; other    2010 11:48AM     

 

                    Anxiety Disorder    10/29/2013           

 

                    Pain in joint; Hip    06/10/2014           

 

                    Pulmonary nodule seen on imaging study    10/29/2014           

 

                    Exercise hypoxemia    10/29/2014           

 

                    Chronic Obstructive Pulmonary Disease    2010  3:02PM     

 

                    Edema               2010  3:02PM     

 

                    Sinusitis, Acute    2010  3:02PM     

 

                    Anxiety about health    2016           

 

                    Chronic Obstructive Pulmonary Disease    Trey  3 2011  8:38AM     

 

                    Palpitations        Trey  3 2011  8:38AM     

 

                    Cough               2011  9:54AM     

 

                    Sinusitis, Acute    2011  9:54AM     

 

                    Seasonal Allergies    2011  9:54AM     

 

                    Post-nasal drainage    2011  9:54AM     

 

                    Primary osteoarthritis involving multiple joints    2017           

 

                    Medication management    2017           

 

                    Cough               Sep 22 2011  2:55PM     

 

                    Seasonal Allergies    Sep 22 2011  2:55PM     

 

                    Pharyngitis, Acute    Sep 22 2011  2:55PM     

 

                    Post-nasal drainage    Sep 22 2011  2:55PM     

 

                    Blood In Stool, Occult    2011  9:58AM     

 

                    Hemorrhoids         2011  9:58AM     

 

                    Chronic Obstructive Pulmonary Disease    2012  2:33PM     

 

                    Cardiac Dysrhythmia    2012  2:33PM     

 

                    Sinoatrial Node Dysfunction    Mar 16 2012  9:12AM     

 

                    Palpitations        Mar 16 2012  9:12AM     

 

                    Osteoarthrosis, generalized, multiple sites    Oct  3 2012  9:34AM     

 

                    Pain in joint; Hip    Oct  3 2012  9:34AM     

 

                    Osteoarthrosis      Oct 25 2012  9:09AM     

 

                    Bronchitis, Acute    Oct 25 2012  9:09AM     

 

                    Cough               Oct 25 2012  9:09AM     

 

                    Pain in joint; Left Hip    Oct 25 2012  9:09AM     

 

                    Pain in joint; Hip    2013  9:50AM     

 

                    Osteoarthrosis, generalized, multiple sites    2013  2:45PM     

 

                    Pain in joint; Hip bilateral    2013  2:45PM     

 

                    Anxiety Disorder    Oct 28 2013 10:32AM     

 

                    Chronic Obstructive Pulmonary Disease    Oct 28 2013 10:32AM     

 

                    Hyperlipidemia      Oct 28 2013 10:32AM     

 

                    Pain in joint; Left Hip    Oct 28 2013 10:32AM     

 

                    Sinusitis, Acute    Oct 28 2013 10:32AM     

 

                    Essential Hypertension    2014  3:19PM     

 

                    Osteoarthrosis, generalized, multiple sites    2014  3:19PM     

 

                    Chronic Obstructive Pulmonary Disease    2014  3:19PM     

 

                    Pain in joint; Hip    2014  3:19PM     

 

                    Chronic Obstructive Pulmonary Disease    2014  2:31PM     

 

                    Pain in joint; Hip    2014  2:31PM     

 

                    pre-operative examination, unspecified    2014  2:31PM     

 

                    Lung nodule seen on imaging study    Oct 28 2014 10:24AM     

 

                    Bronchitis, Acute    Oct 16 2014  9:45AM     

 

                    Respiratory System And Chest Symptoms    Oct 16 2014  9:45AM     

 

                    COPD (chronic obstructive pulmonary disease)    Oct 16 2014  9:45AM     

 

                    Chronic Obstructive Pulmonary Disease    Oct 28 2014  2:26PM     

 

                    Pulmonary nodule seen on imaging study    Oct 28 2014  2:26PM     

 

                    Shortness of breath    Oct 28 2014  2:26PM     

 

                    Exercise hypoxemia    Oct 28 2014  2:26PM     

 

                    Nail avulsion       Oct  6 2015  9:38AM     

 

                          Contusion of left index finger with damage to nail, initial encounter    Oct 26 2015

  2:53PM                                 

 

                    Forehead contusion, subsequent encounter    Dec 15 2015  2:39PM     

 

                    Generalized anxiety disorder    Dec 15 2015  2:39PM     

 

                    Chronic Obstructive Pulmonary Disease    Dec 15 2015  2:39PM     

 

                    Osteoarthrosis, generalized, multiple sites    Mar 10 2016  2:12PM     

 

                    Pain of right thigh    Mar 10 2016  2:12PM     

 

                    Mild Acute Hip pain, acute, right Improving    Mar 10 2016  2:12PM     

 

                    Anxiety about health    Mar 10 2016  2:12PM     

 

                    Hyperlipidemia      Aug 22 2016 10:58AM     

 

                    Sinoatrial Node Dysfunction    Aug 22 2016 10:58AM     

 

                    Palpitations        Aug 22 2016 10:58AM     

 

                    Chronic Obstructive Pulmonary Disease    Aug 22 2016 10:58AM     

 

                    Exercise hypoxemia    Aug 22 2016 10:58AM     

 

                    Pain in joint; Hip    Aug 22 2016 10:58AM     

 

                    Pulmonary nodule seen on imaging study    Aug 22 2016 10:58AM     

 

                    Eustachian tube dysfunction, bilateral    Nov 10 2016  3:57PM     

 

                    Moderate Acute Cough    Nov 10 2016  3:57PM     

 

                    Pharyngitis, Acute    Nov 10 2016  3:57PM     

 

                    Upper Respiratory Infection    Nov 10 2016  3:57PM     

 

                          COPD (chronic obstructive pulmonary disease) with acute bronchitis    Nov 10 2016 

 3:57PM                                  

 

                    Mild Chronic Cough Worsening    Dec 12 2016 12:12PM     

 

                          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

 12 2016 12:12PM                         

 

                    Moderate Shortness of breath on exertion    Dec 12 2016 12:12PM     

 

                    Hyperlipidemia      May  4 2017 10:27AM     

 

                    Sinoatrial Node Dysfunction    May  4 2017 10:27AM     

 

                    Palpitations        May  4 2017 10:27AM     

 

                    Chronic Obstructive Pulmonary Disease    May  4 2017 10:27AM     

 

                    Exercise hypoxemia    May  4 2017 10:27AM     

 

                    Pain in joint; Hip    May  4 2017 10:27AM     

 

                    Pulmonary nodule seen on imaging study    May  4 2017 10:27AM     

 

                    Chest congestion    May 16 2017  4:04PM     

 

                          COPD (chronic obstructive pulmonary disease) with acute bronchitis    May 16 2017 

 4:04PM                                  

 

                    Productive cough    May 16 2017  4:04PM     

 

                    Anxiety about health    May 16 2017  4:04PM     

 

                          Chronic obstructive pulmonary disease, unspecified COPD type    May 16 2017  4:04PM

                                         

 

                    Exercise hypoxemia    May 16 2017  4:04PM     

 

                    Mixed hyperlipidemia    May 16 2017  4:04PM     

 

                    Medication management    May 16 2017  4:04PM     

 

                    Primary osteoarthritis involving multiple joints    May 16 2017  4:04PM     

 

                    Cellulitis of left lower extremity    2017 12:10PM     

 

                    Bitten by dog, initial encounter    2017 12:10PM     







Payers







           Insurance Name    Company Name    Plan Name    Plan Number    Policy Number    Policy Group

 Number                                 Start Date

 

                    Medicare [employer]    Medicare RHC              [id number]              N/A

 

                          [place] [employer] [facility]    [place] [employer]                 [id number]

                                                    N/A

 

                    [employer] Medicare A    Test Medicare A              [id number]              N/A

 

                                [facility] - RHC - [facility] RHC Comm      

                    [id number]                             N/A

 

                    Medicare Part A    Medicare - Lab/Xray              [id number]              N/A

 

                          [employer]    [employer]                 [id number]

                                                    N/A

 

                    Medicare Part A    Medicare Part A              [id number]              N/A

 

                    Medicare Part B    [employer] [place]              [id number]              N/A







History of Encounters







                    Visit Date          Visit Type          Provider

 

                    2017            Office visit        DEON ESCALANTE

 

                    2017           Office visit        DEON ESCALANTE

 

                    2016          Office visit        DEON ESCALANTE

 

                    11/10/2016          Office visit        DEON ESCALANTE

 

                    3/10/2016           Office visit        DEON ESCALANTE

 

                    12/15/2015          Office visit        DEON ESCALANTE

 

                    10/26/2015          Office visit        DEON ESCALANTE

 

                    10/6/2015           Office visit        DEON ESCALANTE

 

                    10/28/2014          Office visit        DEON ESCALANTE

 

                    10/16/2014          Office visit        DEON ESCALANTE

 

                    2014            Office visit        DEON ESCALANTE

 

                    2014           Hospital            DEWEY Garcia MD

 

                    2014           Office visit        DEON ESCALANTE

 

                    10/28/2013          Office visit        DEON ESCALANTE

 

                    10/14/2013          Hospital            DEWEY Garcia MD

 

                    2013           Office visit        DEON ESCALANTE

 

                    2013            Office visit        DEON LEON PA

 

                    10/25/2012          Office visit        DEON LEON PA

 

                    10/3/2012           Office visit        DEON LEON PA

 

                    3/16/2012           Office visit        DEON LEON PA

 

                    2012            Office visit        DEON LEON PA

 

                    2012            [facility]            DEEWY Garcia MD

 

                    2011          Office visit        DEON LEON PA

 

                    2011           Office visit        Deon Leon PA-C

 

                    2011            Office visit        Deon Leon PA-C

 

                    6/3/2011            Office visit        Deon Leon PA-C

 

                    2010           Office visit        Deon Leon PA-C

 

                    2010           Office visit        Doen Leon PA-C

 

                    2010           Office visit        Deon Leon PA-C

## 2019-06-25 NOTE — DIAGNOSTIC IMAGING REPORT
PROCEDURE: CT angiography of the head and CT angiography of the

neck with and without contrast.



TECHNIQUE: Contiguous noncontrast images were obtained from the

skull base through the vertex. After intravenous contrast

administration, helical CT angiography of the neck was performed.

Source data was reformatted into multiple MIP projections.

Delayed post contrast acquisition was also obtained. Auto

Exposure Controls were utilized during the CT exam to meet ALARA

standards for radiation dose reduction. 



INDICATION:  Unresponsive, altered middle status.



COMPARISON: CT head of earlier same day



FINDINGS:



CTA NECK:



Aorta: Aortic arch is normal, with 2 vessel branching pattern. 

The great vessels are widely patent at their origin.



Anterior Circulation: The origin of the bilateral common carotid

arteries are patent. No stenosis of the common carotid arteries

in the neck. No significant stenosis of the internal carotid

arteries per NASCET criteria. The cervical segments of the

bilateral ICAs are patent.  The proximal external carotid

arteries are patent and without significant stenosis. 



Posterior Circulation: Origins of the bilateral vertebral

arteries are normal.  Vertebral arteries are co-dominant.  The

proximal extraousseous, intrasosseous, and distal extraosseous

segments of the vertebral arteries are patent without dissection

or stenosis. 



Non-vascular: No cervical lymphadenopathy.  The airway is patent.

No evidence of mucosal-based mass lesion in the pharynx.  Thyroid

is normal.  Salivary glands are normal.  No concerning lesion in

the cervical spine. 



CTA HEAD:



Anterior Circulation: The distal internal carotid arteries are

patent.  The bilateral M1 and M2 segments of the middle cerebral

arteries are patent and without stenosis.  The bilateral M3 and

M4 segments are symmetric in size and number.  The anterior

cerebral arteries are patent and without stenosis. Anterior

communicating artery is patent.  No saccular aneurysm in the

anterior circulation.  



Posterior Circulation: The bilateral intracranial segments of the

vertebral arteries are patent.  The basilar artery is patent and

without stenosis.  The posterior cerebral arteries are patent. 

Bilateral posterior communicating arteries are hypoplastic.  No

saccular aneurysm in the posterior circulation. 



Post Contrast Head: No pathologic enhancement on delayed

post-contrast enhancement. 



IMPRESSION:

1. No intra-cranial major arterial occlusion, significant

stenosis or aneurysm.  Specifically, the basilar artery is widely

patent

2. No arterial occlusion or stenosis in the major neck arteries. 



Dictated by: 



  Dictated on workstation # [id number]

## 2019-06-25 NOTE — XMS REPORT
MU2 Ambulatory Summary

                             Created on: 2019



Elsi Casillas

External Reference #: [id number]

: 1940

Sex: Female



Demographics







                          Address                   [address] [place]  [address]

 

                          Home Phone                [phone]

 

                          Preferred Language        English

 

                          Marital Status            

 

                          Quaker Affiliation     Unknown

 

                          Race                      White

 

                          Ethnic Group              Not  or 





Author







                          Author                    DEON LEON

 

                          [facility]

 

                          Address                   [address]

[place], [place]  [id number]



 

                          Phone                     [phone]







Care Team Providers







                    Care Team Member Name    Role                Phone

 

                    DEON LEON    PCP                 [phone]

 

                    DEON LEON    PreferredProvider    [phone]







Allergies and Adverse Reactions







                    Name                Reaction            Notes

 

                    SULFA (SULFONAMIDES)    nausea               







Plan of Treatment







             Planned Activity    Comments     Planned Date    Planned Time    Plan/Goal

 

             Lipid Profile                 2016    12:00 AM      

 

             Chest PA and Lateral - Main                 8/10/2017    12:00 AM      

 

             Lipid Profile                 2018    12:00 AM      

 

             Chest PA and Lateral - Main                 2019    12:00 AM      

 

             Sinuses Limited - Main                 2019    12:00 AM      







Medications







                                        Active 

 

             Name         Start Date    Estimated Completion Date    SIG          Comments

 

                Calcium 600 + D(3) 600 mg(1,500mg) -200 unit oral tablet                                    take 2 tablets by

 oral route daily                        

 

                pravastatin 20 mg oral tablet                                    take 1 tablet (20 mg) by oral route once daily

                                         

 

                Toprol XL 25 mg oral tablet extended release 24 hr                                    take 1 tablet (25 mg) 

by oral route once daily                 

 

                    albuterol sulfate 1.25 mg/3 mL inhalation solution for nebulization    2017           

                                        inhale 3 milliliters (1.25 mg) via nebulizer by inhalation route 4 times per day

  DX J44.9                               

 

                                        ipratropium-albuterol 0.5 mg-3 mg(2.5 mg base)/3 mL inhalation solution for nebulization

                    2018                                inhale 3 milliliters by nebulization route 4 times per day for COPD

                                         

 

                Lasix 40 mg oral tablet    2018                       take 1 tablet (40 mg) by oral route once 

daily                                    

 

                Sudogest 30 mg oral tablet    2018                      take 1-2 tablets by oral route every 

6 hours as needed                        

 

             lisinopril 10 mg oral tablet    2019                 TAKE 1 TABLET BY MOUTH ONCE DAILY     



 

             hydrochlorothiazide 25 mg oral tablet    2019                 TAKE 1 TABLET DAILY     

 

                metoprolol succinate 25 mg oral tablet extended release 24 hr    2019                       TAKE

 1 TABLET DAILY                          

 

                Cozaar 50 mg oral tablet    6/10/2019                       take 1 tablet (50 mg) by oral route once

 daily                                   









                                         

 

             Name         Start Date    Expiration Date    SIG          Comments

 

                Singulair 10 mg oral tablet    9/3/2010        2011        take 1 tablet (10 mg) by oral route

 daily  for 60 days                      

 

                Zithromax Z-Christopher 250 mg oral tablet    2011       take 2 tablets (500 mg)

 by oral route once daily for 1 day then 1 tablet (250 mg) by oral route once 
daily for 4 days                         

 

                Medrol (Christopher) 4 mg oral tablets,dose pack    2011        take as directed

 for 6 days                              

 

                Keflex 500 mg oral capsule    3/1/2012        3/11/2012       take 1 capsule by oral route 3 

times a day for 10 days                  

 

                Cipro 500 mg oral tablet    2012        take 1 tablet (500 mg) by oral route

 every 12 hours for 15 days              

 

                benzonatate 100 mg oral capsule    2013       take 1 capsule (100 mg) by

 oral route 3 times per day for cough     

 

             Medrol (Christopher) 4 mg oral tablets,dose pack    2013     take as directed    

 

 

                Zyrtec 10 mg oral tablet    2013       take 1 tablet (10 mg) by oral route

 once daily for 30 days                  

 

                Flonase 50 mcg/actuation nasal spray,suspension    2013       inhale 1 spray

 in each nostril by intranasal route 2 times per day for 30 days     

 

                cephalexin 500 mg oral capsule    2013        take 1 capsule (500 mg) by oral

 route every 8 hours                     

 

                promethazine-codeine 6.25-10 mg/5 mL oral syrup    2013                       take 5 milliliters

 by oral route every 4 hours as needed, not to exceed 30 mL in 24 hours     

 

                Zithromax Z-Christopher 250 mg oral tablet    10/2/2013       10/7/2013       take 2 tablets (500 mg)

 by oral route once daily for 1 day then 1 tablet (250 mg) by oral route once 
daily for 4 days                         

 

                amoxicillin 500 mg oral capsule    2014       take 1 capsule by oral route

 3 times a day for 15 days               

 

                lovastatin 20 mg oral tablet    2014        take 1 tablet (20 mg) by oral 

route daily  for 30 days                 

 

                Zithromax Z-Christopher 250 mg oral tablet    2014       take 2 tablets (500 mg)

 by oral route once daily for 1 day then 1 tablet (250 mg) by oral route once 
daily for 4 days                         

 

             Medrol (Christopher) 4 mg oral tablets,dose pack    2014                 take as directed     

 

                amoxicillin 500 mg oral capsule    2014                       take 1 capsule (500 mg) by oral route

 3 times per day                         

 

                Levaquin 500 mg oral tablet    10/16/2014      10/26/2014      take 1 tablet (500 mg) by oral

 route once daily for 10 days            

 

                prednisone 20 mg oral tablet    10/16/2014      10/24/2014      4x2 days 3x2 days 2x2 days

 1x2 days                                

 

                Zithromax Z-Christopher 250 mg oral tablet    2014       take 2 tablets (500 mg)

 by oral route once daily for 1 day then 1 tablet (250 mg) by oral route once 
daily for 4 days                         

 

                Bactrim -160 mg oral tablet    1/15/2015       2015       take 1 tablet by oral route

 every 12 hours for 10 days              

 

                Zithromax Z-Christopher 250 mg oral tablet    2015      take 2 tablets (500 mg)

 by oral route once daily for 1 day then 1 tablet (250 mg) by oral route once 
daily for 4 days                         

 

                Keflex 500 mg oral capsule    2016       take 1 capsule by oral route 3

 times a day for 7 days                  

 

                amoxicillin 500 mg oral capsule    10/4/2016       10/14/2016      take 1 capsule by oral route

 3 times a day for 10 days               

 

                Tessalon Perles 100 mg oral capsule    10/4/2016                       take 1 capsule by oral route 

every 4 hours                            

 

                Zithromax Z-Christopher 250 mg oral tablet    11/10/2016      11/15/2016      take 2 tablets (500 

mg) by oral route once daily for 1 day then 1 tablet (250 mg) by oral route once
daily for 4 days                         

 

                amoxicillin 500 mg oral tablet    2016                       take 1 tablet (500 mg) by oral route

 3 times per day                         

 

                amoxicillin 500 mg oral capsule    2017       take 1 capsule (500 mg) by

 oral route 3 times per day for 10 days     

 

                Bactrim -160 mg oral tablet    2017       take 1 tablet by oral route

 2 times a day for 10 days               

 

                Levaquin 500 mg oral tablet    2017        take 1 tablet (500 mg) by oral

 route once daily for 10 days            

 

                amoxicillin 500 mg oral capsule    2017                       take 1 capsule (500 mg) by oral route

 every 12 hours for 7 days               

 

                Zithromax Z-Christopher 250 mg oral tablet    2017                      take 2 tablets (500 mg) by oral

 route once daily for 1 day then 1 tablet (250 mg) by oral route once daily for 
4 days                                   

 

                amoxicillin 500 mg oral tablet    2018                       take 1 tablet (500 mg) by oral route

 every 12 hours for 10 days              

 

                Cipro 500 mg oral tablet    2018        2/15/2018       take 1 tablet (500 mg) by oral route

 2 times per day for 10 days             

 

                Zithromax Z-Christopher 250 mg oral tablet    2018       take 2 tablets (500 mg)

 by oral route once daily for 1 day then 1 tablet (250 mg) by oral route once 
daily for 4 days                         

 

                Keflex 500 mg oral capsule    2018       take 1 capsule (500 mg) by oral

 route every 12 hours for 7 days         

 

                prednisone 20 mg oral tablet    2018       2X4 days 1X4 days 1/2X4 days 

                                         

 

                Levaquin 500 mg oral tablet    2018       take 1 tablet (500 mg) by oral

 route once daily for 7 days             

 

                Medrol (Christopher) 4 mg oral tablets,dose pack    5/10/2019       5/15/2019       take as directed

 for 5 days                              

 

                amoxicillin 500 mg oral capsule    2019       6/10/2019       take 1 capsule (500 mg) by

 oral route 3 times per day for 10 days     









                                        Discontinued 

 

             Name         Start Date    Discontinued Date    SIG          Comments

 

                amoxicillin 500 mg oral capsule    11/10/2011      10/3/2012       take 1 capsule (500 mg) 

by oral route 3 times per day            

 

                Anusol-HC 25 mg rectal suppository    2011      10/3/2012       insert 1 suppository 

(25 mg) by rectal route 2 times per day     

 

                Proctofoam 1 % topical foam    2011       apply by external route daily

                                         

 

             Aerochamber    2014    Use with inhaler as directed     

 

                hydrochlorothiazide 25 mg oral tablet    1/15/2015       2015      TAKE 1 TABLET DAILY

                                         

 

                promethazine-codeine 6.25-10 mg/5 mL oral syrup    11/10/2016      10/25/2017      take 5 

milliliters by oral route every 6 hours as needed, not to exceed 30 mL in 24 
hours                                    

 

                prednisone 20 mg oral tablet    2017       10/25/2017      4 x 2 days, 3 x 2 days, 2 x

 2 days 1 x 2 days                       

 

                Augmentin 875-125 mg oral tablet    2017       take 1 tablet by oral route

 every 12 hours for 7 days               

 

                Keflex 500 mg oral capsule    2017       take 1 capsule (500 mg) by oral

 route every 12 hours                    

 

                Keflex 500 mg oral capsule    10/10/2017      2018       take 1 capsule (500 mg) by oral

 route every 12 hours for 10 days        

 

                    Symbicort 160-4.5 mcg/actuation inhalation HFA aerosol inhaler    10/25/2017          10/6/2018

                          inhale 2 puffs by inhalation route 2 times per day in the morning and evening    

 

 

                Levaquin 500 mg oral tablet    2018       take 1 tablet (500 mg) by oral

 route once daily for 10 days            

 

                Bactrim -160 mg oral tablet    2018       take 1 tablet by oral route

 every 12 hours for 10 days             nausea

 

                Tessalon Perles 100 mg oral capsule    2018        10/6/2018       take 1 capsule (100 mg)

 by oral route 3 times per day as needed for cough     

 

                Sudogest 30 mg oral tablet    2018        10/6/2018       take 1 tablets (60 mg) by oral 

route every 6 hours as needed            

 

                fluticasone 50 mcg/actuation nasal spray,suspension    10/2/2018       2019       inhale

 1 spray (50 mcg) in each nostril by intranasal route 2 times per day     

 

                doxycycline hyclate 100 mg oral capsule    3/5/2019        2019       take 1 capsule (100

 mg) by oral route 2 times per day       







Problem List







                    Description         Status              Onset

 

                    Chronic Obstructive Pulmonary Disease    Active               

 

                    Hyperlipidemia      Active               

 

                    Anxiety disorder    Active              10/29/2013

 

                    Pain in joint; Hip    Active              06/10/2014

 

                    Pulmonary nodule seen on imaging study    Active              10/29/2014

 

                    Exercise hypoxemia    Active              10/29/2014

 

                    Anxiety about health    Active              2016

 

                    Primary osteoarthritis involving multiple joints    Active              2017

 

                    Medication management    Active              2017

 

                    Cellulitis of left lower extremity    Active              2017

 

                    Dog bite of calf, left, sequela    Active              2017

 

                    Peripheral edema    Active              2018







Vital Signs







      Date    Time    BP-Sys(mm[Hg]    BP-Noni(mm[Hg])    HR(bpm)    RR(rpm)    Temp    WT    HT    HC    BMI

                    BSA                 BMI Percentile      O2 Sat(%)

 

        2019    11:23:00 AM    132 mmHg    80 mmHg    80 bpm    18 rpm    98.2 F    143 lbs    63 in

                          25.3311 kg/m    1.698 m                 98 %

 

        2019    11:25:00 AM    118 mmHg    74 mmHg    82 bpm    18 rpm    98.4 F    139 lbs    62 in

                          25.42 kg/m2    1.66 m2                   98 %

 

        2019    3:13:00 PM    136 mmHg    86 mmHg    110 bpm    20 rpm    98.1 F    144 lbs    62 in

                          26.3377 kg/m    1.6904 m                 88 %

 

        2019    3:30:00 PM    108 mmHg    72 mmHg    82 bpm    18 rpm    98.1 F    142 lbs    62 in 

                          25.97 kg/m2    1.68 m2                   89 %

 

        2018    9:30:00 AM    154 mmHg    100 mmHg    108 bpm    20 rpm    98.1 F    145 lbs    62 

in                        26.5206 kg/m    1.6962 m                 91 %

 

        10/4/2018    9:00:00 AM    114 mmHg    74 mmHg    76 bpm    18 rpm    98.4 F    145 lbs    61 in

                          27.40 kg/m2    1.68 m2                   97 %

 

        2018    10:07:00 AM    124 mmHg    82 mmHg    86 bpm    18 rpm    98.2 F    149 lbs    61 in

                          28.15 kg/m2    1.71 m2                   92 %

 

        2018    10:48:00 AM    118 mmHg    80 mmHg    82 bpm    18 rpm    98.3 F    144 lbs    61 in

                          27.2083 kg/m    1.6767 m                 93 %

 

        2018    2:35:00 PM    120 mmHg    82 mmHg    106 bpm    20 rpm    98.2 F    142 lbs    62 in

                          25.97 kg/m2    1.68 m2                   92 %

 

       2018    3:28:00 PM    122 mmHg    68 mmHg    114 bpm    18 rpm    98.2 F    142 lbs            

                                                                90 %

 

        2018    1:35:00 PM    112 mmHg    74 mmHg    114 bpm    18 rpm    99.6 F    139 lbs    63 in

                          24.6225 kg/m    1.6741 m                 98 %

 

       2018    3:57:00 PM    128 mmHg    80 mmHg    78 bpm    18 rpm    98.4 F    148 lbs             

                                                                90 %

 

        10/24/2017    1:51:00 PM    132 mmHg    80 mmHg    82 bpm    16 rpm    98.2 F    145 lbs    62 in

                          26.52 kg/m2    1.70 m2                   95 %

 

       2017    3:29:00 PM    128 mmHg    80 mmHg    68 bpm    16 rpm    98 F    144 lbs    63 in    

                25.5082 kg/m    1.7039 m                      93 %

 

        2017    11:37:00 AM    118 mmHg    72 mmHg    96 bpm    16 rpm    97.4 F    141 lbs    63 in

                          24.98 kg/m2    1.69 m2                   91 %

 

        2017    8:55:00 AM    105 mmHg    60 mmHg    96 bpm    18 rpm    95.8 F    142 lbs    63 in 

                          25.1539 kg/m    1.6921 m                 95 %

 

       2017    12:10:00 PM    122 mmHg    68 mmHg    76 bpm    18 rpm    98 F    143 lbs    63 in    

                25.33 kg/m2     1.70 m2                         98 %

 

        2017    4:03:00 PM    118 mmHg    64 mmHg    106 bpm    18 rpm    97.4 F    137 lbs    63 in

                          24.2682 kg/m    1.662 m                 98 %

 

        2016    12:11:00 PM    120 mmHg    84 mmHg    110 bpm    16 rpm    98.1 F    130 lbs    63

 in                       23.03 kg/m2    1.62 m2                   90 %

 

        11/10/2016    3:57:00 PM    148 mmHg    72 mmHg    88 bpm    16 rpm    98.2 F    132 lbs    63 in

                          23.3825 kg/m    1.6314 m                 98 %

 

        3/10/2016    2:12:00 PM    122 mmHg    60 mmHg    106 bpm    18 rpm    97 F    137 lbs    63 in 

                          24.27 kg/m2    1.66 m2                   93 %

 

        12/15/2015    2:33:00 PM    120 mmHg    75 mmHg    102 bpm    16 rpm    98.2 F    137 lbs    63 

in                        24.2682 kg/m    1.662 m                 94 %

 

        10/26/2015    2:46:00 PM    130 mmHg    80 mmHg    96 bpm    16 rpm    97.9 F    141 lbs    66 in

                          22.76 kg/m2    1.73 m2                   98 %

 

        10/6/2015    9:37:00 AM    140 mmHg    78 mmHg    110 bpm    16 rpm    97.9 F    141 lbs    63 in

                          24.9768 kg/m    1.6861 m                 95 %

 

        10/28/2014    2:25:00 PM    132 mmHg    66 mmHg    92 bpm    24 rpm    97.5 F    147 lbs    63 in

                          26.04 kg/m2    1.72 m2                   92 %

 

        10/16/2014    9:45:00 AM    132 mmHg    64 mmHg    74 bpm    24 rpm    97.7 F    146 lbs    63 in

                          25.8625 kg/m    1.7157 m                 92 %

 

        2014    2:31:00 PM    136 mmHg    68 mmHg    90 bpm    22 rpm    98.2 F    148 lbs    63 in 

                          26.22 kg/m2    1.73 m2                   95 %

 

        2014    3:19:00 PM    124 mmHg    70 mmHg    64 bpm    20 rpm    97.8 F    147 lbs    63 in

                          26.0396 kg/m    1.7216 m                 95 %

 

        10/28/2013    10:31:00 AM    140 mmHg    70 mmHg    92 bpm    24 rpm    97.8 F    143 lbs    63 

in                        25.33 kg/m2    1.70 m2                   95 %

 

      2013    2:51:00 PM    130 mmHg    78 mmHg    90 bpm          98.2 F    168 lbs    65 in          

27.9564 kg/m      1.8694 m                               

 

       2013    2:43:00 PM    110 mmHg    76 mmHg    103 bpm           96.6 F    137 lbs    63 in      

                24.27 kg/m2     1.66 m2                          

 

        2013    9:50:00 AM    150 mmHg    66 mmHg    108 bpm    20 rpm    97.8 F    138 lbs    63 in

                          24.4454 kg/m    1.6681 m                 94 %

 

        10/25/2012    9:08:00 AM    110 mmHg    60 mmHg    88 bpm    18 rpm    97.5 F    142 lbs    63 in

                          25.15 kg/m2    1.69 m2                   94 %

 

      10/3/2012    9:33:00 AM    130 mmHg    60 mmHg    98 bpm    18 rpm          146 lbs    63 in          

25.8625 kg/m      1.7157 m                              95 %

 

      2012    2:31:00 PM    116 mmHg    76 mmHg    88 bpm                140 lbs    63 in          24.80 

kg/m2               1.68 m2                                 96 %

 

     2011    10:02:00 AM    122 mmHg    80 mmHg    110 bpm              144 lbs                        

                                        94 %

 

      2011    2:58:00 PM    124 mmHg    84 mmHg    106 bpm                155 lbs    63 in          27.4567

 kg/m             1.7678 m                              96 %

 

     2011    9:55:00 AM    114 mmHg    78 mmHg    92 bpm              146 lbs                             95

 %

 

     6/3/2011    8:36:00 AM    116 mmHg    74 mmHg    90 bpm              146 lbs                             96

 %

 

     2010    3:01:00 PM    132 mmHg    84 mmHg    102 bpm              149 lbs                          

                                        94 %

 

     2010    11:46:00 AM    124 mmHg    78 mmHg    104 bpm              151 lbs                         

                                        94 %

 

     2010    8:47:00 AM    116 mmHg    78 mmHg    103 bpm              151 lbs                          

                                        95 %







Social History







                    Name                Description         Comments

 

                    Alcohol             Never                

 

                    Uses seatbelts                           

 

                    Tobacco             Never smoker         







History of Procedures







                    Date Ordered        Description         Order Status

 

                    2011 12:00 AM    THER/PROPH/DIAG INJ SC/IM    Reviewed

 

                    2011 12:00 AM    Decadron Inj.1mg-([facility]) N[facility] #[id number]    Reviewed

 

                    2011 12:00 AM    Depo-Medrol 80 Mg Im/[facility] [id number]    Reviewed

 

                    2011 12:00 AM    Rocephin, Per 250MG - 1 Gram Vial NDC [id number]-[id number]    Reviewed



 

                    3/16/2012 12:00 AM    ROUTINE VENIPUNCTURE    Reviewed

 

                    3/16/2012 12:00 AM    COMPLETE CBC W/AUTO DIFF WBC    Reviewed

 

                    3/16/2012 12:00 AM    COMPREHEN METABOLIC PANEL    Reviewed

 

                    3/16/2012 12:00 AM    ASSAY THYROID STIM HORMONE    Reviewed

 

                    11/10/2016 12:00 AM    Decadron, Per 1 Mg N[facility]# [address]-31    Reviewed

 

                    11/10/2016 12:00 AM    Depo-Medrol, Per 80 Mg NDC#7071-3343-90    Reviewed

 

                    11/10/2016 12:00 AM    Rocephin 1 gram NDC#3332-5623-14    Reviewed

 

                    2016 12:00 AM    THER/PROPH/DIAG INJ SC/IM    Reviewed

 

                    2016 12:00 AM    Decadron 8mg Injection, [facility]    Reviewed

 

                    2016 12:00 AM    Depo-Medrol 80mg Injection, [facility]    Reviewed

 

                    2016 12:00 AM    Rocephin 1 gram Injection, [facility]    Reviewed

 

                    2017 12:00 AM    COMPLETE CBC W/AUTO DIFF WBC    Reviewed

 

                    2017 12:00 AM    COMPREHEN METABOLIC PANEL    Reviewed

 

                    2017 12:00 AM    LIPID PANEL         Reviewed

 

                    2017 12:00 AM    THERAPEUTIC PROPHYLACTIC/DX INJECTION SUBQ/IM    Reviewed

 

                    2017 12:00 AM    Decadron 8mg Injection, [facility]    Reviewed

 

                    2017 12:00 AM    Depo-Medrol 80mg Injection, [facility] Medicare    Reviewed

 

                    2017 12:00 AM    LIPID PANEL         Returned

 

                    2017 12:00 AM    THERAPEUTIC PROPHYLACTIC/DX INJECTION SUBQ/IM    Reviewed

 

                    2017 12:00 AM    Decadron 8mg Injection, [facility]    Reviewed

 

                    2017 12:00 AM    Depo-Medrol 80mg Injection, [facility]    Reviewed

 

                    10/3/2012 12:00 AM    THER/PROPH/DIAG INJ SC/IM    Reviewed

 

                    10/3/2012 12:00 AM    Decadron, Per 1 Mg NDC# [address]-66    Reviewed

 

                    10/3/2012 12:00 AM    Depo-Medrol, Per 80 Mg NDC#0490-6143-73    Reviewed

 

                    10/3/2012 12:00 AM    Toradol 60 Mg NDC#4036-8427-75    Reviewed

 

                    10/24/2017 12:00 AM    THERAPEUTIC PROPHYLACTIC/DX INJECTION SUBQ/IM    Reviewed

 

                    10/24/2017 12:00 AM    Decadron 8mg Injection, [facility]    Reviewed

 

                    10/24/2017 12:00 AM    Depo-Medrol 80mg Injection, [facility]    Reviewed

 

                    2018 12:00 AM    THERAPEUTIC PROPHYLACTIC/DX INJECTION SUBQ/IM    Reviewed

 

                    2018 12:00 AM    Rocephin 1 gram Injection, [facility]    Reviewed

 

                    2018 12:00 AM    THERAPEUTIC PROPHYLACTIC/DX INJECTION SUBQ/IM    Reviewed

 

                    2018 12:00 AM    Decadron 8mg Injection, [facility]    Reviewed

 

                    2018 12:00 AM    Depo-Medrol 80mg Injection, [facility]    Reviewed

 

                    2018 12:00 AM    Rocephin 1 gram Injection, [facility]    Reviewed

 

                    2018 12:00 AM    COMPLETE CBC W/AUTO DIFF WBC    Returned

 

                    2018 12:00 AM    COMPREHEN METABOLIC PANEL    Returned

 

                    2018 12:00 AM    ELECTROCARDIOGRAM TRACING    Reviewed

 

                    2013 12:00 AM    THER/PROPH/DIAG INJ SC/IM    Reviewed

 

                    2013 12:00 AM    Decadron, Per 1 Mg NDC# [address]-99    Reviewed

 

                    2013 12:00 AM    Depo-Medrol, Per 80 Mg NDC#7413-3530-25    Reviewed

 

                    2013 12:00 AM    Toradol 60 Mg NDC#4830-8316-30    Reviewed

 

                    2019 12:00 AM    THERAPEUTIC PROPHYLACTIC/DX INJECTION SUBQ/IM    Reviewed

 

                    2019 12:00 AM    Decadron 8mg Injection, [facility]    Reviewed

 

                    2019 12:00 AM    Depo-Medrol 80mg Injection, [facility]    Reviewed

 

                    2019 12:00 AM    THERAPEUTIC PROPHYLACTIC/DX INJECTION SUBQ/IM    Reviewed

 

                    2019 12:00 AM    Decadron 8mg Injection, [facility] Medicare    Reviewed

 

                    2019 12:00 AM    Rocephin 1 gram Injection, [facility] Medicare    Reviewed

 

                    2019 12:00 AM    THERAPEUTIC PROPHYLACTIC/DX INJECTION SUBQ/IM    Reviewed

 

                    2019 12:00 AM    Decadron 8mg Injection, [facility]    Reviewed

 

                    2019 12:00 AM    LIPID PANEL         Returned

 

                    2010 12:00 AM    ROUTINE VENIPUNCTURE    Reviewed

 

                    2010 12:00 AM    COMPLETE CBC W/AUTO DIFF WBC    Reviewed

 

                    2010 12:00 AM    COMPREHEN METABOLIC PANEL    Reviewed

 

                    2010 12:00 AM    LIPID PANEL         Reviewed

 

                    10/28/2014 12:00 AM    CHEST X-RAY 4/> VIEWS    Reviewed

 

                    6/3/2011 12:00 AM    ROUTINE VENIPUNCTURE    Reviewed

 

                    6/3/2011 12:00 AM    COMPLETE CBC AUTOMATED    Reviewed

 

                    6/3/2011 12:00 AM    COMPREHEN METABOLIC PANEL    Reviewed

 

                    6/3/2011 12:00 AM    LIPID PANEL         Reviewed

 

                    2011 12:00 AM    THER/PROPH/DIAG INJ SC/IM    Reviewed

 

                    2011 12:00 AM    Decadron Inj.8mg-([facility] Ndc #[id number]    Reviewed

 

                    2011 12:00 AM    Depo-Medrol 80 Mg Im/[facility] NDC [id number]    Reviewed







Results Summary







                          Date and Description      Results

 

                          6/3/2011 1:53 PM          TRIGLYCERIDES 155.0 mg/dLCHOLESTEROL 248.0 mg/dLHDL 51.0 mg/dLTOT

 CHOL/HDL 4.9 .0 mg/dLGLUCOSE 97.0 mg/dLSODIUM 139.0 mmol/LPOTASSIUM 3.70
 mmol/LCHLORIDE 101.0 mmol/LCO2 27.0 mmol/LBUN 19.0 mg/dLCREATININE 0.90 
mg/dLSGOT/AST 19.0 IU/LSGPT/ALT 11.0 IU/LALK PHOS 111.0 IU/LTOTAL PROTEIN 7.40 
g/dLALBUMIN 4.20 g/dLTOTAL BILI 0.50 mg/dLCALCIUM 9.50 mg/dLAGE 70 GFR NonAA 62 
GFR AA 75 eGFR >60 mL/min/1.73 m2eGFR AA* >60 







History Of Immunizations

Not available.



History of Past Illness







                    Name                Date of Onset       Comments

 

                    Chronic Obstructive Pulmonary Disease                         

 

                    Hyperlipidemia                           

 

                    Cough               2010  8:49AM     

 

                    General Medical Exam, Adult    2010  8:49AM     

 

                    Seasonal Allergies    2010  8:49AM     

 

                    Sinusitis, Chronic    2010  8:49AM     

 

                    Ganglion cyst of synovium, tendon, and bursa; other    2010 11:48AM     

 

                    Anxiety disorder    10/29/2013           

 

                    Pain in joint; Hip    06/10/2014           

 

                    Pulmonary nodule seen on imaging study    10/29/2014           

 

                    Exercise hypoxemia    10/29/2014           

 

                    Chronic Obstructive Pulmonary Disease    2010  3:02PM     

 

                    Edema               2010  3:02PM     

 

                    Sinusitis, Acute    2010  3:02PM     

 

                    Anxiety about health    2016           

 

                    Chronic Obstructive Pulmonary Disease    Trey  3 2011  8:38AM     

 

                    Palpitations        Trey  3 2011  8:38AM     

 

                    Cough               2011  9:54AM     

 

                    Sinusitis, Acute    2011  9:54AM     

 

                    Seasonal Allergies    2011  9:54AM     

 

                    Post-nasal drainage    2011  9:54AM     

 

                    Primary osteoarthritis involving multiple joints    2017           

 

                    Medication management    2017           

 

                    Cellulitis of left lower extremity    2017           

 

                    Dog bite of calf, left, sequela    2017           

 

                    Cough               Sep 22 2011  2:55PM     

 

                    Seasonal Allergies    Sep 22 2011  2:55PM     

 

                    Pharyngitis, Acute    Sep 22 2011  2:55PM     

 

                    Post-nasal drainage    Sep 22 2011  2:55PM     

 

                    Peripheral edema    2018           

 

                    Blood In Stool, Occult    2011  9:58AM     

 

                    Hemorrhoids         2011  9:58AM     

 

                    Chronic Obstructive Pulmonary Disease    Fe2012  2:33PM     

 

                    Cardiac Dysrhythmia    2012  2:33PM     

 

                    Sinoatrial Node Dysfunction    Mar 16 2012  9:12AM     

 

                    Palpitations        Mar 16 2012  9:12AM     

 

                    Osteoarthrosis, generalized, multiple sites    Oct  3 2012  9:34AM     

 

                    Pain in joint; Hip    Oct  3 2012  9:34AM     

 

                    Osteoarthrosis      Oct 25 2012  9:09AM     

 

                    Bronchitis, Acute    Oct 25 2012  9:09AM     

 

                    Cough               Oct 25 2012  9:09AM     

 

                    Pain in joint; Left Hip    Oct 25 2012  9:09AM     

 

                    Pain in joint; Hip    2013  9:50AM     

 

                    Osteoarthrosis, generalized, multiple sites    2013  2:45PM     

 

                    Pain in joint; Hip bilateral    2013  2:45PM     

 

                    Anxiety Disorder    Oct 28 2013 10:32AM     

 

                    Chronic Obstructive Pulmonary Disease    Oct 28 2013 10:32AM     

 

                    Hyperlipidemia      Oct 28 2013 10:32AM     

 

                    Pain in joint; Left Hip    Oct 28 2013 10:32AM     

 

                    Sinusitis, Acute    Oct 28 2013 10:32AM     

 

                    Essential Hypertension    2014  3:19PM     

 

                    Osteoarthrosis, generalized, multiple sites    2014  3:19PM     

 

                    Chronic Obstructive Pulmonary Disease    2014  3:19PM     

 

                    Pain in joint; Hip    2014  3:19PM     

 

                    Chronic Obstructive Pulmonary Disease    2014  2:31PM     

 

                    Pain in joint; Hip    2014  2:31PM     

 

                    pre-operative examination, unspecified    2014  2:31PM     

 

                    Lung nodule seen on imaging study    Oct 28 2014 10:24AM     

 

                    Bronchitis, Acute    Oct 16 2014  9:45AM     

 

                    Respiratory System And Chest Symptoms    Oct 16 2014  9:45AM     

 

                    COPD (chronic obstructive pulmonary disease)    Oct 16 2014  9:45AM     

 

                    Chronic Obstructive Pulmonary Disease    Oct 28 2014  2:26PM     

 

                    Pulmonary nodule seen on imaging study    Oct 28 2014  2:26PM     

 

                    Shortness of breath    Oct 28 2014  2:26PM     

 

                    Exercise hypoxemia    Oct 28 2014  2:26PM     

 

                    Nail avulsion       Oct  6 2015  9:38AM     

 

                          Contusion of left index finger with damage to nail, initial encounter    Oct 26 2015

  2:53PM                                 

 

                    Forehead contusion, subsequent encounter    Dec 15 2015  2:39PM     

 

                    Generalized anxiety disorder    Dec 15 2015  2:39PM     

 

                    Chronic Obstructive Pulmonary Disease    Dec 15 2015  2:39PM     

 

                    Osteoarthrosis, generalized, multiple sites    Mar 10 2016  2:12PM     

 

                    Pain of right thigh    Mar 10 2016  2:12PM     

 

                    Mild Acute Hip pain, acute, right Improving    Mar 10 2016  2:12PM     

 

                    Anxiety about health    Mar 10 2016  2:12PM     

 

                    Hyperlipidemia      Aug 22 2016 10:58AM     

 

                    Sinoatrial Node Dysfunction    Aug 22 2016 10:58AM     

 

                    Palpitations        Aug 22 2016 10:58AM     

 

                    Chronic Obstructive Pulmonary Disease    Aug 22 2016 10:58AM     

 

                    Exercise hypoxemia    Aug 22 2016 10:58AM     

 

                    Pain in joint; Hip    Aug 22 2016 10:58AM     

 

                    Pulmonary nodule seen on imaging study    Aug 22 2016 10:58AM     

 

                    Eustachian tube dysfunction, bilateral    Nov 10 2016  3:57PM     

 

                    Moderate Acute Cough    Nov 10 2016  3:57PM     

 

                    Pharyngitis, Acute    Nov 10 2016  3:57PM     

 

                    Upper Respiratory Infection    Nov 10 2016  3:57PM     

 

                          COPD (chronic obstructive pulmonary disease) with acute bronchitis    Nov 10 2016 

 3:57PM                                  

 

                    Mild Chronic Cough Worsening    Dec 12 2016 12:12PM     

 

                          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

 12 2016 12:12PM                         

 

                    Moderate Shortness of breath on exertion    Dec 12 2016 12:12PM     

 

                    Hyperlipidemia      May  4 2017 10:27AM     

 

                    Sinoatrial Node Dysfunction    May  4 2017 10:27AM     

 

                    Palpitations        May  4 2017 10:27AM     

 

                    Chronic Obstructive Pulmonary Disease    May  4 2017 10:27AM     

 

                    Exercise hypoxemia    May  4 2017 10:27AM     

 

                    Pain in joint; Hip    May  4 2017 10:27AM     

 

                    Pulmonary nodule seen on imaging study    May  4 2017 10:27AM     

 

                    Chest congestion    May 16 2017  4:04PM     

 

                          COPD (chronic obstructive pulmonary disease) with acute bronchitis    May 16 2017 

 4:04PM                                  

 

                    Productive cough    May 16 2017  4:04PM     

 

                    Anxiety about health    May 16 2017  4:04PM     

 

                          Chronic obstructive pulmonary disease, unspecified COPD type    May 16 2017  4:04PM

                                         

 

                    Exercise hypoxemia    May 16 2017  4:04PM     

 

                    Mixed hyperlipidemia    May 16 2017  4:04PM     

 

                    Medication management    May 16 2017  4:04PM     

 

                    Primary osteoarthritis involving multiple joints    May 16 2017  4:04PM     

 

                    Cellulitis of left lower extremity    2017 12:10PM     

 

                    Bitten by dog, initial encounter    2017 12:10PM     

 

                    Cellulitis of left lower extremity    2017  8:56AM     

 

                    Open bite, left lower leg, sequela    2017  8:56AM     

 

                    Bitten by dog, sequela    2017  8:56AM     

 

                    Medication management    2017  8:56AM     

 

                    Cellulitis of left lower extremity    2017 11:38AM     

 

                    Open bite, left lower leg, subsequent encounter    2017 11:38AM     

 

                    Bitten by dog, subsequent encounter    2017 11:38AM     

 

                    Medication management    2017 11:38AM     

 

                    Cough               Aug 10 2017  4:09PM     

 

                    Abnormal chest xray    Aug 10 2017  4:09PM     

 

                    Hyperlipidemia      Aug 29 2017  9:02AM     

 

                    Sinoatrial Node Dysfunction    Aug 29 2017  9:02AM     

 

                    Palpitations        Aug 29 2017  9:02AM     

 

                    Chronic Obstructive Pulmonary Disease    Aug 29 2017  9:02AM     

 

                    Exercise hypoxemia    Aug 29 2017  9:02AM     

 

                    Pain in joint; Hip    Aug 29 2017  9:02AM     

 

                    Pulmonary nodule seen on imaging study    Aug 29 2017  9:02AM     

 

                    Eustachian tube dysfunction, bilateral    Aug 28 2017  3:30PM     

 

                    Purulent postnasal drainage    Aug 28 2017  3:30PM     

 

                    Chest congestion    Aug 28 2017  3:30PM     

 

                    Upper respiratory tract infection, unspecified type    Aug 28 2017  3:30PM     

 

                    Moderate Acute Sinus pressure    Aug 28 2017  3:30PM     

 

                          COPD (chronic obstructive pulmonary disease) with acute bronchitis    Aug 28 2017 

 3:30PM                                  

 

                    Moderate Chronic Purulent postnasal drainage    Oct 24 2017  1:52PM     

 

                    Mild Chronic Sinus pressure    Oct 24 2017  1:52PM     

 

                          Moderate Chronic Acute seasonal allergic rhinitis, unspecified trigger Stable    Oct

 24 2017  1:52PM                         

 

                    Mild Acute Labyrinthitis    Oct 24 2017  1:52PM     

 

                    Moderate Acute Vertigo    Oct 24 2017  1:52PM     

 

                    Purulent postnasal drainage    2018  3:58PM     

 

                    Upper respiratory tract infection, unspecified type    2018  3:58PM     

 

                    Mild Acute Left Enlarged lymph node in neck    2018  3:58PM     

 

                    Cough               2018  1:36PM     

 

                    Moderate Acute Recurrent Chest congestion    2018  1:36PM     

 

                    COPD exacerbation    2018  1:36PM     

 

                          Chronic obstructive pulmonary disease with acute lower respiratory infection    2018  1:36PM                         

 

                    Acute bronchitis, unspecified    2018  1:36PM     

 

                    Cough               2018  3:29PM     

 

                    Acute pharyngitis, unspecified etiology    2018  3:29PM     

 

                    Chest congestion    2018  3:29PM     

 

                    COPD (chronic obstructive pulmonary disease)    2018  3:29PM     

 

                    Cellulitis of left lower extremity    May 22 2018  2:35PM     

 

                    Cellulitis of left lower extremity    2018 10:48AM     

 

                    Peripheral edema    2018 10:48AM     

 

                    Hyperlipidemia      Aug 22 2018 10:03AM     

 

                    Sinoatrial Node Dysfunction    Aug 22 2018 10:03AM     

 

                    Palpitations        Aug 22 2018 10:03AM     

 

                    Chronic Obstructive Pulmonary Disease    Aug 22 2018 10:03AM     

 

                    Exercise hypoxemia    Aug 22 2018 10:03AM     

 

                    Pain in joint; Hip    Aug 22 2018 10:03AM     

 

                    Pulmonary nodule seen on imaging study    Aug 22 2018 10:03AM     

 

                    Exercise Counseling    Aug 21 2018 10:08AM     

 

                    Moderate Acute Bilateral Peripheral edema    Aug 21 2018 10:08AM     

 

                          Chronic obstructive pulmonary disease, unspecified COPD type    Aug 21 2018 10:08AM

                                         

 

                    Medication management    Aug 21 2018 10:08AM     

 

                    Acute nasopharyngitis (common cold)    Oct  4 2018  9:03AM     

 

                    Purulent postnasal drainage    Oct  4 2018  9:03AM     

 

                    Ear pain, right     Oct  4 2018  9:03AM     

 

                    Essential hypertension    2018  9:31AM     

 

                    Ear pain, bilateral    2018  9:31AM     

 

                    Purulent postnasal drainage    2019  3:35PM     

 

                    Chest congestion    2019  3:35PM     

 

                    Upper respiratory tract infection, unspecified type    2019  3:35PM     

 

                    Sinus pressure      2019  3:35PM     

 

                          Chronic obstructive pulmonary disease with acute lower respiratory infection    2019  3:35PM                         

 

                    Acute bronchitis, unspecified    2019  3:35PM     

 

                    Cough               2019  4:44PM     

 

                    Sinus pressure      2019  4:44PM     

 

                    Chest congestion    2019  3:15PM     

 

                    Upper respiratory tract infection, unspecified type    2019  3:15PM     

 

                    COPD exacerbation    2019  3:15PM     

 

                    Essential hypertension    2019 11:26AM     

 

                    COPD exacerbation    2019 11:26AM     

 

                    Medication management    2019 11:26AM     

 

                    Hyperlipidemia      2019  8:55AM     

 

                    Sinoatrial Node Dysfunction    2019  8:55AM     

 

                    Palpitations        2019  8:55AM     

 

                    Chronic Obstructive Pulmonary Disease    2019  8:55AM     

 

                    Exercise hypoxemia    2019  8:55AM     

 

                    Pain in joint; Hip    2019  8:55AM     

 

                    Pulmonary nodule seen on imaging study    2019  8:55AM     

 

                    Dizziness on standing    2019 11:26AM     







Payers







           Insurance Name    Company Name    Plan Name    Plan Number    Policy Number    Policy Group

 Number                                 Start Date

 

                    Medicare RHC    Medicare R[employer]              [id number]              N/A

 

                          [place] [employer] [facility]    [place] [employer]                 [id number]

                                                    N/A

 

                    [employer] Medicare A    Test Medicare A              [id number]              N/A

 

                                [facility]      

                    [id number]                             N/A

 

                    Medicare Part A    Medicare - Lab/Xray              [id number]              N/A

 

                          [place] [facility]                 [id number]

                                                    N/A

 

                    Medicare Part A    Medicare Part A              [id number]              N/A

 

                    Medicare Part B    [employer] [place]              [id number]              N/A







History of Encounters







                    Visit Date          Visit Type          Provider

 

                    2019           Laboratory          DEON ESCALANTE

 

                    2019           Office visit        DEON LEON PA

 

                    2019            Office visit        DEON LEON PA

 

                    2019            Office visit        DEON LEON PA

 

                    2019            Office visit        DEON LEON PA

 

                    2018           Office visit        DEON LEON PA

 

                    10/2/2018           Office visit        DEON LEON PA

 

                    2018           Office visit        DEON LEON PA

 

                    2018           Office visit        DEON LEON PA

 

                    2018           Office visit        DEON LEON PA

 

                    2018           Office visit        DEON LEON PA

 

                    2018            Office visit        DEON LEON PA

 

                    2018           Office visit        DEON LEON PA

 

                    10/24/2017          Office visit        DEON LEON PA

 

                    2017           Voided              DEON LEON PA

 

                    2017           Office visit        DEON LEON PA

 

                    2017           Office visit        DEON LEON PA

 

                    2017            Office visit        DEON LEON PA

 

                    2017            Office visit        DEON LEON PA

 

                    2017           Office visit        DEON LEON PA

 

                    2016          Office visit        DEON LEON PA

 

                    11/10/2016          Office visit        DEON LEON PA

 

                    3/10/2016           Office visit        DEON LEON PA

 

                    12/15/2015          Office visit        DEON LEON PA

 

                    10/26/2015          Office visit        DEON LEON PA

 

                    10/6/2015           Office visit        DEON LEON PA

 

                    10/28/2014          Office visit        DEON LEON PA

 

                    10/16/2014          Office visit        DEON LEON PA

 

                    2014            Office visit        DEON LEON PA

 

                    2014           [facility]            DEWEY Garcia MD

 

                    2014           Office visit        DEON LEON PA

 

                    10/28/2013          Office visit        DEON LEON PA

 

                    10/14/2013          [facility]            DEWEY Garcia MD

 

                    2013           Office visit        [facility]

 

                    2013            Office visit        DEON LEON PA

 

                    10/25/2012          Office visit        DEON LEON PA

 

                    10/3/2012           Office visit        DEON LEON PA

 

                    3/16/2012           Office visit        DEON LEON PA

 

                    2012            Office visit        DEON LEON PA

 

                    2012            [facility]            DEWEY Garcia MD

 

                    2011          Office visit        DEON LEON PA

 

                    2011           Office visit        Deon Leon PA-C

 

                    2011            Office visit        Deon Leon PA-C

 

                    6/3/2011            Office visit        Deon Leon PA-C

 

                    2010           Office visit        Deon Leon PA-C

 

                    2010           Office visit        Deon Leon PA-C

 

                    2010           Office visit        Deon Leon PA-C

## 2019-06-25 NOTE — XMS REPORT
MU2 Ambulatory Summary

                             Created on: 10/25/2017



Elsi Casillas

External Reference #: [id number]

: 1940

Sex: Female



Demographics







                          Address                   [address] [place]  [address]

 

                          Home Phone                [phone]

 

                          Preferred Language        English

 

                          Marital Status            

 

                          Mandaeism Affiliation     Unknown

 

                          Race                      White

 

                          Ethnic Group              Not  or 





Author







                          DEON Ferguson

 

                          [facility]

 

                          Address                   [address]

[place], [place]  [id number]



 

                          Phone                     [phone]







Care Team Providers







                    Care Team Member Name    Role                Phone

 

                    DEON LEON    PCP                 Unavailable

 

                    DEON LEON    PreferredProvider    Unavailable







Allergies and Adverse Reactions







                    Name                Reaction            Notes

 

                    NO KNOWN DRUG ALLERGIES                         







Plan of Treatment







             Planned Activity    Comments     Planned Date    Planned Time    Plan/Goal

 

             Lipid Profile                 2016    12:00 AM      

 

             Chest PA and Lateral - Main                 8/10/2017    12:00 AM      







Medications







                                        Active 

 

             Name         Start Date    Estimated Completion Date    SIG          Comments

 

                Calcium 600 + D(3) 600 mg(1,500mg) -200 unit oral tablet                                    take 2 tablets by

 oral route daily                        

 

                pravastatin 20 mg oral tablet                                    take 1 tablet (20 mg) by oral route once daily

                                         

 

                Toprol XL 25 mg oral tablet extended release 24 hr                                    take 1 tablet (25 mg) 

by oral route once daily                 

 

                metoprolol succinate 25 mg oral tablet extended release 24 hr    2015                      take

 1 tablet (25 mg) by oral route once daily     

 

                                        ipratropium-albuterol 0.5 mg-3 mg(2.5 mg base)/3 mL inhalation solution for nebulization

                    2016                              inhale 3 milliliters by nebulization route 4 times per day for COPD

                                         

 

                metoprolol succinate 25 mg oral tablet extended release 24 hr    2017                       TAKE

 1 TABLET DAILY                          

 

                    albuterol sulfate 1.25 mg/3 mL inhalation solution for nebulization    2017           

                                        inhale 3 milliliters (1.25 mg) via nebulizer by inhalation route 4 times per day

  DX J44.9                               

 

             hydrochlorothiazide 25 mg oral tablet    2017                 TAKE 1 TABLET DAILY     

 

                Keflex 500 mg oral capsule    10/10/2017                      take 1 capsule (500 mg) by oral route

 every 12 hours for 10 days              

 

                Sudogest 30 mg oral tablet    10/24/2017                      take 1 tablets (60 mg) by oral route 

every 6 hours as needed                  

 

                Symbicort 160-4.5 mcg/actuation inhalation HFA aerosol inhaler    10/25/2017                      inhale

 2 puffs by inhalation route 2 times per day in the morning and evening     









                                         

 

             Name         Start Date    Expiration Date    SIG          Comments

 

                Singulair 10 mg oral tablet    9/3/2010        2011        take 1 tablet (10 mg) by oral route

 daily  for 60 days                      

 

                Zithromax Z-Christopher 250 mg oral tablet    2011       take 2 tablets (500 mg)

 by oral route once daily for 1 day then 1 tablet (250 mg) by oral route once 
daily for 4 days                         

 

                Medrol (Christopher) 4 mg oral tablets,dose pack    2011        take as directed

 for 6 days                              

 

                Keflex 500 mg oral capsule    3/1/2012        3/11/2012       take 1 capsule by oral route 3 

times a day for 10 days                  

 

                Cipro 500 mg oral tablet    2012        take 1 tablet (500 mg) by oral route

 every 12 hours for 15 days              

 

                benzonatate 100 mg oral capsule    2013       take 1 capsule (100 mg) by

 oral route 3 times per day for cough     

 

             Medrol (Christopher) 4 mg oral tablets,dose pack    2013     take as directed    

 

 

                Zyrtec 10 mg oral tablet    2013       take 1 tablet (10 mg) by oral route

 once daily for 30 days                  

 

                Flonase 50 mcg/actuation nasal spray,suspension    2013       inhale 1 spray

 in each nostril by intranasal route 2 times per day for 30 days     

 

                cephalexin 500 mg oral capsule    2013        take 1 capsule (500 mg) by oral

 route every 8 hours                     

 

                promethazine-codeine 6.25-10 mg/5 mL oral syrup    2013                       take 5 milliliters

 by oral route every 4 hours as needed, not to exceed 30 mL in 24 hours     

 

                Zithromax Z-Christopher 250 mg oral tablet    10/2/2013       10/7/2013       take 2 tablets (500 mg)

 by oral route once daily for 1 day then 1 tablet (250 mg) by oral route once 
daily for 4 days                         

 

                amoxicillin 500 mg oral capsule    2014       take 1 capsule by oral route

 3 times a day for 15 days               

 

                lovastatin 20 mg oral tablet    2014        take 1 tablet (20 mg) by oral 

route daily  for 30 days                 

 

                Zithromax Z-Christopher 250 mg oral tablet    2014       take 2 tablets (500 mg)

 by oral route once daily for 1 day then 1 tablet (250 mg) by oral route once 
daily for 4 days                         

 

             Medrol (Christopher) 4 mg oral tablets,dose pack    2014                 take as directed     

 

                amoxicillin 500 mg oral capsule    2014                       take 1 capsule (500 mg) by oral route

 3 times per day                         

 

                Levaquin 500 mg oral tablet    10/16/2014      10/26/2014      take 1 tablet (500 mg) by oral

 route once daily for 10 days            

 

                prednisone 20 mg oral tablet    10/16/2014      10/24/2014      4x2 days 3x2 days 2x2 days

 1x2 days                                

 

                Zithromax Z-Christopher 250 mg oral tablet    2014       take 2 tablets (500 mg)

 by oral route once daily for 1 day then 1 tablet (250 mg) by oral route once 
daily for 4 days                         

 

                Bactrim -160 mg oral tablet    1/15/2015       2015       take 1 tablet by oral route

 every 12 hours for 10 days              

 

                Zithromax Z-Christopher 250 mg oral tablet    2015      take 2 tablets (500 mg)

 by oral route once daily for 1 day then 1 tablet (250 mg) by oral route once 
daily for 4 days                         

 

                Keflex 500 mg oral capsule    2016       take 1 capsule by oral route 3

 times a day for 7 days                  

 

                amoxicillin 500 mg oral capsule    10/4/2016       10/14/2016      take 1 capsule by oral route

 3 times a day for 10 days               

 

                Tessalon Perles 100 mg oral capsule    10/4/2016                       take 1 capsule by oral route 

every 4 hours                            

 

                Zithromax Z-Christopher 250 mg oral tablet    11/10/2016      11/15/2016      take 2 tablets (500 

mg) by oral route once daily for 1 day then 1 tablet (250 mg) by oral route once
daily for 4 days                         

 

                amoxicillin 500 mg oral tablet    2016                       take 1 tablet (500 mg) by oral route

 3 times per day                         

 

                amoxicillin 500 mg oral capsule    2017       take 1 capsule (500 mg) by

 oral route 3 times per day for 10 days     

 

                Bactrim -160 mg oral tablet    2017       take 1 tablet by oral route

 2 times a day for 10 days               

 

                Levaquin 500 mg oral tablet    2017        take 1 tablet (500 mg) by oral

 route once daily for 10 days            

 

                amoxicillin 500 mg oral capsule    2017                       take 1 capsule (500 mg) by oral route

 every 12 hours for 7 days               









                                        Discontinued 

 

             Name         Start Date    Discontinued Date    SIG          Comments

 

                amoxicillin 500 mg oral capsule    11/10/2011      10/3/2012       take 1 capsule (500 mg) 

by oral route 3 times per day            

 

                Anusol-HC 25 mg rectal suppository    2011      10/3/2012       insert 1 suppository 

(25 mg) by rectal route 2 times per day     

 

                Proctofoam 1 % topical foam    2011       apply by external route daily

                                         

 

             Aerochamber    2014    Use with inhaler as directed     

 

                hydrochlorothiazide 25 mg oral tablet    1/15/2015       2015      TAKE 1 TABLET DAILY

                                         

 

                promethazine-codeine 6.25-10 mg/5 mL oral syrup    11/10/2016      10/25/2017      take 5 

milliliters by oral route every 6 hours as needed, not to exceed 30 mL in 24 
hours                                    

 

                prednisone 20 mg oral tablet    2017       10/25/2017      4 x 2 days, 3 x 2 days, 2 x

 2 days 1 x 2 days                       

 

                Augmentin 875-125 mg oral tablet    2017       take 1 tablet by oral route

 every 12 hours for 7 days               

 

                Keflex 500 mg oral capsule    2017       take 1 capsule (500 mg) by oral

 route every 12 hours                    







Problem List







                    Description         Status              Onset

 

                    Chronic Obstructive Pulmonary Disease    Active               

 

                    Hyperlipidemia      Active               

 

                    Anxiety Disorder    Active              10/29/2013

 

                    Pain in joint; Hip    Active              06/10/2014

 

                    Pulmonary nodule seen on imaging study    Active              10/29/2014

 

                    Exercise hypoxemia    Active              10/29/2014

 

                    Anxiety about health    Active              2016

 

                    Primary osteoarthritis involving multiple joints    Active              2017

 

                    Medication management    Active              2017

 

                    Cellulitis of left lower extremity    Active              2017

 

                    Dog bite of calf, left, sequela    Active              2017







Vital Signs







      Date    Time    BP-Sys(mm[Hg]    BP-Noni(mm[Hg])    HR(bpm)    RR(rpm)    Temp    WT    HT    HC    BMI

                    BSA                 BMI Percentile      O2 Sat(%)

 

        10/24/2017    1:51:00 PM    132 mmHg    80 mmHg    82 bpm    16 rpm    98.2 F    145 lbs    62 in

                          26.52 kg/m2    1.70 m2                   95 %

 

       2017    3:29:00 PM    128 mmHg    80 mmHg    68 bpm    16 rpm    98 F    144 lbs    63 in    

                25.5082 kg/m    1.7039 m                      93 %

 

        2017    11:37:00 AM    118 mmHg    72 mmHg    96 bpm    16 rpm    97.4 F    141 lbs    63 in

                          24.98 kg/m2    1.69 m2                   91 %

 

        2017    8:55:00 AM    105 mmHg    60 mmHg    96 bpm    18 rpm    95.8 F    142 lbs    63 in 

                          25.1539 kg/m    1.6921 m                 95 %

 

       2017    12:10:00 PM    122 mmHg    68 mmHg    76 bpm    18 rpm    98 F    143 lbs    63 in    

                25.33 kg/m2     1.70 m2                         98 %

 

        2017    4:03:00 PM    118 mmHg    64 mmHg    106 bpm    18 rpm    97.4 F    137 lbs    63 in

                          24.2682 kg/m    1.662 m                 98 %

 

        2016    12:11:00 PM    120 mmHg    84 mmHg    110 bpm    16 rpm    98.1 F    130 lbs    63

 in                       23.03 kg/m2    1.62 m2                   90 %

 

        11/10/2016    3:57:00 PM    148 mmHg    72 mmHg    88 bpm    16 rpm    98.2 F    132 lbs    63 in

                          23.3825 kg/m    1.6314 m                 98 %

 

        3/10/2016    2:12:00 PM    122 mmHg    60 mmHg    106 bpm    18 rpm    97 F    137 lbs    63 in 

                          24.27 kg/m2    1.66 m2                   93 %

 

        12/15/2015    2:33:00 PM    120 mmHg    75 mmHg    102 bpm    16 rpm    98.2 F    137 lbs    63 

in                        24.2682 kg/m    1.662 m                 94 %

 

        10/26/2015    2:46:00 PM    130 mmHg    80 mmHg    96 bpm    16 rpm    97.9 F    141 lbs    66 in

                          22.76 kg/m2    1.73 m2                   98 %

 

        10/6/2015    9:37:00 AM    140 mmHg    78 mmHg    110 bpm    16 rpm    97.9 F    141 lbs    63 in

                          24.9768 kg/m    1.6861 m                 95 %

 

        10/28/2014    2:25:00 PM    132 mmHg    66 mmHg    92 bpm    24 rpm    97.5 F    147 lbs    63 in

                          26.04 kg/m2    1.72 m2                   92 %

 

        10/16/2014    9:45:00 AM    132 mmHg    64 mmHg    74 bpm    24 rpm    97.7 F    146 lbs    63 in

                          25.8625 kg/m    1.7157 m                 92 %

 

        2014    2:31:00 PM    136 mmHg    68 mmHg    90 bpm    22 rpm    98.2 F    148 lbs    63 in 

                          26.22 kg/m2    1.73 m2                   95 %

 

        2014    3:19:00 PM    124 mmHg    70 mmHg    64 bpm    20 rpm    97.8 F    147 lbs    63 in

                          26.0396 kg/m    1.7216 m                 95 %

 

        10/28/2013    10:31:00 AM    140 mmHg    70 mmHg    92 bpm    24 rpm    97.8 F    143 lbs    63 

in                        25.33 kg/m2    1.70 m2                   95 %

 

      2013    2:51:00 PM    130 mmHg    78 mmHg    90 bpm          98.2 F    168 lbs    65 in          

27.9564 kg/m      1.8694 m                               

 

       2013    2:43:00 PM    110 mmHg    76 mmHg    103 bpm           96.6 F    137 lbs    63 in      

                24.27 kg/m2     1.66 m2                          

 

        2013    9:50:00 AM    150 mmHg    66 mmHg    108 bpm    20 rpm    97.8 F    138 lbs    63 in

                          24.4454 kg/m    1.6681 m                 94 %

 

        10/25/2012    9:08:00 AM    110 mmHg    60 mmHg    88 bpm    18 rpm    97.5 F    142 lbs    63 in

                          25.15 kg/m2    1.69 m2                   94 %

 

      10/3/2012    9:33:00 AM    130 mmHg    60 mmHg    98 bpm    18 rpm          146 lbs    63 in          

25.8625 kg/m      1.7157 m                              95 %

 

      2012    2:31:00 PM    116 mmHg    76 mmHg    88 bpm                140 lbs    63 in          24.80 

kg/m2               1.68 m2                                 96 %

 

     2011    10:02:00 AM    122 mmHg    80 mmHg    110 bpm              144 lbs                        

                                        94 %

 

      2011    2:58:00 PM    124 mmHg    84 mmHg    106 bpm                155 lbs    63 in          27.46

 kg/m2              1.77 m2                                 96 %

 

     2011    9:55:00 AM    114 mmHg    78 mmHg    92 bpm              146 lbs                             95

 %

 

     6/3/2011    8:36:00 AM    116 mmHg    74 mmHg    90 bpm              146 lbs                             96

 %

 

     2010    3:01:00 PM    132 mmHg    84 mmHg    102 bpm              149 lbs                          

                                        94 %

 

     2010    11:46:00 AM    124 mmHg    78 mmHg    104 bpm              151 lbs                         

                                        94 %

 

     2010    8:47:00 AM    116 mmHg    78 mmHg    103 bpm              151 lbs                          

                                        95 %







Social History







                    Name                Description         Comments

 

                    Tobacco             Never smoker         

 

                    denies alcohol use                         







History of Procedures







                    Date Ordered        Description         Order Status

 

                    2011 12:00 AM    THER/PROPH/DIAG INJ SC/IM    Reviewed

 

                    2011 12:00 AM    Decadron Inj.1mg-([facility]) Ndc #[id number]    Reviewed

 

                    2011 12:00 AM    Depo-Medrol 80 Mg Im/[facility] NDC [id number]    Reviewed

 

                    2011 12:00 AM    Rocephin, Per 250MG - 1 Gram Vial NDC [id number]-[facility]    Reviewed



 

                    3/16/2012 12:00 AM    ROUTINE VENIPUNCTURE    Reviewed

 

                    3/16/2012 12:00 AM    COMPLETE CBC W/AUTO DIFF WBC    Reviewed

 

                    3/16/2012 12:00 AM    COMPREHEN METABOLIC PANEL    Reviewed

 

                    3/16/2012 12:00 AM    ASSAY THYROID STIM HORMONE    Reviewed

 

                    11/10/2016 12:00 AM    Decadron, Per 1 Mg NDC# [address]-42    Reviewed

 

                    11/10/2016 12:00 AM    Depo-Medrol, Per 80 Mg NDC#7324-5602-40    Reviewed

 

                    11/10/2016 12:00 AM    Rocephin 1 gram NDC#0273-3438-19    Reviewed

 

                    2016 12:00 AM    THER/PROPH/DIAG INJ SC/IM    Reviewed

 

                    2016 12:00 AM    Decadron 8mg Injection, [facility]    Reviewed

 

                    2016 12:00 AM    Depo-Medrol 80mg Injection, [facility]    Reviewed

 

                    2016 12:00 AM    Rocephin 1 gram Injection, [facility]    Reviewed

 

                    2017 12:00 AM    COMPLETE CBC W/AUTO DIFF WBC    Reviewed

 

                    2017 12:00 AM    COMPREHEN METABOLIC PANEL    Reviewed

 

                    2017 12:00 AM    LIPID PANEL         Reviewed

 

                    2017 12:00 AM    THERAPEUTIC PROPHYLACTIC/DX INJECTION SUBQ/IM    Reviewed

 

                    2017 12:00 AM    Decadron 8mg Injection, [facility]    Reviewed

 

                    2017 12:00 AM    Depo-Medrol 80mg Injection, [facility]    Reviewed

 

                    2017 12:00 AM    LIPID PANEL         Returned

 

                    2017 12:00 AM    THERAPEUTIC PROPHYLACTIC/DX INJECTION SUBQ/IM    Reviewed

 

                    2017 12:00 AM    Decadron 8mg Injection, [facility]    Reviewed

 

                    2017 12:00 AM    Depo-Medrol 80mg Injection, [facility]    Reviewed

 

                    10/3/2012 12:00 AM    THER/PROPH/DIAG INJ SC/IM    Reviewed

 

                    10/3/2012 12:00 AM    Decadron, Per 1 Mg NDC# [address]-59    Reviewed

 

                    10/3/2012 12:00 AM    Depo-Medrol, Per 80 Mg NDC#9634-0587-73    Reviewed

 

                    10/3/2012 12:00 AM    Toradol 60 Mg NDC#0528-4108-38    Reviewed

 

                    10/24/2017 12:00 AM    THERAPEUTIC PROPHYLACTIC/DX INJECTION SUBQ/IM    Reviewed

 

                    10/24/2017 12:00 AM    Decadron 8mg Injection, [facility]    Reviewed

 

                    10/24/2017 12:00 AM    Depo-Medrol 80mg Injection, [facility]    Reviewed

 

                    2013 12:00 AM    THER/PROPH/DIAG INJ SC/IM    Reviewed

 

                    2013 12:00 AM    Decadron, Per 1 Mg NDC# [address]-97    Reviewed

 

                    2013 12:00 AM    Depo-Medrol, Per 80 Mg NDC#3948-2848-25    Reviewed

 

                    2013 12:00 AM    Toradol 60 Mg NDC#9203-1507-65    Reviewed

 

                    2010 12:00 AM    ROUTINE VENIPUNCTURE    Reviewed

 

                    2010 12:00 AM    COMPLETE CBC W/AUTO DIFF WBC    Reviewed

 

                    2010 12:00 AM    COMPREHEN METABOLIC PANEL    Reviewed

 

                    2010 12:00 AM    LIPID PANEL         Reviewed

 

                    10/28/2014 12:00 AM    CHEST X-RAY 4/> VIEWS    Reviewed

 

                    6/3/2011 12:00 AM    ROUTINE VENIPUNCTURE    Reviewed

 

                    6/3/2011 12:00 AM    COMPLETE CBC AUTOMATED    Reviewed

 

                    6/3/2011 12:00 AM    COMPREHEN METABOLIC PANEL    Reviewed

 

                    6/3/2011 12:00 AM    LIPID PANEL         Reviewed

 

                    2011 12:00 AM    THER/PROPH/DIAG INJ SC/IM    Reviewed

 

                    2011 12:00 AM    Decadron Inj.8mg-() Ndc #[id number]    Reviewed

 

                    2011 12:00 AM    Depo-Medrol 80 Mg Im/[facility] NDC [id number]    Reviewed







Results Summary







                          Date and Description      Results

 

                          6/3/2011 1:53 PM          TRIGLYCERIDES 155.0 mg/dLCHOLESTEROL 248.0 mg/dLHDL 51.0 mg/dLTOT

 CHOL/HDL 4.9 .0 mg/dLGLUCOSE 97.0 mg/dLSODIUM 139.0 mmol/LPOTASSIUM 3.70
 mmol/LCHLORIDE 101.0 mmol/LCO2 27.0 mmol/LBUN 19.0 mg/dLCREATININE 0.90 
mg/dLSGOT/AST 19.0 IU/LSGPT/ALT 11.0 IU/LALK PHOS 111.0 IU/LTOTAL PROTEIN 7.40 
g/dLALBUMIN 4.20 g/dLTOTAL BILI 0.50 mg/dLCALCIUM 9.50 mg/dLAGE 70 GFR NonAA 62 
GFR AA 75 eGFR >60 mL/min/1.73 m2eGFR AA* >60 







History Of Immunizations

Not available.



History of Past Illness







                    Name                Date of Onset       Comments

 

                    Chronic Obstructive Pulmonary Disease                         

 

                    Hyperlipidemia                           

 

                    Cough               2010  8:49AM     

 

                    General Medical Exam, Adult    2010  8:49AM     

 

                    Seasonal Allergies    2010  8:49AM     

 

                    Sinusitis, Chronic    2010  8:49AM     

 

                    Ganglion cyst of synovium, tendon, and bursa; other    2010 11:48AM     

 

                    Anxiety Disorder    10/29/2013           

 

                    Pain in joint; Hip    06/10/2014           

 

                    Pulmonary nodule seen on imaging study    10/29/2014           

 

                    Exercise hypoxemia    10/29/2014           

 

                    Chronic Obstructive Pulmonary Disease    2010  3:02PM     

 

                    Edema               2010  3:02PM     

 

                    Sinusitis, Acute    2010  3:02PM     

 

                    Anxiety about health    2016           

 

                    Chronic Obstructive Pulmonary Disease    Trey  3 2011  8:38AM     

 

                    Palpitations        Trey  3 2011  8:38AM     

 

                    Cough               2011  9:54AM     

 

                    Sinusitis, Acute    2011  9:54AM     

 

                    Seasonal Allergies    2011  9:54AM     

 

                    Post-nasal drainage    2011  9:54AM     

 

                    Primary osteoarthritis involving multiple joints    2017           

 

                    Medication management    2017           

 

                    Cellulitis of left lower extremity    2017           

 

                    Dog bite of calf, left, sequela    2017           

 

                    Cough               Sep 22 2011  2:55PM     

 

                    Seasonal Allergies    Sep 22 2011  2:55PM     

 

                    Pharyngitis, Acute    Sep 22 2011  2:55PM     

 

                    Post-nasal drainage    Sep 22 2011  2:55PM     

 

                    Blood In Stool, Occult    2011  9:58AM     

 

                    Hemorrhoids         2011  9:58AM     

 

                    Chronic Obstructive Pulmonary Disease    2012  2:33PM     

 

                    Cardiac Dysrhythmia    2012  2:33PM     

 

                    Sinoatrial Node Dysfunction    Mar 16 2012  9:12AM     

 

                    Palpitations        Mar 16 2012  9:12AM     

 

                    Osteoarthrosis, generalized, multiple sites    Oct  3 2012  9:34AM     

 

                    Pain in joint; Hip    Oct  3 2012  9:34AM     

 

                    Osteoarthrosis      Oct 25 2012  9:09AM     

 

                    Bronchitis, Acute    Oct 25 2012  9:09AM     

 

                    Cough               Oct 25 2012  9:09AM     

 

                    Pain in joint; Left Hip    Oct 25 2012  9:09AM     

 

                    Pain in joint; Hip    2013  9:50AM     

 

                    Osteoarthrosis, generalized, multiple sites    2013  2:45PM     

 

                    Pain in joint; Hip bilateral    2013  2:45PM     

 

                    Anxiety Disorder    Oct 28 2013 10:32AM     

 

                    Chronic Obstructive Pulmonary Disease    Oct 28 2013 10:32AM     

 

                    Hyperlipidemia      Oct 28 2013 10:32AM     

 

                    Pain in joint; Left Hip    Oct 28 2013 10:32AM     

 

                    Sinusitis, Acute    Oct 28 2013 10:32AM     

 

                    Essential Hypertension    2014  3:19PM     

 

                    Osteoarthrosis, generalized, multiple sites    2014  3:19PM     

 

                    Chronic Obstructive Pulmonary Disease    2014  3:19PM     

 

                    Pain in joint; Hip    2014  3:19PM     

 

                    Chronic Obstructive Pulmonary Disease    2014  2:31PM     

 

                    Pain in joint; Hip    2014  2:31PM     

 

                    pre-operative examination, unspecified    2014  2:31PM     

 

                    Lung nodule seen on imaging study    Oct 28 2014 10:24AM     

 

                    Bronchitis, Acute    Oct 16 2014  9:45AM     

 

                    Respiratory System And Chest Symptoms    Oct 16 2014  9:45AM     

 

                    COPD (chronic obstructive pulmonary disease)    Oct 16 2014  9:45AM     

 

                    Chronic Obstructive Pulmonary Disease    Oct 28 2014  2:26PM     

 

                    Pulmonary nodule seen on imaging study    Oct 28 2014  2:26PM     

 

                    Shortness of breath    Oct 28 2014  2:26PM     

 

                    Exercise hypoxemia    Oct 28 2014  2:26PM     

 

                    Nail avulsion       Oct  6 2015  9:38AM     

 

                          Contusion of left index finger with damage to nail, initial encounter    Oct 26 2015

  2:53PM                                 

 

                    Forehead contusion, subsequent encounter    Dec 15 2015  2:39PM     

 

                    Generalized anxiety disorder    Dec 15 2015  2:39PM     

 

                    Chronic Obstructive Pulmonary Disease    Dec 15 2015  2:39PM     

 

                    Osteoarthrosis, generalized, multiple sites    Mar 10 2016  2:12PM     

 

                    Pain of right thigh    Mar 10 2016  2:12PM     

 

                    Mild Acute Hip pain, acute, right Improving    Mar 10 2016  2:12PM     

 

                    Anxiety about health    Mar 10 2016  2:12PM     

 

                    Hyperlipidemia      Aug 22 2016 10:58AM     

 

                    Sinoatrial Node Dysfunction    Aug 22 2016 10:58AM     

 

                    Palpitations        Aug 22 2016 10:58AM     

 

                    Chronic Obstructive Pulmonary Disease    Aug 22 2016 10:58AM     

 

                    Exercise hypoxemia    Aug 22 2016 10:58AM     

 

                    Pain in joint; Hip    Aug 22 2016 10:58AM     

 

                    Pulmonary nodule seen on imaging study    Aug 22 2016 10:58AM     

 

                    Eustachian tube dysfunction, bilateral    Nov 10 2016  3:57PM     

 

                    Moderate Acute Cough    Nov 10 2016  3:57PM     

 

                    Pharyngitis, Acute    Nov 10 2016  3:57PM     

 

                    Upper Respiratory Infection    Nov 10 2016  3:57PM     

 

                          COPD (chronic obstructive pulmonary disease) with acute bronchitis    Nov 10 2016 

 3:57PM                                  

 

                    Mild Chronic Cough Worsening    Dec 12 2016 12:12PM     

 

                          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

 12 2016 12:12PM                         

 

                    Moderate Shortness of breath on exertion    Dec 12 2016 12:12PM     

 

                    Hyperlipidemia      May  4 2017 10:27AM     

 

                    Sinoatrial Node Dysfunction    May  4 2017 10:27AM     

 

                    Palpitations        May  4 2017 10:27AM     

 

                    Chronic Obstructive Pulmonary Disease    May  4 2017 10:27AM     

 

                    Exercise hypoxemia    May  4 2017 10:27AM     

 

                    Pain in joint; Hip    May  4 2017 10:27AM     

 

                    Pulmonary nodule seen on imaging study    May  4 2017 10:27AM     

 

                    Chest congestion    May 16 2017  4:04PM     

 

                          COPD (chronic obstructive pulmonary disease) with acute bronchitis    May 16 2017 

 4:04PM                                  

 

                    Productive cough    May 16 2017  4:04PM     

 

                    Anxiety about health    May 16 2017  4:04PM     

 

                          Chronic obstructive pulmonary disease, unspecified COPD type    May 16 2017  4:04PM

                                         

 

                    Exercise hypoxemia    May 16 2017  4:04PM     

 

                    Mixed hyperlipidemia    May 16 2017  4:04PM     

 

                    Medication management    May 16 2017  4:04PM     

 

                    Primary osteoarthritis involving multiple joints    May 16 2017  4:04PM     

 

                    Cellulitis of left lower extremity    2017 12:10PM     

 

                    Bitten by dog, initial encounter    2017 12:10PM     

 

                    Cellulitis of left lower extremity    2017  8:56AM     

 

                    Open bite, left lower leg, sequela    2017  8:56AM     

 

                    Bitten by dog, sequela    2017  8:56AM     

 

                    Medication management    2017  8:56AM     

 

                    Cellulitis of left lower extremity    2017 11:38AM     

 

                    Open bite, left lower leg, subsequent encounter    2017 11:38AM     

 

                    Bitten by dog, subsequent encounter    2017 11:38AM     

 

                    Medication management    2017 11:38AM     

 

                    Cough               Aug 10 2017  4:09PM     

 

                    Abnormal chest xray    Aug 10 2017  4:09PM     

 

                    Hyperlipidemia      Aug 29 2017  9:02AM     

 

                    Sinoatrial Node Dysfunction    Aug 29 2017  9:02AM     

 

                    Palpitations        Aug 29 2017  9:02AM     

 

                    Chronic Obstructive Pulmonary Disease    Aug 29 2017  9:02AM     

 

                    Exercise hypoxemia    Aug 29 2017  9:02AM     

 

                    Pain in joint; Hip    Aug 29 2017  9:02AM     

 

                    Pulmonary nodule seen on imaging study    Aug 29 2017  9:02AM     

 

                    Eustachian tube dysfunction, bilateral    Aug 28 2017  3:30PM     

 

                    Purulent postnasal drainage    Aug 28 2017  3:30PM     

 

                    Chest congestion    Aug 28 2017  3:30PM     

 

                    Upper respiratory tract infection, unspecified type    Aug 28 2017  3:30PM     

 

                    Moderate Acute Sinus pressure    Aug 28 2017  3:30PM     

 

                          COPD (chronic obstructive pulmonary disease) with acute bronchitis    Aug 28 2017 

 3:30PM                                  

 

                    Moderate Chronic Purulent postnasal drainage    Oct 24 2017  1:52PM     

 

                    Mild Chronic Sinus pressure    Oct 24 2017  1:52PM     

 

                          Moderate Chronic Acute seasonal allergic rhinitis, unspecified trigger Stable    Oct

 24 2017  1:52PM                         

 

                    Mild Acute Labyrinthitis    Oct 24 2017  1:52PM     

 

                    Moderate Acute Vertigo    Oct 24 2017  1:52PM     







Payers







           Insurance Name    Company Name    Plan Name    Plan Number    Policy Number    Policy Group

 Number                                 Start Date

 

                    Medicare [facility]    Medicare [facility]              [id number]              N/A

 

                          [place] [employer] [facility]    [place] [employer]                 [id number]

                                                    N/A

 

                    [employer] Medicare A    Test Medicare A              [id number]              N/A

 

                                [facility] - [facility] - [facility]      

                    [id number]                             N/A

 

                    Medicare Part A    Medicare - Lab/Xray              [id number]              N/A

 

                          [place] [facility]                 [id number]

                                                    N/A

 

                    Medicare Part A    Medicare Part A              [id number]              N/A

 

                    Medicare [place] B    [place]              [id number]              N/A







History of Encounters







                    Visit Date          Visit Type          Provider

 

                    10/24/2017          Office visit        DEON LEON PA

 

                    2017           Voided              DEON LEON PA

 

                    2017           Office visit        DEON LEON PA

 

                    2017           Office visit        DEON LEON PA

 

                    2017            Office visit        DEON LEON PA

 

                    2017            Office visit        DEON LEON PA

 

                    2017           Office visit        DEON LEON PA

 

                    2016          Office visit        DEON LEON PA

 

                    11/10/2016          Office visit        DEON LEON PA

 

                    3/10/2016           Office visit        DEON LEON PA

 

                    12/15/2015          Office visit        DEON LEON PA

 

                    10/26/2015          Office visit        DEON LEON PA

 

                    10/6/2015           Office visit        DEON LEON PA

 

                    10/28/2014          Office visit        DEON LEON PA

 

                    10/16/2014          Office visit        DEON LEON PA

 

                    2014            Office visit        DEON ESCALANTE

 

                    2014           [facility]            DEWEY Garcia MD

 

                    2014           Office visit        DEON LEON PA

 

                    10/28/2013          Office visit        DEON LEON PA

 

                    10/14/2013          Nabeel Garcia MD

 

                    2013           Office visit        DEON LEON PA

 

                    2013            Office visit        DEON LEON PA

 

                    10/25/2012          Office visit        DEON LEON PA

 

                    10/3/2012           Office visit        DEON LEON PA

 

                    3/16/2012           Office visit        DEON LEON PA

 

                    2012            Office visit        DEON LEON PA

 

                    2012            [facility]            DEWEY Garcia MD

 

                    2011          Office visit        DEON LEON PA

 

                    2011           Office visit        Deon Leon PA-C

 

                    2011            Office visit        Deon Leon PA-C

 

                    6/3/2011            Office visit        Deon Leon PA-C

 

                    2010           Office visit        Deon Leon PA-C

 

                    2010           Office visit        Deon Leon PA-C

 

                    2010           Office visit        Deon Leon PA-C

## 2019-06-25 NOTE — XMS REPORT
MU2 Ambulatory Summary

                             Created on: 2015



Elsi Casillas

External Reference #: [id number]

: 1940

Sex: Female



Demographics







                          Address                   [address] [place]  [address]

 

                          Home Phone                [phone]

 

                          Preferred Language        English

 

                          Marital Status            

 

                          Alevism Affiliation     Unknown

 

                          Race                      White

 

                          Ethnic Group              Not  or 





Author







                          Author                    DEON LEON

 

                          [facility]

 

                          Address                   [address]

[place], [place]  [id number]



 

                          Phone                     [phone]







Care Team Providers







                    Care Team Member Name    Role                Phone

 

                    DEON LEON    PCP                 Unavailable







Allergies and Adverse Reactions







                    Name                Reaction            Notes

 

                    NO KNOWN DRUG ALLERGIES                         







Plan of Treatment

Not available.



Medications







                                        Active 

 

             Name         Start Date    Estimated Completion Date    SIG          Comments

 

             Proctofoam 1 % topical foam    2011                 apply by external route daily     

 

                Calcium 600 + D(3) 600 mg(1,500mg) -200 unit oral tablet                                    take 2 tablets by

 oral route daily                        

 

             Aerochamber    2014                 Use with inhaler as directed     

 

                Symbicort 160-4.5 mcg/actuation inhalation HFA aerosol inhaler    2014                       inhale

 2 puffs by inhalation route 2 times per day in the morning and evening     

 

                pravastatin 20 mg oral tablet                                    take 1 tablet (20 mg) by oral route once daily

                                         

 

                Toprol XL 25 mg oral tablet extended release 24 hr                                    take 1 tablet (25 mg) 

by oral route once daily                 

 

                                        ipratropium-albuterol 0.5 mg-3 mg(2.5 mg base)/3 mL inhalation solution for nebulization

                    10/29/2014                              inhale 3 milliliters by nebulization route 4 times per day for COPD

                                         

 

                hydrochlorothiazide 25 mg oral tablet    1/15/2015                       take 1 tablet (25 mg) by oral

 route once daily for 30 days            

 

             hydrochlorothiazide 25 mg oral tablet    1/15/2015                 TAKE 1 TABLET DAILY     









                                         

 

             Name         Start Date    Expiration Date    SIG          Comments

 

                Singulair 10 mg oral tablet    9/3/2010        2011        take 1 tablet (10 mg) by oral route

 daily  for 60 days                      

 

                Zithromax Z-Christopher 250 mg oral tablet    2011       take 2 tablets (500 mg)

 by oral route once daily for 1 day then 1 tablet (250 mg) by oral route once 
daily for 4 days                         

 

                Medrol (Christopher) 4 mg oral tablets,dose pack    2011        take as directed

 for 6 days                              

 

                Keflex 500 mg oral capsule    3/1/2012        3/11/2012       take 1 capsule by oral route 3 

times a day for 10 days                  

 

                Cipro 500 mg oral tablet    2012        take 1 tablet (500 mg) by oral route

 every 12 hours for 15 days              

 

                benzonatate 100 mg oral capsule    2013       take 1 capsule (100 mg) by

 oral route 3 times per day for cough     

 

             Medrol (Christopher) 4 mg oral tablets,dose pack    2013     take as directed    

 

 

                Zyrtec 10 mg oral tablet    2013       take 1 tablet (10 mg) by oral route

 once daily for 30 days                  

 

                Flonase 50 mcg/actuation nasal spray,suspension    2013       inhale 1 spray

 in each nostril by intranasal route 2 times per day for 30 days     

 

                cephalexin 500 mg oral capsule    2013        take 1 capsule (500 mg) by oral

 route every 8 hours                     

 

                promethazine-codeine 6.25-10 mg/5 mL oral syrup    2013                       take 5 milliliters

 by oral route every 4 hours as needed, not to exceed 30 mL in 24 hours     

 

                Zithromax Z-Christopher 250 mg oral tablet    10/2/2013       10/7/2013       take 2 tablets (500 mg)

 by oral route once daily for 1 day then 1 tablet (250 mg) by oral route once 
daily for 4 days                         

 

                amoxicillin 500 mg oral capsule    2014       take 1 capsule by oral route

 3 times a day for 15 days               

 

                lovastatin 20 mg oral tablet    2014        take 1 tablet (20 mg) by oral 

route daily  for 30 days                 

 

                Zithromax Z-Christopher 250 mg oral tablet    2014       take 2 tablets (500 mg)

 by oral route once daily for 1 day then 1 tablet (250 mg) by oral route once 
daily for 4 days                         

 

             Medrol (Christopher) 4 mg oral tablets,dose pack    2014                 take as directed     

 

                amoxicillin 500 mg oral capsule    2014                       take 1 capsule (500 mg) by oral route

 3 times per day                         

 

                Levaquin 500 mg oral tablet    10/16/2014      10/26/2014      take 1 tablet (500 mg) by oral

 route once daily for 10 days            

 

                prednisone 20 mg oral tablet    10/16/2014      10/24/2014      4x2 days 3x2 days 2x2 days

 1x2 days                                

 

                Zithromax Z-Christopher 250 mg oral tablet    2014       take 2 tablets (500 mg)

 by oral route once daily for 1 day then 1 tablet (250 mg) by oral route once 
daily for 4 days                         

 

                Bactrim -160 mg oral tablet    1/15/2015       2015       take 1 tablet by oral route

 every 12 hours for 10 days              

 

                Zithromax Z-Christopher 250 mg oral tablet    9/10/2015       9/15/2015       take 2 tablets (500 mg)

 by oral route once daily for 1 day then 1 tablet (250 mg) by oral route once 
daily for 4 days                         









                                        Discontinued 

 

             Name         Start Date    Discontinued Date    SIG          Comments

 

                amoxicillin 500 mg oral capsule    11/10/2011      10/3/2012       take 1 capsule (500 mg) 

by oral route 3 times per day            

 

                Anusol-HC 25 mg rectal suppository    2011      10/3/2012       insert 1 suppository 

(25 mg) by rectal route 2 times per day     







Problem List







                    Description         Status              Onset

 

                    Chronic Obstructive Pulmonary Disease    Active               

 

                    Hyperlipidemia      Active               

 

                    Anxiety Disorder    Active              10/29/2013

 

                    Pain in joint; Hip    Active              06/10/2014

 

                    Pulmonary nodule seen on imaging study    Active              10/29/2014

 

                    Exercise hypoxemia    Active              10/29/2014







Vital Signs







      Date    Time    BP-Sys(mm[Hg]    BP-Noni(mm[Hg])    HR(bpm)    RR(rpm)    Temp    WT    HT    HC    BMI

                    BSA                 BMI Percentile      O2 Sat(%)

 

        10/26/2015    2:46:00 PM    130 mmHg    80 mmHg    96 bpm    16 rpm    97.9 F    141 lbs    66 in

                          22.76 kg/m2    1.73 m2                   98 %

 

        10/6/2015    9:37:00 AM    140 mmHg    78 mmHg    110 bpm    16 rpm    97.9 F    141 lbs    63 in

                          24.9768 kg/m    1.6861 m                 95 %

 

        10/28/2014    2:25:00 PM    132 mmHg    66 mmHg    92 bpm    24 rpm    97.5 F    147 lbs    63 in

                          26.04 kg/m2    1.72 m2                   92 %

 

        10/16/2014    9:45:00 AM    132 mmHg    64 mmHg    74 bpm    24 rpm    97.7 F    146 lbs    63 in

                          25.8625 kg/m    1.7157 m                 92 %

 

        2014    2:31:00 PM    136 mmHg    68 mmHg    90 bpm    22 rpm    98.2 F    148 lbs    63 in 

                          26.22 kg/m2    1.73 m2                   95 %

 

        2014    3:19:00 PM    124 mmHg    70 mmHg    64 bpm    20 rpm    97.8 F    147 lbs    63 in

                          26.0396 kg/m    1.7216 m                 95 %

 

        10/28/2013    10:31:00 AM    140 mmHg    70 mmHg    92 bpm    24 rpm    97.8 F    143 lbs    63 

in                        25.33 kg/m2    1.70 m2                   95 %

 

      2013    2:51:00 PM    130 mmHg    78 mmHg    90 bpm          98.2 F    168 lbs    65 in          

27.9564 kg/m      1.8694 m                               

 

       2013    2:43:00 PM    110 mmHg    76 mmHg    103 bpm           96.6 F    137 lbs    63 in      

                24.27 kg/m2     1.66 m2                          

 

        2013    9:50:00 AM    150 mmHg    66 mmHg    108 bpm    20 rpm    97.8 F    138 lbs    63 in

                          24.4454 kg/m    1.6681 m                 94 %

 

        10/25/2012    9:08:00 AM    110 mmHg    60 mmHg    88 bpm    18 rpm    97.5 F    142 lbs    63 in

                          25.15 kg/m2    1.69 m2                   94 %

 

      10/3/2012    9:33:00 AM    130 mmHg    60 mmHg    98 bpm    18 rpm          146 lbs    63 in          

25.8625 kg/m      1.7157 m                              95 %

 

      2012    2:31:00 PM    116 mmHg    76 mmHg    88 bpm                140 lbs    63 in          24.80 

kg/m2               1.68 m2                                 96 %

 

     2011    10:02:00 AM    122 mmHg    80 mmHg    110 bpm              144 lbs                        

                                        94 %

 

      2011    2:58:00 PM    124 mmHg    84 mmHg    106 bpm                155 lbs    63 in          27.46

 kg/m2              1.77 m2                                 96 %

 

     2011    9:55:00 AM    114 mmHg    78 mmHg    92 bpm              146 lbs                             95

 %

 

     6/3/2011    8:36:00 AM    116 mmHg    74 mmHg    90 bpm              146 lbs                             96

 %

 

     2010    3:01:00 PM    132 mmHg    84 mmHg    102 bpm              149 lbs                          

                                        94 %

 

     2010    11:46:00 AM    124 mmHg    78 mmHg    104 bpm              151 lbs                         

                                        94 %

 

     2010    8:47:00 AM    116 mmHg    78 mmHg    103 bpm              151 lbs                          

                                        95 %







Social History







                    Name                Description         Comments

 

                    Tobacco             Never smoker         

 

                    denies alcohol use                         







History of Procedures







                    Date Ordered        Description         Order Status

 

                    2011 12:00 AM    THER/PROPH/DIAG INJ SC/IM    Reviewed

 

                    2011 12:00 AM    Decadron Inj.1mg-([facility]) Ndc #[id number]    Reviewed

 

                    2011 12:00 AM    Depo-Medrol 80 Mg Im/[facility] NDC [id number]    Reviewed

 

                    2011 12:00 AM    Rocephin, Per 250MG - 1 Gram Vial NDC 6461-256042-[facility] [place]    Reviewed



 

                    3/16/2012 12:00 AM    ROUTINE VENIPUNCTURE    Reviewed

 

                    3/16/2012 12:00 AM    COMPLETE CBC W/AUTO DIFF WBC    Reviewed

 

                    3/16/2012 12:00 AM    COMPREHEN METABOLIC PANEL    Reviewed

 

                    3/16/2012 12:00 AM    ASSAY THYROID STIM HORMONE    Reviewed

 

                    10/3/2012 12:00 AM    THER/PROPH/DIAG INJ SC/IM    Reviewed

 

                    10/3/2012 12:00 AM    Decadron, Per 1 Mg NDC# [address]-43    Reviewed

 

                    10/3/2012 12:00 AM    Depo-Medrol, Per 80 Mg NDC#9796-4179-03    Reviewed

 

                    10/3/2012 12:00 AM    Toradol 60 Mg NDC#2224-7728-69    Reviewed

 

                    2013 12:00 AM    THER/PROPH/DIAG INJ SC/IM    Reviewed

 

                    2013 12:00 AM    Decadron, Per 1 Mg NDC# [address]-35    Reviewed

 

                    2013 12:00 AM    Depo-Medrol, Per 80 Mg NDC#5523-4273-93    Reviewed

 

                    2013 12:00 AM    Toradol 60 Mg NDC#8877-9553-37    Reviewed

 

                    2010 12:00 AM    ROUTINE VENIPUNCTURE    Reviewed

 

                    2010 12:00 AM    COMPLETE CBC W/AUTO DIFF WBC    Reviewed

 

                    2010 12:00 AM    COMPREHEN METABOLIC PANEL    Reviewed

 

                    2010 12:00 AM    LIPID PANEL         Reviewed

 

                    10/28/2014 12:00 AM    CHEST X-RAY 4/> VIEWS    Returned

 

                    6/3/2011 12:00 AM    ROUTINE VENIPUNCTURE    Reviewed

 

                    6/3/2011 12:00 AM    COMPLETE CBC AUTOMATED    Reviewed

 

                    6/3/2011 12:00 AM    COMPREHEN METABOLIC PANEL    Reviewed

 

                    6/3/2011 12:00 AM    LIPID PANEL         Reviewed

 

                    2011 12:00 AM    THER/PROPH/DIAG INJ SC/IM    Reviewed

 

                    2011 12:00 AM    Decadron Inj.8mg-([facility]) Ndc #[id number]    Reviewed

 

                    2011 12:00 AM    Depo-Medrol 80 Mg Im/[facility] NDC [id number]    Reviewed







Results Summary







                          Data and Description      Results

 

                          6/3/2011 1:52 PM          WBC 8.6 RBC 4.66 HGB 13.20 g/dLHCT 42.10 %MCV 90.0 fLMCH 28.30

 pgMCHC 31.40 g/dLRDW CV 13.60 %MPV 10.90 fLPLT 383 %NEUT 68.0 %%LYMP 20.30 
%%MONO 9.30 %%EOS 2.20 %%BASO 0.20 %#NEUT 5.81 #LYMP 1.74 #MONO 0.80 #EOS 0.19 
#BASO 0.02 

 

                          6/3/2011 1:53 PM          TRIGLYCERIDES 155.0 mg/dLCHOLESTEROL 248.0 mg/dLHDL 51.0 mg/dLLDL

 168.0 mg/dLGLUCOSE 97.0 mg/dLSODIUM 139.0 mmol/LPOTASSIUM 3.70 mmol/LCHLORIDE 
101.0 mmol/LCO2 27.0 mmol/LBUN 19.0 mg/dLCREATININE 0.90 mg/dLSGOT/AST 19.0 
IU/LSGPT/ALT 11.0 IU/LALK PHOS 111.0 IU/LTOTAL PROTEIN 7.40 g/dLALBUMIN 4.20 
g/dLTOTAL BILI 0.50 mg/dLCALCIUM 9.50 mg/dLeGFR >60 mL/min/1.73 m2







History Of Immunizations

Not available.



History of Past Illness







                    Name                Date of Onset       Comments

 

                    Chronic Obstructive Pulmonary Disease                         

 

                    Hyperlipidemia                           

 

                    Cough               2010  8:49AM     

 

                    General Medical Exam, Adult    2010  8:49AM     

 

                    Seasonal Allergies    2010  8:49AM     

 

                    Sinusitis, Chronic    2010  8:49AM     

 

                    Ganglion cyst of synovium, tendon, and bursa; other    2010 11:48AM     

 

                    Anxiety Disorder    10/29/2013           

 

                    Pain in joint; Hip    06/10/2014           

 

                    Pulmonary nodule seen on imaging study    10/29/2014           

 

                    Exercise hypoxemia    10/29/2014           

 

                    Chronic Obstructive Pulmonary Disease    2010  3:02PM     

 

                    Edema               2010  3:02PM     

 

                    Sinusitis, Acute    2010  3:02PM     

 

                    Chronic Obstructive Pulmonary Disease    Trey  3 2011  8:38AM     

 

                    Palpitations        Trey  3 2011  8:38AM     

 

                    Cough               2011  9:54AM     

 

                    Sinusitis, Acute    2011  9:54AM     

 

                    Seasonal Allergies    2011  9:54AM     

 

                    Post-nasal drainage    2011  9:54AM     

 

                    Cough               Sep 22 2011  2:55PM     

 

                    Seasonal Allergies    Sep 22 2011  2:55PM     

 

                    Pharyngitis, Acute    Sep 22 2011  2:55PM     

 

                    Post-nasal drainage    Sep 22 2011  2:55PM     

 

                    Blood In Stool, Occult    2011  9:58AM     

 

                    Hemorrhoids         2011  9:58AM     

 

                    Chronic Obstructive Pulmonary Disease    2012  2:33PM     

 

                    Cardiac Dysrhythmia    2012  2:33PM     

 

                    Sinoatrial Node Dysfunction    Mar 16 2012  9:12AM     

 

                    Palpitations        Mar 16 2012  9:12AM     

 

                    Osteoarthrosis, generalized, multiple sites    Oct  3 2012  9:34AM     

 

                    Pain in joint; Hip    Oct  3 2012  9:34AM     

 

                    Osteoarthrosis      Oct 25 2012  9:09AM     

 

                    Bronchitis, Acute    Oct 25 2012  9:09AM     

 

                    Cough               Oct 25 2012  9:09AM     

 

                    Pain in joint; Left Hip    Oct 25 2012  9:09AM     

 

                    Pain in joint; Hip    2013  9:50AM     

 

                    Osteoarthrosis, generalized, multiple sites    2013  2:45PM     

 

                    Pain in joint; Hip bilateral    2013  2:45PM     

 

                    Anxiety Disorder    Oct 28 2013 10:32AM     

 

                    Chronic Obstructive Pulmonary Disease    Oct 28 2013 10:32AM     

 

                    Hyperlipidemia      Oct 28 2013 10:32AM     

 

                    Pain in joint; Left Hip    Oct 28 2013 10:32AM     

 

                    Sinusitis, Acute    Oct 28 2013 10:32AM     

 

                    Essential Hypertension    2014  3:19PM     

 

                    Osteoarthrosis, generalized, multiple sites    2014  3:19PM     

 

                    Chronic Obstructive Pulmonary Disease    2014  3:19PM     

 

                    Pain in joint; Hip    2014  3:19PM     

 

                    Chronic Obstructive Pulmonary Disease    2014  2:31PM     

 

                    Pain in joint; Hip    2014  2:31PM     

 

                    pre-operative examination, unspecified    2014  2:31PM     

 

                    Lung nodule seen on imaging study    Oct 28 2014 10:24AM     

 

                    Bronchitis, Acute    Oct 16 2014  9:45AM     

 

                    Respiratory System And Chest Symptoms    Oct 16 2014  9:45AM     

 

                    COPD (chronic obstructive pulmonary disease)    Oct 16 2014  9:45AM     

 

                    Chronic Obstructive Pulmonary Disease    Oct 28 2014  2:26PM     

 

                    Pulmonary nodule seen on imaging study    Oct 28 2014  2:26PM     

 

                    Shortness of breath    Oct 28 2014  2:26PM     

 

                    Exercise hypoxemia    Oct 28 2014  2:26PM     

 

                    Nail avulsion       Oct  6 2015  9:38AM     

 

                          Contusion of left index finger with damage to nail, initial encounter    Oct 26 2015

  2:53PM                                 







Payers







           Insurance Name    Company Name    Plan Name    Plan Number    Policy Number    Policy Group

 Number                                 Start Date

 

                    Medicare Part A    Medicare Part A              [id number]              N/A

 

                                [facility]      

                    [id number]                             N/A

 

                    Medicare Part B    [employer] [place]              [id number]              N/A

 

                          [place] [employer] [facility] Medical Assistance Prog                 [id number]

                                                    N/A

 

                    [facility]              [id number]              N/A







History of Encounters







                    Visit Date          Visit Type          Provider

 

                    10/26/2015          Office visit        DEON ESCALANTE

 

                    10/6/2015           Office visit        DEON ESCALANTE

 

                    10/28/2014          Office visit        DEON ESCALANTE

 

                    10/16/2014          Office visit        DEON ESCALANTE

 

                    2014            Office visit        DEON ESCALANTE

 

                    2014           [facility]            DEWEY Garcia MD

 

                    2014           Office visit        DEON ESCALANTE

 

                    10/28/2013          Office visit        DEON ESCALANTE

 

                    10/14/2013          [facility]            DEWEY Garcia MD

 

                    2013           Office visit        DOEN ESCALANTE

 

                    2013            Office visit        DEON ESCALANTE

 

                    10/25/2012          Office visit        DEON ESCALANTE

 

                    10/3/2012           Office visit        DEON ESCALANTE

 

                    3/16/2012           Office visit        DEON ESCALANTE

 

                    2012            Office visit        DEON ESCALANTE

 

                    2012            [facility]            DEWEY Garcia MD

 

                    2011          Office visit        DEON ESCALANTE

 

                    2011           Office visit        Deon CASTROC

 

                    2011            Office visit        Deon Leon PA-C

 

                    6/3/2011            Office visit        Deon Leon PA-C

 

                    2010           Office visit        Deon Leon PA-C

 

                    2010           Office visit        Deon Leon PA-C

 

                    2010           Office visit        Deon Leon PA-C

## 2019-06-25 NOTE — XMS REPORT
MU2 Ambulatory Summary

                             Created on: 2017



Elsi Casillas

External Reference #: [id number]

: 1940

Sex: Female



Demographics







                          Address                   [address] [place]  [address]

 

                          Home Phone                [phone]

 

                          Preferred Language        English

 

                          Marital Status            

 

                          Cheondoism Affiliation     Unknown

 

                          Race                      White

 

                          Ethnic Group              Not  or 





Author







                          DEON Ferguson

 

                          [facility]

 

                          Address                   [address]

[place], [place]  [id number]



 

                          Phone                     [phone]







Care Team Providers







                    Care Team Member Name    Role                Phone

 

                    DEON LEON    PCP                 Unavailable

 

                    DEON LEON    PreferredProvider    Unavailable







Allergies and Adverse Reactions







                    Name                Reaction            Notes

 

                    NO KNOWN DRUG ALLERGIES                         







Plan of Treatment







             Planned Activity    Comments     Planned Date    Planned Time    Plan/Goal

 

             Lipid Profile                 2016    12:00 AM      

 

             Chest PA and Lateral - Main                 8/10/2017    12:00 AM      

 

             Injection,Subcutaneous/Intramuscul, [facility]                 2017    12:00 AM      

 

             Lipid Profile                 2017    12:00 AM      







Medications







                                        Active 

 

             Name         Start Date    Estimated Completion Date    SIG          Comments

 

                Calcium 600 + D(3) 600 mg(1,500mg) -200 unit oral tablet                                    take 2 tablets by

 oral route daily                        

 

                pravastatin 20 mg oral tablet                                    take 1 tablet (20 mg) by oral route once daily

                                         

 

                Toprol XL 25 mg oral tablet extended release 24 hr                                    take 1 tablet (25 mg) 

by oral route once daily                 

 

                hydrochlorothiazide 25 mg oral tablet    1/15/2015                       take 1 tablet (25 mg) by oral

 route once daily for 30 days            

 

                metoprolol succinate 25 mg oral tablet extended release 24 hr    2015                      take

 1 tablet (25 mg) by oral route once daily     

 

                promethazine-codeine 6.25-10 mg/5 mL oral syrup    11/10/2016                      take 5 milliliters

 by oral route every 6 hours as needed, not to exceed 30 mL in 24 hours     

 

                                        ipratropium-albuterol 0.5 mg-3 mg(2.5 mg base)/3 mL inhalation solution for nebulization

                    2016                              inhale 3 milliliters by nebulization route 4 times per day for COPD

                                         

 

                Symbicort 160-4.5 mcg/actuation inhalation HFA aerosol inhaler    11/10/2016                      inhale

 2 puffs by inhalation route 2 times per day in the morning and evening     

 

                prednisone 20 mg oral tablet    2017                       4 x 2 days, 3 x 2 days, 2 x 2 days 1

 x 2 days                                

 

                Keflex 500 mg oral capsule    2017                       take 1 capsule (500 mg) by oral route 

every 12 hours                           

 

             hydrochlorothiazide 25 mg oral tablet    2017                 TAKE 1 TABLET DAILY     

 

                metoprolol succinate 25 mg oral tablet extended release 24 hr    2017                       TAKE

 1 TABLET DAILY                          

 

                    albuterol sulfate 1.25 mg/3 mL inhalation solution for nebulization    2017           

                                        inhale 3 milliliters (1.25 mg) via nebulizer by inhalation route 4 times per day

  DX J44.9                               









                                         

 

             Name         Start Date    Expiration Date    SIG          Comments

 

                Singulair 10 mg oral tablet    9/3/2010        2011        take 1 tablet (10 mg) by oral route

 daily  for 60 days                      

 

                Zithromax Z-Christopher 250 mg oral tablet    2011       take 2 tablets (500 mg)

 by oral route once daily for 1 day then 1 tablet (250 mg) by oral route once 
daily for 4 days                         

 

                Medrol (Christopher) 4 mg oral tablets,dose pack    2011        take as directed

 for 6 days                              

 

                Keflex 500 mg oral capsule    3/1/2012        3/11/2012       take 1 capsule by oral route 3 

times a day for 10 days                  

 

                Cipro 500 mg oral tablet    2012        take 1 tablet (500 mg) by oral route

 every 12 hours for 15 days              

 

                benzonatate 100 mg oral capsule    2013       take 1 capsule (100 mg) by

 oral route 3 times per day for cough     

 

             Medrol (Christopher) 4 mg oral tablets,dose pack    2013     take as directed    

 

 

                Zyrtec 10 mg oral tablet    2013       take 1 tablet (10 mg) by oral route

 once daily for 30 days                  

 

                Flonase 50 mcg/actuation nasal spray,suspension    2013       inhale 1 spray

 in each nostril by intranasal route 2 times per day for 30 days     

 

                cephalexin 500 mg oral capsule    2013        take 1 capsule (500 mg) by oral

 route every 8 hours                     

 

                promethazine-codeine 6.25-10 mg/5 mL oral syrup    2013                       take 5 milliliters

 by oral route every 4 hours as needed, not to exceed 30 mL in 24 hours     

 

                Zithromax Z-Christopher 250 mg oral tablet    10/2/2013       10/7/2013       take 2 tablets (500 mg)

 by oral route once daily for 1 day then 1 tablet (250 mg) by oral route once 
daily for 4 days                         

 

                amoxicillin 500 mg oral capsule    2014       take 1 capsule by oral route

 3 times a day for 15 days               

 

                lovastatin 20 mg oral tablet    2014        take 1 tablet (20 mg) by oral 

route daily  for 30 days                 

 

                Zithromax Z-Christopher 250 mg oral tablet    2014       take 2 tablets (500 mg)

 by oral route once daily for 1 day then 1 tablet (250 mg) by oral route once 
daily for 4 days                         

 

             Medrol (Christopher) 4 mg oral tablets,dose pack    2014                 take as directed     

 

                amoxicillin 500 mg oral capsule    2014                       take 1 capsule (500 mg) by oral route

 3 times per day                         

 

                Levaquin 500 mg oral tablet    10/16/2014      10/26/2014      take 1 tablet (500 mg) by oral

 route once daily for 10 days            

 

                prednisone 20 mg oral tablet    10/16/2014      10/24/2014      4x2 days 3x2 days 2x2 days

 1x2 days                                

 

                Zithromax Z-Christopher 250 mg oral tablet    2014       take 2 tablets (500 mg)

 by oral route once daily for 1 day then 1 tablet (250 mg) by oral route once 
daily for 4 days                         

 

                Bactrim -160 mg oral tablet    1/15/2015       2015       take 1 tablet by oral route

 every 12 hours for 10 days              

 

                Zithromax Z-Christopher 250 mg oral tablet    2015      take 2 tablets (500 mg)

 by oral route once daily for 1 day then 1 tablet (250 mg) by oral route once 
daily for 4 days                         

 

                Keflex 500 mg oral capsule    2016       take 1 capsule by oral route 3

 times a day for 7 days                  

 

                amoxicillin 500 mg oral capsule    10/4/2016       10/14/2016      take 1 capsule by oral route

 3 times a day for 10 days               

 

                Tessalon Perles 100 mg oral capsule    10/4/2016                       take 1 capsule by oral route 

every 4 hours                            

 

                Zithromax Z-Christopher 250 mg oral tablet    11/10/2016      11/15/2016      take 2 tablets (500 

mg) by oral route once daily for 1 day then 1 tablet (250 mg) by oral route once
daily for 4 days                         

 

                amoxicillin 500 mg oral tablet    2016                       take 1 tablet (500 mg) by oral route

 3 times per day                         

 

                amoxicillin 500 mg oral capsule    2017       take 1 capsule (500 mg) by

 oral route 3 times per day for 10 days     

 

                Bactrim -160 mg oral tablet    2017       take 1 tablet by oral route

 2 times a day for 10 days               

 

                Levaquin 500 mg oral tablet    2017       take 1 tablet (500 mg) by oral

 route once daily for 10 days            









                                        Discontinued 

 

             Name         Start Date    Discontinued Date    SIG          Comments

 

                amoxicillin 500 mg oral capsule    11/10/2011      10/3/2012       take 1 capsule (500 mg) 

by oral route 3 times per day            

 

                Anusol-HC 25 mg rectal suppository    2011      10/3/2012       insert 1 suppository 

(25 mg) by rectal route 2 times per day     

 

                Proctofoam 1 % topical foam    2011       apply by external route daily

                                         

 

             Aerochamber    2014    Use with inhaler as directed     

 

                hydrochlorothiazide 25 mg oral tablet    1/15/2015       2015      TAKE 1 TABLET DAILY

                                         

 

                Augmentin 875-125 mg oral tablet    2017       take 1 tablet by oral route

 every 12 hours for 7 days               







Problem List







                    Description         Status              Onset

 

                    Chronic Obstructive Pulmonary Disease    Active               

 

                    Hyperlipidemia      Active               

 

                    Anxiety Disorder    Active              10/29/2013

 

                    Pain in joint; Hip    Active              06/10/2014

 

                    Pulmonary nodule seen on imaging study    Active              10/29/2014

 

                    Exercise hypoxemia    Active              10/29/2014

 

                    Anxiety about health    Active              2016

 

                    Primary osteoarthritis involving multiple joints    Active              2017

 

                    Medication management    Active              2017

 

                    Cellulitis of left lower extremity    Active              2017

 

                    Dog bite of calf, left, sequela    Active              2017







Vital Signs







      Date    Time    BP-Sys(mm[Hg]    BP-Noni(mm[Hg])    HR(bpm)    RR(rpm)    Temp    WT    HT    HC    BMI

                    BSA                 BMI Percentile      O2 Sat(%)

 

       2017    3:29:00 PM    128 mmHg    80 mmHg    68 bpm    16 rpm    98 F    144 lbs    63 in    

                25.51 kg/m2     1.70 m2                         93 %

 

        2017    11:37:00 AM    118 mmHg    72 mmHg    96 bpm    16 rpm    97.4 F    141 lbs    63 in

                          24.9768 kg/m    1.6861 m                 91 %

 

        2017    8:55:00 AM    105 mmHg    60 mmHg    96 bpm    18 rpm    95.8 F    142 lbs    63 in 

                          25.15 kg/m2    1.69 m2                   95 %

 

       2017    12:10:00 PM    122 mmHg    68 mmHg    76 bpm    18 rpm    98 F    143 lbs    63 in    

                25.3311 kg/m    1.698 m                       98 %

 

        2017    4:03:00 PM    118 mmHg    64 mmHg    106 bpm    18 rpm    97.4 F    137 lbs    63 in

                          24.27 kg/m2    1.66 m2                   98 %

 

        2016    12:11:00 PM    120 mmHg    84 mmHg    110 bpm    16 rpm    98.1 F    130 lbs    63

 in                       23.0282 kg/m    1.619 m                 90 %

 

        11/10/2016    3:57:00 PM    148 mmHg    72 mmHg    88 bpm    16 rpm    98.2 F    132 lbs    63 in

                          23.38 kg/m2    1.63 m2                   98 %

 

        3/10/2016    2:12:00 PM    122 mmHg    60 mmHg    106 bpm    18 rpm    97 F    137 lbs    63 in 

                          24.2682 kg/m    1.662 m                 93 %

 

        12/15/2015    2:33:00 PM    120 mmHg    75 mmHg    102 bpm    16 rpm    98.2 F    137 lbs    63 

in                        24.27 kg/m2    1.66 m2                   94 %

 

        10/26/2015    2:46:00 PM    130 mmHg    80 mmHg    96 bpm    16 rpm    97.9 F    141 lbs    66 in

                          22.7578 kg/m    1.7258 m                 98 %

 

        10/6/2015    9:37:00 AM    140 mmHg    78 mmHg    110 bpm    16 rpm    97.9 F    141 lbs    63 in

                          24.98 kg/m2    1.69 m2                   95 %

 

        10/28/2014    2:25:00 PM    132 mmHg    66 mmHg    92 bpm    24 rpm    97.5 F    147 lbs    63 in

                          26.0396 kg/m    1.7216 m                 92 %

 

        10/16/2014    9:45:00 AM    132 mmHg    64 mmHg    74 bpm    24 rpm    97.7 F    146 lbs    63 in

                          25.86 kg/m2    1.72 m2                   92 %

 

        2014    2:31:00 PM    136 mmHg    68 mmHg    90 bpm    22 rpm    98.2 F    148 lbs    63 in 

                          26.2168 kg/m    1.7274 m                 95 %

 

        2014    3:19:00 PM    124 mmHg    70 mmHg    64 bpm    20 rpm    97.8 F    147 lbs    63 in

                          26.04 kg/m2    1.72 m2                   95 %

 

        10/28/2013    10:31:00 AM    140 mmHg    70 mmHg    92 bpm    24 rpm    97.8 F    143 lbs    63 

in                        25.3311 kg/m    1.698 m                 95 %

 

      2013    2:51:00 PM    130 mmHg    78 mmHg    90 bpm          98.2 F    168 lbs    65 in          

27.96 kg/m2         1.87 m2                                  

 

       2013    2:43:00 PM    110 mmHg    76 mmHg    103 bpm           96.6 F    137 lbs    63 in      

                24.2682 kg/m    1.662 m                        

 

        2013    9:50:00 AM    150 mmHg    66 mmHg    108 bpm    20 rpm    97.8 F    138 lbs    63 in

                          24.45 kg/m2    1.67 m2                   94 %

 

        10/25/2012    9:08:00 AM    110 mmHg    60 mmHg    88 bpm    18 rpm    97.5 F    142 lbs    63 in

                          25.1539 kg/m    1.6921 m                 94 %

 

      10/3/2012    9:33:00 AM    130 mmHg    60 mmHg    98 bpm    18 rpm          146 lbs    63 in          

25.86 kg/m2         1.72 m2                                 95 %

 

      2012    2:31:00 PM    116 mmHg    76 mmHg    88 bpm                140 lbs    63 in          24.7996

 kg/m             1.6801 m                              96 %

 

     2011    10:02:00 AM    122 mmHg    80 mmHg    110 bpm              144 lbs                        

                                        94 %

 

      2011    2:58:00 PM    124 mmHg    84 mmHg    106 bpm                155 lbs    63 in          27.4567

 kg/m             1.7678 m                              96 %

 

     2011    9:55:00 AM    114 mmHg    78 mmHg    92 bpm              146 lbs                             95

 %

 

     6/3/2011    8:36:00 AM    116 mmHg    74 mmHg    90 bpm              146 lbs                             96

 %

 

     2010    3:01:00 PM    132 mmHg    84 mmHg    102 bpm              149 lbs                          

                                        94 %

 

     2010    11:46:00 AM    124 mmHg    78 mmHg    104 bpm              151 lbs                         

                                        94 %

 

     2010    8:47:00 AM    116 mmHg    78 mmHg    103 bpm              151 lbs                          

                                        95 %







Social History







                    Name                Description         Comments

 

                    Tobacco             Never smoker         

 

                    denies alcohol use                         







History of Procedures







                    Date Ordered        Description         Order Status

 

                    2011 12:00 AM    THER/PROPH/DIAG INJ SC/IM    Reviewed

 

                    2011 12:00 AM    Decadron Inj.1mg-([facility]) Ndc #[id number]    Reviewed

 

                    2011 12:00 AM    Depo-Medrol 80 Mg Im/[facility] NDC 0009-065547    Reviewed

 

                    2011 12:00 AM    Rocephin, Per 250MG - 1 Gram Vial NDC 1637-136502-[facility]    Reviewed



 

                    3/16/2012 12:00 AM    ROUTINE VENIPUNCTURE    Reviewed

 

                    3/16/2012 12:00 AM    COMPLETE CBC W/AUTO DIFF WBC    Reviewed

 

                    3/16/2012 12:00 AM    COMPREHEN METABOLIC PANEL    Reviewed

 

                    3/16/2012 12:00 AM    ASSAY THYROID STIM HORMONE    Reviewed

 

                    11/10/2016 12:00 AM    Decadron, Per 1 Mg NDC# [address]-64    Reviewed

 

                    11/10/2016 12:00 AM    Depo-Medrol, Per 80 Mg NDC#1185-2157-75    Reviewed

 

                    11/10/2016 12:00 AM    Rocephin 1 gram NDC#9400-6338-63    Reviewed

 

                    2016 12:00 AM    THER/PROPH/DIAG INJ SC/IM    Reviewed

 

                    2016 12:00 AM    Decadron 8mg Injection, [facility]    Reviewed

 

                    2016 12:00 AM    Depo-Medrol 80mg Injection, [facility]    Reviewed

 

                    2016 12:00 AM    Rocephin 1 gram Injection, [facility]    Reviewed

 

                    2017 12:00 AM    COMPLETE CBC W/AUTO DIFF WBC    Reviewed

 

                    2017 12:00 AM    COMPREHEN METABOLIC PANEL    Reviewed

 

                    2017 12:00 AM    LIPID PANEL         Reviewed

 

                    2017 12:00 AM    THERAPEUTIC PROPHYLACTIC/DX INJECTION SUBQ/IM    Reviewed

 

                    2017 12:00 AM    Decadron 8mg Injection, [facility]    Reviewed

 

                    2017 12:00 AM    Depo-Medrol 80mg Injection, [facility]    Reviewed

 

                    10/3/2012 12:00 AM    THER/PROPH/DIAG INJ SC/IM    Reviewed

 

                    10/3/2012 12:00 AM    Decadron, Per 1 Mg NDC# [address]-48    Reviewed

 

                    10/3/2012 12:00 AM    Depo-Medrol, Per 80 Mg NDC#6247-4753-75    Reviewed

 

                    10/3/2012 12:00 AM    Toradol 60 Mg NDC#3359-4448-94    Reviewed

 

                    2013 12:00 AM    THER/PROPH/DIAG INJ SC/IM    Reviewed

 

                    2013 12:00 AM    Decadron, Per 1 Mg NDC# [address]-57    Reviewed

 

                    2013 12:00 AM    Depo-Medrol, Per 80 Mg NDC#0255-1290-57    Reviewed

 

                    2013 12:00 AM    Toradol 60 Mg NDC#3291-0444-75    Reviewed

 

                    2010 12:00 AM    ROUTINE VENIPUNCTURE    Reviewed

 

                    2010 12:00 AM    COMPLETE CBC W/AUTO DIFF WBC    Reviewed

 

                    2010 12:00 AM    COMPREHEN METABOLIC PANEL    Reviewed

 

                    2010 12:00 AM    LIPID PANEL         Reviewed

 

                    10/28/2014 12:00 AM    CHEST X-RAY 4/> VIEWS    Reviewed

 

                    6/3/2011 12:00 AM    ROUTINE VENIPUNCTURE    Reviewed

 

                    6/3/2011 12:00 AM    COMPLETE CBC AUTOMATED    Reviewed

 

                    6/3/2011 12:00 AM    COMPREHEN METABOLIC PANEL    Reviewed

 

                    6/3/2011 12:00 AM    LIPID PANEL         Reviewed

 

                    2011 12:00 AM    THER/PROPH/DIAG INJ SC/IM    Reviewed

 

                    2011 12:00 AM    Decadron Inj.8mg-([facility]) Ndc #[id number]    Reviewed

 

                    2011 12:00 AM    Depo-Medrol 80 Mg Im/[facility] NDC [id number]    Reviewed







Results Summary







                          Date and Description      Results

 

                          6/3/2011 1:53 PM          TRIGLYCERIDES 155.0 mg/dLCHOLESTEROL 248.0 mg/dLHDL 51.0 mg/dLTOT

 CHOL/HDL 4.9 .0 mg/dLGLUCOSE 97.0 mg/dLSODIUM 139.0 mmol/LPOTASSIUM 3.70
 mmol/LCHLORIDE 101.0 mmol/LCO2 27.0 mmol/LBUN 19.0 mg/dLCREATININE 0.90 
mg/dLSGOT/AST 19.0 IU/LSGPT/ALT 11.0 IU/LALK PHOS 111.0 IU/LTOTAL PROTEIN 7.40 
g/dLALBUMIN 4.20 g/dLTOTAL BILI 0.50 mg/dLCALCIUM 9.50 mg/dLAGE 70 GFR NonAA 62 
GFR AA 75 eGFR >60 mL/min/1.73 m2eGFR AA* >60 







History Of Immunizations

Not available.



History of Past Illness







                    Name                Date of Onset       Comments

 

                    Chronic Obstructive Pulmonary Disease                         

 

                    Hyperlipidemia                           

 

                    Cough               2010  8:49AM     

 

                    General Medical Exam, Adult    2010  8:49AM     

 

                    Seasonal Allergies    2010  8:49AM     

 

                    Sinusitis, Chronic    2010  8:49AM     

 

                    Ganglion cyst of synovium, tendon, and bursa; other    2010 11:48AM     

 

                    Anxiety Disorder    10/29/2013           

 

                    Pain in joint; Hip    06/10/2014           

 

                    Pulmonary nodule seen on imaging study    10/29/2014           

 

                    Exercise hypoxemia    10/29/2014           

 

                    Chronic Obstructive Pulmonary Disease    2010  3:02PM     

 

                    Edema               2010  3:02PM     

 

                    Sinusitis, Acute    2010  3:02PM     

 

                    Anxiety about health    2016           

 

                    Chronic Obstructive Pulmonary Disease    Trey  3 2011  8:38AM     

 

                    Palpitations        Trey  3 2011  8:38AM     

 

                    Cough               2011  9:54AM     

 

                    Sinusitis, Acute    2011  9:54AM     

 

                    Seasonal Allergies    2011  9:54AM     

 

                    Post-nasal drainage    2011  9:54AM     

 

                    Primary osteoarthritis involving multiple joints    2017           

 

                    Medication management    2017           

 

                    Cellulitis of left lower extremity    2017           

 

                    Dog bite of calf, left, sequela    2017           

 

                    Cough               Sep 22 2011  2:55PM     

 

                    Seasonal Allergies    Sep 22 2011  2:55PM     

 

                    Pharyngitis, Acute    Sep 22 2011  2:55PM     

 

                    Post-nasal drainage    Sep 22 2011  2:55PM     

 

                    Blood In Stool, Occult    2011  9:58AM     

 

                    Hemorrhoids         2011  9:58AM     

 

                    Chronic Obstructive Pulmonary Disease    2012  2:33PM     

 

                    Cardiac Dysrhythmia    2012  2:33PM     

 

                    Sinoatrial Node Dysfunction    Mar 16 2012  9:12AM     

 

                    Palpitations        Mar 16 2012  9:12AM     

 

                    Osteoarthrosis, generalized, multiple sites    Oct  3 2012  9:34AM     

 

                    Pain in joint; Hip    Oct  3 2012  9:34AM     

 

                    Osteoarthrosis      Oct 25 2012  9:09AM     

 

                    Bronchitis, Acute    Oct 25 2012  9:09AM     

 

                    Cough               Oct 25 2012  9:09AM     

 

                    Pain in joint; Left Hip    Oct 25 2012  9:09AM     

 

                    Pain in joint; Hip    2013  9:50AM     

 

                    Osteoarthrosis, generalized, multiple sites    2013  2:45PM     

 

                    Pain in joint; Hip bilateral    2013  2:45PM     

 

                    Anxiety Disorder    Oct 28 2013 10:32AM     

 

                    Chronic Obstructive Pulmonary Disease    Oct 28 2013 10:32AM     

 

                    Hyperlipidemia      Oct 28 2013 10:32AM     

 

                    Pain in joint; Left Hip    Oct 28 2013 10:32AM     

 

                    Sinusitis, Acute    Oct 28 2013 10:32AM     

 

                    Essential Hypertension    2014  3:19PM     

 

                    Osteoarthrosis, generalized, multiple sites    2014  3:19PM     

 

                    Chronic Obstructive Pulmonary Disease    2014  3:19PM     

 

                    Pain in joint; Hip    2014  3:19PM     

 

                    Chronic Obstructive Pulmonary Disease    2014  2:31PM     

 

                    Pain in joint; Hip    2014  2:31PM     

 

                    pre-operative examination, unspecified    2014  2:31PM     

 

                    Lung nodule seen on imaging study    Oct 28 2014 10:24AM     

 

                    Bronchitis, Acute    Oct 16 2014  9:45AM     

 

                    Respiratory System And Chest Symptoms    Oct 16 2014  9:45AM     

 

                    COPD (chronic obstructive pulmonary disease)    Oct 16 2014  9:45AM     

 

                    Chronic Obstructive Pulmonary Disease    Oct 28 2014  2:26PM     

 

                    Pulmonary nodule seen on imaging study    Oct 28 2014  2:26PM     

 

                    Shortness of breath    Oct 28 2014  2:26PM     

 

                    Exercise hypoxemia    Oct 28 2014  2:26PM     

 

                    Nail avulsion       Oct  6 2015  9:38AM     

 

                          Contusion of left index finger with damage to nail, initial encounter    Oct 26 2015

  2:53PM                                 

 

                    Forehead contusion, subsequent encounter    Dec 15 2015  2:39PM     

 

                    Generalized anxiety disorder    Dec 15 2015  2:39PM     

 

                    Chronic Obstructive Pulmonary Disease    Dec 15 2015  2:39PM     

 

                    Osteoarthrosis, generalized, multiple sites    Mar 10 2016  2:12PM     

 

                    Pain of right thigh    Mar 10 2016  2:12PM     

 

                    Mild Acute Hip pain, acute, right Improving    Mar 10 2016  2:12PM     

 

                    Anxiety about health    Mar 10 2016  2:12PM     

 

                    Hyperlipidemia      Aug 22 2016 10:58AM     

 

                    Sinoatrial Node Dysfunction    Aug 22 2016 10:58AM     

 

                    Palpitations        Aug 22 2016 10:58AM     

 

                    Chronic Obstructive Pulmonary Disease    Aug 22 2016 10:58AM     

 

                    Exercise hypoxemia    Aug 22 2016 10:58AM     

 

                    Pain in joint; Hip    Aug 22 2016 10:58AM     

 

                    Pulmonary nodule seen on imaging study    Aug 22 2016 10:58AM     

 

                    Eustachian tube dysfunction, bilateral    Nov 10 2016  3:57PM     

 

                    Moderate Acute Cough    Nov 10 2016  3:57PM     

 

                    Pharyngitis, Acute    Nov 10 2016  3:57PM     

 

                    Upper Respiratory Infection    Nov 10 2016  3:57PM     

 

                          COPD (chronic obstructive pulmonary disease) with acute bronchitis    Nov 10 2016 

 3:57PM                                  

 

                    Mild Chronic Cough Worsening    Dec 12 2016 12:12PM     

 

                          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

 12 2016 12:12PM                         

 

                    Moderate Shortness of breath on exertion    Dec 12 2016 12:12PM     

 

                    Hyperlipidemia      May  4 2017 10:27AM     

 

                    Sinoatrial Node Dysfunction    May  4 2017 10:27AM     

 

                    Palpitations        May  4 2017 10:27AM     

 

                    Chronic Obstructive Pulmonary Disease    May  4 2017 10:27AM     

 

                    Exercise hypoxemia    May  4 2017 10:27AM     

 

                    Pain in joint; Hip    May  4 2017 10:27AM     

 

                    Pulmonary nodule seen on imaging study    May  4 2017 10:27AM     

 

                    Chest congestion    May 16 2017  4:04PM     

 

                          COPD (chronic obstructive pulmonary disease) with acute bronchitis    May 16 2017 

 4:04PM                                  

 

                    Productive cough    May 16 2017  4:04PM     

 

                    Anxiety about health    May 16 2017  4:04PM     

 

                          Chronic obstructive pulmonary disease, unspecified COPD type    May 16 2017  4:04PM

                                         

 

                    Exercise hypoxemia    May 16 2017  4:04PM     

 

                    Mixed hyperlipidemia    May 16 2017  4:04PM     

 

                    Medication management    May 16 2017  4:04PM     

 

                    Primary osteoarthritis involving multiple joints    May 16 2017  4:04PM     

 

                    Cellulitis of left lower extremity    2017 12:10PM     

 

                    Bitten by dog, initial encounter    2017 12:10PM     

 

                    Cellulitis of left lower extremity    2017  8:56AM     

 

                    Open bite, left lower leg, sequela    2017  8:56AM     

 

                    Bitten by dog, sequela    2017  8:56AM     

 

                    Medication management    2017  8:56AM     

 

                    Cellulitis of left lower extremity    2017 11:38AM     

 

                    Open bite, left lower leg, subsequent encounter    2017 11:38AM     

 

                    Bitten by dog, subsequent encounter    2017 11:38AM     

 

                    Medication management    2017 11:38AM     

 

                    Cough               Aug 10 2017  4:09PM     

 

                    Abnormal chest xray    Aug 10 2017  4:09PM     

 

                    Hyperlipidemia      Aug 29 2017  9:02AM     

 

                    Sinoatrial Node Dysfunction    Aug 29 2017  9:02AM     

 

                    Palpitations        Aug 29 2017  9:02AM     

 

                    Chronic Obstructive Pulmonary Disease    Aug 29 2017  9:02AM     

 

                    Exercise hypoxemia    Aug 29 2017  9:02AM     

 

                    Pain in joint; Hip    Aug 29 2017  9:02AM     

 

                    Pulmonary nodule seen on imaging study    Aug 29 2017  9:02AM     







Payers







           Insurance Name    Company Name    Plan Name    Plan Number    Policy Number    Policy Group

 Number                                 Start Date

 

                    Medicare [employer]    Medicare R[employer]              [id number]              N/A

 

                          [place] [employer]    [place] [employer]                 [id number]

                                                    N/A

 

                    [employer] Medicare A    Test Medicare A              [id number]              N/A

 

                                [facility] - [facility] Comm      

                    [id number]                             N/A

 

                    Medicare Part A    Medicare - Lab/Xray              [id number]              N/A

 

                          [employer]    [employer]                 [id number]

                                                    N/A

 

                    Medicare Part A    Medicare Part A              [id number]              N/A

 

                    Medicare Part B    [employer] [place]              [id number]              N/A







History of Encounters







                    Visit Date          Visit Type          Provider

 

                    2017           Laboratory          DEON ESCALANTE

 

                    2017           Office visit        DEON ESCALANTE

 

                    2017           Office visit        DEON ESCALANTE

 

                    2017            Office visit        DEON ESCALANTE

 

                    2017            Office visit        DEON ESCALANTE

 

                    2017           Office visit        DEON ESCALANTE

 

                    2016          Office visit        DEON ESCALANTE

 

                    11/10/2016          Office visit        DEON ESCALANTE

 

                    3/10/2016           Office visit        DEON ESCALANTE

 

                    12/15/2015          Office visit        DEON ESCALANTE

 

                    10/26/2015          Office visit        DEON ESCALANTE

 

                    10/6/2015           Office visit        DEON ESCALANTE

 

                    10/28/2014          Office visit        DEON ESCALANTE

 

                    10/16/2014          Office visit        DEON ESCALANTE

 

                    2014            Office visit        DEON ESCALANTE

 

                    2014           Hospital            DEWEY Garcia MD

 

                    2014           Office visit        DEON ESCALANTE

 

                    10/28/2013          Office visit        DEON ESCALANTE

 

                    10/14/2013          Nabeel Garcia MD

 

                    2013           Office visit        DEON ESCALANTE

 

                    2013            Office visit        DEON ESCALANTE

 

                    10/25/2012          Office visit        DEON ESCALANTE

 

                    10/3/2012           Office visit        DEON ESCALANTE

 

                    3/16/2012           Office visit        DEON ESCALANTE

 

                    2012            Office visit        DEON ESCALANTE

 

                    2012            [facility] SUKHDEEP Garcia MD

 

                    2011          Office visit        DEON ESCALANTE

 

                    2011           Office visit        Deon Leon PA-C

 

                    2011            Office visit        Deon Leon PA-C

 

                    6/3/2011            Office visit        Deon Leon PA-C

 

                    2010           Office visit        Deon Leon PA-C

 

                    2010           Office visit        Deon Leon PA-C

 

                    2010           Office visit        Deon Leon PA-C

## 2019-06-25 NOTE — XMS REPORT
MU2 Ambulatory Summary

                             Created on: 2018



Elsi Casillas

External Reference #: [id number]

: 1940

Sex: Female



Demographics







                          Address                   [address] [place]  [address]

 

                          Home Phone                [phone]

 

                          Preferred Language        English

 

                          Marital Status            

 

                          Yazdanism Affiliation     Unknown

 

                          Race                      White

 

                          Ethnic Group              Not  or 





Author







                          Author                    DEON LEON

 

                          [facility]

 

                          Address                   [address]

[place], [place]  [id number]



 

                          Phone                     [phone]







Care Team Providers







                    Care Team Member Name    Role                Phone

 

                    DEON LEON    PCP                 [phone]

 

                    DEON LEON    PreferredProvider    [phone]







Allergies and Adverse Reactions







                    Name                Reaction            Notes

 

                    SULFA (SULFONAMIDES)    nausea               







Plan of Treatment







             Planned Activity    Comments     Planned Date    Planned Time    Plan/Goal

 

             Lipid Profile                 2016    12:00 AM      

 

             Chest PA and Lateral - Main                 8/10/2017    12:00 AM      

 

             Lipid Profile                 2018    12:00 AM      







Medications







                                        Active 

 

             Name         Start Date    Estimated Completion Date    SIG          Comments

 

                Calcium 600 + D(3) 600 mg(1,500mg) -200 unit oral tablet                                    take 2 tablets by

 oral route daily                        

 

                pravastatin 20 mg oral tablet                                    take 1 tablet (20 mg) by oral route once daily

                                         

 

                Toprol XL 25 mg oral tablet extended release 24 hr                                    take 1 tablet (25 mg) 

by oral route once daily                 

 

                    albuterol sulfate 1.25 mg/3 mL inhalation solution for nebulization    2017           

                                        inhale 3 milliliters (1.25 mg) via nebulizer by inhalation route 4 times per day

  DX J44.9                               

 

                                        ipratropium-albuterol 0.5 mg-3 mg(2.5 mg base)/3 mL inhalation solution for nebulization

                    2018                                inhale 3 milliliters by nebulization route 4 times per day for COPD

                                         

 

                metoprolol succinate 25 mg oral tablet extended release 24 hr    2018                       TAKE

 1 TABLET DAILY                          

 

                Lasix 40 mg oral tablet    2018                       take 1 tablet (40 mg) by oral route once 

daily                                    

 

             hydrochlorothiazide 25 mg oral tablet    2018                 TAKE 1 TABLET DAILY     

 

                fluticasone 50 mcg/actuation nasal spray,suspension    10/2/2018                       inhale 1 spray

 (50 mcg) in each nostril by intranasal route 2 times per day     

 

                lisinopril 10 mg oral tablet    2018        take 1 tablet (10 mg) by oral

 route once daily for 30 days            









                                         

 

             Name         Start Date    Expiration Date    SIG          Comments

 

                Singulair 10 mg oral tablet    9/3/2010        2011        take 1 tablet (10 mg) by oral route

 daily  for 60 days                      

 

                Zithromax Z-Christopher 250 mg oral tablet    2011       take 2 tablets (500 mg)

 by oral route once daily for 1 day then 1 tablet (250 mg) by oral route once 
daily for 4 days                         

 

                Medrol (Christopher) 4 mg oral tablets,dose pack    2011        take as directed

 for 6 days                              

 

                Keflex 500 mg oral capsule    3/1/2012        3/11/2012       take 1 capsule by oral route 3 

times a day for 10 days                  

 

                Cipro 500 mg oral tablet    2012        take 1 tablet (500 mg) by oral route

 every 12 hours for 15 days              

 

                benzonatate 100 mg oral capsule    2013       take 1 capsule (100 mg) by

 oral route 3 times per day for cough     

 

             Medrol (Christopher) 4 mg oral tablets,dose pack    2013     take as directed    

 

 

                Zyrtec 10 mg oral tablet    2013       take 1 tablet (10 mg) by oral route

 once daily for 30 days                  

 

                Flonase 50 mcg/actuation nasal spray,suspension    2013       inhale 1 spray

 in each nostril by intranasal route 2 times per day for 30 days     

 

                cephalexin 500 mg oral capsule    2013        take 1 capsule (500 mg) by oral

 route every 8 hours                     

 

                promethazine-codeine 6.25-10 mg/5 mL oral syrup    2013                       take 5 milliliters

 by oral route every 4 hours as needed, not to exceed 30 mL in 24 hours     

 

                Zithromax Z-Christopher 250 mg oral tablet    10/2/2013       10/7/2013       take 2 tablets (500 mg)

 by oral route once daily for 1 day then 1 tablet (250 mg) by oral route once 
daily for 4 days                         

 

                amoxicillin 500 mg oral capsule    2014       take 1 capsule by oral route

 3 times a day for 15 days               

 

                lovastatin 20 mg oral tablet    2014        take 1 tablet (20 mg) by oral 

route daily  for 30 days                 

 

                Zithromax Z-Christopher 250 mg oral tablet    2014       take 2 tablets (500 mg)

 by oral route once daily for 1 day then 1 tablet (250 mg) by oral route once 
daily for 4 days                         

 

             Medrol (Christopher) 4 mg oral tablets,dose pack    2014                 take as directed     

 

                amoxicillin 500 mg oral capsule    2014                       take 1 capsule (500 mg) by oral route

 3 times per day                         

 

                Levaquin 500 mg oral tablet    10/16/2014      10/26/2014      take 1 tablet (500 mg) by oral

 route once daily for 10 days            

 

                prednisone 20 mg oral tablet    10/16/2014      10/24/2014      4x2 days 3x2 days 2x2 days

 1x2 days                                

 

                Zithromax Z-Christopher 250 mg oral tablet    2014       take 2 tablets (500 mg)

 by oral route once daily for 1 day then 1 tablet (250 mg) by oral route once 
daily for 4 days                         

 

                Bactrim -160 mg oral tablet    1/15/2015       2015       take 1 tablet by oral route

 every 12 hours for 10 days              

 

                Zithromax Z-Christopher 250 mg oral tablet    2015      take 2 tablets (500 mg)

 by oral route once daily for 1 day then 1 tablet (250 mg) by oral route once 
daily for 4 days                         

 

                Keflex 500 mg oral capsule    2016       take 1 capsule by oral route 3

 times a day for 7 days                  

 

                amoxicillin 500 mg oral capsule    10/4/2016       10/14/2016      take 1 capsule by oral route

 3 times a day for 10 days               

 

                Tessalon Perles 100 mg oral capsule    10/4/2016                       take 1 capsule by oral route 

every 4 hours                            

 

                Zithromax Z-Christopher 250 mg oral tablet    11/10/2016      11/15/2016      take 2 tablets (500 

mg) by oral route once daily for 1 day then 1 tablet (250 mg) by oral route once
daily for 4 days                         

 

                amoxicillin 500 mg oral tablet    2016                       take 1 tablet (500 mg) by oral route

 3 times per day                         

 

                amoxicillin 500 mg oral capsule    2017       take 1 capsule (500 mg) by

 oral route 3 times per day for 10 days     

 

                Bactrim -160 mg oral tablet    2017       take 1 tablet by oral route

 2 times a day for 10 days               

 

                Levaquin 500 mg oral tablet    2017        take 1 tablet (500 mg) by oral

 route once daily for 10 days            

 

                amoxicillin 500 mg oral capsule    2017                       take 1 capsule (500 mg) by oral route

 every 12 hours for 7 days               

 

                Zithromax Z-Christopher 250 mg oral tablet    2017                      take 2 tablets (500 mg) by oral

 route once daily for 1 day then 1 tablet (250 mg) by oral route once daily for 
4 days                                   

 

                amoxicillin 500 mg oral tablet    2018                       take 1 tablet (500 mg) by oral route

 every 12 hours for 10 days              

 

                Cipro 500 mg oral tablet    2018        2/15/2018       take 1 tablet (500 mg) by oral route

 2 times per day for 10 days             

 

                Zithromax Z-Christopher 250 mg oral tablet    2018       take 2 tablets (500 mg)

 by oral route once daily for 1 day then 1 tablet (250 mg) by oral route once 
daily for 4 days                         

 

                Keflex 500 mg oral capsule    2018       take 1 capsule (500 mg) by oral

 route every 12 hours for 7 days         

 

                prednisone 20 mg oral tablet    2018       2X4 days 1X4 days 1/2X4 days 

                                         

 

                amoxicillin 500 mg oral capsule    10/2/2018       10/12/2018      take 1 capsule (500 mg) 

by oral route 3 times per day for 10 days     









                                        Discontinued 

 

             Name         Start Date    Discontinued Date    SIG          Comments

 

                amoxicillin 500 mg oral capsule    11/10/2011      10/3/2012       take 1 capsule (500 mg) 

by oral route 3 times per day            

 

                Anusol-HC 25 mg rectal suppository    2011      10/3/2012       insert 1 suppository 

(25 mg) by rectal route 2 times per day     

 

                Proctofoam 1 % topical foam    2011       apply by external route daily

                                         

 

             Aerochamber    2014    Use with inhaler as directed     

 

                hydrochlorothiazide 25 mg oral tablet    1/15/2015       2015      TAKE 1 TABLET DAILY

                                         

 

                promethazine-codeine 6.25-10 mg/5 mL oral syrup    11/10/2016      10/25/2017      take 5 

milliliters by oral route every 6 hours as needed, not to exceed 30 mL in 24 
hours                                    

 

                prednisone 20 mg oral tablet    2017       10/25/2017      4 x 2 days, 3 x 2 days, 2 x

 2 days 1 x 2 days                       

 

                Augmentin 875-125 mg oral tablet    2017       take 1 tablet by oral route

 every 12 hours for 7 days               

 

                Keflex 500 mg oral capsule    2017       take 1 capsule (500 mg) by oral

 route every 12 hours                    

 

                Keflex 500 mg oral capsule    10/10/2017      2018       take 1 capsule (500 mg) by oral

 route every 12 hours for 10 days        

 

                    Symbicort 160-4.5 mcg/actuation inhalation HFA aerosol inhaler    10/25/2017          10/6/2018

                          inhale 2 puffs by inhalation route 2 times per day in the morning and evening    

 

 

                Levaquin 500 mg oral tablet    2018       take 1 tablet (500 mg) by oral

 route once daily for 10 days            

 

                Bactrim -160 mg oral tablet    2018       take 1 tablet by oral route

 every 12 hours for 10 days             nausea

 

                Tessalon Perles 100 mg oral capsule    2018        10/6/2018       take 1 capsule (100 mg)

 by oral route 3 times per day as needed for cough     

 

                Sudogest 30 mg oral tablet    2018        10/6/2018       take 1 tablets (60 mg) by oral 

route every 6 hours as needed            







Problem List







                    Description         Status              Onset

 

                    Chronic Obstructive Pulmonary Disease    Active               

 

                    Hyperlipidemia      Active               

 

                    Anxiety Disorder    Active              10/29/2013

 

                    Pain in joint; Hip    Active              06/10/2014

 

                    Pulmonary nodule seen on imaging study    Active              10/29/2014

 

                    Exercise hypoxemia    Active              10/29/2014

 

                    Anxiety about health    Active              2016

 

                    Primary osteoarthritis involving multiple joints    Active              2017

 

                    Medication management    Active              2017

 

                    Cellulitis of left lower extremity    Active              2017

 

                    Dog bite of calf, left, sequela    Active              2017

 

                    Peripheral edema    Active              2018







Vital Signs







      Date    Time    BP-Sys(mm[Hg]    BP-Noni(mm[Hg])    HR(bpm)    RR(rpm)    Temp    WT    HT    HC    BMI

                    BSA                 BMI Percentile      O2 Sat(%)

 

        2018    9:30:00 AM    154 mmHg    100 mmHg    108 bpm    20 rpm    98.1 F    145 lbs    62 

in                        26.5206 kg/m    1.6962 m                 91 %

 

        10/4/2018    9:00:00 AM    114 mmHg    74 mmHg    76 bpm    18 rpm    98.4 F    145 lbs    61 in

                          27.40 kg/m2    1.68 m2                   97 %

 

        2018    10:07:00 AM    124 mmHg    82 mmHg    86 bpm    18 rpm    98.2 F    149 lbs    61 in

                          28.15 kg/m2    1.71 m2                   92 %

 

        2018    10:48:00 AM    118 mmHg    80 mmHg    82 bpm    18 rpm    98.3 F    144 lbs    61 in

                          27.2083 kg/m    1.6767 m                 93 %

 

        2018    2:35:00 PM    120 mmHg    82 mmHg    106 bpm    20 rpm    98.2 F    142 lbs    62 in

                          25.97 kg/m2    1.68 m2                   92 %

 

       2018    3:28:00 PM    122 mmHg    68 mmHg    114 bpm    18 rpm    98.2 F    142 lbs            

                                                                90 %

 

        2018    1:35:00 PM    112 mmHg    74 mmHg    114 bpm    18 rpm    99.6 F    139 lbs    63 in

                          24.6225 kg/m    1.6741 m                 98 %

 

       2018    3:57:00 PM    128 mmHg    80 mmHg    78 bpm    18 rpm    98.4 F    148 lbs             

                                                                90 %

 

        10/24/2017    1:51:00 PM    132 mmHg    80 mmHg    82 bpm    16 rpm    98.2 F    145 lbs    62 in

                          26.52 kg/m2    1.70 m2                   95 %

 

       2017    3:29:00 PM    128 mmHg    80 mmHg    68 bpm    16 rpm    98 F    144 lbs    63 in    

                25.5082 kg/m    1.7039 m                      93 %

 

        2017    11:37:00 AM    118 mmHg    72 mmHg    96 bpm    16 rpm    97.4 F    141 lbs    63 in

                          24.98 kg/m2    1.69 m2                   91 %

 

        2017    8:55:00 AM    105 mmHg    60 mmHg    96 bpm    18 rpm    95.8 F    142 lbs    63 in 

                          25.1539 kg/m    1.6921 m                 95 %

 

       2017    12:10:00 PM    122 mmHg    68 mmHg    76 bpm    18 rpm    98 F    143 lbs    63 in    

                25.33 kg/m2     1.70 m2                         98 %

 

        2017    4:03:00 PM    118 mmHg    64 mmHg    106 bpm    18 rpm    97.4 F    137 lbs    63 in

                          24.2682 kg/m    1.662 m                 98 %

 

        2016    12:11:00 PM    120 mmHg    84 mmHg    110 bpm    16 rpm    98.1 F    130 lbs    63

 in                       23.03 kg/m2    1.62 m2                   90 %

 

        11/10/2016    3:57:00 PM    148 mmHg    72 mmHg    88 bpm    16 rpm    98.2 F    132 lbs    63 in

                          23.3825 kg/m    1.6314 m                 98 %

 

        3/10/2016    2:12:00 PM    122 mmHg    60 mmHg    106 bpm    18 rpm    97 F    137 lbs    63 in 

                          24.27 kg/m2    1.66 m2                   93 %

 

        12/15/2015    2:33:00 PM    120 mmHg    75 mmHg    102 bpm    16 rpm    98.2 F    137 lbs    63 

in                        24.2682 kg/m    1.662 m                 94 %

 

        10/26/2015    2:46:00 PM    130 mmHg    80 mmHg    96 bpm    16 rpm    97.9 F    141 lbs    66 in

                          22.76 kg/m2    1.73 m2                   98 %

 

        10/6/2015    9:37:00 AM    140 mmHg    78 mmHg    110 bpm    16 rpm    97.9 F    141 lbs    63 in

                          24.9768 kg/m    1.6861 m                 95 %

 

        10/28/2014    2:25:00 PM    132 mmHg    66 mmHg    92 bpm    24 rpm    97.5 F    147 lbs    63 in

                          26.04 kg/m2    1.72 m2                   92 %

 

        10/16/2014    9:45:00 AM    132 mmHg    64 mmHg    74 bpm    24 rpm    97.7 F    146 lbs    63 in

                          25.8625 kg/m    1.7157 m                 92 %

 

        2014    2:31:00 PM    136 mmHg    68 mmHg    90 bpm    22 rpm    98.2 F    148 lbs    63 in 

                          26.22 kg/m2    1.73 m2                   95 %

 

        2014    3:19:00 PM    124 mmHg    70 mmHg    64 bpm    20 rpm    97.8 F    147 lbs    63 in

                          26.0396 kg/m    1.7216 m                 95 %

 

        10/28/2013    10:31:00 AM    140 mmHg    70 mmHg    92 bpm    24 rpm    97.8 F    143 lbs    63 

in                        25.33 kg/m2    1.70 m2                   95 %

 

      2013    2:51:00 PM    130 mmHg    78 mmHg    90 bpm          98.2 F    168 lbs    65 in          

27.9564 kg/m      1.8694 m                               

 

       2013    2:43:00 PM    110 mmHg    76 mmHg    103 bpm           96.6 F    137 lbs    63 in      

                24.27 kg/m2     1.66 m2                          

 

        2013    9:50:00 AM    150 mmHg    66 mmHg    108 bpm    20 rpm    97.8 F    138 lbs    63 in

                          24.4454 kg/m    1.6681 m                 94 %

 

        10/25/2012    9:08:00 AM    110 mmHg    60 mmHg    88 bpm    18 rpm    97.5 F    142 lbs    63 in

                          25.15 kg/m2    1.69 m2                   94 %

 

      10/3/2012    9:33:00 AM    130 mmHg    60 mmHg    98 bpm    18 rpm          146 lbs    63 in          

25.8625 kg/m      1.7157 m                              95 %

 

      2012    2:31:00 PM    116 mmHg    76 mmHg    88 bpm                140 lbs    63 in          24.80 

kg/m2               1.68 m2                                 96 %

 

     2011    10:02:00 AM    122 mmHg    80 mmHg    110 bpm              144 lbs                        

                                        94 %

 

      2011    2:58:00 PM    124 mmHg    84 mmHg    106 bpm                155 lbs    63 in          27.4567

 kg/m             1.7678 m                              96 %

 

     2011    9:55:00 AM    114 mmHg    78 mmHg    92 bpm              146 lbs                             95

 %

 

     6/3/2011    8:36:00 AM    116 mmHg    74 mmHg    90 bpm              146 lbs                             96

 %

 

     2010    3:01:00 PM    132 mmHg    84 mmHg    102 bpm              149 lbs                          

                                        94 %

 

     2010    11:46:00 AM    124 mmHg    78 mmHg    104 bpm              151 lbs                         

                                        94 %

 

     2010    8:47:00 AM    116 mmHg    78 mmHg    103 bpm              151 lbs                          

                                        95 %







Social History







                    Name                Description         Comments

 

                    Alcohol             Never                

 

                    Uses seatbelts                           

 

                    Tobacco             Never smoker         







History of Procedures







                    Date Ordered        Description         Order Status

 

                    2011 12:00 AM    THER/PROPH/DIAG INJ SC/IM    Reviewed

 

                    2011 12:00 AM    Decadron Inj.1mg-([facility]) [facility] #[id number]    Reviewed

 

                    2011 12:00 AM    Depo-Medrol 80 Mg Im/[facility] 0009-214066    Reviewed

 

                    2011 12:00 AM    Rocephin, Per 250MG - 1 Gram Vial NDC 1151-627775-[facility]    Reviewed



 

                    3/16/2012 12:00 AM    ROUTINE VENIPUNCTURE    Reviewed

 

                    3/16/2012 12:00 AM    COMPLETE CBC W/AUTO DIFF WBC    Reviewed

 

                    3/16/2012 12:00 AM    COMPREHEN METABOLIC PANEL    Reviewed

 

                    3/16/2012 12:00 AM    ASSAY THYROID STIM HORMONE    Reviewed

 

                    11/10/2016 12:00 AM    Decadron, Per 1 Mg NDC# [address]-60    Reviewed

 

                    11/10/2016 12:00 AM    Depo-Medrol, Per 80 Mg NDC#0722-0640-75    Reviewed

 

                    11/10/2016 12:00 AM    Rocephin 1 gram NDC#1635-4760-55    Reviewed

 

                    2016 12:00 AM    THER/PROPH/DIAG INJ SC/IM    Reviewed

 

                    2016 12:00 AM    Decadron 8mg Injection, [facility]    Reviewed

 

                    2016 12:00 AM    Depo-Medrol 80mg Injection, [facility]    Reviewed

 

                    2016 12:00 AM    Rocephin 1 gram Injection, [facility]    Reviewed

 

                    2017 12:00 AM    COMPLETE CBC W/AUTO DIFF WBC    Reviewed

 

                    2017 12:00 AM    COMPREHEN METABOLIC PANEL    Reviewed

 

                    2017 12:00 AM    LIPID PANEL         Reviewed

 

                    2017 12:00 AM    THERAPEUTIC PROPHYLACTIC/DX INJECTION SUBQ/IM    Reviewed

 

                    2017 12:00 AM    Decadron 8mg Injection, [facility]    Reviewed

 

                    2017 12:00 AM    Depo-Medrol 80mg Injection, [facility]    Reviewed

 

                    2017 12:00 AM    LIPID PANEL         Returned

 

                    2017 12:00 AM    THERAPEUTIC PROPHYLACTIC/DX INJECTION SUBQ/IM    Reviewed

 

                    2017 12:00 AM    Decadron 8mg Injection, [facility]    Reviewed

 

                    2017 12:00 AM    Depo-Medrol 80mg Injection, [facility]    Reviewed

 

                    10/3/2012 12:00 AM    THER/PROPH/DIAG INJ SC/IM    Reviewed

 

                    10/3/2012 12:00 AM    Decadron, Per 1 Mg NDC# [address]-52    Reviewed

 

                    10/3/2012 12:00 AM    Depo-Medrol, Per 80 Mg NDC#5641-1040-64    Reviewed

 

                    10/3/2012 12:00 AM    Toradol 60 Mg NDC#6401-8887-92    Reviewed

 

                    10/24/2017 12:00 AM    THERAPEUTIC PROPHYLACTIC/DX INJECTION SUBQ/IM    Reviewed

 

                    10/24/2017 12:00 AM    Decadron 8mg Injection, [facility]    Reviewed

 

                    10/24/2017 12:00 AM    Depo-Medrol 80mg Injection, [facility]    Reviewed

 

                    2018 12:00 AM    THERAPEUTIC PROPHYLACTIC/DX INJECTION SUBQ/IM    Reviewed

 

                    2018 12:00 AM    Rocephin 1 gram Injection, [facility]    Reviewed

 

                    2018 12:00 AM    THERAPEUTIC PROPHYLACTIC/DX INJECTION SUBQ/IM    Reviewed

 

                    2018 12:00 AM    Decadron 8mg Injection, [facility] [employer]    Reviewed

 

                    2018 12:00 AM    Depo-Medrol 80mg Injection, [facility]    Reviewed

 

                    2018 12:00 AM    Rocephin 1 gram Injection, [facility]    Reviewed

 

                    2018 12:00 AM    COMPLETE CBC W/AUTO DIFF WBC    Returned

 

                    2018 12:00 AM    COMPREHEN METABOLIC PANEL    Returned

 

                    2018 12:00 AM    ELECTROCARDIOGRAM TRACING    Reviewed

 

                    2013 12:00 AM    THER/PROPH/DIAG INJ SC/IM    Reviewed

 

                    2013 12:00 AM    Decadron, Per 1 Mg NDC# [address]-68    Reviewed

 

                    2013 12:00 AM    Depo-Medrol, Per 80 Mg NDC#6252-9343-74    Reviewed

 

                    2013 12:00 AM    Toradol 60 Mg NDC#4108-2553-50    Reviewed

 

                    2010 12:00 AM    ROUTINE VENIPUNCTURE    Reviewed

 

                    2010 12:00 AM    COMPLETE CBC W/AUTO DIFF WBC    Reviewed

 

                    2010 12:00 AM    COMPREHEN METABOLIC PANEL    Reviewed

 

                    2010 12:00 AM    LIPID PANEL         Reviewed

 

                    10/28/2014 12:00 AM    CHEST X-RAY 4/> VIEWS    Reviewed

 

                    6/3/2011 12:00 AM    ROUTINE VENIPUNCTURE    Reviewed

 

                    6/3/2011 12:00 AM    COMPLETE CBC AUTOMATED    Reviewed

 

                    6/3/2011 12:00 AM    COMPREHEN METABOLIC PANEL    Reviewed

 

                    6/3/2011 12:00 AM    LIPID PANEL         Reviewed

 

                    2011 12:00 AM    THER/PROPH/DIAG INJ SC/IM    Reviewed

 

                    2011 12:00 AM    Decadron Inj.8mg-([facility]) Ndc #[id number]    Reviewed

 

                    2011 12:00 AM    Depo-Medrol 80 Mg Im/[facility] NDC [id number]    Reviewed







Results Summary







                          Date and Description      Results

 

                          6/3/2011 1:53 PM          TRIGLYCERIDES 155.0 mg/dLCHOLESTEROL 248.0 mg/dLHDL 51.0 mg/dLTOT

 CHOL/HDL 4.9 .0 mg/dLGLUCOSE 97.0 mg/dLSODIUM 139.0 mmol/LPOTASSIUM 3.70
 mmol/LCHLORIDE 101.0 mmol/LCO2 27.0 mmol/LBUN 19.0 mg/dLCREATININE 0.90 
mg/dLSGOT/AST 19.0 IU/LSGPT/ALT 11.0 IU/LALK PHOS 111.0 IU/LTOTAL PROTEIN 7.40 
g/dLALBUMIN 4.20 g/dLTOTAL BILI 0.50 mg/dLCALCIUM 9.50 mg/dLAGE 70 GFR NonAA 62 
GFR AA 75 eGFR >60 mL/min/1.73 m2eGFR AA* >60 







History Of Immunizations

Not available.



History of Past Illness







                    Name                Date of Onset       Comments

 

                    Chronic Obstructive Pulmonary Disease                         

 

                    Hyperlipidemia                           

 

                    Cough               2010  8:49AM     

 

                    General Medical Exam, Adult    2010  8:49AM     

 

                    Seasonal Allergies    2010  8:49AM     

 

                    Sinusitis, Chronic    2010  8:49AM     

 

                    Ganglion cyst of synovium, tendon, and bursa; other    2010 11:48AM     

 

                    Anxiety Disorder    10/29/2013           

 

                    Pain in joint; Hip    06/10/2014           

 

                    Pulmonary nodule seen on imaging study    10/29/2014           

 

                    Exercise hypoxemia    10/29/2014           

 

                    Chronic Obstructive Pulmonary Disease    2010  3:02PM     

 

                    Edema               2010  3:02PM     

 

                    Sinusitis, Acute    2010  3:02PM     

 

                    Anxiety about health    2016           

 

                    Chronic Obstructive Pulmonary Disease    Trey  3 2011  8:38AM     

 

                    Palpitations        Trey  3 2011  8:38AM     

 

                    Cough               2011  9:54AM     

 

                    Sinusitis, Acute    2011  9:54AM     

 

                    Seasonal Allergies    2011  9:54AM     

 

                    Post-nasal drainage    2011  9:54AM     

 

                    Primary osteoarthritis involving multiple joints    2017           

 

                    Medication management    2017           

 

                    Cellulitis of left lower extremity    2017           

 

                    Dog bite of calf, left, sequela    2017           

 

                    Cough               Sep 22 2011  2:55PM     

 

                    Seasonal Allergies    Sep 22 2011  2:55PM     

 

                    Pharyngitis, Acute    Sep 22 2011  2:55PM     

 

                    Post-nasal drainage    Sep 22 2011  2:55PM     

 

                    Peripheral edema    2018           

 

                    Blood In Stool, Occult    2011  9:58AM     

 

                    Hemorrhoids         2011  9:58AM     

 

                    Chronic Obstructive Pulmonary Disease    2012  2:33PM     

 

                    Cardiac Dysrhythmia    2012  2:33PM     

 

                    Sinoatrial Node Dysfunction    Mar 16 2012  9:12AM     

 

                    Palpitations        Mar 16 2012  9:12AM     

 

                    Osteoarthrosis, generalized, multiple sites    Oct  3 2012  9:34AM     

 

                    Pain in joint; Hip    Oct  3 2012  9:34AM     

 

                    Osteoarthrosis      Oct 25 2012  9:09AM     

 

                    Bronchitis, Acute    Oct 25 2012  9:09AM     

 

                    Cough               Oct 25 2012  9:09AM     

 

                    Pain in joint; Left Hip    Oct 25 2012  9:09AM     

 

                    Pain in joint; Hip    2013  9:50AM     

 

                    Osteoarthrosis, generalized, multiple sites    2013  2:45PM     

 

                    Pain in joint; Hip bilateral    2013  2:45PM     

 

                    Anxiety Disorder    Oct 28 2013 10:32AM     

 

                    Chronic Obstructive Pulmonary Disease    Oct 28 2013 10:32AM     

 

                    Hyperlipidemia      Oct 28 2013 10:32AM     

 

                    Pain in joint; Left Hip    Oct 28 2013 10:32AM     

 

                    Sinusitis, Acute    Oct 28 2013 10:32AM     

 

                    Essential Hypertension    2014  3:19PM     

 

                    Osteoarthrosis, generalized, multiple sites    2014  3:19PM     

 

                    Chronic Obstructive Pulmonary Disease    2014  3:19PM     

 

                    Pain in joint; Hip    2014  3:19PM     

 

                    Chronic Obstructive Pulmonary Disease    2014  2:31PM     

 

                    Pain in joint; Hip    2014  2:31PM     

 

                    pre-operative examination, unspecified    2014  2:31PM     

 

                    Lung nodule seen on imaging study    Oct 28 2014 10:24AM     

 

                    Bronchitis, Acute    Oct 16 2014  9:45AM     

 

                    Respiratory System And Chest Symptoms    Oct 16 2014  9:45AM     

 

                    COPD (chronic obstructive pulmonary disease)    Oct 16 2014  9:45AM     

 

                    Chronic Obstructive Pulmonary Disease    Oct 28 2014  2:26PM     

 

                    Pulmonary nodule seen on imaging study    Oct 28 2014  2:26PM     

 

                    Shortness of breath    Oct 28 2014  2:26PM     

 

                    Exercise hypoxemia    Oct 28 2014  2:26PM     

 

                    Nail avulsion       Oct  6 2015  9:38AM     

 

                          Contusion of left index finger with damage to nail, initial encounter    Oct 26 2015

  2:53PM                                 

 

                    Forehead contusion, subsequent encounter    Dec 15 2015  2:39PM     

 

                    Generalized anxiety disorder    Dec 15 2015  2:39PM     

 

                    Chronic Obstructive Pulmonary Disease    Dec 15 2015  2:39PM     

 

                    Osteoarthrosis, generalized, multiple sites    Mar 10 2016  2:12PM     

 

                    Pain of right thigh    Mar 10 2016  2:12PM     

 

                    Mild Acute Hip pain, acute, right Improving    Mar 10 2016  2:12PM     

 

                    Anxiety about health    Mar 10 2016  2:12PM     

 

                    Hyperlipidemia      Aug 22 2016 10:58AM     

 

                    Sinoatrial Node Dysfunction    Aug 22 2016 10:58AM     

 

                    Palpitations        Aug 22 2016 10:58AM     

 

                    Chronic Obstructive Pulmonary Disease    Aug 22 2016 10:58AM     

 

                    Exercise hypoxemia    Aug 22 2016 10:58AM     

 

                    Pain in joint; Hip    Aug 22 2016 10:58AM     

 

                    Pulmonary nodule seen on imaging study    Aug 22 2016 10:58AM     

 

                    Eustachian tube dysfunction, bilateral    Nov 10 2016  3:57PM     

 

                    Moderate Acute Cough    Nov 10 2016  3:57PM     

 

                    Pharyngitis, Acute    Nov 10 2016  3:57PM     

 

                    Upper Respiratory Infection    Nov 10 2016  3:57PM     

 

                          COPD (chronic obstructive pulmonary disease) with acute bronchitis    Nov 10 2016 

 3:57PM                                  

 

                    Mild Chronic Cough Worsening    Dec 12 2016 12:12PM     

 

                          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

 12 2016 12:12PM                         

 

                    Moderate Shortness of breath on exertion    Dec 12 2016 12:12PM     

 

                    Hyperlipidemia      May  4 2017 10:27AM     

 

                    Sinoatrial Node Dysfunction    May  4 2017 10:27AM     

 

                    Palpitations        May  4 2017 10:27AM     

 

                    Chronic Obstructive Pulmonary Disease    May  4 2017 10:27AM     

 

                    Exercise hypoxemia    May  4 2017 10:27AM     

 

                    Pain in joint; Hip    May  4 2017 10:27AM     

 

                    Pulmonary nodule seen on imaging study    May  4 2017 10:27AM     

 

                    Chest congestion    May 16 2017  4:04PM     

 

                          COPD (chronic obstructive pulmonary disease) with acute bronchitis    May 16 2017 

 4:04PM                                  

 

                    Productive cough    May 16 2017  4:04PM     

 

                    Anxiety about health    May 16 2017  4:04PM     

 

                          Chronic obstructive pulmonary disease, unspecified COPD type    May 16 2017  4:04PM

                                         

 

                    Exercise hypoxemia    May 16 2017  4:04PM     

 

                    Mixed hyperlipidemia    May 16 2017  4:04PM     

 

                    Medication management    May 16 2017  4:04PM     

 

                    Primary osteoarthritis involving multiple joints    May 16 2017  4:04PM     

 

                    Cellulitis of left lower extremity    2017 12:10PM     

 

                    Bitten by dog, initial encounter    2017 12:10PM     

 

                    Cellulitis of left lower extremity    2017  8:56AM     

 

                    Open bite, left lower leg, sequela    2017  8:56AM     

 

                    Bitten by dog, sequela    2017  8:56AM     

 

                    Medication management    2017  8:56AM     

 

                    Cellulitis of left lower extremity    2017 11:38AM     

 

                    Open bite, left lower leg, subsequent encounter    2017 11:38AM     

 

                    Bitten by dog, subsequent encounter    2017 11:38AM     

 

                    Medication management    2017 11:38AM     

 

                    Cough               Aug 10 2017  4:09PM     

 

                    Abnormal chest xray    Aug 10 2017  4:09PM     

 

                    Hyperlipidemia      Aug 29 2017  9:02AM     

 

                    Sinoatrial Node Dysfunction    Aug 29 2017  9:02AM     

 

                    Palpitations        Aug 29 2017  9:02AM     

 

                    Chronic Obstructive Pulmonary Disease    Aug 29 2017  9:02AM     

 

                    Exercise hypoxemia    Aug 29 2017  9:02AM     

 

                    Pain in joint; Hip    Aug 29 2017  9:02AM     

 

                    Pulmonary nodule seen on imaging study    Aug 29 2017  9:02AM     

 

                    Eustachian tube dysfunction, bilateral    Aug 28 2017  3:30PM     

 

                    Purulent postnasal drainage    Aug 28 2017  3:30PM     

 

                    Chest congestion    Aug 28 2017  3:30PM     

 

                    Upper respiratory tract infection, unspecified type    Aug 28 2017  3:30PM     

 

                    Moderate Acute Sinus pressure    Aug 28 2017  3:30PM     

 

                          COPD (chronic obstructive pulmonary disease) with acute bronchitis    Aug 28 2017 

 3:30PM                                  

 

                    Moderate Chronic Purulent postnasal drainage    Oct 24 2017  1:52PM     

 

                    Mild Chronic Sinus pressure    Oct 24 2017  1:52PM     

 

                          Moderate Chronic Acute seasonal allergic rhinitis, unspecified trigger Stable    Oct

 24 2017  1:52PM                         

 

                    Mild Acute Labyrinthitis    Oct 24 2017  1:52PM     

 

                    Moderate Acute Vertigo    Oct 24 2017  1:52PM     

 

                    Purulent postnasal drainage    2018  3:58PM     

 

                    Upper respiratory tract infection, unspecified type    2018  3:58PM     

 

                    Mild Acute Left Enlarged lymph node in neck    2018  3:58PM     

 

                    Cough               2018  1:36PM     

 

                    Moderate Acute Recurrent Chest congestion    2018  1:36PM     

 

                    COPD exacerbation    2018  1:36PM     

 

                          Chronic obstructive pulmonary disease with acute lower respiratory infection    2018  1:36PM                         

 

                    Acute bronchitis, unspecified    2018  1:36PM     

 

                    Cough               2018  3:29PM     

 

                    Acute pharyngitis, unspecified etiology    2018  3:29PM     

 

                    Chest congestion    2018  3:29PM     

 

                    COPD (chronic obstructive pulmonary disease)    2018  3:29PM     

 

                    Cellulitis of left lower extremity    May 22 2018  2:35PM     

 

                    Cellulitis of left lower extremity    2018 10:48AM     

 

                    Peripheral edema    2018 10:48AM     

 

                    Hyperlipidemia      Aug 22 2018 10:03AM     

 

                    Sinoatrial Node Dysfunction    Aug 22 2018 10:03AM     

 

                    Palpitations        Aug 22 2018 10:03AM     

 

                    Chronic Obstructive Pulmonary Disease    Aug 22 2018 10:03AM     

 

                    Exercise hypoxemia    Aug 22 2018 10:03AM     

 

                    Pain in joint; Hip    Aug 22 2018 10:03AM     

 

                    Pulmonary nodule seen on imaging study    Aug 22 2018 10:03AM     

 

                    Exercise Counseling    Aug 21 2018 10:08AM     

 

                    Moderate Acute Bilateral Peripheral edema    Aug 21 2018 10:08AM     

 

                          Chronic obstructive pulmonary disease, unspecified COPD type    Aug 21 2018 10:08AM

                                         

 

                    Medication management    Aug 21 2018 10:08AM     

 

                    Acute nasopharyngitis (common cold)    Oct  4 2018  9:03AM     

 

                    Purulent postnasal drainage    Oct  4 2018  9:03AM     

 

                    Ear pain, right     Oct  4 2018  9:03AM     

 

                    Essential hypertension    2018  9:31AM     

 

                    Ear pain, bilateral    2018  9:31AM     







Payers







           Insurance Name    Company Name    Plan Name    Plan Number    Policy Number    Policy Group

 Number                                 Start Date

 

                    Medicare [facility]    Medicare [facility]              [id number]              N/A

 

                          [place] [employer]    [place] [employer]                 [id number]

                                                    N/A

 

                    [employer]zTest Medicare A    Test Medicare A              [id number]              N/A

 

                                [facility] - [facility] - [facility] Comm      

                    [id number]                             N/A

 

                    Medicare Part A    Medicare - Lab/Xray              [id number]              N/A

 

                          [employer]    [employer]                 [id number]

                                                    N/A

 

                    Medicare Part A    Medicare Part A              [id number]              N/A

 

                    Medicare Part B    [employer] [place]              [id number]              N/A







History of Encounters







                    Visit Date          Visit Type          Provider

 

                    2018           Office visit        DEON ESCALANTE

 

                    10/2/2018           Office visit        DEON ESCALANTE

 

                    2018           Office visit        DEON ESCALANTE

 

                    2018           Office visit        DEON ESCALANTE

 

                    2018           Office visit        DEON ESCALANTE

 

                    2018           Office visit        DEON ESCALANTE

 

                    2018            Office visit        DEON ESCALANTE

 

                    2018           Office visit        DEON ESCALANTE

 

                    10/24/2017          Office visit        DEON ESCALANTE

 

                    2017           Voided              DEON ESCALANTE

 

                    2017           Office visit        DEON ESCALANTE

 

                    2017           Office visit        DEON ESCALANTE

 

                    2017            Office visit        DEON ESCALANTE

 

                    2017            Office visit        DEON LEON PA

 

                    2017           Office visit        DOEN LEON PA

 

                    2016          Office visit        DEON LEON PA

 

                    11/10/2016          Office visit        DEON LEON PA

 

                    3/10/2016           Office visit        DEON LEON PA

 

                    12/15/2015          Office visit        DEON LEON PA

 

                    10/26/2015          Office visit        DEON LEON PA

 

                    10/6/2015           Office visit        DEON LEON PA

 

                    10/28/2014          Office visit        DEON LEON PA

 

                    10/16/2014          Office visit        DEON LEON PA

 

                    2014            Office visit        DEON LEON PA

 

                    2014           [facility]            DEWEY Garcia MD

 

                    2014           Office visit        DEON LEON PA

 

                    10/28/2013          Office visit        DEON LEON PA

 

                    10/14/2013          [facility]            DEWEY Garcia MD

 

                    2013           Office visit        DEON LEON PA

 

                    2013            Office visit        DEON LEON PA

 

                    10/25/2012          Office visit        DEON LEON PA

 

                    10/3/2012           Office visit        DEON LEON PA

 

                    3/16/2012           Office visit        DEON LEON PA

 

                    2012            Office visit        DEON LEON PA

 

                    2012            [facility]            DEWEY Garcia MD

 

                    2011          Office visit        DEON LEON PA

 

                    2011           Office visit        Deon Leon PA-C

 

                    2011            Office visit        Deon Leon PA-C

 

                    6/3/2011            Office visit        Deon Leon PA-C

 

                    2010           Office visit        Deon Leon PA-C

 

                    2010           Office visit        Deon Leon PA-C

 

                    2010           Office visit        Deon Leon PA-C

## 2019-06-25 NOTE — XMS REPORT
MU2 Ambulatory Summary

                             Created on: 2018



Elsi Casillas

External Reference #: [id number]

: 1940

Sex: Female



Demographics







                          Address                   [address] [place]  [address]

 

                          Home Phone                [phone]

 

                          Preferred Language        English

 

                          Marital Status            

 

                          Christian Affiliation     Unknown

 

                          Race                      White

 

                          Ethnic Group              Not  or 





Author







                          Author                    DEON LEON

 

                          [facility]

 

                          Address                   [address]

[place], [place]  [address]



 

                          Phone                     [phone]







Care Team Providers







                    Care Team Member Name    Role                Phone

 

                    DEON LEON    PCP                 [phone]

 

                    DEON LEON    PreferredProvider    [phone]







Allergies and Adverse Reactions







                    Name                Reaction            Notes

 

                    NO KNOWN DRUG ALLERGIES                         







Plan of Treatment







             Planned Activity    Comments     Planned Date    Planned Time    Plan/Goal

 

             Lipid Profile                 2016    12:00 AM      

 

             Chest PA and Lateral - Main                 8/10/2017    12:00 AM      







Medications







                                        Active 

 

             Name         Start Date    Estimated Completion Date    SIG          Comments

 

                Calcium 600 + D(3) 600 mg(1,500mg) -200 unit oral tablet                                    take 2 tablets by

 oral route daily                        

 

                pravastatin 20 mg oral tablet                                    take 1 tablet (20 mg) by oral route once daily

                                         

 

                Toprol XL 25 mg oral tablet extended release 24 hr                                    take 1 tablet (25 mg) 

by oral route once daily                 

 

                    albuterol sulfate 1.25 mg/3 mL inhalation solution for nebulization    2017           

                                        inhale 3 milliliters (1.25 mg) via nebulizer by inhalation route 4 times per day

  DX J44.9                               

 

                Symbicort 160-4.5 mcg/actuation inhalation HFA aerosol inhaler    10/25/2017                      inhale

 2 puffs by inhalation route 2 times per day in the morning and evening     

 

                metoprolol succinate 25 mg oral tablet extended release 24 hr    2017                       TAKE

 1 TABLET DAILY                          

 

                                        ipratropium-albuterol 0.5 mg-3 mg(2.5 mg base)/3 mL inhalation solution for nebulization

                    2018                                inhale 3 milliliters by nebulization route 4 times per day for COPD

                                         

 

                Sudogest 30 mg oral tablet    3/9/2018                        take 1 tablets (60 mg) by oral route every

 6 hours as needed                       

 

             hydrochlorothiazide 25 mg oral tablet    3/27/2018                 TAKE 1 TABLET DAILY     









                                         

 

             Name         Start Date    Expiration Date    SIG          Comments

 

                Singulair 10 mg oral tablet    9/3/2010        2011        take 1 tablet (10 mg) by oral route

 daily  for 60 days                      

 

                Zithromax Z-Christopher 250 mg oral tablet    2011       take 2 tablets (500 mg)

 by oral route once daily for 1 day then 1 tablet (250 mg) by oral route once 
daily for 4 days                         

 

                Medrol (Christopher) 4 mg oral tablets,dose pack    2011        take as directed

 for 6 days                              

 

                Keflex 500 mg oral capsule    3/1/2012        3/11/2012       take 1 capsule by oral route 3 

times a day for 10 days                  

 

                Cipro 500 mg oral tablet    2012        take 1 tablet (500 mg) by oral route

 every 12 hours for 15 days              

 

                benzonatate 100 mg oral capsule    2013       take 1 capsule (100 mg) by

 oral route 3 times per day for cough     

 

             Medrol (Christopher) 4 mg oral tablets,dose pack    2013     take as directed    

 

 

                Zyrtec 10 mg oral tablet    2013       take 1 tablet (10 mg) by oral route

 once daily for 30 days                  

 

                Flonase 50 mcg/actuation nasal spray,suspension    2013       inhale 1 spray

 in each nostril by intranasal route 2 times per day for 30 days     

 

                cephalexin 500 mg oral capsule    2013        take 1 capsule (500 mg) by oral

 route every 8 hours                     

 

                promethazine-codeine 6.25-10 mg/5 mL oral syrup    2013                       take 5 milliliters

 by oral route every 4 hours as needed, not to exceed 30 mL in 24 hours     

 

                Zithromax Z-Christopher 250 mg oral tablet    10/2/2013       10/7/2013       take 2 tablets (500 mg)

 by oral route once daily for 1 day then 1 tablet (250 mg) by oral route once 
daily for 4 days                         

 

                amoxicillin 500 mg oral capsule    2014       take 1 capsule by oral route

 3 times a day for 15 days               

 

                lovastatin 20 mg oral tablet    2014        take 1 tablet (20 mg) by oral 

route daily  for 30 days                 

 

                Zithromax Z-Christopher 250 mg oral tablet    2014       take 2 tablets (500 mg)

 by oral route once daily for 1 day then 1 tablet (250 mg) by oral route once 
daily for 4 days                         

 

             Medrol (Christopher) 4 mg oral tablets,dose pack    2014                 take as directed     

 

                amoxicillin 500 mg oral capsule    2014                       take 1 capsule (500 mg) by oral route

 3 times per day                         

 

                Levaquin 500 mg oral tablet    10/16/2014      10/26/2014      take 1 tablet (500 mg) by oral

 route once daily for 10 days            

 

                prednisone 20 mg oral tablet    10/16/2014      10/24/2014      4x2 days 3x2 days 2x2 days

 1x2 days                                

 

                Zithromax Z-Christopher 250 mg oral tablet    2014       take 2 tablets (500 mg)

 by oral route once daily for 1 day then 1 tablet (250 mg) by oral route once 
daily for 4 days                         

 

                Bactrim -160 mg oral tablet    1/15/2015       2015       take 1 tablet by oral route

 every 12 hours for 10 days              

 

                Zithromax Z-Christopher 250 mg oral tablet    2015      take 2 tablets (500 mg)

 by oral route once daily for 1 day then 1 tablet (250 mg) by oral route once 
daily for 4 days                         

 

                Keflex 500 mg oral capsule    2016       take 1 capsule by oral route 3

 times a day for 7 days                  

 

                amoxicillin 500 mg oral capsule    10/4/2016       10/14/2016      take 1 capsule by oral route

 3 times a day for 10 days               

 

                Tessalon Perles 100 mg oral capsule    10/4/2016                       take 1 capsule by oral route 

every 4 hours                            

 

                Zithromax Z-Christopher 250 mg oral tablet    11/10/2016      11/15/2016      take 2 tablets (500 

mg) by oral route once daily for 1 day then 1 tablet (250 mg) by oral route once
daily for 4 days                         

 

                amoxicillin 500 mg oral tablet    2016                       take 1 tablet (500 mg) by oral route

 3 times per day                         

 

                amoxicillin 500 mg oral capsule    2017       take 1 capsule (500 mg) by

 oral route 3 times per day for 10 days     

 

                Bactrim -160 mg oral tablet    2017       take 1 tablet by oral route

 2 times a day for 10 days               

 

                Levaquin 500 mg oral tablet    2017        take 1 tablet (500 mg) by oral

 route once daily for 10 days            

 

                amoxicillin 500 mg oral capsule    2017                       take 1 capsule (500 mg) by oral route

 every 12 hours for 7 days               

 

                Zithromax Z-Christopher 250 mg oral tablet    2017                      take 2 tablets (500 mg) by oral

 route once daily for 1 day then 1 tablet (250 mg) by oral route once daily for 
4 days                                   

 

                amoxicillin 500 mg oral tablet    2018                       take 1 tablet (500 mg) by oral route

 every 12 hours for 10 days              

 

                Cipro 500 mg oral tablet    2018        2/15/2018       take 1 tablet (500 mg) by oral route

 2 times per day for 10 days             

 

                prednisone 20 mg oral tablet    2018       2X4 days 1X4 days 2X4 days 

                                         

 

                Zithromax Z-Christopher 250 mg oral tablet    2018       take 2 tablets (500 mg)

 by oral route once daily for 1 day then 1 tablet (250 mg) by oral route once 
daily for 4 days                         









                                        Discontinued 

 

             Name         Start Date    Discontinued Date    SIG          Comments

 

                amoxicillin 500 mg oral capsule    11/10/2011      10/3/2012       take 1 capsule (500 mg) 

by oral route 3 times per day            

 

                Anusol-HC 25 mg rectal suppository    2011      10/3/2012       insert 1 suppository 

(25 mg) by rectal route 2 times per day     

 

                Proctofoam 1 % topical foam    2011       apply by external route daily

                                         

 

             Aerochamber    2014    Use with inhaler as directed     

 

                hydrochlorothiazide 25 mg oral tablet    1/15/2015       2015      TAKE 1 TABLET DAILY

                                         

 

                promethazine-codeine 6.25-10 mg/5 mL oral syrup    11/10/2016      10/25/2017      take 5 

milliliters by oral route every 6 hours as needed, not to exceed 30 mL in 24 
hours                                    

 

                prednisone 20 mg oral tablet    2017       10/25/2017      4 x 2 days, 3 x 2 days, 2 x

 2 days 1 x 2 days                       

 

                Augmentin 875-125 mg oral tablet    2017       take 1 tablet by oral route

 every 12 hours for 7 days               

 

                Keflex 500 mg oral capsule    2017       take 1 capsule (500 mg) by oral

 route every 12 hours                    

 

                Keflex 500 mg oral capsule    10/10/2017      2018       take 1 capsule (500 mg) by oral

 route every 12 hours for 10 days        

 

                Levaquin 500 mg oral tablet    2018       take 1 tablet (500 mg) by oral

 route once daily for 10 days            







Problem List







                    Description         Status              Onset

 

                    Chronic Obstructive Pulmonary Disease    Active               

 

                    Hyperlipidemia      Active               

 

                    Anxiety disorder    Active              10/29/2013

 

                    Pain in joint; Hip    Active              06/10/2014

 

                    Pulmonary nodule seen on imaging study    Active              10/29/2014

 

                    Exercise hypoxemia    Active              10/29/2014

 

                    Anxiety about health    Active              2016

 

                    Primary osteoarthritis involving multiple joints    Active              2017

 

                    Medication management    Active              2017

 

                    Cellulitis of left lower extremity    Active              2017

 

                    Dog bite of calf, left, sequela    Active              2017







Vital Signs







      Date    Time    BP-Sys(mm[Hg]    BP-Noni(mm[Hg])    HR(bpm)    RR(rpm)    Temp    WT    HT    HC    BMI

                    BSA                 BMI Percentile      O2 Sat(%)

 

       2018    3:28:00 PM    122 mmHg    68 mmHg    114 bpm    18 rpm    98.2 F    142 lbs            

                                                                90 %

 

        2018    1:35:00 PM    112 mmHg    74 mmHg    114 bpm    18 rpm    99.6 F    139 lbs    63 in

                          24.6225 kg/m    1.6741 m                 98 %

 

       2018    3:57:00 PM    128 mmHg    80 mmHg    78 bpm    18 rpm    98.4 F    148 lbs             

                                                                90 %

 

        10/24/2017    1:51:00 PM    132 mmHg    80 mmHg    82 bpm    16 rpm    98.2 F    145 lbs    62 in

                          26.5206 kg/m    1.6962 m                 95 %

 

       2017    3:29:00 PM    128 mmHg    80 mmHg    68 bpm    16 rpm    98 F    144 lbs    63 in    

                25.51 kg/m2     1.70 m2                         93 %

 

        2017    11:37:00 AM    118 mmHg    72 mmHg    96 bpm    16 rpm    97.4 F    141 lbs    63 in

                          24.9768 kg/m    1.6861 m                 91 %

 

        2017    8:55:00 AM    105 mmHg    60 mmHg    96 bpm    18 rpm    95.8 F    142 lbs    63 in 

                          25.15 kg/m2    1.69 m2                   95 %

 

       2017    12:10:00 PM    122 mmHg    68 mmHg    76 bpm    18 rpm    98 F    143 lbs    63 in    

                25.3311 kg/m    1.698 m                       98 %

 

        2017    4:03:00 PM    118 mmHg    64 mmHg    106 bpm    18 rpm    97.4 F    137 lbs    63 in

                          24.27 kg/m2    1.66 m2                   98 %

 

        2016    12:11:00 PM    120 mmHg    84 mmHg    110 bpm    16 rpm    98.1 F    130 lbs    63

 in                       23.0282 kg/m    1.619 m                 90 %

 

        11/10/2016    3:57:00 PM    148 mmHg    72 mmHg    88 bpm    16 rpm    98.2 F    132 lbs    63 in

                          23.38 kg/m2    1.63 m2                   98 %

 

        3/10/2016    2:12:00 PM    122 mmHg    60 mmHg    106 bpm    18 rpm    97 F    137 lbs    63 in 

                          24.2682 kg/m    1.662 m                 93 %

 

        12/15/2015    2:33:00 PM    120 mmHg    75 mmHg    102 bpm    16 rpm    98.2 F    137 lbs    63 

in                        24.27 kg/m2    1.66 m2                   94 %

 

        10/26/2015    2:46:00 PM    130 mmHg    80 mmHg    96 bpm    16 rpm    97.9 F    141 lbs    66 in

                          22.7578 kg/m    1.7258 m                 98 %

 

        10/6/2015    9:37:00 AM    140 mmHg    78 mmHg    110 bpm    16 rpm    97.9 F    141 lbs    63 in

                          24.98 kg/m2    1.69 m2                   95 %

 

        10/28/2014    2:25:00 PM    132 mmHg    66 mmHg    92 bpm    24 rpm    97.5 F    147 lbs    63 in

                          26.0396 kg/m    1.7216 m                 92 %

 

        10/16/2014    9:45:00 AM    132 mmHg    64 mmHg    74 bpm    24 rpm    97.7 F    146 lbs    63 in

                          25.86 kg/m2    1.72 m2                   92 %

 

        2014    2:31:00 PM    136 mmHg    68 mmHg    90 bpm    22 rpm    98.2 F    148 lbs    63 in 

                          26.2168 kg/m    1.7274 m                 95 %

 

        2014    3:19:00 PM    124 mmHg    70 mmHg    64 bpm    20 rpm    97.8 F    147 lbs    63 in

                          26.04 kg/m2    1.72 m2                   95 %

 

        10/28/2013    10:31:00 AM    140 mmHg    70 mmHg    92 bpm    24 rpm    97.8 F    143 lbs    63 

in                        25.3311 kg/m    1.698 m                 95 %

 

      2013    2:51:00 PM    130 mmHg    78 mmHg    90 bpm          98.2 F    168 lbs    65 in          

27.96 kg/m2         1.87 m2                                  

 

       2013    2:43:00 PM    110 mmHg    76 mmHg    103 bpm           96.6 F    137 lbs    63 in      

                24.2682 kg/m    1.662 m                        

 

        2013    9:50:00 AM    150 mmHg    66 mmHg    108 bpm    20 rpm    97.8 F    138 lbs    63 in

                          24.45 kg/m2    1.67 m2                   94 %

 

        10/25/2012    9:08:00 AM    110 mmHg    60 mmHg    88 bpm    18 rpm    97.5 F    142 lbs    63 in

                          25.1539 kg/m    1.6921 m                 94 %

 

      10/3/2012    9:33:00 AM    130 mmHg    60 mmHg    98 bpm    18 rpm          146 lbs    63 in          

25.86 kg/m2         1.72 m2                                 95 %

 

      2012    2:31:00 PM    116 mmHg    76 mmHg    88 bpm                140 lbs    63 in          24.7996

 kg/m             1.6801 m                              96 %

 

     2011    10:02:00 AM    122 mmHg    80 mmHg    110 bpm              144 lbs                        

                                        94 %

 

      2011    2:58:00 PM    124 mmHg    84 mmHg    106 bpm                155 lbs    63 in          27.4567

 kg/m             1.7678 m                              96 %

 

     2011    9:55:00 AM    114 mmHg    78 mmHg    92 bpm              146 lbs                             95

 %

 

     6/3/2011    8:36:00 AM    116 mmHg    74 mmHg    90 bpm              146 lbs                             96

 %

 

     2010    3:01:00 PM    132 mmHg    84 mmHg    102 bpm              149 lbs                          

                                        94 %

 

     2010    11:46:00 AM    124 mmHg    78 mmHg    104 bpm              151 lbs                         

                                        94 %

 

     2010    8:47:00 AM    116 mmHg    78 mmHg    103 bpm              151 lbs                          

                                        95 %







Social History







                    Name                Description         Comments

 

                    Alcohol             Never                

 

                    Uses seatbelts                           

 

                    Tobacco             Never smoker         







History of Procedures







                    Date Ordered        Description         Order Status

 

                    2011 12:00 AM    THER/PROPH/DIAG INJ SC/IM    Reviewed

 

                    2011 12:00 AM    Decadron Inj.1mg-([facility]) Ndc #[id number]    Reviewed

 

                    2011 12:00 AM    Depo-Medrol 80 Mg Im/[facility] NDC 0009-623894    Reviewed

 

                    2011 12:00 AM    Rocephin, Per 250MG - 1 Gram Vial NDC 3097-068133-[facility]    Reviewed



 

                    3/16/2012 12:00 AM    ROUTINE VENIPUNCTURE    Reviewed

 

                    3/16/2012 12:00 AM    COMPLETE CBC W/AUTO DIFF WBC    Reviewed

 

                    3/16/2012 12:00 AM    COMPREHEN METABOLIC PANEL    Reviewed

 

                    3/16/2012 12:00 AM    ASSAY THYROID STIM HORMONE    Reviewed

 

                    11/10/2016 12:00 AM    Decadron, Per 1 Mg NDC# [address]-35    Reviewed

 

                    11/10/2016 12:00 AM    Depo-Medrol, Per 80 Mg NDC#7355-5077-08    Reviewed

 

                    11/10/2016 12:00 AM    Rocephin 1 gram NDC#5341-9319-49    Reviewed

 

                    2016 12:00 AM    THER/PROPH/DIAG INJ SC/IM    Reviewed

 

                    2016 12:00 AM    Decadron 8mg Injection, [facility]    Reviewed

 

                    2016 12:00 AM    Depo-Medrol 80mg Injection, [facility]    Reviewed

 

                    2016 12:00 AM    Rocephin 1 gram Injection, [facility]    Reviewed

 

                    2017 12:00 AM    COMPLETE CBC W/AUTO DIFF WBC    Reviewed

 

                    2017 12:00 AM    COMPREHEN METABOLIC PANEL    Reviewed

 

                    2017 12:00 AM    LIPID PANEL         Reviewed

 

                    2017 12:00 AM    THERAPEUTIC PROPHYLACTIC/DX INJECTION SUBQ/IM    Reviewed

 

                    2017 12:00 AM    Decadron 8mg Injection, [facility]    Reviewed

 

                    2017 12:00 AM    Depo-Medrol 80mg Injection, [facility]    Reviewed

 

                    2017 12:00 AM    LIPID PANEL         Returned

 

                    2017 12:00 AM    THERAPEUTIC PROPHYLACTIC/DX INJECTION SUBQ/IM    Reviewed

 

                    2017 12:00 AM    Decadron 8mg Injection, [facility] Medicare    Reviewed

 

                    2017 12:00 AM    Depo-Medrol 80mg Injection, [facility]    Reviewed

 

                    10/3/2012 12:00 AM    THER/PROPH/DIAG INJ SC/IM    Reviewed

 

                    10/3/2012 12:00 AM    Decadron, Per 1 Mg NDC# [address]-34    Reviewed

 

                    10/3/2012 12:00 AM    Depo-Medrol, Per 80 Mg NDC#1767-5537-88    Reviewed

 

                    10/3/2012 12:00 AM    Toradol 60 Mg NDC#3308-8698-85    Reviewed

 

                    10/24/2017 12:00 AM    THERAPEUTIC PROPHYLACTIC/DX INJECTION SUBQ/IM    Reviewed

 

                    10/24/2017 12:00 AM    Decadron 8mg Injection, [facility]    Reviewed

 

                    10/24/2017 12:00 AM    Depo-Medrol 80mg Injection, [facility]    Reviewed

 

                    2018 12:00 AM    THERAPEUTIC PROPHYLACTIC/DX INJECTION SUBQ/IM    Reviewed

 

                    2018 12:00 AM    Rocephin 1 gram Injection, [facility]    Reviewed

 

                    2018 12:00 AM    THERAPEUTIC PROPHYLACTIC/DX INJECTION SUBQ/IM    Reviewed

 

                    2018 12:00 AM    Decadron 8mg Injection, [facility]    Reviewed

 

                    2018 12:00 AM    Depo-Medrol 80mg Injection, [facility]    Reviewed

 

                    2018 12:00 AM    Rocephin 1 gram Injection, [facility]    Reviewed

 

                    2013 12:00 AM    THER/PROPH/DIAG INJ SC/IM    Reviewed

 

                    2013 12:00 AM    Decadron, Per 1 Mg NDC# [address]-18    Reviewed

 

                    2013 12:00 AM    Depo-Medrol, Per 80 Mg NDC#8339-7108-66    Reviewed

 

                    2013 12:00 AM    Toradol 60 Mg NDC#8795-9049-11    Reviewed

 

                    2010 12:00 AM    ROUTINE VENIPUNCTURE    Reviewed

 

                    2010 12:00 AM    COMPLETE CBC W/AUTO DIFF WBC    Reviewed

 

                    2010 12:00 AM    COMPREHEN METABOLIC PANEL    Reviewed

 

                    2010 12:00 AM    LIPID PANEL         Reviewed

 

                    10/28/2014 12:00 AM    CHEST X-RAY 4/> VIEWS    Reviewed

 

                    6/3/2011 12:00 AM    ROUTINE VENIPUNCTURE    Reviewed

 

                    6/3/2011 12:00 AM    COMPLETE CBC AUTOMATED    Reviewed

 

                    6/3/2011 12:00 AM    COMPREHEN METABOLIC PANEL    Reviewed

 

                    6/3/2011 12:00 AM    LIPID PANEL         Reviewed

 

                    2011 12:00 AM    THER/PROPH/DIAG INJ SC/IM    Reviewed

 

                    2011 12:00 AM    Decadron Inj.8mg-([facility] Ndc #[id number]    Reviewed

 

                    2011 12:00 AM    Depo-Medrol 80 Mg Im/[facility] NDC 0009-452045    Reviewed







Results Summary







                          Date and Description      Results

 

                          6/3/2011 1:53 PM          TRIGLYCERIDES 155.0 mg/dLCHOLESTEROL 248.0 mg/dLHDL 51.0 mg/dLTOT

 CHOL/HDL 4.9 .0 mg/dLGLUCOSE 97.0 mg/dLSODIUM 139.0 mmol/LPOTASSIUM 3.70
 mmol/LCHLORIDE 101.0 mmol/LCO2 27.0 mmol/LBUN 19.0 mg/dLCREATININE 0.90 
mg/dLSGOT/AST 19.0 IU/LSGPT/ALT 11.0 IU/LALK PHOS 111.0 IU/LTOTAL PROTEIN 7.40 
g/dLALBUMIN 4.20 g/dLTOTAL BILI 0.50 mg/dLCALCIUM 9.50 mg/dLAGE 70 GFR NonAA 62 
GFR AA 75 eGFR >60 mL/min/1.73 m2eGFR AA* >60 







History Of Immunizations

Not available.



History of Past Illness







                    Name                Date of Onset       Comments

 

                    Chronic Obstructive Pulmonary Disease                         

 

                    Hyperlipidemia                           

 

                    Cough               2010  8:49AM     

 

                    General Medical Exam, Adult    2010  8:49AM     

 

                    Seasonal Allergies    2010  8:49AM     

 

                    Sinusitis, Chronic    2010  8:49AM     

 

                    Ganglion cyst of synovium, tendon, and bursa; other    2010 11:48AM     

 

                    Anxiety disorder    10/29/2013           

 

                    Pain in joint; Hip    06/10/2014           

 

                    Pulmonary nodule seen on imaging study    10/29/2014           

 

                    Exercise hypoxemia    10/29/2014           

 

                    Chronic Obstructive Pulmonary Disease    2010  3:02PM     

 

                    Edema               2010  3:02PM     

 

                    Sinusitis, Acute    2010  3:02PM     

 

                    Anxiety about health    2016           

 

                    Chronic Obstructive Pulmonary Disease    Trey  3 2011  8:38AM     

 

                    Palpitations        Trey  3 2011  8:38AM     

 

                    Cough               2011  9:54AM     

 

                    Sinusitis, Acute    2011  9:54AM     

 

                    Seasonal Allergies    2011  9:54AM     

 

                    Post-nasal drainage    2011  9:54AM     

 

                    Primary osteoarthritis involving multiple joints    2017           

 

                    Medication management    2017           

 

                    Cellulitis of left lower extremity    2017           

 

                    Dog bite of calf, left, sequela    2017           

 

                    Cough               Sep 22 2011  2:55PM     

 

                    Seasonal Allergies    Sep 22 2011  2:55PM     

 

                    Pharyngitis, Acute    Sep 22 2011  2:55PM     

 

                    Post-nasal drainage    Sep 22 2011  2:55PM     

 

                    Blood In Stool, Occult    2011  9:58AM     

 

                    Hemorrhoids         2011  9:58AM     

 

                    Chronic Obstructive Pulmonary Disease    2012  2:33PM     

 

                    Cardiac Dysrhythmia    2012  2:33PM     

 

                    Sinoatrial Node Dysfunction    Mar 16 2012  9:12AM     

 

                    Palpitations        Mar 16 2012  9:12AM     

 

                    Osteoarthrosis, generalized, multiple sites    Oct  3 2012  9:34AM     

 

                    Pain in joint; Hip    Oct  3 2012  9:34AM     

 

                    Osteoarthrosis      Oct 25 2012  9:09AM     

 

                    Bronchitis, Acute    Oct 25 2012  9:09AM     

 

                    Cough               Oct 25 2012  9:09AM     

 

                    Pain in joint; Left Hip    Oct 25 2012  9:09AM     

 

                    Pain in joint; Hip    2013  9:50AM     

 

                    Osteoarthrosis, generalized, multiple sites    2013  2:45PM     

 

                    Pain in joint; Hip bilateral    2013  2:45PM     

 

                    Anxiety Disorder    Oct 28 2013 10:32AM     

 

                    Chronic Obstructive Pulmonary Disease    Oct 28 2013 10:32AM     

 

                    Hyperlipidemia      Oct 28 2013 10:32AM     

 

                    Pain in joint; Left Hip    Oct 28 2013 10:32AM     

 

                    Sinusitis, Acute    Oct 28 2013 10:32AM     

 

                    Essential Hypertension    2014  3:19PM     

 

                    Osteoarthrosis, generalized, multiple sites    2014  3:19PM     

 

                    Chronic Obstructive Pulmonary Disease    2014  3:19PM     

 

                    Pain in joint; Hip    2014  3:19PM     

 

                    Chronic Obstructive Pulmonary Disease    2014  2:31PM     

 

                    Pain in joint; Hip    2014  2:31PM     

 

                    pre-operative examination, unspecified    2014  2:31PM     

 

                    Lung nodule seen on imaging study    Oct 28 2014 10:24AM     

 

                    Bronchitis, Acute    Oct 16 2014  9:45AM     

 

                    Respiratory System And Chest Symptoms    Oct 16 2014  9:45AM     

 

                    COPD (chronic obstructive pulmonary disease)    Oct 16 2014  9:45AM     

 

                    Chronic Obstructive Pulmonary Disease    Oct 28 2014  2:26PM     

 

                    Pulmonary nodule seen on imaging study    Oct 28 2014  2:26PM     

 

                    Shortness of breath    Oct 28 2014  2:26PM     

 

                    Exercise hypoxemia    Oct 28 2014  2:26PM     

 

                    Nail avulsion       Oct  6 2015  9:38AM     

 

                          Contusion of left index finger with damage to nail, initial encounter    Oct 26 2015

  2:53PM                                 

 

                    Forehead contusion, subsequent encounter    Dec 15 2015  2:39PM     

 

                    Generalized anxiety disorder    Dec 15 2015  2:39PM     

 

                    Chronic Obstructive Pulmonary Disease    Dec 15 2015  2:39PM     

 

                    Osteoarthrosis, generalized, multiple sites    Mar 10 2016  2:12PM     

 

                    Pain of right thigh    Mar 10 2016  2:12PM     

 

                    Mild Acute Hip pain, acute, right Improving    Mar 10 2016  2:12PM     

 

                    Anxiety about health    Mar 10 2016  2:12PM     

 

                    Hyperlipidemia      Aug 22 2016 10:58AM     

 

                    Sinoatrial Node Dysfunction    Aug 22 2016 10:58AM     

 

                    Palpitations        Aug 22 2016 10:58AM     

 

                    Chronic Obstructive Pulmonary Disease    Aug 22 2016 10:58AM     

 

                    Exercise hypoxemia    Aug 22 2016 10:58AM     

 

                    Pain in joint; Hip    Aug 22 2016 10:58AM     

 

                    Pulmonary nodule seen on imaging study    Aug 22 2016 10:58AM     

 

                    Eustachian tube dysfunction, bilateral    Nov 10 2016  3:57PM     

 

                    Moderate Acute Cough    Nov 10 2016  3:57PM     

 

                    Pharyngitis, Acute    Nov 10 2016  3:57PM     

 

                    Upper Respiratory Infection    Nov 10 2016  3:57PM     

 

                          COPD (chronic obstructive pulmonary disease) with acute bronchitis    Nov 10 2016 

 3:57PM                                  

 

                    Mild Chronic Cough Worsening    Dec 12 2016 12:12PM     

 

                          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

 12 2016 12:12PM                         

 

                    Moderate Shortness of breath on exertion    Dec 12 2016 12:12PM     

 

                    Hyperlipidemia      May  4 2017 10:27AM     

 

                    Sinoatrial Node Dysfunction    May  4 2017 10:27AM     

 

                    Palpitations        May  4 2017 10:27AM     

 

                    Chronic Obstructive Pulmonary Disease    May  4 2017 10:27AM     

 

                    Exercise hypoxemia    May  4 2017 10:27AM     

 

                    Pain in joint; Hip    May  4 2017 10:27AM     

 

                    Pulmonary nodule seen on imaging study    May  4 2017 10:27AM     

 

                    Chest congestion    May 16 2017  4:04PM     

 

                          COPD (chronic obstructive pulmonary disease) with acute bronchitis    May 16 2017 

 4:04PM                                  

 

                    Productive cough    May 16 2017  4:04PM     

 

                    Anxiety about health    May 16 2017  4:04PM     

 

                          Chronic obstructive pulmonary disease, unspecified COPD type    May 16 2017  4:04PM

                                         

 

                    Exercise hypoxemia    May 16 2017  4:04PM     

 

                    Mixed hyperlipidemia    May 16 2017  4:04PM     

 

                    Medication management    May 16 2017  4:04PM     

 

                    Primary osteoarthritis involving multiple joints    May 16 2017  4:04PM     

 

                    Cellulitis of left lower extremity    2017 12:10PM     

 

                    Bitten by dog, initial encounter    2017 12:10PM     

 

                    Cellulitis of left lower extremity    2017  8:56AM     

 

                    Open bite, left lower leg, sequela    2017  8:56AM     

 

                    Bitten by dog, sequela    2017  8:56AM     

 

                    Medication management    2017  8:56AM     

 

                    Cellulitis of left lower extremity    2017 11:38AM     

 

                    Open bite, left lower leg, subsequent encounter    2017 11:38AM     

 

                    Bitten by dog, subsequent encounter    2017 11:38AM     

 

                    Medication management    2017 11:38AM     

 

                    Cough               Aug 10 2017  4:09PM     

 

                    Abnormal chest xray    Aug 10 2017  4:09PM     

 

                    Hyperlipidemia      Aug 29 2017  9:02AM     

 

                    Sinoatrial Node Dysfunction    Aug 29 2017  9:02AM     

 

                    Palpitations        Aug 29 2017  9:02AM     

 

                    Chronic Obstructive Pulmonary Disease    Aug 29 2017  9:02AM     

 

                    Exercise hypoxemia    Aug 29 2017  9:02AM     

 

                    Pain in joint; Hip    Aug 29 2017  9:02AM     

 

                    Pulmonary nodule seen on imaging study    Aug 29 2017  9:02AM     

 

                    Eustachian tube dysfunction, bilateral    Aug 28 2017  3:30PM     

 

                    Purulent postnasal drainage    Aug 28 2017  3:30PM     

 

                    Chest congestion    Aug 28 2017  3:30PM     

 

                    Upper respiratory tract infection, unspecified type    Aug 28 2017  3:30PM     

 

                    Moderate Acute Sinus pressure    Aug 28 2017  3:30PM     

 

                          COPD (chronic obstructive pulmonary disease) with acute bronchitis    Aug 28 2017 

 3:30PM                                  

 

                    Moderate Chronic Purulent postnasal drainage    Oct 24 2017  1:52PM     

 

                    Mild Chronic Sinus pressure    Oct 24 2017  1:52PM     

 

                          Moderate Chronic Acute seasonal allergic rhinitis, unspecified trigger Stable    Oct

 24 2017  1:52PM                         

 

                    Mild Acute Labyrinthitis    Oct 24 2017  1:52PM     

 

                    Moderate Acute Vertigo    Oct 24 2017  1:52PM     

 

                    Purulent postnasal drainage    2018  3:58PM     

 

                    Upper respiratory tract infection, unspecified type    2018  3:58PM     

 

                    Mild Acute Left Enlarged lymph node in neck    2018  3:58PM     

 

                    Cough               2018  1:36PM     

 

                    Moderate Acute Recurrent Chest congestion    2018  1:36PM     

 

                    COPD exacerbation    2018  1:36PM     

 

                          Chronic obstructive pulmonary disease with acute lower respiratory infection    2018  1:36PM                         

 

                    Acute bronchitis, unspecified    2018  1:36PM     

 

                    Cough               2018  3:29PM     

 

                    Acute pharyngitis, unspecified etiology    2018  3:29PM     

 

                    Chest congestion    2018  3:29PM     

 

                    COPD (chronic obstructive pulmonary disease)    2018  3:29PM     







Payers







           Insurance Name    Company Name    Plan Name    Plan Number    Policy Number    Policy Group

 Number                                 Start Date

 

                    Medicare [facility]    Medicare [facility]              [id number]              N/A

 

                          [place] [employer] [facility]    [place] [employer]                 [id number]

                                                    N/A

 

                    [id number] Medicare A    Test Medicare A              [id number]              N/A

 

                                [facility]      

                    [id number]                             N/A

 

                    Medicare Part A    Medicare - Lab/Xray              [id number]              N/A

 

                          [employer] [facility]    [employer] [facility]                 [id number]

                                                    N/A

 

                    Medicare Part A    Medicare Part A              [id number]              N/A

 

                    Medicare Part B    [employer] [facility] [place]              [id number]              N/A







History of Encounters







                    Visit Date          Visit Type          Provider

 

                    2018           Office visit        DEON ESCALANTE

 

                    2018            Office visit        DEON ESCALANTE

 

                    2018           Office visit        DEON LEON PA

 

                    10/24/2017          Office visit        DEON ESCALANTE

 

                    2017           Voided              DEON LEON PA

 

                    2017           Office visit        DEON ESCALANTE

 

                    2017           Office visit        DEON ESCALANTE

 

                    2017            Office visit        DEON LEON PA

 

                    2017            Office visit        DEON ESCALANTE

 

                    2017           Office visit        DEON ESCALANTE

 

                    2016          Office visit        DEON ESCALANTE

 

                    11/10/2016          Office visit        DEON ESCALANTE

 

                    3/10/2016           Office visit        DEON ESCALANTE

 

                    12/15/2015          Office visit        DEON ESCALANTE

 

                    10/26/2015          Office visit        DEON ESCALANTE

 

                    10/6/2015           Office visit        DEON ESCALANTE

 

                    10/28/2014          Office visit        DEON ESCALANTE

 

                    10/16/2014          Office visit        DEON ESCALANTE

 

                    2014            Office visit        DEON ESCALANTE

 

                    2014           [facility]            DEWEY Garcia MD

 

                    2014           Office visit        DEON ESCALANTE

 

                    10/28/2013          Office visit        DEON ESCALANTE

 

                    10/14/2013          Nabeel Garcia MD

 

                    2013           Office visit        DEON ESCALANTE

 

                    2013            Office visit        DEON ESCALANTE

 

                    10/25/2012          Office visit        DEON ESCALANTE

 

                    10/3/2012           Office visit        DEON ESCALANTE

 

                    3/16/2012           Office visit        DEON ESCALANTE

 

                    2012            Office visit        DEON ESCALANTE

 

                    2012            [facility]            DEWEY Garcia MD

 

                    2011          Office visit        DEON ESCALANTE

 

                    2011           Office visit        Deon ESCALANTE-C

 

                    2011            Office visit        Deon ESCALANTE-C

 

                    6/3/2011            Office visit        Deon CASTROC

 

                    2010           Office visit        Deon ESCALANTE-C

 

                    2010           Office visit        Deon ESCALANTE-C

 

                    2010           Office visit        Deon Leon PA-C

## 2019-06-25 NOTE — XMS REPORT
MU2 Ambulatory Summary

                             Created on: 2019



Elsi Casillas

External Reference #: [id number]

: 1940

Sex: Female



Demographics







                          Address                   [address] [place]  [address]

 

                          Home Phone                [phone]

 

                          Preferred Language        English

 

                          Marital Status            

 

                          Yarsanism Affiliation     Unknown

 

                          Race                      White

 

                          Ethnic Group              Not  or 





Author







                          Author                    DEON LEON

 

                          [facility]

 

                          Address                   [address]

[place], [place]  [id number]



 

                          Phone                     [phone]







Care Team Providers







                    Care Team Member Name    Role                Phone

 

                    DEON LEON    PCP                 [phone]

 

                    DEON LEON    PreferredProvider    [phone]







Allergies and Adverse Reactions







                    Name                Reaction            Notes

 

                    SULFA (SULFONAMIDES)    nausea               







Plan of Treatment







             Planned Activity    Comments     Planned Date    Planned Time    Plan/Goal

 

             Lipid Profile                 2016    12:00 AM      

 

             Chest PA and Lateral - Main                 8/10/2017    12:00 AM      

 

             Lipid Profile                 2018    12:00 AM      

 

             Chest PA and Lateral - Main                 2019    12:00 AM      

 

             Sinuses Limited - Main                 2019    12:00 AM      







Medications







                                        Active 

 

             Name         Start Date    Estimated Completion Date    SIG          Comments

 

                Calcium 600 + D(3) 600 mg(1,500mg) -200 unit oral tablet                                    take 2 tablets by

 oral route daily                        

 

                pravastatin 20 mg oral tablet                                    take 1 tablet (20 mg) by oral route once daily

                                         

 

                Toprol XL 25 mg oral tablet extended release 24 hr                                    take 1 tablet (25 mg) 

by oral route once daily                 

 

                    albuterol sulfate 1.25 mg/3 mL inhalation solution for nebulization    2017           

                                        inhale 3 milliliters (1.25 mg) via nebulizer by inhalation route 4 times per day

  DX J44.9                               

 

                                        ipratropium-albuterol 0.5 mg-3 mg(2.5 mg base)/3 mL inhalation solution for nebulization

                    2018                                inhale 3 milliliters by nebulization route 4 times per day for COPD

                                         

 

                Lasix 40 mg oral tablet    2018                       take 1 tablet (40 mg) by oral route once 

daily                                    

 

                Sudogest 30 mg oral tablet    2018                      take 1-2 tablets by oral route every 

6 hours as needed                        

 

             lisinopril 10 mg oral tablet    2019                 TAKE 1 TABLET BY MOUTH ONCE DAILY     



 

             hydrochlorothiazide 25 mg oral tablet    2019                 TAKE 1 TABLET DAILY     

 

                metoprolol succinate 25 mg oral tablet extended release 24 hr    2019                       TAKE

 1 TABLET DAILY                          

 

                Cozaar 50 mg oral tablet    6/10/2019                       take 1 tablet (50 mg) by oral route once

 daily                                   









                                         

 

             Name         Start Date    Expiration Date    SIG          Comments

 

                Singulair 10 mg oral tablet    9/3/2010        2011        take 1 tablet (10 mg) by oral route

 daily  for 60 days                      

 

                Zithromax Z-Christopher 250 mg oral tablet    2011       take 2 tablets (500 mg)

 by oral route once daily for 1 day then 1 tablet (250 mg) by oral route once 
daily for 4 days                         

 

                Medrol (Christopher) 4 mg oral tablets,dose pack    2011        take as directed

 for 6 days                              

 

                Keflex 500 mg oral capsule    3/1/2012        3/11/2012       take 1 capsule by oral route 3 

times a day for 10 days                  

 

                Cipro 500 mg oral tablet    2012        take 1 tablet (500 mg) by oral route

 every 12 hours for 15 days              

 

                benzonatate 100 mg oral capsule    2013       take 1 capsule (100 mg) by

 oral route 3 times per day for cough     

 

             Medrol (Christopher) 4 mg oral tablets,dose pack    2013     take as directed    

 

 

                Zyrtec 10 mg oral tablet    2013       take 1 tablet (10 mg) by oral route

 once daily for 30 days                  

 

                Flonase 50 mcg/actuation nasal spray,suspension    2013       inhale 1 spray

 in each nostril by intranasal route 2 times per day for 30 days     

 

                cephalexin 500 mg oral capsule    2013        take 1 capsule (500 mg) by oral

 route every 8 hours                     

 

                promethazine-codeine 6.25-10 mg/5 mL oral syrup    2013                       take 5 milliliters

 by oral route every 4 hours as needed, not to exceed 30 mL in 24 hours     

 

                Zithromax Z-Christopher 250 mg oral tablet    10/2/2013       10/7/2013       take 2 tablets (500 mg)

 by oral route once daily for 1 day then 1 tablet (250 mg) by oral route once 
daily for 4 days                         

 

                amoxicillin 500 mg oral capsule    2014       take 1 capsule by oral route

 3 times a day for 15 days               

 

                lovastatin 20 mg oral tablet    2014        take 1 tablet (20 mg) by oral 

route daily  for 30 days                 

 

                Zithromax Z-Christopher 250 mg oral tablet    2014       take 2 tablets (500 mg)

 by oral route once daily for 1 day then 1 tablet (250 mg) by oral route once 
daily for 4 days                         

 

             Medrol (Christopher) 4 mg oral tablets,dose pack    2014                 take as directed     

 

                amoxicillin 500 mg oral capsule    2014                       take 1 capsule (500 mg) by oral route

 3 times per day                         

 

                Levaquin 500 mg oral tablet    10/16/2014      10/26/2014      take 1 tablet (500 mg) by oral

 route once daily for 10 days            

 

                prednisone 20 mg oral tablet    10/16/2014      10/24/2014      4x2 days 3x2 days 2x2 days

 1x2 days                                

 

                Zithromax Z-Christopher 250 mg oral tablet    2014       take 2 tablets (500 mg)

 by oral route once daily for 1 day then 1 tablet (250 mg) by oral route once 
daily for 4 days                         

 

                Bactrim -160 mg oral tablet    1/15/2015       2015       take 1 tablet by oral route

 every 12 hours for 10 days              

 

                Zithromax Z-Christopher 250 mg oral tablet    2015      take 2 tablets (500 mg)

 by oral route once daily for 1 day then 1 tablet (250 mg) by oral route once 
daily for 4 days                         

 

                Keflex 500 mg oral capsule    2016       take 1 capsule by oral route 3

 times a day for 7 days                  

 

                amoxicillin 500 mg oral capsule    10/4/2016       10/14/2016      take 1 capsule by oral route

 3 times a day for 10 days               

 

                Tessalon Perles 100 mg oral capsule    10/4/2016                       take 1 capsule by oral route 

every 4 hours                            

 

                Zithromax Z-Christopher 250 mg oral tablet    11/10/2016      11/15/2016      take 2 tablets (500 

mg) by oral route once daily for 1 day then 1 tablet (250 mg) by oral route once
daily for 4 days                         

 

                amoxicillin 500 mg oral tablet    2016                       take 1 tablet (500 mg) by oral route

 3 times per day                         

 

                amoxicillin 500 mg oral capsule    2017       take 1 capsule (500 mg) by

 oral route 3 times per day for 10 days     

 

                Bactrim -160 mg oral tablet    2017       take 1 tablet by oral route

 2 times a day for 10 days               

 

                Levaquin 500 mg oral tablet    2017        take 1 tablet (500 mg) by oral

 route once daily for 10 days            

 

                amoxicillin 500 mg oral capsule    2017                       take 1 capsule (500 mg) by oral route

 every 12 hours for 7 days               

 

                Zithromax Z-Christopher 250 mg oral tablet    2017                      take 2 tablets (500 mg) by oral

 route once daily for 1 day then 1 tablet (250 mg) by oral route once daily for 
4 days                                   

 

                amoxicillin 500 mg oral tablet    2018                       take 1 tablet (500 mg) by oral route

 every 12 hours for 10 days              

 

                Cipro 500 mg oral tablet    2018        2/15/2018       take 1 tablet (500 mg) by oral route

 2 times per day for 10 days             

 

                Zithromax Z-Christopher 250 mg oral tablet    2018       take 2 tablets (500 mg)

 by oral route once daily for 1 day then 1 tablet (250 mg) by oral route once 
daily for 4 days                         

 

                Keflex 500 mg oral capsule    2018       take 1 capsule (500 mg) by oral

 route every 12 hours for 7 days         

 

                prednisone 20 mg oral tablet    2018       2X4 days 1X4 days 1/2X4 days 

                                         

 

                Levaquin 500 mg oral tablet    2018       take 1 tablet (500 mg) by oral

 route once daily for 7 days             

 

                Medrol (Christopher) 4 mg oral tablets,dose pack    5/10/2019       5/15/2019       take as directed

 for 5 days                              

 

                amoxicillin 500 mg oral capsule    2019       6/10/2019       take 1 capsule (500 mg) by

 oral route 3 times per day for 10 days     









                                        Discontinued 

 

             Name         Start Date    Discontinued Date    SIG          Comments

 

                amoxicillin 500 mg oral capsule    11/10/2011      10/3/2012       take 1 capsule (500 mg) 

by oral route 3 times per day            

 

                Anusol-HC 25 mg rectal suppository    2011      10/3/2012       insert 1 suppository 

(25 mg) by rectal route 2 times per day     

 

                Proctofoam 1 % topical foam    2011       apply by external route daily

                                         

 

             Aerochamber    2014    Use with inhaler as directed     

 

                hydrochlorothiazide 25 mg oral tablet    1/15/2015       2015      TAKE 1 TABLET DAILY

                                         

 

                promethazine-codeine 6.25-10 mg/5 mL oral syrup    11/10/2016      10/25/2017      take 5 

milliliters by oral route every 6 hours as needed, not to exceed 30 mL in 24 
hours                                    

 

                prednisone 20 mg oral tablet    2017       10/25/2017      4 x 2 days, 3 x 2 days, 2 x

 2 days 1 x 2 days                       

 

                Augmentin 875-125 mg oral tablet    2017       take 1 tablet by oral route

 every 12 hours for 7 days               

 

                Keflex 500 mg oral capsule    2017       take 1 capsule (500 mg) by oral

 route every 12 hours                    

 

                Keflex 500 mg oral capsule    10/10/2017      2018       take 1 capsule (500 mg) by oral

 route every 12 hours for 10 days        

 

                    Symbicort 160-4.5 mcg/actuation inhalation HFA aerosol inhaler    10/25/2017          10/6/2018

                          inhale 2 puffs by inhalation route 2 times per day in the morning and evening    

 

 

                Levaquin 500 mg oral tablet    2018       take 1 tablet (500 mg) by oral

 route once daily for 10 days            

 

                Bactrim -160 mg oral tablet    2018       take 1 tablet by oral route

 every 12 hours for 10 days             nausea

 

                Tessalon Perles 100 mg oral capsule    2018        10/6/2018       take 1 capsule (100 mg)

 by oral route 3 times per day as needed for cough     

 

                Sudogest 30 mg oral tablet    2018        10/6/2018       take 1 tablets (60 mg) by oral 

route every 6 hours as needed            

 

                fluticasone 50 mcg/actuation nasal spray,suspension    10/2/2018       2019       inhale

 1 spray (50 mcg) in each nostril by intranasal route 2 times per day     

 

                doxycycline hyclate 100 mg oral capsule    3/5/2019        2019       take 1 capsule (100

 mg) by oral route 2 times per day       







Problem List







                    Description         Status              Onset

 

                    Chronic Obstructive Pulmonary Disease    Active               

 

                    Hyperlipidemia      Active               

 

                    Anxiety disorder    Active              10/29/2013

 

                    Pain in joint; Hip    Active              06/10/2014

 

                    Pulmonary nodule seen on imaging study    Active              10/29/2014

 

                    Exercise hypoxemia    Active              10/29/2014

 

                    Anxiety about health    Active              2016

 

                    Primary osteoarthritis involving multiple joints    Active              2017

 

                    Medication management    Active              2017

 

                    Cellulitis of left lower extremity    Active              2017

 

                    Dog bite of calf, left, sequela    Active              2017

 

                    Peripheral edema    Active              2018







Vital Signs







      Date    Time    BP-Sys(mm[Hg]    BP-Noni(mm[Hg])    HR(bpm)    RR(rpm)    Temp    WT    HT    HC    BMI

                    BSA                 BMI Percentile      O2 Sat(%)

 

        2019    11:23:00 AM    132 mmHg    80 mmHg    80 bpm    18 rpm    98.2 F    143 lbs    63 in

                          25.3311 kg/m    1.698 m                 98 %

 

        2019    11:25:00 AM    118 mmHg    74 mmHg    82 bpm    18 rpm    98.4 F    139 lbs    62 in

                          25.42 kg/m2    1.66 m2                   98 %

 

        2019    3:13:00 PM    136 mmHg    86 mmHg    110 bpm    20 rpm    98.1 F    144 lbs    62 in

                          26.3377 kg/m    1.6904 m                 88 %

 

        2019    3:30:00 PM    108 mmHg    72 mmHg    82 bpm    18 rpm    98.1 F    142 lbs    62 in 

                          25.97 kg/m2    1.68 m2                   89 %

 

        2018    9:30:00 AM    154 mmHg    100 mmHg    108 bpm    20 rpm    98.1 F    145 lbs    62 

in                        26.5206 kg/m    1.6962 m                 91 %

 

        10/4/2018    9:00:00 AM    114 mmHg    74 mmHg    76 bpm    18 rpm    98.4 F    145 lbs    61 in

                          27.40 kg/m2    1.68 m2                   97 %

 

        2018    10:07:00 AM    124 mmHg    82 mmHg    86 bpm    18 rpm    98.2 F    149 lbs    61 in

                          28.15 kg/m2    1.71 m2                   92 %

 

        2018    10:48:00 AM    118 mmHg    80 mmHg    82 bpm    18 rpm    98.3 F    144 lbs    61 in

                          27.2083 kg/m    1.6767 m                 93 %

 

        2018    2:35:00 PM    120 mmHg    82 mmHg    106 bpm    20 rpm    98.2 F    142 lbs    62 in

                          25.97 kg/m2    1.68 m2                   92 %

 

       2018    3:28:00 PM    122 mmHg    68 mmHg    114 bpm    18 rpm    98.2 F    142 lbs            

                                                                90 %

 

        2018    1:35:00 PM    112 mmHg    74 mmHg    114 bpm    18 rpm    99.6 F    139 lbs    63 in

                          24.6225 kg/m    1.6741 m                 98 %

 

       2018    3:57:00 PM    128 mmHg    80 mmHg    78 bpm    18 rpm    98.4 F    148 lbs             

                                                                90 %

 

        10/24/2017    1:51:00 PM    132 mmHg    80 mmHg    82 bpm    16 rpm    98.2 F    145 lbs    62 in

                          26.52 kg/m2    1.70 m2                   95 %

 

       2017    3:29:00 PM    128 mmHg    80 mmHg    68 bpm    16 rpm    98 F    144 lbs    63 in    

                25.5082 kg/m    1.7039 m                      93 %

 

        2017    11:37:00 AM    118 mmHg    72 mmHg    96 bpm    16 rpm    97.4 F    141 lbs    63 in

                          24.98 kg/m2    1.69 m2                   91 %

 

        2017    8:55:00 AM    105 mmHg    60 mmHg    96 bpm    18 rpm    95.8 F    142 lbs    63 in 

                          25.1539 kg/m    1.6921 m                 95 %

 

       2017    12:10:00 PM    122 mmHg    68 mmHg    76 bpm    18 rpm    98 F    143 lbs    63 in    

                25.33 kg/m2     1.70 m2                         98 %

 

        2017    4:03:00 PM    118 mmHg    64 mmHg    106 bpm    18 rpm    97.4 F    137 lbs    63 in

                          24.2682 kg/m    1.662 m                 98 %

 

        2016    12:11:00 PM    120 mmHg    84 mmHg    110 bpm    16 rpm    98.1 F    130 lbs    63

 in                       23.03 kg/m2    1.62 m2                   90 %

 

        11/10/2016    3:57:00 PM    148 mmHg    72 mmHg    88 bpm    16 rpm    98.2 F    132 lbs    63 in

                          23.3825 kg/m    1.6314 m                 98 %

 

        3/10/2016    2:12:00 PM    122 mmHg    60 mmHg    106 bpm    18 rpm    97 F    137 lbs    63 in 

                          24.27 kg/m2    1.66 m2                   93 %

 

        12/15/2015    2:33:00 PM    120 mmHg    75 mmHg    102 bpm    16 rpm    98.2 F    137 lbs    63 

in                        24.2682 kg/m    1.662 m                 94 %

 

        10/26/2015    2:46:00 PM    130 mmHg    80 mmHg    96 bpm    16 rpm    97.9 F    141 lbs    66 in

                          22.76 kg/m2    1.73 m2                   98 %

 

        10/6/2015    9:37:00 AM    140 mmHg    78 mmHg    110 bpm    16 rpm    97.9 F    141 lbs    63 in

                          24.9768 kg/m    1.6861 m                 95 %

 

        10/28/2014    2:25:00 PM    132 mmHg    66 mmHg    92 bpm    24 rpm    97.5 F    147 lbs    63 in

                          26.04 kg/m2    1.72 m2                   92 %

 

        10/16/2014    9:45:00 AM    132 mmHg    64 mmHg    74 bpm    24 rpm    97.7 F    146 lbs    63 in

                          25.8625 kg/m    1.7157 m                 92 %

 

        2014    2:31:00 PM    136 mmHg    68 mmHg    90 bpm    22 rpm    98.2 F    148 lbs    63 in 

                          26.22 kg/m2    1.73 m2                   95 %

 

        2014    3:19:00 PM    124 mmHg    70 mmHg    64 bpm    20 rpm    97.8 F    147 lbs    63 in

                          26.0396 kg/m    1.7216 m                 95 %

 

        10/28/2013    10:31:00 AM    140 mmHg    70 mmHg    92 bpm    24 rpm    97.8 F    143 lbs    63 

in                        25.33 kg/m2    1.70 m2                   95 %

 

      2013    2:51:00 PM    130 mmHg    78 mmHg    90 bpm          98.2 F    168 lbs    65 in          

27.9564 kg/m      1.8694 m                               

 

       2013    2:43:00 PM    110 mmHg    76 mmHg    103 bpm           96.6 F    137 lbs    63 in      

                24.27 kg/m2     1.66 m2                          

 

        2013    9:50:00 AM    150 mmHg    66 mmHg    108 bpm    20 rpm    97.8 F    138 lbs    63 in

                          24.4454 kg/m    1.6681 m                 94 %

 

        10/25/2012    9:08:00 AM    110 mmHg    60 mmHg    88 bpm    18 rpm    97.5 F    142 lbs    63 in

                          25.15 kg/m2    1.69 m2                   94 %

 

      10/3/2012    9:33:00 AM    130 mmHg    60 mmHg    98 bpm    18 rpm          146 lbs    63 in          

25.8625 kg/m      1.7157 m                              95 %

 

      2012    2:31:00 PM    116 mmHg    76 mmHg    88 bpm                140 lbs    63 in          24.80 

kg/m2               1.68 m2                                 96 %

 

     2011    10:02:00 AM    122 mmHg    80 mmHg    110 bpm              144 lbs                        

                                        94 %

 

      2011    2:58:00 PM    124 mmHg    84 mmHg    106 bpm                155 lbs    63 in          27.4567

 kg/m             1.7678 m                              96 %

 

     2011    9:55:00 AM    114 mmHg    78 mmHg    92 bpm              146 lbs                             95

 %

 

     6/3/2011    8:36:00 AM    116 mmHg    74 mmHg    90 bpm              146 lbs                             96

 %

 

     2010    3:01:00 PM    132 mmHg    84 mmHg    102 bpm              149 lbs                          

                                        94 %

 

     2010    11:46:00 AM    124 mmHg    78 mmHg    104 bpm              151 lbs                         

                                        94 %

 

     2010    8:47:00 AM    116 mmHg    78 mmHg    103 bpm              151 lbs                          

                                        95 %







Social History







                    Name                Description         Comments

 

                    Alcohol             Never                

 

                    Uses seatbelts                           

 

                    Tobacco             Never smoker         







History of Procedures







                    Date Ordered        Description         Order Status

 

                    2011 12:00 AM    THER/PROPH/DIAG INJ SC/IM    Reviewed

 

                    2011 12:00 AM    Decadron Inj.1mg-([facility]) [facility] #[id number]    Reviewed

 

                    2011 12:00 AM    Depo-Medrol 80 Mg Im/[facility] [place] 0009-905411    Reviewed

 

                    2011 12:00 AM    Rocephin, Per 250MG - 1 Gram Vial NDC 4660-957347-[facility] [place]    Reviewed



 

                    3/16/2012 12:00 AM    ROUTINE VENIPUNCTURE    Reviewed

 

                    3/16/2012 12:00 AM    COMPLETE CBC W/AUTO DIFF WBC    Reviewed

 

                    3/16/2012 12:00 AM    COMPREHEN METABOLIC PANEL    Reviewed

 

                    3/16/2012 12:00 AM    ASSAY THYROID STIM HORMONE    Reviewed

 

                    11/10/2016 12:00 AM    Decadron, Per 1 Mg [facility]# [address]-29    Reviewed

 

                    11/10/2016 12:00 AM    Depo-Medrol, Per 80 Mg N[facility]#0071-4076-33    Reviewed

 

                    11/10/2016 12:00 AM    Rocephin 1 gram N[facility]#4692-7989-90    Reviewed

 

                    2016 12:00 AM    THER/PROPH/DIAG INJ SC/IM    Reviewed

 

                    2016 12:00 AM    Decadron 8mg Injection, [facility]    Reviewed

 

                    2016 12:00 AM    Depo-Medrol 80mg Injection, [facility]    Reviewed

 

                    2016 12:00 AM    Rocephin 1 gram Injection, [facility]    Reviewed

 

                    2017 12:00 AM    COMPLETE CBC W/AUTO DIFF WBC    Reviewed

 

                    2017 12:00 AM    COMPREHEN METABOLIC PANEL    Reviewed

 

                    2017 12:00 AM    LIPID PANEL         Reviewed

 

                    2017 12:00 AM    THERAPEUTIC PROPHYLACTIC/DX INJECTION SUBQ/IM    Reviewed

 

                    2017 12:00 AM    Decadron 8mg Injection, [facility]    Reviewed

 

                    2017 12:00 AM    Depo-Medrol 80mg Injection, [facility]    Reviewed

 

                    2017 12:00 AM    LIPID PANEL         Returned

 

                    2017 12:00 AM    THERAPEUTIC PROPHYLACTIC/DX INJECTION SUBQ/IM    Reviewed

 

                    2017 12:00 AM    Decadron 8mg Injection, [facility]    Reviewed

 

                    2017 12:00 AM    Depo-Medrol 80mg Injection, [facility]    Reviewed

 

                    10/3/2012 12:00 AM    THER/PROPH/DIAG INJ SC/IM    Reviewed

 

                    10/3/2012 12:00 AM    Decadron, Per 1 Mg NDC# [address]-12    Reviewed

 

                    10/3/2012 12:00 AM    Depo-Medrol, Per 80 Mg NDC#2360-8422-39    Reviewed

 

                    10/3/2012 12:00 AM    Toradol 60 Mg NDC#8954-3492-44    Reviewed

 

                    10/24/2017 12:00 AM    THERAPEUTIC PROPHYLACTIC/DX INJECTION SUBQ/IM    Reviewed

 

                    10/24/2017 12:00 AM    Decadron 8mg Injection, [facility]    Reviewed

 

                    10/24/2017 12:00 AM    Depo-Medrol 80mg Injection, [facility]    Reviewed

 

                    2018 12:00 AM    THERAPEUTIC PROPHYLACTIC/DX INJECTION SUBQ/IM    Reviewed

 

                    2018 12:00 AM    Rocephin 1 gram Injection, [facility]    Reviewed

 

                    2018 12:00 AM    THERAPEUTIC PROPHYLACTIC/DX INJECTION SUBQ/IM    Reviewed

 

                    2018 12:00 AM    Decadron 8mg Injection, [facility]    Reviewed

 

                    2018 12:00 AM    Depo-Medrol 80mg Injection, [facility]    Reviewed

 

                    2018 12:00 AM    Rocephin 1 gram Injection, [facility]    Reviewed

 

                    2018 12:00 AM    COMPLETE CBC W/AUTO DIFF WBC    Returned

 

                    2018 12:00 AM    COMPREHEN METABOLIC PANEL    Returned

 

                    2018 12:00 AM    ELECTROCARDIOGRAM TRACING    Reviewed

 

                    2013 12:00 AM    THER/PROPH/DIAG INJ SC/IM    Reviewed

 

                    2013 12:00 AM    Decadron, Per 1 Mg NDC# [address]-24    Reviewed

 

                    2013 12:00 AM    Depo-Medrol, Per 80 Mg NDC#6778-4919-61    Reviewed

 

                    2013 12:00 AM    Toradol 60 Mg NDC#7307-2135-02    Reviewed

 

                    2019 12:00 AM    THERAPEUTIC PROPHYLACTIC/DX INJECTION SUBQ/IM    Reviewed

 

                    2019 12:00 AM    Decadron 8mg Injection, [facility]    Reviewed

 

                    2019 12:00 AM    Depo-Medrol 80mg Injection, [facility]    Reviewed

 

                    2019 12:00 AM    THERAPEUTIC PROPHYLACTIC/DX INJECTION SUBQ/IM    Reviewed

 

                    2019 12:00 AM    Decadron 8mg Injection, [facility]    Reviewed

 

                    2019 12:00 AM    Rocephin 1 gram Injection, [facility]    Reviewed

 

                    2019 12:00 AM    LIPID PANEL         Returned

 

                    2019 12:00 AM    THERAPEUTIC PROPHYLACTIC/DX INJECTION SUBQ/IM    Reviewed

 

                    2019 12:00 AM    Decadron 8mg Injection, [facility]    Reviewed

 

                    2010 12:00 AM    ROUTINE VENIPUNCTURE    Reviewed

 

                    2010 12:00 AM    COMPLETE CBC W/AUTO DIFF WBC    Reviewed

 

                    2010 12:00 AM    COMPREHEN METABOLIC PANEL    Reviewed

 

                    2010 12:00 AM    LIPID PANEL         Reviewed

 

                    10/28/2014 12:00 AM    CHEST X-RAY 4/> VIEWS    Reviewed

 

                    6/3/2011 12:00 AM    ROUTINE VENIPUNCTURE    Reviewed

 

                    6/3/2011 12:00 AM    COMPLETE CBC AUTOMATED    Reviewed

 

                    6/3/2011 12:00 AM    COMPREHEN METABOLIC PANEL    Reviewed

 

                    6/3/2011 12:00 AM    LIPID PANEL         Reviewed

 

                    2011 12:00 AM    THER/PROPH/DIAG INJ SC/IM    Reviewed

 

                    2011 12:00 AM    Decadron Inj.8mg-([place]) Ndc #3402068709    Reviewed

 

                    2011 12:00 AM    Depo-Medrol 80 Mg Im/[place] NDC 0009-085300    Reviewed







Results Summary







                          Date and Description      Results

 

                          6/3/2011 1:53 PM          TRIGLYCERIDES 155.0 mg/dLCHOLESTEROL 248.0 mg/dLHDL 51.0 mg/dLTOT

 CHOL/HDL 4.9 .0 mg/dLGLUCOSE 97.0 mg/dLSODIUM 139.0 mmol/LPOTASSIUM 3.70
 mmol/LCHLORIDE 101.0 mmol/LCO2 27.0 mmol/LBUN 19.0 mg/dLCREATININE 0.90 
mg/dLSGOT/AST 19.0 IU/LSGPT/ALT 11.0 IU/LALK PHOS 111.0 IU/LTOTAL PROTEIN 7.40 
g/dLALBUMIN 4.20 g/dLTOTAL BILI 0.50 mg/dLCALCIUM 9.50 mg/dLAGE 70 GFR NonAA 62 
GFR AA 75 eGFR >60 mL/min/1.73 m2eGFR AA* >60 







History Of Immunizations

Not available.



History of Past Illness







                    Name                Date of Onset       Comments

 

                    Chronic Obstructive Pulmonary Disease                         

 

                    Hyperlipidemia                           

 

                    Cough               2010  8:49AM     

 

                    General Medical Exam, Adult    2010  8:49AM     

 

                    Seasonal Allergies    2010  8:49AM     

 

                    Sinusitis, Chronic    2010  8:49AM     

 

                    Ganglion cyst of synovium, tendon, and bursa; other    2010 11:48AM     

 

                    Anxiety disorder    10/29/2013           

 

                    Pain in joint; Hip    06/10/2014           

 

                    Pulmonary nodule seen on imaging study    10/29/2014           

 

                    Exercise hypoxemia    10/29/2014           

 

                    Chronic Obstructive Pulmonary Disease    2010  3:02PM     

 

                    Edema               2010  3:02PM     

 

                    Sinusitis, Acute    2010  3:02PM     

 

                    Anxiety about health    2016           

 

                    Chronic Obstructive Pulmonary Disease    Trey  3 2011  8:38AM     

 

                    Palpitations        Trey  3 2011  8:38AM     

 

                    Cough               2011  9:54AM     

 

                    Sinusitis, Acute    2011  9:54AM     

 

                    Seasonal Allergies    2011  9:54AM     

 

                    Post-nasal drainage    2011  9:54AM     

 

                    Primary osteoarthritis involving multiple joints    2017           

 

                    Medication management    2017           

 

                    Cellulitis of left lower extremity    2017           

 

                    Dog bite of calf, left, sequela    2017           

 

                    Cough               Sep 22 2011  2:55PM     

 

                    Seasonal Allergies    Sep 22 2011  2:55PM     

 

                    Pharyngitis, Acute    Sep 22 2011  2:55PM     

 

                    Post-nasal drainage    Sep 22 2011  2:55PM     

 

                    Peripheral edema    2018           

 

                    Blood In Stool, Occult    2011  9:58AM     

 

                    Hemorrhoids         2011  9:58AM     

 

                    Chronic Obstructive Pulmonary Disease    Fe2012  2:33PM     

 

                    Cardiac Dysrhythmia    2012  2:33PM     

 

                    Sinoatrial Node Dysfunction    Mar 16 2012  9:12AM     

 

                    Palpitations        Mar 16 2012  9:12AM     

 

                    Osteoarthrosis, generalized, multiple sites    Oct  3 2012  9:34AM     

 

                    Pain in joint; Hip    Oct  3 2012  9:34AM     

 

                    Osteoarthrosis      Oct 25 2012  9:09AM     

 

                    Bronchitis, Acute    Oct 25 2012  9:09AM     

 

                    Cough               Oct 25 2012  9:09AM     

 

                    Pain in joint; Left Hip    Oct 25 2012  9:09AM     

 

                    Pain in joint; Hip    2013  9:50AM     

 

                    Osteoarthrosis, generalized, multiple sites    2013  2:45PM     

 

                    Pain in joint; Hip bilateral    2013  2:45PM     

 

                    Anxiety Disorder    Oct 28 2013 10:32AM     

 

                    Chronic Obstructive Pulmonary Disease    Oct 28 2013 10:32AM     

 

                    Hyperlipidemia      Oct 28 2013 10:32AM     

 

                    Pain in joint; Left Hip    Oct 28 2013 10:32AM     

 

                    Sinusitis, Acute    Oct 28 2013 10:32AM     

 

                    Essential Hypertension    2014  3:19PM     

 

                    Osteoarthrosis, generalized, multiple sites    2014  3:19PM     

 

                    Chronic Obstructive Pulmonary Disease    2014  3:19PM     

 

                    Pain in joint; Hip    2014  3:19PM     

 

                    Chronic Obstructive Pulmonary Disease    2014  2:31PM     

 

                    Pain in joint; Hip    2014  2:31PM     

 

                    pre-operative examination, unspecified    2014  2:31PM     

 

                    Lung nodule seen on imaging study    Oct 28 2014 10:24AM     

 

                    Bronchitis, Acute    Oct 16 2014  9:45AM     

 

                    Respiratory System And Chest Symptoms    Oct 16 2014  9:45AM     

 

                    COPD (chronic obstructive pulmonary disease)    Oct 16 2014  9:45AM     

 

                    Chronic Obstructive Pulmonary Disease    Oct 28 2014  2:26PM     

 

                    Pulmonary nodule seen on imaging study    Oct 28 2014  2:26PM     

 

                    Shortness of breath    Oct 28 2014  2:26PM     

 

                    Exercise hypoxemia    Oct 28 2014  2:26PM     

 

                    Nail avulsion       Oct  6 2015  9:38AM     

 

                          Contusion of left index finger with damage to nail, initial encounter    Oct 26 2015

  2:53PM                                 

 

                    Forehead contusion, subsequent encounter    Dec 15 2015  2:39PM     

 

                    Generalized anxiety disorder    Dec 15 2015  2:39PM     

 

                    Chronic Obstructive Pulmonary Disease    Dec 15 2015  2:39PM     

 

                    Osteoarthrosis, generalized, multiple sites    Mar 10 2016  2:12PM     

 

                    Pain of right thigh    Mar 10 2016  2:12PM     

 

                    Mild Acute Hip pain, acute, right Improving    Mar 10 2016  2:12PM     

 

                    Anxiety about health    Mar 10 2016  2:12PM     

 

                    Hyperlipidemia      Aug 22 2016 10:58AM     

 

                    Sinoatrial Node Dysfunction    Aug 22 2016 10:58AM     

 

                    Palpitations        Aug 22 2016 10:58AM     

 

                    Chronic Obstructive Pulmonary Disease    Aug 22 2016 10:58AM     

 

                    Exercise hypoxemia    Aug 22 2016 10:58AM     

 

                    Pain in joint; Hip    Aug 22 2016 10:58AM     

 

                    Pulmonary nodule seen on imaging study    Aug 22 2016 10:58AM     

 

                    Eustachian tube dysfunction, bilateral    Nov 10 2016  3:57PM     

 

                    Moderate Acute Cough    Nov 10 2016  3:57PM     

 

                    Pharyngitis, Acute    Nov 10 2016  3:57PM     

 

                    Upper Respiratory Infection    Nov 10 2016  3:57PM     

 

                          COPD (chronic obstructive pulmonary disease) with acute bronchitis    Nov 10 2016 

 3:57PM                                  

 

                    Mild Chronic Cough Worsening    Dec 12 2016 12:12PM     

 

                          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

 12 2016 12:12PM                         

 

                    Moderate Shortness of breath on exertion    Dec 12 2016 12:12PM     

 

                    Hyperlipidemia      May  4 2017 10:27AM     

 

                    Sinoatrial Node Dysfunction    May  4 2017 10:27AM     

 

                    Palpitations        May  4 2017 10:27AM     

 

                    Chronic Obstructive Pulmonary Disease    May  4 2017 10:27AM     

 

                    Exercise hypoxemia    May  4 2017 10:27AM     

 

                    Pain in joint; Hip    May  4 2017 10:27AM     

 

                    Pulmonary nodule seen on imaging study    May  4 2017 10:27AM     

 

                    Chest congestion    May 16 2017  4:04PM     

 

                          COPD (chronic obstructive pulmonary disease) with acute bronchitis    May 16 2017 

 4:04PM                                  

 

                    Productive cough    May 16 2017  4:04PM     

 

                    Anxiety about health    May 16 2017  4:04PM     

 

                          Chronic obstructive pulmonary disease, unspecified COPD type    May 16 2017  4:04PM

                                         

 

                    Exercise hypoxemia    May 16 2017  4:04PM     

 

                    Mixed hyperlipidemia    May 16 2017  4:04PM     

 

                    Medication management    May 16 2017  4:04PM     

 

                    Primary osteoarthritis involving multiple joints    May 16 2017  4:04PM     

 

                    Cellulitis of left lower extremity    2017 12:10PM     

 

                    Bitten by dog, initial encounter    2017 12:10PM     

 

                    Cellulitis of left lower extremity    2017  8:56AM     

 

                    Open bite, left lower leg, sequela    2017  8:56AM     

 

                    Bitten by dog, sequela    2017  8:56AM     

 

                    Medication management    2017  8:56AM     

 

                    Cellulitis of left lower extremity    2017 11:38AM     

 

                    Open bite, left lower leg, subsequent encounter    2017 11:38AM     

 

                    Bitten by dog, subsequent encounter    2017 11:38AM     

 

                    Medication management    2017 11:38AM     

 

                    Cough               Aug 10 2017  4:09PM     

 

                    Abnormal chest xray    Aug 10 2017  4:09PM     

 

                    Hyperlipidemia      Aug 29 2017  9:02AM     

 

                    Sinoatrial Node Dysfunction    Aug 29 2017  9:02AM     

 

                    Palpitations        Aug 29 2017  9:02AM     

 

                    Chronic Obstructive Pulmonary Disease    Aug 29 2017  9:02AM     

 

                    Exercise hypoxemia    Aug 29 2017  9:02AM     

 

                    Pain in joint; Hip    Aug 29 2017  9:02AM     

 

                    Pulmonary nodule seen on imaging study    Aug 29 2017  9:02AM     

 

                    Eustachian tube dysfunction, bilateral    Aug 28 2017  3:30PM     

 

                    Purulent postnasal drainage    Aug 28 2017  3:30PM     

 

                    Chest congestion    Aug 28 2017  3:30PM     

 

                    Upper respiratory tract infection, unspecified type    Aug 28 2017  3:30PM     

 

                    Moderate Acute Sinus pressure    Aug 28 2017  3:30PM     

 

                          COPD (chronic obstructive pulmonary disease) with acute bronchitis    Aug 28 2017 

 3:30PM                                  

 

                    Moderate Chronic Purulent postnasal drainage    Oct 24 2017  1:52PM     

 

                    Mild Chronic Sinus pressure    Oct 24 2017  1:52PM     

 

                          Moderate Chronic Acute seasonal allergic rhinitis, unspecified trigger Stable    Oct

 24 2017  1:52PM                         

 

                    Mild Acute Labyrinthitis    Oct 24 2017  1:52PM     

 

                    Moderate Acute Vertigo    Oct 24 2017  1:52PM     

 

                    Purulent postnasal drainage    2018  3:58PM     

 

                    Upper respiratory tract infection, unspecified type    2018  3:58PM     

 

                    Mild Acute Left Enlarged lymph node in neck    2018  3:58PM     

 

                    Cough               2018  1:36PM     

 

                    Moderate Acute Recurrent Chest congestion    2018  1:36PM     

 

                    COPD exacerbation    2018  1:36PM     

 

                          Chronic obstructive pulmonary disease with acute lower respiratory infection    2018  1:36PM                         

 

                    Acute bronchitis, unspecified    2018  1:36PM     

 

                    Cough               2018  3:29PM     

 

                    Acute pharyngitis, unspecified etiology    2018  3:29PM     

 

                    Chest congestion    2018  3:29PM     

 

                    COPD (chronic obstructive pulmonary disease)    2018  3:29PM     

 

                    Cellulitis of left lower extremity    May 22 2018  2:35PM     

 

                    Cellulitis of left lower extremity    2018 10:48AM     

 

                    Peripheral edema    2018 10:48AM     

 

                    Hyperlipidemia      Aug 22 2018 10:03AM     

 

                    Sinoatrial Node Dysfunction    Aug 22 2018 10:03AM     

 

                    Palpitations        Aug 22 2018 10:03AM     

 

                    Chronic Obstructive Pulmonary Disease    Aug 22 2018 10:03AM     

 

                    Exercise hypoxemia    Aug 22 2018 10:03AM     

 

                    Pain in joint; Hip    Aug 22 2018 10:03AM     

 

                    Pulmonary nodule seen on imaging study    Aug 22 2018 10:03AM     

 

                    Exercise Counseling    Aug 21 2018 10:08AM     

 

                    Moderate Acute Bilateral Peripheral edema    Aug 21 2018 10:08AM     

 

                          Chronic obstructive pulmonary disease, unspecified COPD type    Aug 21 2018 10:08AM

                                         

 

                    Medication management    Aug 21 2018 10:08AM     

 

                    Acute nasopharyngitis (common cold)    Oct  4 2018  9:03AM     

 

                    Purulent postnasal drainage    Oct  4 2018  9:03AM     

 

                    Ear pain, right     Oct  4 2018  9:03AM     

 

                    Essential hypertension    2018  9:31AM     

 

                    Ear pain, bilateral    2018  9:31AM     

 

                    Purulent postnasal drainage    2019  3:35PM     

 

                    Chest congestion    2019  3:35PM     

 

                    Upper respiratory tract infection, unspecified type    2019  3:35PM     

 

                    Sinus pressure      2019  3:35PM     

 

                          Chronic obstructive pulmonary disease with acute lower respiratory infection    2019  3:35PM                         

 

                    Acute bronchitis, unspecified    2019  3:35PM     

 

                    Cough               2019  4:44PM     

 

                    Sinus pressure      2019  4:44PM     

 

                    Chest congestion    2019  3:15PM     

 

                    Upper respiratory tract infection, unspecified type    2019  3:15PM     

 

                    COPD exacerbation    2019  3:15PM     

 

                    Essential hypertension    2019 11:26AM     

 

                    COPD exacerbation    2019 11:26AM     

 

                    Medication management    2019 11:26AM     

 

                    Hyperlipidemia      2019  8:55AM     

 

                    Sinoatrial Node Dysfunction    2019  8:55AM     

 

                    Palpitations        2019  8:55AM     

 

                    Chronic Obstructive Pulmonary Disease    2019  8:55AM     

 

                    Exercise hypoxemia    2019  8:55AM     

 

                    Pain in joint; Hip    2019  8:55AM     

 

                    Pulmonary nodule seen on imaging study    2019  8:55AM     

 

                    Dizziness on standing    2019 11:26AM     

 

                    Dysfunction of both eustachian tubes    2019 11:24AM     

 

                    Nasal congestion with rhinorrhea    2019 11:24AM     

 

                    Purulent postnasal drainage    2019 11:24AM     







Payers







           Insurance Name    Company Name    Plan Name    Plan Number    Policy Number    Policy Group

 Number                                 Start Date

 

                    Medicare [facility]    Medicare [facility]              [id number]              N/A

 

                          [place] [employer] [facility]    [place] [employer]                 [id number]

                                                    N/A

 

                    [employer] Medicare A    Test Medicare A              [id number]              N/A

 

                                [facility]      

                    [id number]                             N/A

 

                    Medicare Part A    Medicare - Lab/Xray              [id number]              N/A

 

                          [facility]    [place] [employer] [facility]                 [id number]

                                                    N/A

 

                    Medicare Part A    Medicare Part A              [id number]              N/A

 

                    Medicare Part B    [employer] [place]              [id number]              N/A







History of Encounters







                    Visit Date          Visit Type          Provider

 

                    2019           Laboratory          DEON LEON PA

 

                    2019           Office visit        DEON LEON PA

 

                    2019            Office visit        DEON LEON PA

 

                    2019            Office visit        DEON LEON PA

 

                    2019            Office visit        DEON LEON PA

 

                    2018           Office visit        DEON LEON PA

 

                    10/2/2018           Office visit        DEON LEON PA

 

                    2018           Office visit        DEON LEON PA

 

                    2018           Office visit        DEON LEON PA

 

                    2018           Office visit        DEON LEON PA

 

                    2018           Office visit        DEON LEON PA

 

                    2018            Office visit        DEON LEON PA

 

                    2018           Office visit        DEON LEON PA

 

                    10/24/2017          Office visit        DEON LEON PA

 

                    2017           Voided              DEON LEON PA

 

                    2017           Office visit        DEON LEON PA

 

                    2017           Office visit        DEON LEON PA

 

                    2017            Office visit        DEON LEON PA

 

                    2017            Office visit        DEON LEON PA

 

                    2017           Office visit        DEON LEON PA

 

                    2016          Office visit        DEON LEON PA

 

                    11/10/2016          Office visit        DEON LEON PA

 

                    3/10/2016           Office visit        DEON LEON PA

 

                    12/15/2015          Office visit        DEON LEON PA

 

                    10/26/2015          Office visit        DEON LEON PA

 

                    10/6/2015           Office visit        DEON LEON PA

 

                    10/28/2014          Office visit        DEON LEON PA

 

                    10/16/2014          Office visit        DEON LEON PA

 

                    2014            Office visit        DEON LEON PA

 

                    2014           Nabeel Garcia MD

 

                    2014           Office visit        DEON LEON PA

 

                    10/28/2013          Office visit        DEON LEON PA

 

                    10/14/2013          [facility]            DEWEY Garcia MD

 

                    2013           Office visit        DEON LEON PA

 

                    2013            Office visit        DEON LEON PA

 

                    10/25/2012          Office visit        DEON LEON PA

 

                    10/3/2012           Office visit        DEON LEON PA

 

                    3/16/2012           Office visit        DEON LEON PA

 

                    2012            Office visit        DEON LEON PA

 

                    2012            [facility]            DEWEY Garcia MD

 

                    2011          Office visit        DEON LEON PA

 

                    2011           Office visit        Deon Leon PA-C

 

                    2011            Office visit        Deon Leon PA-C

 

                    6/3/2011            Office visit        Deon Leon PA-C

 

                    2010           Office visit        Deon Leon PA-C

 

                    2010           Office visit        Deon Leon PA-C

 

                    2010           Office visit        Deon Leon PA-C

## 2019-06-25 NOTE — XMS REPORT
MU2 Ambulatory Summary

                             Created on: 2017



Elsi Casillas

External Reference #: [id number]

: 1940

Sex: Female



Demographics







                          Address                   [address] [place]  [address]

 

                          Home Phone                [phone]

 

                          Preferred Language        English

 

                          Marital Status            

 

                          Pentecostal Affiliation     Unknown

 

                          Race                      White

 

                          Ethnic Group              Not  or 





Author







                          DEON Ferguson

 

                          [facility]

 

                          Address                   [address]

[place], [place]  [id number]



 

                          Phone                     [phone]







Care Team Providers







                    Care Team Member Name    Role                Phone

 

                    DEON LEON    PCP                 Unavailable

 

                    DEON LEON    PreferredProvider    Unavailable







Allergies and Adverse Reactions







                    Name                Reaction            Notes

 

                    NO KNOWN DRUG ALLERGIES                         







Plan of Treatment







             Planned Activity    Comments     Planned Date    Planned Time    Plan/Goal

 

             Lipid Profile                 2016    12:00 AM      

 

             Chest PA and Lateral - Main                 8/10/2017    12:00 AM      







Medications







                                        Active 

 

             Name         Start Date    Estimated Completion Date    SIG          Comments

 

                Calcium 600 + D(3) 600 mg(1,500mg) -200 unit oral tablet                                    take 2 tablets by

 oral route daily                        

 

                pravastatin 20 mg oral tablet                                    take 1 tablet (20 mg) by oral route once daily

                                         

 

                Toprol XL 25 mg oral tablet extended release 24 hr                                    take 1 tablet (25 mg) 

by oral route once daily                 

 

                hydrochlorothiazide 25 mg oral tablet    1/15/2015                       take 1 tablet (25 mg) by oral

 route once daily for 30 days            

 

                metoprolol succinate 25 mg oral tablet extended release 24 hr    2015                      take

 1 tablet (25 mg) by oral route once daily     

 

                promethazine-codeine 6.25-10 mg/5 mL oral syrup    11/10/2016                      take 5 milliliters

 by oral route every 6 hours as needed, not to exceed 30 mL in 24 hours     

 

                                        ipratropium-albuterol 0.5 mg-3 mg(2.5 mg base)/3 mL inhalation solution for nebulization

                    2016                              inhale 3 milliliters by nebulization route 4 times per day for COPD

                                         

 

                Symbicort 160-4.5 mcg/actuation inhalation HFA aerosol inhaler    11/10/2016                      inhale

 2 puffs by inhalation route 2 times per day in the morning and evening     

 

                prednisone 20 mg oral tablet    2017                       4 x 2 days, 3 x 2 days, 2 x 2 days 1

 x 2 days                                

 

             hydrochlorothiazide 25 mg oral tablet    2017                 TAKE 1 TABLET DAILY     

 

                metoprolol succinate 25 mg oral tablet extended release 24 hr    2017                       TAKE

 1 TABLET DAILY                          

 

                    albuterol sulfate 1.25 mg/3 mL inhalation solution for nebulization    2017           

                                        inhale 3 milliliters (1.25 mg) via nebulizer by inhalation route 4 times per day

  DX J44.9                               

 

                Sudogest 30 mg oral tablet    2017                       take 1 tablets (60 mg) by oral route every

 6 hours as needed                       

 

                Levaquin 500 mg oral tablet    2017        take 1 tablet (500 mg) by oral

 route once daily for 10 days            









                                         

 

             Name         Start Date    Expiration Date    SIG          Comments

 

                Singulair 10 mg oral tablet    9/3/2010        2011        take 1 tablet (10 mg) by oral route

 daily  for 60 days                      

 

                Zithromax Z-Christopher 250 mg oral tablet    2011       take 2 tablets (500 mg)

 by oral route once daily for 1 day then 1 tablet (250 mg) by oral route once 
daily for 4 days                         

 

                Medrol (Christopher) 4 mg oral tablets,dose pack    2011        take as directed

 for 6 days                              

 

                Keflex 500 mg oral capsule    3/1/2012        3/11/2012       take 1 capsule by oral route 3 

times a day for 10 days                  

 

                Cipro 500 mg oral tablet    2012        take 1 tablet (500 mg) by oral route

 every 12 hours for 15 days              

 

                benzonatate 100 mg oral capsule    2013       take 1 capsule (100 mg) by

 oral route 3 times per day for cough     

 

             Medrol (Christopher) 4 mg oral tablets,dose pack    2013     take as directed    

 

 

                Zyrtec 10 mg oral tablet    2013       take 1 tablet (10 mg) by oral route

 once daily for 30 days                  

 

                Flonase 50 mcg/actuation nasal spray,suspension    2013       inhale 1 spray

 in each nostril by intranasal route 2 times per day for 30 days     

 

                cephalexin 500 mg oral capsule    2013        take 1 capsule (500 mg) by oral

 route every 8 hours                     

 

                promethazine-codeine 6.25-10 mg/5 mL oral syrup    2013                       take 5 milliliters

 by oral route every 4 hours as needed, not to exceed 30 mL in 24 hours     

 

                Zithromax Z-Christopher 250 mg oral tablet    10/2/2013       10/7/2013       take 2 tablets (500 mg)

 by oral route once daily for 1 day then 1 tablet (250 mg) by oral route once 
daily for 4 days                         

 

                amoxicillin 500 mg oral capsule    2014       take 1 capsule by oral route

 3 times a day for 15 days               

 

                lovastatin 20 mg oral tablet    2014        take 1 tablet (20 mg) by oral 

route daily  for 30 days                 

 

                Zithromax Z-Christopher 250 mg oral tablet    2014       take 2 tablets (500 mg)

 by oral route once daily for 1 day then 1 tablet (250 mg) by oral route once 
daily for 4 days                         

 

             Medrol (Christopher) 4 mg oral tablets,dose pack    2014                 take as directed     

 

                amoxicillin 500 mg oral capsule    2014                       take 1 capsule (500 mg) by oral route

 3 times per day                         

 

                Levaquin 500 mg oral tablet    10/16/2014      10/26/2014      take 1 tablet (500 mg) by oral

 route once daily for 10 days            

 

                prednisone 20 mg oral tablet    10/16/2014      10/24/2014      4x2 days 3x2 days 2x2 days

 1x2 days                                

 

                Zithromax Z-Christopher 250 mg oral tablet    2014       take 2 tablets (500 mg)

 by oral route once daily for 1 day then 1 tablet (250 mg) by oral route once 
daily for 4 days                         

 

                Bactrim -160 mg oral tablet    1/15/2015       2015       take 1 tablet by oral route

 every 12 hours for 10 days              

 

                Zithromax Z-Christopher 250 mg oral tablet    2015      take 2 tablets (500 mg)

 by oral route once daily for 1 day then 1 tablet (250 mg) by oral route once 
daily for 4 days                         

 

                Keflex 500 mg oral capsule    2016       take 1 capsule by oral route 3

 times a day for 7 days                  

 

                amoxicillin 500 mg oral capsule    10/4/2016       10/14/2016      take 1 capsule by oral route

 3 times a day for 10 days               

 

                Tessalon Perles 100 mg oral capsule    10/4/2016                       take 1 capsule by oral route 

every 4 hours                            

 

                Zithromax Z-Christopher 250 mg oral tablet    11/10/2016      11/15/2016      take 2 tablets (500 

mg) by oral route once daily for 1 day then 1 tablet (250 mg) by oral route once
daily for 4 days                         

 

                amoxicillin 500 mg oral tablet    2016                       take 1 tablet (500 mg) by oral route

 3 times per day                         

 

                amoxicillin 500 mg oral capsule    2017       take 1 capsule (500 mg) by

 oral route 3 times per day for 10 days     

 

                Bactrim -160 mg oral tablet    2017       take 1 tablet by oral route

 2 times a day for 10 days               









                                        Discontinued 

 

             Name         Start Date    Discontinued Date    SIG          Comments

 

                amoxicillin 500 mg oral capsule    11/10/2011      10/3/2012       take 1 capsule (500 mg) 

by oral route 3 times per day            

 

                Anusol-HC 25 mg rectal suppository    2011      10/3/2012       insert 1 suppository 

(25 mg) by rectal route 2 times per day     

 

                Proctofoam 1 % topical foam    2011       apply by external route daily

                                         

 

             Aerochamber    2014    Use with inhaler as directed     

 

                hydrochlorothiazide 25 mg oral tablet    1/15/2015       2015      TAKE 1 TABLET DAILY

                                         

 

                Augmentin 875-125 mg oral tablet    2017       take 1 tablet by oral route

 every 12 hours for 7 days               

 

                Keflex 500 mg oral capsule    2017       take 1 capsule (500 mg) by oral

 route every 12 hours                    







Problem List







                    Description         Status              Onset

 

                    Chronic Obstructive Pulmonary Disease    Active               

 

                    Hyperlipidemia      Active               

 

                    Anxiety Disorder    Active              10/29/2013

 

                    Pain in joint; Hip    Active              06/10/2014

 

                    Pulmonary nodule seen on imaging study    Active              10/29/2014

 

                    Exercise hypoxemia    Active              10/29/2014

 

                    Anxiety about health    Active              2016

 

                    Primary osteoarthritis involving multiple joints    Active              2017

 

                    Medication management    Active              2017

 

                    Cellulitis of left lower extremity    Active              2017

 

                    Dog bite of calf, left, sequela    Active              2017







Vital Signs







      Date    Time    BP-Sys(mm[Hg]    BP-Noni(mm[Hg])    HR(bpm)    RR(rpm)    Temp    WT    HT    HC    BMI

                    BSA                 BMI Percentile      O2 Sat(%)

 

       2017    3:29:00 PM    128 mmHg    80 mmHg    68 bpm    16 rpm    98 F    144 lbs    63 in    

                25.51 kg/m2     1.70 m2                         93 %

 

        2017    11:37:00 AM    118 mmHg    72 mmHg    96 bpm    16 rpm    97.4 F    141 lbs    63 in

                          24.9768 kg/m    1.6861 m                 91 %

 

        2017    8:55:00 AM    105 mmHg    60 mmHg    96 bpm    18 rpm    95.8 F    142 lbs    63 in 

                          25.15 kg/m2    1.69 m2                   95 %

 

       2017    12:10:00 PM    122 mmHg    68 mmHg    76 bpm    18 rpm    98 F    143 lbs    63 in    

                25.3311 kg/m    1.698 m                       98 %

 

        2017    4:03:00 PM    118 mmHg    64 mmHg    106 bpm    18 rpm    97.4 F    137 lbs    63 in

                          24.27 kg/m2    1.66 m2                   98 %

 

        2016    12:11:00 PM    120 mmHg    84 mmHg    110 bpm    16 rpm    98.1 F    130 lbs    63

 in                       23.0282 kg/m    1.619 m                 90 %

 

        11/10/2016    3:57:00 PM    148 mmHg    72 mmHg    88 bpm    16 rpm    98.2 F    132 lbs    63 in

                          23.38 kg/m2    1.63 m2                   98 %

 

        3/10/2016    2:12:00 PM    122 mmHg    60 mmHg    106 bpm    18 rpm    97 F    137 lbs    63 in 

                          24.2682 kg/m    1.662 m                 93 %

 

        12/15/2015    2:33:00 PM    120 mmHg    75 mmHg    102 bpm    16 rpm    98.2 F    137 lbs    63 

in                        24.27 kg/m2    1.66 m2                   94 %

 

        10/26/2015    2:46:00 PM    130 mmHg    80 mmHg    96 bpm    16 rpm    97.9 F    141 lbs    66 in

                          22.7578 kg/m    1.7258 m                 98 %

 

        10/6/2015    9:37:00 AM    140 mmHg    78 mmHg    110 bpm    16 rpm    97.9 F    141 lbs    63 in

                          24.98 kg/m2    1.69 m2                   95 %

 

        10/28/2014    2:25:00 PM    132 mmHg    66 mmHg    92 bpm    24 rpm    97.5 F    147 lbs    63 in

                          26.0396 kg/m    1.7216 m                 92 %

 

        10/16/2014    9:45:00 AM    132 mmHg    64 mmHg    74 bpm    24 rpm    97.7 F    146 lbs    63 in

                          25.86 kg/m2    1.72 m2                   92 %

 

        2014    2:31:00 PM    136 mmHg    68 mmHg    90 bpm    22 rpm    98.2 F    148 lbs    63 in 

                          26.2168 kg/m    1.7274 m                 95 %

 

        2014    3:19:00 PM    124 mmHg    70 mmHg    64 bpm    20 rpm    97.8 F    147 lbs    63 in

                          26.04 kg/m2    1.72 m2                   95 %

 

        10/28/2013    10:31:00 AM    140 mmHg    70 mmHg    92 bpm    24 rpm    97.8 F    143 lbs    63 

in                        25.3311 kg/m    1.698 m                 95 %

 

      2013    2:51:00 PM    130 mmHg    78 mmHg    90 bpm          98.2 F    168 lbs    65 in          

27.96 kg/m2         1.87 m2                                  

 

       2013    2:43:00 PM    110 mmHg    76 mmHg    103 bpm           96.6 F    137 lbs    63 in      

                24.2682 kg/m    1.662 m                        

 

        2013    9:50:00 AM    150 mmHg    66 mmHg    108 bpm    20 rpm    97.8 F    138 lbs    63 in

                          24.45 kg/m2    1.67 m2                   94 %

 

        10/25/2012    9:08:00 AM    110 mmHg    60 mmHg    88 bpm    18 rpm    97.5 F    142 lbs    63 in

                          25.1539 kg/m    1.6921 m                 94 %

 

      10/3/2012    9:33:00 AM    130 mmHg    60 mmHg    98 bpm    18 rpm          146 lbs    63 in          

25.86 kg/m2         1.72 m2                                 95 %

 

      2012    2:31:00 PM    116 mmHg    76 mmHg    88 bpm                140 lbs    63 in          24.7996

 kg/m             1.6801 m                              96 %

 

     2011    10:02:00 AM    122 mmHg    80 mmHg    110 bpm              144 lbs                        

                                        94 %

 

      2011    2:58:00 PM    124 mmHg    84 mmHg    106 bpm                155 lbs    63 in          27.4567

 kg/m             1.7678 m                              96 %

 

     2011    9:55:00 AM    114 mmHg    78 mmHg    92 bpm              146 lbs                             95

 %

 

     6/3/2011    8:36:00 AM    116 mmHg    74 mmHg    90 bpm              146 lbs                             96

 %

 

     2010    3:01:00 PM    132 mmHg    84 mmHg    102 bpm              149 lbs                          

                                        94 %

 

     2010    11:46:00 AM    124 mmHg    78 mmHg    104 bpm              151 lbs                         

                                        94 %

 

     2010    8:47:00 AM    116 mmHg    78 mmHg    103 bpm              151 lbs                          

                                        95 %







Social History







                    Name                Description         Comments

 

                    Tobacco             Never smoker         

 

                    denies alcohol use                         







History of Procedures







                    Date Ordered        Description         Order Status

 

                    2011 12:00 AM    THER/PROPH/DIAG INJ SC/IM    Reviewed

 

                    2011 12:00 AM    Decadron Inj.1mg-([facility]) Ndc #[id number]    Reviewed

 

                    2011 12:00 AM    Depo-Medrol 80 Mg Im/[facility] NDC [id number]    Reviewed

 

                    2011 12:00 AM    Rocephin, Per 250MG - 1 Gram Vial NDC 0594-839153-[facility]    Reviewed



 

                    3/16/2012 12:00 AM    ROUTINE VENIPUNCTURE    Reviewed

 

                    3/16/2012 12:00 AM    COMPLETE CBC W/AUTO DIFF WBC    Reviewed

 

                    3/16/2012 12:00 AM    COMPREHEN METABOLIC PANEL    Reviewed

 

                    3/16/2012 12:00 AM    ASSAY THYROID STIM HORMONE    Reviewed

 

                    11/10/2016 12:00 AM    Decadron, Per 1 Mg NDC# [address]-00    Reviewed

 

                    11/10/2016 12:00 AM    Depo-Medrol, Per 80 Mg NDC#4704-6863-11    Reviewed

 

                    11/10/2016 12:00 AM    Rocephin 1 gram NDC#2596-2712-25    Reviewed

 

                    2016 12:00 AM    THER/PROPH/DIAG INJ SC/IM    Reviewed

 

                    2016 12:00 AM    Decadron 8mg Injection, [facility]    Reviewed

 

                    2016 12:00 AM    Depo-Medrol 80mg Injection, [facility]    Reviewed

 

                    2016 12:00 AM    Rocephin 1 gram Injection, [facility]    Reviewed

 

                    2017 12:00 AM    COMPLETE CBC W/AUTO DIFF WBC    Reviewed

 

                    2017 12:00 AM    COMPREHEN METABOLIC PANEL    Reviewed

 

                    2017 12:00 AM    LIPID PANEL         Reviewed

 

                    2017 12:00 AM    THERAPEUTIC PROPHYLACTIC/DX INJECTION SUBQ/IM    Reviewed

 

                    2017 12:00 AM    Decadron 8mg Injection, [facility]    Reviewed

 

                    2017 12:00 AM    Depo-Medrol 80mg Injection, [facility]    Reviewed

 

                    2017 12:00 AM    LIPID PANEL         Returned

 

                    2017 12:00 AM    THERAPEUTIC PROPHYLACTIC/DX INJECTION SUBQ/IM    Reviewed

 

                    2017 12:00 AM    Decadron 8mg Injection, [facility]    Reviewed

 

                    2017 12:00 AM    Depo-Medrol 80mg Injection, [facility]    Reviewed

 

                    10/3/2012 12:00 AM    THER/PROPH/DIAG INJ SC/IM    Reviewed

 

                    10/3/2012 12:00 AM    Decadron, Per 1 Mg NDC# [address]-47    Reviewed

 

                    10/3/2012 12:00 AM    Depo-Medrol, Per 80 Mg NDC#0339-6839-45    Reviewed

 

                    10/3/2012 12:00 AM    Toradol 60 Mg NDC#3761-7986-30    Reviewed

 

                    2013 12:00 AM    THER/PROPH/DIAG INJ SC/IM    Reviewed

 

                    2013 12:00 AM    Decadron, Per 1 Mg NDC# [address]-55    Reviewed

 

                    2013 12:00 AM    Depo-Medrol, Per 80 Mg NDC#[address]-86    Reviewed

 

                    2013 12:00 AM    Toradol 60 Mg NDC#1538-7522-94    Reviewed

 

                    2010 12:00 AM    ROUTINE VENIPUNCTURE    Reviewed

 

                    2010 12:00 AM    COMPLETE CBC W/AUTO DIFF WBC    Reviewed

 

                    2010 12:00 AM    COMPREHEN METABOLIC PANEL    Reviewed

 

                    2010 12:00 AM    LIPID PANEL         Reviewed

 

                    10/28/2014 12:00 AM    CHEST X-RAY 4/> VIEWS    Reviewed

 

                    6/3/2011 12:00 AM    ROUTINE VENIPUNCTURE    Reviewed

 

                    6/3/2011 12:00 AM    COMPLETE CBC AUTOMATED    Reviewed

 

                    6/3/2011 12:00 AM    COMPREHEN METABOLIC PANEL    Reviewed

 

                    6/3/2011 12:00 AM    LIPID PANEL         Reviewed

 

                    2011 12:00 AM    THER/PROPH/DIAG INJ SC/IM    Reviewed

 

                    2011 12:00 AM    Decadron Inj.8mg-([facility]) Ndc #[id number]    Reviewed

 

                    2011 12:00 AM    Depo-Medrol 80 Mg Im/[facility] NDC 0009-318237    Reviewed







Results Summary







                          Date and Description      Results

 

                          6/3/2011 1:53 PM          TRIGLYCERIDES 155.0 mg/dLCHOLESTEROL 248.0 mg/dLHDL 51.0 mg/dLTOT

 CHOL/HDL 4.9 .0 mg/dLGLUCOSE 97.0 mg/dLSODIUM 139.0 mmol/LPOTASSIUM 3.70
 mmol/LCHLORIDE 101.0 mmol/LCO2 27.0 mmol/LBUN 19.0 mg/dLCREATININE 0.90 
mg/dLSGOT/AST 19.0 IU/LSGPT/ALT 11.0 IU/LALK PHOS 111.0 IU/LTOTAL PROTEIN 7.40 
g/dLALBUMIN 4.20 g/dLTOTAL BILI 0.50 mg/dLCALCIUM 9.50 mg/dLAGE 70 GFR NonAA 62 
GFR AA 75 eGFR >60 mL/min/1.73 m2eGFR AA* >60 







History Of Immunizations

Not available.



History of Past Illness







                    Name                Date of Onset       Comments

 

                    Chronic Obstructive Pulmonary Disease                         

 

                    Hyperlipidemia                           

 

                    Cough               2010  8:49AM     

 

                    General Medical Exam, Adult    2010  8:49AM     

 

                    Seasonal Allergies    2010  8:49AM     

 

                    Sinusitis, Chronic    2010  8:49AM     

 

                    Ganglion cyst of synovium, tendon, and bursa; other    2010 11:48AM     

 

                    Anxiety Disorder    10/29/2013           

 

                    Pain in joint; Hip    06/10/2014           

 

                    Pulmonary nodule seen on imaging study    10/29/2014           

 

                    Exercise hypoxemia    10/29/2014           

 

                    Chronic Obstructive Pulmonary Disease    2010  3:02PM     

 

                    Edema               2010  3:02PM     

 

                    Sinusitis, Acute    2010  3:02PM     

 

                    Anxiety about health    2016           

 

                    Chronic Obstructive Pulmonary Disease    Trey  3 2011  8:38AM     

 

                    Palpitations        Trey  3 2011  8:38AM     

 

                    Cough               2011  9:54AM     

 

                    Sinusitis, Acute    2011  9:54AM     

 

                    Seasonal Allergies    2011  9:54AM     

 

                    Post-nasal drainage    2011  9:54AM     

 

                    Primary osteoarthritis involving multiple joints    2017           

 

                    Medication management    2017           

 

                    Cellulitis of left lower extremity    2017           

 

                    Dog bite of calf, left, sequela    2017           

 

                    Cough               Sep 22 2011  2:55PM     

 

                    Seasonal Allergies    Sep 22 2011  2:55PM     

 

                    Pharyngitis, Acute    Sep 22 2011  2:55PM     

 

                    Post-nasal drainage    Sep 22 2011  2:55PM     

 

                    Blood In Stool, Occult    2011  9:58AM     

 

                    Hemorrhoids         2011  9:58AM     

 

                    Chronic Obstructive Pulmonary Disease    2012  2:33PM     

 

                    Cardiac Dysrhythmia    2012  2:33PM     

 

                    Sinoatrial Node Dysfunction    Mar 16 2012  9:12AM     

 

                    Palpitations        Mar 16 2012  9:12AM     

 

                    Osteoarthrosis, generalized, multiple sites    Oct  3 2012  9:34AM     

 

                    Pain in joint; Hip    Oct  3 2012  9:34AM     

 

                    Osteoarthrosis      Oct 25 2012  9:09AM     

 

                    Bronchitis, Acute    Oct 25 2012  9:09AM     

 

                    Cough               Oct 25 2012  9:09AM     

 

                    Pain in joint; Left Hip    Oct 25 2012  9:09AM     

 

                    Pain in joint; Hip    2013  9:50AM     

 

                    Osteoarthrosis, generalized, multiple sites    2013  2:45PM     

 

                    Pain in joint; Hip bilateral    2013  2:45PM     

 

                    Anxiety Disorder    Oct 28 2013 10:32AM     

 

                    Chronic Obstructive Pulmonary Disease    Oct 28 2013 10:32AM     

 

                    Hyperlipidemia      Oct 28 2013 10:32AM     

 

                    Pain in joint; Left Hip    Oct 28 2013 10:32AM     

 

                    Sinusitis, Acute    Oct 28 2013 10:32AM     

 

                    Essential Hypertension    2014  3:19PM     

 

                    Osteoarthrosis, generalized, multiple sites    2014  3:19PM     

 

                    Chronic Obstructive Pulmonary Disease    2014  3:19PM     

 

                    Pain in joint; Hip    2014  3:19PM     

 

                    Chronic Obstructive Pulmonary Disease    2014  2:31PM     

 

                    Pain in joint; Hip    2014  2:31PM     

 

                    pre-operative examination, unspecified    2014  2:31PM     

 

                    Lung nodule seen on imaging study    Oct 28 2014 10:24AM     

 

                    Bronchitis, Acute    Oct 16 2014  9:45AM     

 

                    Respiratory System And Chest Symptoms    Oct 16 2014  9:45AM     

 

                    COPD (chronic obstructive pulmonary disease)    Oct 16 2014  9:45AM     

 

                    Chronic Obstructive Pulmonary Disease    Oct 28 2014  2:26PM     

 

                    Pulmonary nodule seen on imaging study    Oct 28 2014  2:26PM     

 

                    Shortness of breath    Oct 28 2014  2:26PM     

 

                    Exercise hypoxemia    Oct 28 2014  2:26PM     

 

                    Nail avulsion       Oct  6 2015  9:38AM     

 

                          Contusion of left index finger with damage to nail, initial encounter    Oct 26 2015

  2:53PM                                 

 

                    Forehead contusion, subsequent encounter    Dec 15 2015  2:39PM     

 

                    Generalized anxiety disorder    Dec 15 2015  2:39PM     

 

                    Chronic Obstructive Pulmonary Disease    Dec 15 2015  2:39PM     

 

                    Osteoarthrosis, generalized, multiple sites    Mar 10 2016  2:12PM     

 

                    Pain of right thigh    Mar 10 2016  2:12PM     

 

                    Mild Acute Hip pain, acute, right Improving    Mar 10 2016  2:12PM     

 

                    Anxiety about health    Mar 10 2016  2:12PM     

 

                    Hyperlipidemia      Aug 22 2016 10:58AM     

 

                    Sinoatrial Node Dysfunction    Aug 22 2016 10:58AM     

 

                    Palpitations        Aug 22 2016 10:58AM     

 

                    Chronic Obstructive Pulmonary Disease    Aug 22 2016 10:58AM     

 

                    Exercise hypoxemia    Aug 22 2016 10:58AM     

 

                    Pain in joint; Hip    Aug 22 2016 10:58AM     

 

                    Pulmonary nodule seen on imaging study    Aug 22 2016 10:58AM     

 

                    Eustachian tube dysfunction, bilateral    Nov 10 2016  3:57PM     

 

                    Moderate Acute Cough    Nov 10 2016  3:57PM     

 

                    Pharyngitis, Acute    Nov 10 2016  3:57PM     

 

                    Upper Respiratory Infection    Nov 10 2016  3:57PM     

 

                          COPD (chronic obstructive pulmonary disease) with acute bronchitis    Nov 10 2016 

 3:57PM                                  

 

                    Mild Chronic Cough Worsening    Dec 12 2016 12:12PM     

 

                          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

 12 2016 12:12PM                         

 

                    Moderate Shortness of breath on exertion    Dec 12 2016 12:12PM     

 

                    Hyperlipidemia      May  4 2017 10:27AM     

 

                    Sinoatrial Node Dysfunction    May  4 2017 10:27AM     

 

                    Palpitations        May  4 2017 10:27AM     

 

                    Chronic Obstructive Pulmonary Disease    May  4 2017 10:27AM     

 

                    Exercise hypoxemia    May  4 2017 10:27AM     

 

                    Pain in joint; Hip    May  4 2017 10:27AM     

 

                    Pulmonary nodule seen on imaging study    May  4 2017 10:27AM     

 

                    Chest congestion    May 16 2017  4:04PM     

 

                          COPD (chronic obstructive pulmonary disease) with acute bronchitis    May 16 2017 

 4:04PM                                  

 

                    Productive cough    May 16 2017  4:04PM     

 

                    Anxiety about health    May 16 2017  4:04PM     

 

                          Chronic obstructive pulmonary disease, unspecified COPD type    May 16 2017  4:04PM

                                         

 

                    Exercise hypoxemia    May 16 2017  4:04PM     

 

                    Mixed hyperlipidemia    May 16 2017  4:04PM     

 

                    Medication management    May 16 2017  4:04PM     

 

                    Primary osteoarthritis involving multiple joints    May 16 2017  4:04PM     

 

                    Cellulitis of left lower extremity    2017 12:10PM     

 

                    Bitten by dog, initial encounter    2017 12:10PM     

 

                    Cellulitis of left lower extremity    2017  8:56AM     

 

                    Open bite, left lower leg, sequela    2017  8:56AM     

 

                    Bitten by dog, sequela    2017  8:56AM     

 

                    Medication management    2017  8:56AM     

 

                    Cellulitis of left lower extremity    2017 11:38AM     

 

                    Open bite, left lower leg, subsequent encounter    2017 11:38AM     

 

                    Bitten by dog, subsequent encounter    2017 11:38AM     

 

                    Medication management    2017 11:38AM     

 

                    Cough               Aug 10 2017  4:09PM     

 

                    Abnormal chest xray    Aug 10 2017  4:09PM     

 

                    Hyperlipidemia      Aug 29 2017  9:02AM     

 

                    Sinoatrial Node Dysfunction    Aug 29 2017  9:02AM     

 

                    Palpitations        Aug 29 2017  9:02AM     

 

                    Chronic Obstructive Pulmonary Disease    Aug 29 2017  9:02AM     

 

                    Exercise hypoxemia    Aug 29 2017  9:02AM     

 

                    Pain in joint; Hip    Aug 29 2017  9:02AM     

 

                    Pulmonary nodule seen on imaging study    Aug 29 2017  9:02AM     

 

                    Eustachian tube dysfunction, bilateral    Aug 28 2017  3:30PM     

 

                    Purulent postnasal drainage    Aug 28 2017  3:30PM     

 

                    Chest congestion    Aug 28 2017  3:30PM     

 

                    Upper respiratory tract infection, unspecified type    Aug 28 2017  3:30PM     

 

                    Moderate Acute Sinus pressure    Aug 28 2017  3:30PM     

 

                          COPD (chronic obstructive pulmonary disease) with acute bronchitis    Aug 28 2017 

 3:30PM                                  







Payers







           Insurance Name    Company Name    Plan Name    Plan Number    Policy Number    Policy Group

 Number                                 Start Date

 

                    Medicare RHC    Medicare RHC              [id number]              N/A

 

                          [place] [employer]    [place] [employer]                 [id number]

                                                    N/A

 

                    [employer] Medicare A    Test Medicare A              [id number]              N/A

 

                                [facility] - [facility] - [facility] RHC Comm      

                    [id number]                             N/A

 

                    Medicare Part A    Medicare - Lab/Xray              [id number]              N/A

 

                          [employer]    [employer]                 [id number]

                                                    N/A

 

                    Medicare Part A    Medicare Part A              [id number]              N/A

 

                    Medicare Part B    [employer] [place]              [id number]              N/A







History of Encounters







                    Visit Date          Visit Type          Provider

 

                    2017           Laboratory          DEON ESCALANTE

 

                    2017           Office visit        DEON ESCALANTE

 

                    2017           Office visit        DEON ESCALANTE

 

                    2017            Office visit        DEON ESCALANTE

 

                    2017            Office visit        DEON ESCALANTE

 

                    2017           Office visit        DEON ESCALANTE

 

                    2016          Office visit        DEON ESCALANTE

 

                    11/10/2016          Office visit        DEON ESCALANTE

 

                    3/10/2016           Office visit        DEON ESCALANTE

 

                    12/15/2015          Office visit        DEON ESCALANTE

 

                    10/26/2015          Office visit        DEON ESCALANTE

 

                    10/6/2015           Office visit        DEON ESCALANET

 

                    10/28/2014          Office visit        DEON ESCALANTE

 

                    10/16/2014          Office visit        DEON ESCALANTE

 

                    2014            Office visit        DEON LEON PA

 

                    2014           [facility]            DEWEY Garcia MD

 

                    2014           Office visit        DEON LEON PA

 

                    10/28/2013          Office visit        DEON LEON PA

 

                    10/14/2013          [facility]            DEWEY Garcia MD

 

                    2013           Office visit        DEON LEON PA

 

                    2013            Office visit        DEON LEON PA

 

                    10/25/2012          Office visit        DEON ESCALANTE

 

                    10/3/2012           Office visit        DEON LEON PA

 

                    3/16/2012           Office visit        DEON LEON PA

 

                    2012            Office visit        DEON LEON PA

 

                    2012            [facility]            DEWEY Garcia MD

 

                    2011          Office visit        DEON LEON PA

 

                    2011           Office visit        Deon Leon PA-C

 

                    2011            Office visit        Deon Leon PA-C

 

                    6/3/2011            Office visit        Deon Leon PA-C

 

                    2010           Office visit        Deon Leon PA-C

 

                    2010           Office visit        Deon Leon PA-C

 

                    2010           Office visit        Deon Leon PA-C

## 2021-08-15 NOTE — DIAGNOSTIC IMAGING REPORT
PROCEDURE: CT pelvis without contrast.



TECHNIQUE: Multiple contiguous axial images were obtained through

the pelvis without the use of intravenous contrast.  Sagittal and

coronal reformations were performed. Auto Exposure Controls were

utilized during the CT exam to meet ALARA standards for radiation

dose reduction. 



INDICATION: Concern for fracture in the right hip. Abnormality

seen on right hip radiograph.



COMPARISON: Pelvic radiograph performed earlier the same date.



FINDINGS: A small nondisplaced fracture is seen involving the

anterior aspect of the right acetabulum in the area of previously

visualized abnormality. No unstable fracture is identified in the

right hip. The included right femur has a normal appearance

without acute fracture or dislocation. Moderate degenerative

changes are seen in the right femoroacetabular joint with joint

space narrowing, marginal osteophyte formation and subchondral

sclerosis and cyst formation. The bilateral SI joints demonstrate

normal alignment. The pubic symphysis is intact. Prior left total

hip arthroplasty changes are noted. No evidence of periprosthetic

fracture. No aggressive osseous lesion is identified. The

included soft tissues of the pelvis demonstrate no acute

abnormality. Diverticulosis of the sigmoid colon is seen without

evidence of acute diverticulitis.



IMPRESSION:

1. Nondisplaced fracture involving the anterior aspect of the

right acetabulum. No fracture is seen involving the included

right femur. No unstable fracture is identified in the pelvis or

hips. Recommend follow-up as indicated.

2. 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 Prior left total hip

arthroplasty changes are noted.

3. Diverticulosis of the sigmoid colon without evidence of acute

diverticulitis. 



Dictated by: 



  Dictated on workstation # [id number]

## 2021-08-15 NOTE — DIAGNOSTIC IMAGING REPORT
Clinical indication: Patient with sepsis.



Exam: Portable chest x-ray upright view.



Comparisons: Chest x-ray dated 06/25/2019.



Findings: There is interval slight improved aeration of the right

lung base. There is development of mild atelectasis versus

infiltrate involving the right perihilar region. There is

increased patchy airspace opacities and consolidation involving

the left lung base. There is no pleural effusion or pneumothorax.

There is mild airspace opacity involving the right upper lobe.



There is cardiomegaly with no significant pulmonary vascular

congestion. Bones show no significant abnormality.



Impression:

1: There is concern for left lung base infiltrate.

2: There is interval development of right perihilar atelectasis

versus infiltrate.

3: There is cardiomegaly with no significant pulmonary vascular

congestion.



Dictated by: 



  Dictated on workstation # [id number]

## 2021-08-15 NOTE — DIAGNOSTIC IMAGING REPORT
PROCEDURE: CT head and CT cervical spine without contrast.



TECHNIQUE: Multiple contiguous axial images were obtained through

the brain and cervical spine without the use of intravenous

contrast. Sagittal and coronal reformations through the cervical

spine were then performed. Auto Exposure Controls were utilized

during the CT exam to meet ALARA standards for radiation dose

reduction. 



INDICATION: Fall, altered mental status with injury.



COMPARISON: CT head from 06/25/2019.



FINDINGS:



Head: No intracranial hyperdense hemorrhage or space-occupying

mass. No hydrocephalus or midline shift. Global atrophy is

present. Periventricular white matter hypoattenuation is most

compatible with chronic microvascular ischemic disease. No acute

fracture. Paranasal sinuses and mastoid air cells are clear.



Cervical spine: No acute fracture or traumatic malalignment.

Degenerative straightening of cervical spine is present.

Multilevel degenerative disc disease is greatest at C6-C7. Severe

facet osteoarthritis is present at multiple levels throughout the

cervical spine. Lung apices are clear. No cervical

lymphadenopathy. No retropharyngeal fluid collection. Lung apices

show a few scattered sites of groundglass attenuation in the left

upper lobe.



IMPRESSION:

1. No acute intracranial hemorrhage or skull fracture.

2. No acute fracture or traumatic malalignment in the cervical

spine.

3. There are a few scattered groundglass opacities in the left

upper lobe which could be due to infection in the appropriate

setting.



Dictated by: 



  Dictated on workstation # [id number]

## 2021-08-15 NOTE — ED GENERAL
General


Chief Complaint:  Altered Mental Status


Stated Complaint:  UTI


Nursing Triage Note:  


PT ARRIVES TO ER BY [facility] FROM [facility] WITH C/O ALTERED MENTAL STATUS AND DECREASED




LOC ACCORDING TO THE NURSE FROM [facility]. REPORT FROM EMS STATES SHE WAS 77% ON RA ON




TRUCK. AT THIS TIME PATIENT HAS NO COMPLAINTS


Source of Information:  Patient


Exam Limitations:  No Limitations





History of Present Illness


Date Seen by Provider:  Aug 15, 2021


Time Seen by Provider:  15:07


Initial Comments


To ER by [facility] from [facility] with reports of altered 

mental status.  She seems more lethargic today they state.  She is currently on 

day 4 of antibiotic treatment for urinary tract infection.  She has an order for

oxygen as needed at the nursing home to keep saturation above 90%.


Timing/Duration:  1-2 Days


Severity:  Moderate





Allergies and Home Medications


Allergies


Coded Allergies:  


     No Known Drug Allergies (Unverified , 12/7/15)





Home Medications


Acetaminophen 325 Mg/10.15 Ml Soln, 325 MG PO Q6H PRN for PAIN-MILD


   Prescribed by: BRITTA SNEED on 19


Apixaban 5 Mg Tablet, 5 MG PO BID


   Prescribed by: BRITTA SNEED on 19


Aspirin 81 Mg Tab.chew, 81 MG PO DAILY


   Prescribed by: BRITTA SNEED on 19


Donepezil HCl 5 Mg Tablet, 5 MG PO HS


   Prescribed by: BRITTA SNEED on 19


Levetiracetam 1,000 Mg Tablet, 1,000 MG PO BID


   Prescribed by: BRITTA SNEED on 19


Metoprolol Tartrate 25 Mg Tablet, 25 MG PO BID


   Prescribed by: BRITTA SNEED on 19





Patient Home Medication List


Home Medication List Reviewed:  Yes





Review of Systems


Review of Systems


Constitutional:  see HPI, other (Patient states she feels okay has no pain or 

complaints)


EENTM:  see HPI


Respiratory:  no symptoms reported


Cardiovascular:  no symptoms reported


Genitourinary:  no symptoms reported


Musculoskeletal:  no symptoms reported


Skin:  no symptoms reported


Psychiatric/Neurological:  No Symptoms Reported


Hematologic/Lymphatic:  No Symptoms Reported





Past Medical-Social-Family Hx


Seasonal Allergies


Seasonal Allergies:  No





Past Medical History


Surgeries:  Yes (left hip replacement/cataracts)


Eye Surgery, Orthopedic


Respiratory:  Yes


Pulmonary Embolism, COPD


Currently Using CPAP:  No


Currently Using BIPAP:  No


Cardiac:  Yes


Atrial Fibrillation, Hypertension


Neurological:  No


Seizure Disorder


Genitourinary:  Yes (INCONTINENCY)


Gastrointestinal:  No


Musculoskeletal:  No


Arthritis


Endocrine:  No


HEENT:  Yes


Cataract


Hearing Impairment:  Hard of Hearing


Cancer:  No


Psychosocial:  No


Integumentary:  No


Blood Disorders:  No


Adverse Reaction/Blood Tranf:  No





Family Medical History





Dementia


  19 MOTHER


FH: breast cancer


  G8 SISTER


FH: leukemia


  19 FATHER


FH: uterine cancer


  G8 SISTER





Physical Exam


Vital Signs





Vital Signs - First Documented








 8/15/21 8/15/21





 14:50 15:04


 


Temp 36.7 


 


Pulse 70 


 


Resp 18 


 


B/P (MAP) 133/97 (109) 


 


Pulse Ox 93 


 


O2 Delivery Room Air 


 


O2 Flow Rate  4.00





Capillary Refill : Less Than 3 Seconds


Height, Weight, BMI


Height: 5'3.00"


Weight: 139lbs. 9.6oz. [id number]; 22.00 BMI


Method:Estimated


General Appearance:  No Apparent Distress, WD/WN, Chronically ill, Thin


Eyes:  Bilateral Eye Normal Inspection, Bilateral Eye PERRL, Bilateral Eye EOMI


HEENT:  PERRL/EOMI, TMs Normal


Neck:  Full Range of Motion, Normal Inspection


Respiratory:  No Accessory Muscle Use, No Respiratory Distress


Cardiovascular:  Regular Rate, Rhythm, Normal Peripheral Pulses, Other (Normal 

sinus rhythm with a rate of 68 blood pressure 133/87.  Oxygen saturation was 88%

on room air, 2 L increased her to 93%.)


Gastrointestinal:  Normal Bowel Sounds, Non Tender, Soft


Extremity:  Normal Capillary Refill, Normal Inspection


Neurologic/Psychiatric:  Alert, Other (somnolent, flat affect)


Skin:  Normal Color, Warm/Dry





Focused Exam


Lactate Level


8/15/21 15:14: Lactic Acid Level 0.98





Lactic Acid Level





Laboratory Tests








Test


 8/15/21


15:14


 


Lactic Acid Level


 0.98 MMOL/L


(0.50-2.00)











Progress/Results/Core Measures


Suspected Sepsis


SIRS


Temperature: 


Pulse: 70 


Respiratory Rate: 18


 


Laboratory Tests


8/15/21 14:50: White Blood Count 5.9


Blood Pressure 133 /97 


Mean: 109


 


8/15/21 15:14: Lactic Acid Level 0.98


Laboratory Tests


8/15/21 14:50: 


Creatinine 0.78, INR Comment 1.1, Platelet Count 258, Total Bilirubin 0.3








Results/Orders


Lab Results





Laboratory Tests








Test


 8/15/21


14:50 8/15/21


15:14 8/15/21


17:01 8/15/21


17:12 Range/Units


 


 


White Blood Count


 5.9 


 


 


 


 4.3-11.0


10^3/uL


 


Red Blood Count


 4.09 


 


 


 


 3.80-5.11


10^6/uL


 


Hemoglobin 11.8     11.5-16.0  g/dL


 


Hematocrit 39     35-52  %


 


Mean Corpuscular Volume 95     80-99  fL


 


Mean Corpuscular Hemoglobin 29     25-34  pg


 


Mean Corpuscular Hemoglobin


Concent 31 L


 


 


 


 32-36  g/dL





 


Red Cell Distribution Width 13.6     10.0-14.5  %


 


Platelet Count


 258 


 


 


 


 130-400


10^3/uL


 


Mean Platelet Volume 10.4     9.0-12.2  fL


 


Immature Granulocyte % (Auto) 1      %


 


Neutrophils (%) (Auto) 70     42-75  %


 


Lymphocytes (%) (Auto) 16     12-44  %


 


Monocytes (%) (Auto) 12     0-12  %


 


Eosinophils (%) (Auto) 1     0-10  %


 


Basophils (%) (Auto) 1     0-10  %


 


Neutrophils # (Auto) 4.1     1.8-7.8  X 10^3


 


Lymphocytes # (Auto) 0.9 L    1.0-4.0  X 10^3


 


Monocytes # (Auto) 0.7     0.0-1.0  X 10^3


 


Eosinophils # (Auto)


 0.1 


 


 


 


 0.0-0.3


10^3/uL


 


Basophils # (Auto)


 0.0 


 


 


 


 0.0-0.1


10^3/uL


 


Immature Granulocyte # (Auto)


 0.0 


 


 


 


 0.0-0.1


10^3/uL


 


Prothrombin Time 14.7     12.2-14.7  SEC


 


INR Comment 1.1     0.8-1.4  


 


Activated Partial


Thromboplast Time 33 


 


 


 


 24-35  SEC





 


Sodium Level 142     135-145  MMOL/L


 


Potassium Level 3.9     3.6-5.0  MMOL/L


 


Chloride Level 104       MMOL/L


 


Carbon Dioxide Level 30     21-32  MMOL/L


 


Anion Gap 8     5-14  MMOL/L


 


Blood Urea Nitrogen 18     7-18  MG/DL


 


Creatinine


 0.78 


 


 


 


 0.60-1.30


MG/DL


 


Estimat Glomerular Filtration


Rate 71 


 


 


 


  





 


BUN/Creatinine Ratio 23      


 


Glucose Level 105       MG/DL


 


Calcium Level 8.5     8.5-10.1  MG/DL


 


Corrected Calcium 9.0     8.5-10.1  MG/DL


 


Total Bilirubin 0.3     0.1-1.0  MG/DL


 


Aspartate Amino Transf


(AST/SGOT) 23 


 


 


 


 5-34  U/L





 


Alanine Aminotransferase


(ALT/SGPT) 13 


 


 


 


 0-55  U/L





 


Alkaline Phosphatase 61       U/L


 


B-Type Natriuretic Peptide 61.8     <100.0  PG/ML


 


Total Protein 6.8     6.4-8.2  GM/DL


 


Albumin 3.4     3.2-4.5  GM/DL


 


Valproic Acid (Depakene) Level


 79.4 


 


 


 


 50.0-100.0


UG/ML


 


Lactic Acid Level


 


 0.98 


 


 


 0.50-2.00


MMOL/L


 


SARS-CoV-2 RNA (RT-PCR)   Not Detected   Not Detecte  


 


Urine Color    YELLOW   


 


Urine Clarity    SL CLOUDY   


 


Urine pH    6.0  5-9  


 


Urine Specific Gravity    >=1.030  1.016-1.022  


 


Urine Protein    NEGATIVE  NEGATIVE  


 


Urine Glucose (UA)    NEGATIVE  NEGATIVE  


 


Urine Ketones    1+ H NEGATIVE  


 


Urine Nitrite    NEGATIVE  NEGATIVE  


 


Urine Bilirubin    1+ H NEGATIVE  


 


Urine Urobilinogen    0.2  < = 1.0  MG/DL


 


Urine Leukocyte Esterase    NEGATIVE  NEGATIVE  


 


Urine RBC (Auto)    NEGATIVE  NEGATIVE  


 


Urine RBC    NONE   /HPF


 


Urine WBC    RARE   /HPF


 


Urine Squamous Epithelial


Cells 


 


 


 0-2 


  /HPF





 


Urine Crystals    NONE   /LPF


 


Urine Bacteria    TRACE   /HPF


 


Urine Casts    PRESENT   /LPF


 


Urine Hyaline Casts    0-2 H  /LPF


 


Urine Mucus    MODERATE H  /LPF


 


Urine Culture Indicated


 


 


 


 CULTURE


PENDING  











My Orders





Orders - CARLOS KAHN


Cbc With Automated Diff (8/15/21 15:00)


Comprehensive Metabolic Panel (8/15/21 15:00)


Ua Culture If Indicated (8/15/21 15:00)


Ed Iv/Invasive Line Start (8/15/21 15:00)


Blood Culture (8/15/21 15:00)


Sputum Culture (8/15/21 15:00)


Urine Culture (8/15/21 15:00)


Protime With Inr (8/15/21 15:00)


Partial Thromboplastin Time (8/15/21 15:00)


Chest 1 View, Ap/Pa Only (8/15/21 15:00)


Vital Signs Adult Sepsis Patie Q15M (8/15/21 15:00)


O2 (8/15/21 15:00)


Remove Rings In Anticipation O (8/15/21 15:00)


Lactic Acid Analyzer (8/15/21 15:00)


BNP (8/15/21 15:06)


Valproic Acid (8/15/21 15:06)


Ct Head/Cervical Spine Wo (8/15/21 15:11)


Pelvis (8/15/21 15:11)


Covid 19 Inhouse Test (8/15/21 16:27)


Ct Pelvis Wo (8/15/21 16:29)





Vital Signs/I&O











 8/15/21 8/15/21 8/15/21





 14:50 15:04 15:37


 


Temp 36.7  36.6


 


Pulse 70  64


 


Resp 18  18


 


B/P (MAP) 133/97 (109)  133/98 (110)


 


Pulse Ox 93 94 99


 


O2 Delivery Room Air Nasal Cannula Nasal Cannula


 


O2 Flow Rate  4.00 3.00





Capillary Refill : Less Than 3 Seconds








Blood Pressure Mean:                    109








Progress Note :  


Progress Note


1806-I have been back in the room the son is at the bedside.  I do not find any 

cause for her lethargy.  We made a phone call to his sister, she states that the

patient's antipsychotic has been increased about 2 weeks ago just prior to the 

onset of this sedation.  Typically she would call her children many times a day 

and was a high elopement risk from the nursing home as she kept trying to 

escape.  This led to the increase in the Risperdal dosage.  That dosage is 

currently 1 mg twice a day.  The somnolence has progressed to the point that she

required assistance with feeding today.  Her urinary tract infection seems to 

have cleared.  Her oxygen saturation is 98% on 2 L her blood pressure 143/85 

heart rate is sinus rhythm at 64.  Patient is alert but rather flat affect.  I 

will reduce her respite all dosage by half which would be 0.5 mg twice daily.  

In regards to the CT finding of anterior right acetabular fracture I do see the 

abnormality on CT but the patient has absolutely no pain in this location and 

full active and passive range of motion of the hip including internal and 

external rotation and flexion.  This more likely represents an arthritic change 

than fracture.  In regards to the chest x-ray findings her lungs are clear 

without leukocytosis fevers or cough and I do not feel that a bacterial 

pneumonia is likely.  Will discharge back to the nursing home.  I will write a 

paper order for this and to have the nursing home let Kerry Amor know about 

the dosage reduction and ask for any further medication recommendations.





Diagnostic Imaging





   Diagonstic Imaging:  CT


Comments


NAME:   ROSENDO BRIGHT


[facility] REC#:   [id number]


ACCOUNT#:   [id number]


PT STATUS:   REG ER


:   1940


PHYSICIAN:   CARLOS KAHN APRN


ADMIT DATE:   08/15/21/ER


                                  ***Signed***


Date of Exam:08/15/21





CT PELVIS WO








PROCEDURE: CT pelvis without contrast.





TECHNIQUE: Multiple contiguous axial images were obtained through


the pelvis without the use of intravenous contrast.  Sagittal and


coronal reformations were performed. Auto Exposure Controls were


utilized during the CT exam to meet ALARA standards for radiation


dose reduction. 





INDICATION: Concern for fracture in the right hip. Abnormality


seen on right hip radiograph.





COMPARISON: Pelvic radiograph performed earlier the same date.





FINDINGS: A small nondisplaced fracture is seen involving the


anterior aspect of the right acetabulum in the area of previously


visualized abnormality. No unstable fracture is identified in the


right hip. The included right femur has a normal appearance


without acute fracture or dislocation. Moderate degenerative


changes are seen in the right femoroacetabular joint with joint


space narrowing, marginal osteophyte formation and subchondral


sclerosis and cyst formation. The bilateral SI joints demonstrate


normal alignment. The pubic symphysis is intact. Prior left total


hip arthroplasty changes are noted. No evidence of periprosthetic


fracture. No aggressive osseous lesion is identified. The


included soft tissues of the pelvis demonstrate no acute


abnormality. Diverticulosis of the sigmoid colon is seen without


evidence of acute diverticulitis.





IMPRESSION:


1. Nondisplaced fracture involving the anterior aspect of the


right acetabulum. No fracture is seen involving the included


right femur. No unstable fracture is identified in the pelvis or


hips. Recommend follow-up as indicated.


2. 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 Prior left total hip


arthroplasty changes are noted.


3. Diverticulosis of the sigmoid colon without evidence of acute


diverticulitis. 





Dictated by: 





  Dictated on workstation # [id number]








Dict:   08/15/21 1654


Trans:   08/15/21 1720


[facility] [id number]





Interpreted by:     KAMILLE WILLIAMSON DO


Electronically signed by: TERIKAMILLE ERNESTO BEYER 08/15/21 1720





Departure


Communication (Admissions)


NAME:   ROSENDO BRIGHT


[facility] REC#:   [id number]


ACCOUNT#:   [id number]


PT STATUS:   REG ER


:   1940


PHYSICIAN:   CARLOS KAHN


ADMIT DATE:   08/15/21/ER


                                   ***Draft***


Date of Exam:08/15/21





PELVIS








CLINICAL INDICATION: Patient is status post fall this morning.





EXAM: X-ray of the pelvis, AP view.





COMPARISON: None.





FINDINGS AND 


IMPRESSION:


1: There is a curvilinear lucency involving the right acetabular


region and a fracture should be excluded. CT scan would better


evaluate.


2: Left total hip arthroplasty is seen which is incompletely


imaged distally. There is no fracture or dislocation, as


visualized.


3: There is no other significant abnormality seen. 





  Dictated on workstation # [id number]








Dict:   08/15/21 1620


Trans:   08/15/21 1625


[facility] [id number]





Interpreted by:     JENNA PEREZ MD


Electronically signed by:





Impression





   Primary Impression:  


   Medication side effect


Disposition:  01 HOME, SELF-CARE


Condition:  Stable





Departure-Patient Inst.


Decision time for Depature:  18:09


Referrals:  


NO,LOCAL PHYSICIAN (PCP/Family)


Primary Care Physician


Patient Instructions:  Adverse Drug Reactions, Adult





Add. Discharge Instructions:  


1.  It seems that the cause of her somnolence is most likely the higher 

Risperdal dosage.  As such let us reduce this to 0.5 mg by mouth twice a day.  

Return to ER for any concerns.





All discharge instructions reviewed with patient and/or family. Voiced 

understanding.





Copy


Copies To 1:   FREDIS MURDOCK MD, PETER J APRN             Aug 15, 2021 15:09

## 2021-08-15 NOTE — DIAGNOSTIC IMAGING REPORT
CLINICAL INDICATION: Patient is status post fall this morning.



EXAM: X-ray of the pelvis, AP view.



COMPARISON: None.



FINDINGS AND 

IMPRESSION:

1: There is a curvilinear lucency involving the right acetabular

region and a fracture should be excluded. CT scan would better

evaluate.

2: Left total hip arthroplasty is seen which is incompletely

imaged distally. There is no fracture or dislocation, as

visualized.

3: There is no other significant abnormality seen. 



Dictated by: 



  Dictated on workstation # [id number]

## 2022-05-06 ENCOUNTER — HOSPITAL ENCOUNTER (EMERGENCY)
Dept: HOSPITAL 75 - ER | Age: 82
Discharge: NURSING, REHAB OR LONG-TERM CARE | End: 2022-05-06
Payer: MEDICARE

## 2022-05-06 VITALS — SYSTOLIC BLOOD PRESSURE: 148 MMHG | DIASTOLIC BLOOD PRESSURE: 88 MMHG

## 2022-05-06 VITALS — HEIGHT: 65 IN | WEIGHT: 100.09 LBS | BODY MASS INDEX: 16.68 KG/M2

## 2022-05-06 DIAGNOSIS — Z96.642: ICD-10-CM

## 2022-05-06 DIAGNOSIS — S80.12XA: ICD-10-CM

## 2022-05-06 DIAGNOSIS — Z86.711: ICD-10-CM

## 2022-05-06 DIAGNOSIS — J44.9: ICD-10-CM
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
## 2022-05-06 DIAGNOSIS — S70.02XA: ICD-10-CM

## 2022-05-06 DIAGNOSIS — S05.12XA: ICD-10-CM

## 2022-05-06 DIAGNOSIS — I48.91: ICD-10-CM

## 2022-05-06 DIAGNOSIS — G40.909: ICD-10-CM

## 2022-05-06 DIAGNOSIS — Z99.81: ICD-10-CM

## 2022-05-06 DIAGNOSIS — S06.0X9A: Primary | ICD-10-CM

## 2022-05-06 DIAGNOSIS — Z79.82: ICD-10-CM

## 2022-05-06 DIAGNOSIS — F03.90: ICD-10-CM

## 2022-05-06 LAB
ALBUMIN SERPL-MCNC: 3.6 GM/DL (ref 3.2–4.5)
ALP SERPL-CCNC: 106 U/L (ref 40–136)
ALT SERPL-CCNC: 14 U/L (ref 0–55)
APTT BLD: 30 SEC (ref 24–35)
APTT PPP: YELLOW S
BACTERIA #/AREA URNS HPF: NEGATIVE /HPF
BASOPHILS # BLD AUTO: 0 10^3/UL (ref 0–0.1)
BASOPHILS NFR BLD AUTO: 0 % (ref 0–10)
BILIRUB SERPL-MCNC: 0.5 MG/DL (ref 0.1–1)
BILIRUB UR QL STRIP: NEGATIVE
BUN/CREAT SERPL: 16
CALCIUM SERPL-MCNC: 8.7 MG/DL (ref 8.5–10.1)
CHLORIDE SERPL-SCNC: 102 MMOL/L (ref 98–107)
CK MB SERPL-MCNC: 1.4 NG/ML (ref ?–6.6)
CK SERPL-CCNC: 87 U/L (ref 29–168)
CO2 SERPL-SCNC: 24 MMOL/L (ref 21–32)
CREAT SERPL-MCNC: 0.73 MG/DL (ref 0.6–1.3)
EOSINOPHIL # BLD AUTO: 0.1 10^3/UL (ref 0–0.3)
EOSINOPHIL NFR BLD AUTO: 1 % (ref 0–10)
FIBRINOGEN PPP-MCNC: CLEAR MG/DL
GFR SERPLBLD BASED ON 1.73 SQ M-ARVRAT: 82 ML/MIN
GLUCOSE SERPL-MCNC: 97 MG/DL (ref 70–105)
GLUCOSE UR STRIP-MCNC: NEGATIVE MG/DL
HCT VFR BLD CALC: 37 % (ref 35–52)
HGB BLD-MCNC: 11.4 G/DL (ref 11.5–16)
INR PPP: 1 (ref 0.8–1.4)
KETONES UR QL STRIP: NEGATIVE
LEUKOCYTE ESTERASE UR QL STRIP: NEGATIVE
LYMPHOCYTES # BLD AUTO: 1.1 10^3/UL (ref 1–4)
LYMPHOCYTES NFR BLD AUTO: 12 % (ref 12–44)
MAGNESIUM SERPL-MCNC: 1.9 MG/DL (ref 1.6–2.4)
MANUAL DIFFERENTIAL PERFORMED BLD QL: NO
MCH RBC QN AUTO: 29 PG (ref 25–34)
MCHC RBC AUTO-ENTMCNC: 31 G/DL (ref 32–36)
MCV RBC AUTO: 93 FL (ref 80–99)
MONOCYTES # BLD AUTO: 0.8 10^3/UL (ref 0–1)
MONOCYTES NFR BLD AUTO: 9 % (ref 0–12)
NEUTROPHILS # BLD AUTO: 7 10^3/UL (ref 1.8–7.8)
NEUTROPHILS NFR BLD AUTO: 77 % (ref 42–75)
NITRITE UR QL STRIP: NEGATIVE
PH UR STRIP: 8 [PH] (ref 5–9)
PLATELET # BLD: 349 10^3/UL (ref 130–400)
PMV BLD AUTO: 10 FL (ref 9–12.2)
POTASSIUM SERPL-SCNC: 4.1 MMOL/L (ref 3.6–5)
PROT SERPL-MCNC: 7.2 GM/DL (ref 6.4–8.2)
PROT UR QL STRIP: NEGATIVE
PROTHROMBIN TIME: 13.3 SEC (ref 12.2–14.7)
RBC #/AREA URNS HPF: (no result) /HPF
SODIUM SERPL-SCNC: 139 MMOL/L (ref 135–145)
SP GR UR STRIP: 1.01 (ref 1.02–1.02)
SQUAMOUS #/AREA URNS HPF: (no result) /HPF
VALPROATE SERPL-MCNC: 19.8 UG/ML (ref 50–100)
WBC # BLD AUTO: 9.1 10^3/UL (ref 4.3–11)
WBC #/AREA URNS HPF: (no result) /HPF

## 2022-05-06 PROCEDURE — 72125 CT NECK SPINE W/O DYE: CPT

## 2022-05-06 PROCEDURE — 82550 ASSAY OF CK (CPK): CPT

## 2022-05-06 PROCEDURE — 70486 CT MAXILLOFACIAL W/O DYE: CPT

## 2022-05-06 PROCEDURE — 51701 INSERT BLADDER CATHETER: CPT

## 2022-05-06 PROCEDURE — 81000 URINALYSIS NONAUTO W/SCOPE: CPT

## 2022-05-06 PROCEDURE — 36415 COLL VENOUS BLD VENIPUNCTURE: CPT

## 2022-05-06 PROCEDURE — 73590 X-RAY EXAM OF LOWER LEG: CPT

## 2022-05-06 PROCEDURE — 93041 RHYTHM ECG TRACING: CPT

## 2022-05-06 PROCEDURE — 82553 CREATINE MB FRACTION: CPT

## 2022-05-06 PROCEDURE — 83735 ASSAY OF MAGNESIUM: CPT

## 2022-05-06 PROCEDURE — 83874 ASSAY OF MYOGLOBIN: CPT

## 2022-05-06 PROCEDURE — 85610 PROTHROMBIN TIME: CPT

## 2022-05-06 PROCEDURE — 73552 X-RAY EXAM OF FEMUR 2/>: CPT

## 2022-05-06 PROCEDURE — 80053 COMPREHEN METABOLIC PANEL: CPT

## 2022-05-06 PROCEDURE — 73523 X-RAY EXAM HIPS BI 5/> VIEWS: CPT

## 2022-05-06 PROCEDURE — 80164 ASSAY DIPROPYLACETIC ACD TOT: CPT

## 2022-05-06 PROCEDURE — 85025 COMPLETE CBC W/AUTO DIFF WBC: CPT

## 2022-05-06 PROCEDURE — 70450 CT HEAD/BRAIN W/O DYE: CPT

## 2022-05-06 PROCEDURE — 71045 X-RAY EXAM CHEST 1 VIEW: CPT

## 2022-05-06 PROCEDURE — 85730 THROMBOPLASTIN TIME PARTIAL: CPT

## 2022-05-06 PROCEDURE — 84484 ASSAY OF TROPONIN QUANT: CPT

## 2022-05-06 NOTE — DIAGNOSTIC IMAGING REPORT
Indication: Pelvic pain.



Time of Exam: 9:04 AM



AP view the pelvis and multiple views of each hip were obtained.



There is generalized demineralization. There are postoperative

changes of left hip arthroplasty. Prosthetic elements are in good

position. Right hip demonstrates some degenerative changes but no

definite fracture is seen. Rami appear intact.



IMPRESSION: Postsurgical and chronic changes. No acute fracture

is detected.



Dictated by: 



  Dictated on workstation # [id number]

## 2022-05-06 NOTE — DIAGNOSTIC IMAGING REPORT
Indication: Left leg pain.



Time of Exam: 9:18 AM



Tibia and fibula appear intact. No fracture seen. Alignment at

the knee and ankle appears normal.



IMPRESSION: No acute bony abnormality is detected.



Dictated by: 



  Dictated on workstation # [id number]

## 2022-05-06 NOTE — DIAGNOSTIC IMAGING REPORT
INDICATION: Leg pain.



EXAMINATION: Left femur 05/06/2022



FINDINGS:



4 views of the femur demonstrate a total hip arthroplasty which

appears intact. There are no dislocations. No evidence for

loosening. More distal femur is intact with no fractures

identified. Knee joint unremarkable as visualized.



IMPRESSION:

1. Postoperative findings left hip unremarkable in appearance

with no acute fracture identified.



Dictated by: 



  Dictated on workstation # [id number]

## 2022-05-06 NOTE — ED FALL/INJURY
General


Stated Complaint:  FALL


Source:  EMS, nursing home records, old records (ALL PMH IS FROM OLD RECORDS AND

NURSING HOME PAPERS)


Exam Limitations:  other (PT WITH DEMENTIA, UNABLE TO PROVIDE ANY INFORMATION; 

PT YELLS "OW' WITH ANY MOVEMENT OR SLIGHTEST TOUCH--LITERALLY TO ANY PART OF 

BODY, TAKING VITALS, ETC)





History of Present Illness


Date Seen by Provider:  May 6, 2022


Time Seen by Provider:  08:13


Initial Comments


PT ARRIVES VIA EMS FROM VIA [facility]


NO REPORT/CALL FROM NURSING HOME


PER EMS:


PT HAD AN UNWITNESSED FALL LAST NIGHT, WAS FOUND ON THE FLOOR IN HER ROOM ABOUT 

2200 PM--NO APPARENT INJURIES AT THAT TIME. IS UNKNOWN HOW LONG SHE HAD BEEN ON 

THE FLOOR THAT TIME. 


PT WAS FOUND ON THE FLOOR IN HER ROOM AGAIN THIS MORNING AROUND 0720--FACE DOWN


PER EMS, PT WAS LAST SEEN AT 2200 LAST NIGHT, PRIOR TO FINDING HER ON THE FLOOR 

AGAIN THIS AM





PT WITH SEVERE DEMENTIA AND IS ON HOSPICE, AND IS DNR/DNI


PT AT NORMAL BASELINE MENTATION, PER EMS


PT IS CURRENTLY DISORIENTED TO PLACE, TIME, SITUATION. ONLY KNOWS NAME. KEEPS 

YELLING FOR GE


PT IS TALKING AND SPEECH IS CLEAR, BUT IS  UNABLE TO STATE IF SHE HURTS 

ANYWHERE. 





PER EMS, PT HAD C/O PAIN TO LEFT HIP AND LEG--HAS HAD PRIOR LEFT HIP 

REPLACEMENT, PER OLD RECORDS


PT ALSO HAS A LARGE HEMATOMA TO LEFT BROW, AND DRIED BLOOD FROM NOSE. 





PT IS ON ASPIRIN, NO OTHER BLOOD THINNERS


IN ADDITION TO SEVERE DEMENTIA, PT HAS HISTORY OF SEIZURES, ATRIAL FIBRILLATION,

PULMONARY EMBOLI, COPD--O2 DEPENDENT AT 2L/NC CONTINUOUSLY, AND GENERALIZED 

WEAKNESS, AND IS POST-POLIO.





PCP: DR. MURDOCK





Allergies and Home Medications


Allergies


Coded Allergies:  


     No Known Drug Allergies (Unverified , 12/7/15)





Patient Home Medication List


Home Medication List Reviewed:  Yes


Acetaminophen (Acetaminophen) 325 Mg/10.15 Ml Soln, 325 MG PO Q6H PRN for PAIN-

MILD


   Prescribed by: BRITTA SNEED on 7/18/19 2115


Apixaban (Eliquis) 5 Mg Tablet, 5 MG PO BID


   Prescribed by: BRITTA SNEED on 7/18/19 2115


Aspirin (Aspirin) 81 Mg Tab.chew, 81 MG PO DAILY


   Prescribed by: BRITTA SNEED on 7/18/19 2115


Donepezil HCl (Aricept) 5 Mg Tablet, 5 MG PO HS


   Prescribed by: BRITTA SNEED on 7/18/19 2115


Levetiracetam (Levetiracetam) 1,000 Mg Tablet, 1,000 MG PO BID


   Prescribed by: BRITTA SNEED on 7/18/19 2115


Metoprolol Tartrate (Metoprolol Tartrate) 25 Mg Tablet, 25 MG PO BID


   Prescribed by: BRITTA SNEED on 7/18/19 2115





Review of Systems


Review of Systems


Constitutional:  other (PT UNABLE TO PROVIDE ANY RELEVANT INFORMATION DUE TO 

SEVERE DEMENTIA)


Musculoskeletal:  see HPI


Skin:  see HPI


Psychiatric/Neurological:  See HPI





Past Medical-Social-Family Hx


Seasonal Allergies


Seasonal Allergies:  No





Past Medical History


Surgeries:  Yes (left hip replacement/cataracts)


Eye Surgery, Joint Replacement, Orthopedic


Respiratory:  Yes (O2 DEPENDENT AT 2L/NC CONTINOUSLY)


Pulmonary Embolism, COPD


Currently Using CPAP:  No


Currently Using BIPAP:  No


Cardiac:  Yes


Atrial Fibrillation, Hypertension


Neurological:  Yes (SEVERE DEMENTIA)


Dementia, Seizure Disorder


GYN History:  Menopausal


Genitourinary:  Yes (INCONTINENCY)


Gastrointestinal:  No


Musculoskeletal:  Yes (LEFT HIP REPLACEMENT)


Arthritis


Endocrine:  No


HEENT:  Yes


Cataract


Hearing Impairment:  Hard of Hearing


Cancer:  No


Psychosocial:  Yes (DEMENTIA)


Depression


Integumentary:  No


Blood Disorders:  No


Adverse Reaction/Blood Tranf:  No





Family Medical History





Dementia


  19 MOTHER


FH: breast cancer


  G8 SISTER


FH: leukemia


  19 FATHER


FH: uterine cancer


  G8 SISTER











ADDITIONAL PMH:


-06/2019--PT HAD BEEN LIVING AT HOME WITH EX-, WITH INDEPENDENT


ADL'S AND ARRIVED IN ER WITH  ALTERED MENTAL STAUS, WITH STROKE LIKE


PRESENTATION THEN DEVELOPED STATUS EPILEPTICUS, WITH RESPIRATORY FAILURE


REQUIRING INTUBATION AND VENTILATOR PLACEMENT, ALSO HAD MULTIPLE BILATERAL


P.E.'S, HAD HYPERAMMONEMIA AND ENCEPHALOPATHY WITH RESULTANT PERSISTENT


CONFUSION,


WAS SEVERELY ACIDOTIC, WITH ELEVATED CO2 LEVELS.


PT WAS TRANSFERRED TO , THEN TRANSFERRED BACK HERE TO REHAB UNIT. 


PT HAS BEEN IN NURSING HOME SINCE THEN.





Physical Exam


Vital Signs





Vital Signs - First Documented




















Capillary Refill :


Height, Weight, BMI


Height: 5'3.00"


Weight: 139lbs. 9.6oz. [id number]; 22.00 BMI


Method:Estimated


General Appearance:  WD/WN, no apparent distress, thin


HEENT:  PERRL/EOMI, other (LARGE HEMATOMA TO LEFT BROW, WITH SMALL SUPERFICIAL 

LACERATION THAT HAS ONE STERI STRIP IN PLACE. NO ACTIVE BLEEDING FROM THIS AREA.

DRIED BLOOD FROM BOTH NARES. NO ACTIVE BLEEDING FROM EITHER NARE. UPPER DENTURES

IN PLACE AND INTACT. FEW LOWER TEETH WITH DECAY. NO APPARENT BROKEN TEETH. NO 

BLOOD IN MOUTH OR POSTERIOR PHARYNX. )


Neck:  non-tender


Cardiovascular:  regular rate, rhythm, no JVD, no murmur


Respiratory:  chest non-tender, normal breath sounds, no respiratory distress, 

no accessory muscle use


Peripheral Pulses:  2+ Dorsalis Pedis (R), 2+ Left Dors-Pedis (L), 2+ Radial 

Pulses (R), 2+ Radial Pulses (L)


Gastrointestinal:  normal bowel sounds, non tender, soft


Back:  no CVA tenderness, no vertebral tenderness


Extremities:  no pedal edema, normal capillary refill, other (LIMITED ROM OF ALL

EXTREMITIES--YELLS "OW" WITH TOUCHING OR MOVING LITERALLY ANY PART OF HER BODY. 

NO SHORTENING OR ROTATION OF LEGS. NO DEFORMITY TO ANY EXTREMITY. )


Neurologic/Psychiatric:  CNs II-XII nml as tested, no motor/sensory deficits 

(GROSS MOTOR/SENSORY INTACT. ), alert, disoriented x 3 (ORIENTED TO SELF ONLY. 

PT DOES NOT FOLLOW COMMANDS OR ANSWER QUESTIONS APPROPRIATELY, BUT SPEECH IS 

CLEAR AND NO FOCAL DEFICITS IDENTIFIED AT THIS TIME. SPEECH IS CLEAR, AND PT IS 

REPORTEDLY AT HER NORMAL BASELINE MENTALLY. )


Skin:  normal color, warm/dry, ecchymosis, other (NO OTHER BRUISING NOTED 

ANYWHERE ELSE ON BODY. PT DOES HAVE A LARGE CLUSTER OF OLD, POST-INFLAMMATORY 

HYPERPIGMENTATION TO LEFT FLANK--? SITE OF PREVIOUS SHINGLES?????)





Milam Coma Score


Best Eye Response:  (4) Open Spontaneously


Best Verbal Response:  (4) Confused Conversation


Best Motor Response:  (5) Localizes to Pain (BUT PT WAILS "OW" WITH LITERALLY 

EVERYTHING --DOES NOT TRULY LOCALIZE PAIN)


Milam Total:  13





Progress/Results/Core Measures


Results/Orders


Lab Results





Laboratory Tests








Test


 5/6/22


06:28 5/6/22


08:25 Range/Units


 


 


White Blood Count


 9.1 


 


 4.3-11.0


10^3/uL


 


Red Blood Count


 3.91 


 


 3.80-5.11


10^6/uL


 


Hemoglobin 11.4 L  11.5-16.0  g/dL


 


Hematocrit 37   35-52  %


 


Mean Corpuscular Volume 93   80-99  fL


 


Mean Corpuscular Hemoglobin 29   25-34  pg


 


Mean Corpuscular Hemoglobin


Concent 31 L


 


 32-36  g/dL





 


Red Cell Distribution Width 13.2   10.0-14.5  %


 


Platelet Count


 349 


 


 130-400


10^3/uL


 


Mean Platelet Volume 10.0   9.0-12.2  fL


 


Immature Granulocyte % (Auto) 0    %


 


Neutrophils (%) (Auto) 77 H  42-75  %


 


Lymphocytes (%) (Auto) 12   12-44  %


 


Monocytes (%) (Auto) 9   0-12  %


 


Eosinophils (%) (Auto) 1   0-10  %


 


Basophils (%) (Auto) 0   0-10  %


 


Neutrophils # (Auto)


 7.0 


 


 1.8-7.8


10^3/uL


 


Lymphocytes # (Auto)


 1.1 


 


 1.0-4.0


10^3/uL


 


Monocytes # (Auto)


 0.8 


 


 0.0-1.0


10^3/uL


 


Eosinophils # (Auto)


 0.1 


 


 0.0-0.3


10^3/uL


 


Basophils # (Auto)


 0.0 


 


 0.0-0.1


10^3/uL


 


Immature Granulocyte # (Auto)


 0.0 


 


 0.0-0.1


10^3/uL


 


Prothrombin Time 13.3   12.2-14.7  SEC


 


INR Comment 1.0   0.8-1.4  


 


Activated Partial


Thromboplast Time 30 


 


 24-35  SEC





 


Sodium Level 139   135-145  MMOL/L


 


Potassium Level 4.1   3.6-5.0  MMOL/L


 


Chloride Level 102     MMOL/L


 


Carbon Dioxide Level 24   21-32  MMOL/L


 


Anion Gap 13   5-14  MMOL/L


 


Blood Urea Nitrogen 12   7-18  MG/DL


 


Creatinine


 0.73 


 


 0.60-1.30


MG/DL


 


Estimat Glomerular Filtration


Rate 82 


 


  





 


BUN/Creatinine Ratio 16    


 


Glucose Level 97     MG/DL


 


Calcium Level 8.7   8.5-10.1  MG/DL


 


Corrected Calcium 9.0   8.5-10.1  MG/DL


 


Magnesium Level 1.9   1.6-2.4  MG/DL


 


Total Bilirubin 0.5   0.1-1.0  MG/DL


 


Aspartate Amino Transf


(AST/SGOT) 21 


 


 5-34  U/L





 


Alanine Aminotransferase


(ALT/SGPT) 14 


 


 0-55  U/L





 


Alkaline Phosphatase 106     U/L


 


Total Creatine Kinase 87     U/L


 


Creatine Kinase MB 1.4   <6.6  NG/ML


 


Myoglobin


 100.5 H


 


 10.0-92.0


NG/ML


 


Troponin I < 0.028   <0.028  NG/ML


 


Total Protein 7.2   6.4-8.2  GM/DL


 


Albumin 3.6   3.2-4.5  GM/DL


 


Valproic Acid (Depakene) Level


 19.8 L


 


 50.0-100.0


UG/ML


 


Urine Color  YELLOW   


 


Urine Clarity  CLEAR   


 


Urine pH  8.0  5-9  


 


Urine Specific Gravity  1.015 L 1.016-1.022  


 


Urine Protein  NEGATIVE  NEGATIVE  


 


Urine Glucose (UA)  NEGATIVE  NEGATIVE  


 


Urine Ketones  NEGATIVE  NEGATIVE  


 


Urine Nitrite  NEGATIVE  NEGATIVE  


 


Urine Bilirubin  NEGATIVE  NEGATIVE  


 


Urine Urobilinogen  0.2  < = 1.0  MG/DL


 


Urine Leukocyte Esterase  NEGATIVE  NEGATIVE  


 


Urine RBC (Auto)  NEGATIVE  NEGATIVE  


 


Urine RBC  NONE   /HPF


 


Urine WBC  RARE   /HPF


 


Urine Squamous Epithelial


Cells 


 0-2 


  /HPF





 


Urine Crystals  NONE   /LPF


 


Urine Bacteria  NEGATIVE   /HPF


 


Urine Casts  NONE   /LPF


 


Urine Mucus  NEGATIVE   /LPF


 


Urine Culture Indicated  NO   








My Orders





Orders - TIMUR MARQUEZ DO


Ed Iv/Invasive Line Start (5/6/22 08:19)


O2 (5/6/22 08:19)


Monitor-Rhythm Ecg Trace Only (5/6/22 08:19)


Straight Cath For Spec.-Adult (5/6/22 08:19)


Ct Head/Face/Cervical Wo (5/6/22 08:19)


Chest 1 View, Ap/Pa Only (5/6/22 08:19)


Femur, Left, 2 Views (5/6/22 08:19)


Tibia/Fibula, Left, 2 Views (5/6/22 08:19)


Pelvis/Ronnie Hips 5> Views (5/6/22 08:19)


Cbc With Automated Diff (5/6/22 08:19)


Comprehensive Metabolic Panel (5/6/22 08:19)


Creatine Kinase (5/6/22 08:19)


Creatine Kinase Mb (5/6/22 08:19)


Magnesium (5/6/22 08:19)


Protime With Inr (5/6/22 08:19)


Partial Thromboplastin Time (5/6/22 08:19)


Valproic Acid (5/6/22 08:19)


Myoglobin Serum (5/6/22 08:19)


Troponin I Sarah Beth (5/6/22 08:19)


Ua Culture If Indicated (5/6/22 08:48)


Fentanyl  Inj (Sublimaze Injection) (5/6/22 09:45)





Vital Signs/I&O











 5/6/22 5/6/22 5/6/22





 08:11 08:11 11:30


 


Temp 36.5  


 


Pulse 98  99


 


Resp 18  18


 


B/P (MAP) 154/122 (133)  148/88


 


Pulse Ox 98  100


 


O2 Delivery Nasal Cannula Nasal Cannula Nasal Cannula


 


O2 Flow Rate 2.00 2.00 2.00











Progress


Progress Note :  


Progress Note


ON RETURN FROM CT, PT NOW C/O HER HEAD HURTING. FENTANYL ORDERED. IV HAS BLOWN. 

WILL HOLD PO MEDICATIONS UNTIL XRAY REPORTS BACK


PT DID NOT COMPLAIN OF PAIN FOR REMAINDER OF ER STAY, SO PAIN MEDICATION HELD. 





MARKED DELAY IN OBTAINING XRAY REPORT DUE TO DICTATION SYSTEM BEING DOWN





NO DETERIORATION IN PT'S CONDITION DURING ER STAY


PT SLEPT, RESTED QUIETLY FOR MOST OF ER  STAY


SON LATER ARRIVES AND REPORTS PT IS AT NORMAL BASELINE MENTALLY. 


MARKED DELAY IN WAITING FOR NURSING HOME STAFF TO TRANSPORT PT BACK TO FACILITY.





Diagnostic Imaging





Comments


CXR--PER RADIOLOGIST VIA PHONE AT 0924


NO ACUTE PROCESS





CT HEAD/MAXILLOFACIALS/CERVICAL SPINE--PER RADIOLOGIST VIA PHONE AT 0924


SOFT TISSUE HEMATOMA LEFT BROW, NO OTHER ACUTE PROCESS





XRAYS--PER RADIOLOGIST REPORTS AT 1038





PELVIS/BILATERAL HIPS--


There is generalized demineralization. There are postoperative


changes of left hip arthroplasty. Prosthetic elements are in good


position. Right hip demonstrates some degenerative changes but no


definite fracture is seen. Rami appear intact.





IMPRESSION: Postsurgical and chronic changes. No acute fracture


is detected.





LEFT FEMUR--


FINDINGS:





4 views of the femur demonstrate a total hip arthroplasty which


appears intact. There are no dislocations. No evidence for


loosening. More distal femur is intact with no fractures


identified. Knee joint unremarkable as visualized.





IMPRESSION:


1. Postoperative findings left hip unremarkable in appearance


with no acute fracture identified.





LEFT TIB-FIB--


Tibia and fibula appear intact. No fracture seen. Alignment at


the knee and ankle appears normal.





IMPRESSION: No acute bony abnormality is detected.


   Reviewed:  Reviewed by Me





Departure


Impression





   Primary Impression:  


   Unwitnessed fall


   Additional Impressions:  


   MINOR HEAD INJURY WITH UNKNOWN LOSS OF CONSCIOUSNESS


   Periorbital hematoma of left eye


   LEFT LEG AND HIP CONTUSION


   Dementia


   History of seizures


Disposition:  03 XFER SNF


Condition:  Stable





Departure-Patient Inst.


Decision time for Depature:  10:40


Referrals:  


FREDIS MURDOCK MD





NO,LOCAL PHYSICIAN (PCP)


Primary Care Physician


Patient Instructions:  [facility] ED, Closed Head Injury (DC), Contusion (DC), 

Preventing Falls ED, Preventing Falls in the Older Adult





Add. Discharge Instructions:  


ICE TO SORE AREAS AT 20 MINUTE INTERVALS


TAKE YOUR PRESCRIBED PAIN MEDICATION AS NEEDED





FOLLOW UP WITH YOUR DR AS NEEDED


RETURN TO ER IF PROBLEMS











TIMUR MARQUEZ DO                  May 6, 2022 08:26

## 2022-05-06 NOTE — DIAGNOSTIC IMAGING REPORT
PROCEDURE: CT head, face, and cervical spine without contrast.



TECHNIQUE: Multiple contiguous axial images were obtained through

the head, neck, and facial bones without the use of intravenous

contrast. Sagittal and coronal reformations through the cervical

spine and facial bones were also performed. Auto Exposure

Controls were utilized during the CT exam to meet ALARA standards

for radiation dose reduction. 



INDICATION:  Facial trauma. Head and neck pain. Found down. 



COMPARISON: CT head and cervical spine without contrast

06/15/2021.



FINDINGS: Examinations are limited by motion.



CT head and maxillofacial: Hematoma overlying the left frontal

convexity. No fractures. Moderate to advanced generalized

parenchymal volume loss. No intracranial hemorrhage, mass effect,

hydrocephalus or extra-axial fluid collections. Small air-fluid

level in the left maxillary sinus. The mastoids are clear. Normal

alignment of the temporomandibular joints.



CT cervical spine: Normal alignment. Vertebral body heights

preserved. No fractures are identified given the limitations.

Moderate to advanced degenerative endplate changes are greatest

at C6-C7. Visualized paravertebral soft tissues demonstrate no

acute findings. Lung apices are clear.



IMPRESSION: 

1. Scalp hematoma overlying left frontal convexity. No fractures.

2. No acute intracranial or cervical spine CT findings allowing

for the motion.



Findings reported to Dr. Garcia at 9:24 AM on 05/06/2022.



Dictated by: 



  Dictated on workstation # [id number]

## 2022-06-30 ENCOUNTER — HOSPITAL ENCOUNTER (EMERGENCY)
Dept: HOSPITAL 75 - ER | Age: 82
Discharge: NURSING, REHAB OR LONG-TERM CARE | End: 2022-06-30
Payer: MEDICARE

## 2022-06-30 VITALS — DIASTOLIC BLOOD PRESSURE: 89 MMHG | SYSTOLIC BLOOD PRESSURE: 126 MMHG

## 2022-06-30 VITALS — BODY MASS INDEX: 24.34 KG/M2 | WEIGHT: 132.28 LBS | HEIGHT: 61.97 IN
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
## 2022-06-30 DIAGNOSIS — J44.9: ICD-10-CM
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
## 2022-06-30 DIAGNOSIS — F03.90: ICD-10-CM

## 2022-06-30 DIAGNOSIS — Z99.81: ICD-10-CM

## 2022-06-30 DIAGNOSIS — Z79.82: ICD-10-CM

## 2022-06-30 DIAGNOSIS — S42.292A: Primary | ICD-10-CM

## 2022-06-30 DIAGNOSIS — R29.6: ICD-10-CM

## 2022-06-30 PROCEDURE — 73030 X-RAY EXAM OF SHOULDER: CPT

## 2022-06-30 PROCEDURE — 93041 RHYTHM ECG TRACING: CPT

## 2022-06-30 PROCEDURE — 71045 X-RAY EXAM CHEST 1 VIEW: CPT

## 2022-06-30 PROCEDURE — 72170 X-RAY EXAM OF PELVIS: CPT

## 2022-06-30 PROCEDURE — 73060 X-RAY EXAM OF HUMERUS: CPT

## 2022-06-30 NOTE — DIAGNOSTIC IMAGING REPORT
Indication: Fall with chest pain.



Frontal chest obtained at 9:30 p.m.



Heart is borderline in size. There are chronic-appearing

increased interstitial markings. There is no pneumothorax or

pleural fluid or definite infiltrate. There is no overt bony

abnormality of the chest. 



Impression:



Borderline cardiomegaly and chronic interstitial changes. No

overt acute abnormality of the chest. Findings are similar

compared to 05/06/2022.



Dictated by: 



  Dictated on workstation # [id number]

## 2022-06-30 NOTE — DIAGNOSTIC IMAGING REPORT
Indication: Left shoulder pain post trauma.



AP and lateral views of the left humerus are obtained.



There is a comminuted fractured of the left humeral neck with

about 1.1 cm of displacement of the dominant fragments.  There is

no dislocation of the glenohumeral joint. AC joint appears

intact. Distal and mid humeral shaft are intact.



Impression:  Comminuted left humeral neck fracture as described

above.



Dictated by: 



  Dictated on workstation # [id number]

## 2022-06-30 NOTE — ED FALL/INJURY
General


Chief Complaint:  Trauma-Non Activation


Stated Complaint:  UPPER EXTREMITY


Source:  EMS, nursing home records


Exam Limitations:  other (PT WITH DEMENTIA AND UNABLE TO GIVE ANY INFORMATION)





History of Present Illness


Date Seen by Provider:  Jun 30, 2022


Time Seen by Provider:  20:55


Initial Comments


PT ARRIVES VIA EMS FROM VIA [facility]


PT HAD A WITNESSED FALL, FELL FORWARD AND LANDED ON LEFT SHOULDER--JUST PRIOR TO

ARRIVAL


PT HAS SWELLING, BRUISING AND DEFORMITY TO LEFT SHOULDER





DID NOT HIT HEAD OR HAVE LOSS OF CONSCIOUSNESS. 





PT WITH SEVERE DEMENTIA, GENERALIZED WEAKNESS, FREQUENT FALLS, AND IS POST-

POLIO. 


PT IS ON HOSPICE AND IS DNR/DNI


PT IS AT NORMAL BASELINE, PER EMS, AND LATER PER FAMILY





PT IS ON ASPIRIN, NO OTHER BLOOD THINNERS





PCP: DR. MURDOCK





Allergies and Home Medications


Allergies


Coded Allergies:  


     No Known Drug Allergies (Unverified , 12/7/15)





Patient Home Medication List


Home Medication List Reviewed:  Yes


Acetaminophen (Acetaminophen) 325 Mg/10.15 Ml Soln, 325 MG PO Q6H PRN for PAIN-

MILD


   Prescribed by: BRITTA SNEED on 7/18/19 2115


Apixaban (Eliquis) 5 Mg Tablet, 5 MG PO BID


   Prescribed by: BRITTA SNEED on 7/18/19 2115


Aspirin (Aspirin) 81 Mg Tab.chew, 81 MG PO DAILY


   Prescribed by: BRITTA SNEED on 7/18/19 2115


Donepezil HCl (Aricept) 5 Mg Tablet, 5 MG PO HS


   Prescribed by: BRITTA SNEED on 7/18/19 2115


Levetiracetam (Levetiracetam) 1,000 Mg Tablet, 1,000 MG PO BID


   Prescribed by: BRITTA SNEED on 7/18/19 2115


Metoprolol Tartrate (Metoprolol Tartrate) 25 Mg Tablet, 25 MG PO BID


   Prescribed by: BRITTA SNEED on 7/18/19 2115





Review of Systems


Review of Systems


Constitutional:  no symptoms reported


Musculoskeletal:  see HPI





Past Medical-Social-Family Hx


Seasonal Allergies


Seasonal Allergies:  No





Past Medical History


Surgeries:  Yes (LEFT HIP REPLACEMENT; BILATERAL CATARACT SURGERY)


Eye Surgery, Joint Replacement, Orthopedic


Respiratory:  Yes (O2 DEPENDENT AT 2L/NC CONTINOUSLY)


Pulmonary Embolism, COPD


Currently Using CPAP:  No


Currently Using BIPAP:  No


Cardiac:  Yes


Atrial Fibrillation, Hypertension


Neurological:  Yes (SEVERE DEMENTIA; POST POLIO)


Dementia, Seizure Disorder


GYN History:  Menopausal


Genitourinary:  Yes (INCONTINENCE)


Gastrointestinal:  No


Musculoskeletal:  Yes (LEFT HIP REPLACEMENT; FALLS; POST-POLIO)


Arthritis


Endocrine:  No


HEENT:  Yes (S/P BILATERAL CATARACT SURGERY)


Cataract


Hearing Impairment:  Hard of Hearing


Cancer:  No


Psychosocial:  Yes (DEMENTIA)


Depression


Integumentary:  No


Blood Disorders:  No


Adverse Reaction/Blood Tranf:  No





Family Medical History





Dementia


  19 MOTHER


FH: breast cancer


  G8 SISTER


FH: leukemia


  19 FATHER


FH: uterine cancer


  G8 SISTER











ADDITIONAL PMH:


-06/2019--PT HAD BEEN LIVING AT HOME WITH EX-, WITH INDEPENDENT


ADL'S AND ARRIVED IN ER WITH  ALTERED MENTAL STAUS, WITH STROKE LIKE


PRESENTATION THEN DEVELOPED STATUS EPILEPTICUS, WITH RESPIRATORY FAILURE


REQUIRING INTUBATION AND VENTILATOR PLACEMENT, ALSO HAD MULTIPLE BILATERAL


P.E.'S, HAD HYPERAMMONEMIA AND ENCEPHALOPATHY WITH RESULTANT PERSISTENT


CONFUSION,


WAS SEVERELY ACIDOTIC, WITH ELEVATED CO2 LEVELS.


PT WAS TRANSFERRED TO , THEN TRANSFERRED BACK HERE TO REHAB UNIT. 


PT HAS BEEN IN NURSING HOME SINCE THEN.





Physical Exam


Vital Signs





Vital Signs - First Documented








 6/30/22





 21:08


 


Temp 36.7


 


Pulse 100


 


Resp 20


 


B/P (MAP) 137/93 (108)


 


Pulse Ox 94


 


O2 Delivery Nasal Cannula


 


O2 Flow Rate 2.00





Capillary Refill :


Height, Weight, BMI


Height: 5'3.00"


Weight: 139lbs. 9.6oz. [id number]; 16.00 BMI


Method:Estimated


General Appearance:  no apparent distress, other (VERY HARD OF HEARING)


HEENT:  PERRL/EOMI, other (NO EXTERNAL EVIDENCE OF TRAUMA TO HEAD. WEARING 

GLASSES--NO DAMAGE TO GLASSES)


Neck:  non-tender, full range of motion


Cardiovascular:  normal peripheral pulses, regular rate, rhythm, no edema


Respiratory:  chest non-tender, normal breath sounds, no respiratory distress, 

no accessory muscle use


Peripheral Pulses:  1+ Dorsalis Pedis (R), 1+ Left Dors-Pedis (L), 1+ Radial 

Pulses (R), 1+ Radial Pulses (L)


Gastrointestinal:  non tender, soft


Back:  no CVA tenderness, no vertebral tenderness


Extremities:  normal capillary refill, swelling, other (SWELLING, BRUISING, 

DEFORMITY, CREPITANCE TO LEFT SHOULDER. WAILS IN PAIN WITH ANY MOVEMENT OF LEFT 

ARM)


Neurologic/Psychiatric:  no motor/sensory deficits, alert, normal mood/affect, 

other (ORIENTED TO SELF AND SON. VERY POOR MEMORY AND CONFUSED TO PLACE, TIME, 

SITUATION. PT  HAS CLEAR SPEECH. PT IS ABLE TO FOLLOW SIMPLE COMMANDS. )


Skin:  normal color, warm/dry, ecchymosis, other (POST INFLAMMATORY 

HYPERPIGMENTATION TO LEFT FLANK)





Procedures/Interventions


Splinting and Joint Reduction :  


   Pre-Proc Neuro Vasc Exam:  normal


   Post-Proc Neuro Vasc Exam:  normal


   Immobilizers:  Medium Shoulder





Progress/Results/Core Measures


Results/Orders


My Orders





Orders - TIMUR MARQUEZ DO


Ed Iv/Invasive Line Start (6/30/22 21:00)


O2 (6/30/22 21:00)


Monitor-Rhythm Ecg Trace Only (6/30/22 21:00)


Chest 1 View, Ap/Pa Only (6/30/22 21:00)


Shoulder, Left, 3 Views (6/30/22 21:00)


Humerus, Left, 2 Views (6/30/22 21:00)


Pelvis (6/30/22 21:00)


Shoulder Immoblizer (6/30/22 21:40)


Fentanyl  Inj (Sublimaze Injection) (6/30/22 21:40)





Vital Signs/I&O











 6/30/22 6/30/22 6/30/22





 21:08 21:14 22:06


 


Temp 36.7  


 


Pulse 100  87


 


Resp 20  19


 


B/P (MAP) 137/93 (108)  126/89


 


Pulse Ox 94 94 97


 


O2 Delivery Nasal Cannula Nasal Cannula Nasal Cannula


 


O2 Flow Rate 2.00 2.00 2.00











Progress


Progress Note :  


Progress Note


[facility] IS HERE WITH PT AND DAUGHTER IS ON SPEAKER PHONE DURING ER STAY. 


REVIEWED XRAY REPORTS, AND FOLLOW UP PLAN. 


ALL QUESTIONS ANSWERED





MO DETERIORATION IN PT'S CONDITION DURING ER STAY. 





2300--NURSING HOME STAFF MEMBER HERE TO TAKE PT BACK TO NURSING HOME





Diagnostic Imaging





Comments


XRAYS--PER RADIOLOGIST REPORTS AT 2154





CXR--


Heart is borderline in size. There are chronic-appearing


increased interstitial markings. There is no pneumothorax or


pleural fluid or definite infiltrate. There is no overt bony


abnormality of the chest. 





Impression:





Borderline cardiomegaly and chronic interstitial changes. No


overt acute abnormality of the chest. Findings are similar


compared to 05/06/2022.








LEFT SHOULDER--


There is a comminuted displaced fracture of the left humeral


neck, there is about 1.4 cm of displacement of the dominant


fragments. Glenohumeral joint and AC joint are intact.





IMPRESSION:


Displaced humeral neck fracture as described above.








LEFT HUMERUS--


There is a comminuted fractured of the left humeral neck with


about 1.1 cm of displacement of the dominant fragments.  There is


no dislocation of the glenohumeral joint. AC joint appears


intact. Distal and mid humeral shaft are intact.





Impression:  Comminuted left humeral neck fracture as described


above.





PELVIS XRAY--


There is generalized osteopenia. There is a left hip prosthesis.


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 There is some


irregularity of the right femoral neck but this appears chronic


compared to the prior study.





IMPRESSION:


Generalized osteopenia. Well-aligned left hip prosthesis.


Underlying chronic changes with no definite acute finding.


   Reviewed:  Reviewed by Me





Departure


Impression





   Primary Impression:  


   Closed fracture of left proximal humerus


   Additional Impressions:  


   WITNESSED FALL FROM STANDING


   Severe dementia


Disposition:  03 X[facility]


Condition:  Stable





Departure-Patient Inst.


Decision time for Depature:  21:42


Referrals:  


FREDIS MURDOCK MD (PCP/Family)


Primary Care Physician








JOSE LEONARD MD


Patient Instructions:  How to Use a Shoulder Sling ED, Upper Arm Fracture ED, 

Using Cold for Pain





Add. Discharge Instructions:  


WEAR SHOULDER IMMOBILIZER AT ALL TIMES





ICE TO AREA AT 20 MINUTE INTERVALS





CONTINUE YOUR REGULAR MEDICATIONS AS PRESCRIBED, INCLUDING PAIN MEDICATIONS





FOLLOW UP WITH DR. LEONARD NEXT WEEK FOR FURTHER CARE. 





All discharge instructions reviewed with patient and/or family. Voiced 

understanding.











TIMUR MARQUEZ DO                 Jun 30, 2022 21:07

## 2022-06-30 NOTE — DIAGNOSTIC IMAGING REPORT
INDICATION: Fall with pelvic pain.



AP pelvis obtained at 9:32 p.m. and compared to 05/06/2022.



There is generalized osteopenia. There is a left hip prosthesis.

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 There is some

irregularity of the right femoral neck but this appears chronic

compared to the prior study.



IMPRESSION:

Generalized osteopenia. Well-aligned left hip prosthesis.

Underlying chronic changes with no definite acute finding.



Dictated by: 



  Dictated on workstation # [id number]

## 2022-06-30 NOTE — DIAGNOSTIC IMAGING REPORT
INDICATION: Left shoulder pain



AP, oblique, and transscapular views of the left shoulder

obtained at 9:36 p.m.



There is a comminuted displaced fracture of the left humeral

neck, there is about 1.4 cm of displacement of the dominant

fragments. Glenohumeral joint and AC joint are intact.



IMPRESSION:

Displaced humeral neck fracture as described above.



Dictated by: 



  Dictated on workstation # [id number]

## 2024-04-03 NOTE — DIAGNOSTIC IMAGING REPORT
Indication: Fall.



Time of Exam: 9:04 AM



Correlation is made with prior chest from 08/15/2021.



Finding: The heart size is normal. The pulmonary vascularity is

unremarkable. The lungs are clear. No infiltrate, effusion or

pneumothorax is detected.



Impression: No acute cardiopulmonary process is detected.



Dictated by: 



  Dictated on workstation # [id number] > 10% in 6 months